# Patient Record
Sex: MALE | Race: WHITE | NOT HISPANIC OR LATINO | Employment: OTHER | URBAN - METROPOLITAN AREA
[De-identification: names, ages, dates, MRNs, and addresses within clinical notes are randomized per-mention and may not be internally consistent; named-entity substitution may affect disease eponyms.]

---

## 2023-07-18 ENCOUNTER — PATIENT OUTREACH (OUTPATIENT)
Dept: CARE COORDINATION | Facility: CLINIC | Age: 63
End: 2023-07-18
Payer: COMMERCIAL

## 2023-09-22 PROBLEM — E78.5 HYPERLIPIDEMIA: Status: ACTIVE | Noted: 2023-09-22

## 2023-09-22 PROBLEM — I25.2 HX OF MYOCARDIAL INFARCTION: Status: ACTIVE | Noted: 2023-07-04

## 2023-09-22 PROBLEM — N40.0 BPH (BENIGN PROSTATIC HYPERPLASIA): Status: ACTIVE | Noted: 2023-09-22

## 2023-09-22 PROBLEM — I25.10 CAD (CORONARY ARTERY DISEASE): Status: ACTIVE | Noted: 2023-09-22

## 2023-09-22 PROBLEM — M54.16 PAIN, RADICULAR, LUMBAR: Status: ACTIVE | Noted: 2023-09-22

## 2023-09-22 PROBLEM — I63.9 CEREBROVASCULAR ACCIDENT (CVA) (MULTI): Status: ACTIVE | Noted: 2023-09-22

## 2023-09-22 PROBLEM — I70.219: Status: ACTIVE | Noted: 2023-09-22

## 2023-09-22 PROBLEM — F17.200 NICOTINE DEPENDENCE: Status: ACTIVE | Noted: 2023-01-05

## 2023-09-22 PROBLEM — M54.16 LUMBAR RADICULITIS: Status: ACTIVE | Noted: 2023-09-22

## 2023-09-22 PROBLEM — K59.00 CONSTIPATION: Status: ACTIVE | Noted: 2023-09-22

## 2023-09-22 PROBLEM — M24.542 CONTRACTURE OF LEFT HAND: Status: ACTIVE | Noted: 2023-09-22

## 2023-09-22 PROBLEM — I67.2 CEREBRAL ATHEROSCLEROSIS: Status: ACTIVE | Noted: 2023-09-22

## 2023-09-22 PROBLEM — I21.9 MYOCARDIAL INFARCT (MULTI): Status: ACTIVE | Noted: 2023-09-22

## 2023-09-22 PROBLEM — J43.9 EMPHYSEMA/COPD (MULTI): Status: ACTIVE | Noted: 2023-09-22

## 2023-09-22 PROBLEM — I10 BENIGN ESSENTIAL HTN: Status: ACTIVE | Noted: 2023-07-04

## 2023-09-22 PROBLEM — C34.90 LUNG CANCER (MULTI): Status: ACTIVE | Noted: 2023-09-22

## 2023-09-22 RX ORDER — LOSARTAN POTASSIUM 50 MG/1
50 TABLET ORAL
COMMUNITY
Start: 2022-08-05

## 2023-09-22 RX ORDER — METHOCARBAMOL 750 MG/1
750 TABLET, FILM COATED ORAL 3 TIMES DAILY PRN
COMMUNITY
Start: 2023-01-17

## 2023-09-22 RX ORDER — AMLODIPINE BESYLATE 5 MG/1
2.5 TABLET ORAL
COMMUNITY
Start: 2023-01-09

## 2023-09-22 RX ORDER — ASPIRIN 81 MG/1
81 TABLET ORAL DAILY
COMMUNITY
Start: 2022-06-06 | End: 2023-10-17 | Stop reason: SDUPTHER

## 2023-09-22 RX ORDER — ALBUTEROL SULFATE 90 UG/1
2 AEROSOL, METERED RESPIRATORY (INHALATION) EVERY 4 HOURS PRN
COMMUNITY
Start: 2023-06-21

## 2023-09-22 RX ORDER — GABAPENTIN 300 MG/1
300 CAPSULE ORAL
COMMUNITY
Start: 2022-06-20 | End: 2023-11-07 | Stop reason: SDUPTHER

## 2023-09-22 RX ORDER — METOPROLOL TARTRATE 25 MG/1
25 TABLET, FILM COATED ORAL 2 TIMES DAILY
COMMUNITY
Start: 2023-01-17 | End: 2023-10-17 | Stop reason: SDUPTHER

## 2023-09-22 RX ORDER — IBUPROFEN 200 MG
1 TABLET ORAL
COMMUNITY
Start: 2022-04-21 | End: 2023-11-08 | Stop reason: SDUPTHER

## 2023-09-24 RX ORDER — NITROGLYCERIN 0.4 MG/1
TABLET SUBLINGUAL
COMMUNITY

## 2023-10-08 VITALS — HEIGHT: 68 IN | WEIGHT: 115.74 LBS | BODY MASS INDEX: 17.54 KG/M2

## 2023-10-08 DIAGNOSIS — C34.11 MALIGNANT NEOPLASM OF UPPER LOBE OF RIGHT LUNG (MULTI): Primary | ICD-10-CM

## 2023-10-08 RX ORDER — DIPHENHYDRAMINE HYDROCHLORIDE 50 MG/ML
50 INJECTION INTRAMUSCULAR; INTRAVENOUS AS NEEDED
Status: CANCELLED | OUTPATIENT
Start: 2023-10-12

## 2023-10-08 RX ORDER — PROCHLORPERAZINE EDISYLATE 5 MG/ML
10 INJECTION INTRAMUSCULAR; INTRAVENOUS EVERY 6 HOURS PRN
Status: CANCELLED | OUTPATIENT
Start: 2023-10-12

## 2023-10-08 RX ORDER — FAMOTIDINE 10 MG/ML
20 INJECTION INTRAVENOUS ONCE AS NEEDED
Status: CANCELLED | OUTPATIENT
Start: 2023-10-12

## 2023-10-08 RX ORDER — ALBUTEROL SULFATE 0.83 MG/ML
3 SOLUTION RESPIRATORY (INHALATION) AS NEEDED
Status: CANCELLED | OUTPATIENT
Start: 2023-10-12

## 2023-10-08 RX ORDER — PROCHLORPERAZINE MALEATE 10 MG
10 TABLET ORAL EVERY 6 HOURS PRN
Status: CANCELLED | OUTPATIENT
Start: 2023-10-12

## 2023-10-08 RX ORDER — EPINEPHRINE 0.3 MG/.3ML
0.3 INJECTION SUBCUTANEOUS EVERY 5 MIN PRN
Status: CANCELLED | OUTPATIENT
Start: 2023-10-12

## 2023-10-08 RX ORDER — HEPARIN SODIUM,PORCINE/PF 10 UNIT/ML
50 SYRINGE (ML) INTRAVENOUS AS NEEDED
Status: CANCELLED | OUTPATIENT
Start: 2023-10-11

## 2023-10-08 RX ORDER — HEPARIN 100 UNIT/ML
500 SYRINGE INTRAVENOUS AS NEEDED
Status: CANCELLED | OUTPATIENT
Start: 2023-10-11

## 2023-10-09 ENCOUNTER — LAB (OUTPATIENT)
Dept: LAB | Facility: LAB | Age: 63
End: 2023-10-09
Payer: COMMERCIAL

## 2023-10-09 DIAGNOSIS — C34.11 MALIGNANT NEOPLASM OF UPPER LOBE OF RIGHT LUNG (MULTI): ICD-10-CM

## 2023-10-09 DIAGNOSIS — C34.90 LUNG CANCER METASTATIC TO BRAIN (MULTI): Primary | ICD-10-CM

## 2023-10-09 DIAGNOSIS — C79.31 LUNG CANCER METASTATIC TO BRAIN (MULTI): Primary | ICD-10-CM

## 2023-10-09 LAB
ALBUMIN SERPL BCP-MCNC: 4.3 G/DL (ref 3.4–5)
ALP SERPL-CCNC: 58 U/L (ref 33–136)
ALT SERPL W P-5'-P-CCNC: 14 U/L (ref 10–52)
ANION GAP SERPL CALC-SCNC: 12 MMOL/L (ref 10–20)
AST SERPL W P-5'-P-CCNC: 15 U/L (ref 9–39)
BASOPHILS # BLD AUTO: 0.06 X10*3/UL (ref 0–0.1)
BASOPHILS NFR BLD AUTO: 0.8 %
BILIRUB SERPL-MCNC: 0.6 MG/DL (ref 0–1.2)
BUN SERPL-MCNC: 11 MG/DL (ref 6–23)
CALCIUM SERPL-MCNC: 9.3 MG/DL (ref 8.6–10.6)
CHLORIDE SERPL-SCNC: 99 MMOL/L (ref 98–107)
CO2 SERPL-SCNC: 26 MMOL/L (ref 21–32)
CORTIS AM PEAK SERPL-MSCNC: 13.2 UG/DL (ref 5–20)
CREAT SERPL-MCNC: 0.56 MG/DL (ref 0.5–1.3)
EOSINOPHIL # BLD AUTO: 0.76 X10*3/UL (ref 0–0.7)
EOSINOPHIL NFR BLD AUTO: 9.7 %
ERYTHROCYTE [DISTWIDTH] IN BLOOD BY AUTOMATED COUNT: 18.1 % (ref 11.5–14.5)
GFR SERPL CREATININE-BSD FRML MDRD: >90 ML/MIN/1.73M*2
GLUCOSE SERPL-MCNC: 83 MG/DL (ref 74–99)
HCT VFR BLD AUTO: 35.2 % (ref 41–52)
HGB BLD-MCNC: 11.2 G/DL (ref 13.5–17.5)
IMM GRANULOCYTES # BLD AUTO: 0.04 X10*3/UL (ref 0–0.7)
IMM GRANULOCYTES NFR BLD AUTO: 0.5 % (ref 0–0.9)
LYMPHOCYTES # BLD AUTO: 3.5 X10*3/UL (ref 1.2–4.8)
LYMPHOCYTES NFR BLD AUTO: 44.7 %
MCH RBC QN AUTO: 21.3 PG (ref 26–34)
MCHC RBC AUTO-ENTMCNC: 31.8 G/DL (ref 32–36)
MCV RBC AUTO: 67 FL (ref 80–100)
MONOCYTES # BLD AUTO: 0.76 X10*3/UL (ref 0.1–1)
MONOCYTES NFR BLD AUTO: 9.7 %
NEUTROPHILS # BLD AUTO: 2.71 X10*3/UL (ref 1.2–7.7)
NEUTROPHILS NFR BLD AUTO: 34.6 %
NRBC BLD-RTO: ABNORMAL /100{WBCS}
PLATELET # BLD AUTO: 288 X10*3/UL (ref 150–450)
PMV BLD AUTO: 10.2 FL (ref 7.5–11.5)
POTASSIUM SERPL-SCNC: 4.7 MMOL/L (ref 3.5–5.3)
PROT SERPL-MCNC: 6.9 G/DL (ref 6.4–8.2)
RBC # BLD AUTO: 5.26 X10*6/UL (ref 4.5–5.9)
SODIUM SERPL-SCNC: 132 MMOL/L (ref 136–145)
T4 FREE SERPL-MCNC: 0.98 NG/DL (ref 0.78–1.48)
TSH SERPL-ACNC: 4 MIU/L (ref 0.44–3.98)
WBC # BLD AUTO: 7.8 X10*3/UL (ref 4.4–11.3)

## 2023-10-09 PROCEDURE — 80053 COMPREHEN METABOLIC PANEL: CPT

## 2023-10-09 PROCEDURE — 84443 ASSAY THYROID STIM HORMONE: CPT

## 2023-10-09 PROCEDURE — 84439 ASSAY OF FREE THYROXINE: CPT

## 2023-10-09 PROCEDURE — 82024 ASSAY OF ACTH: CPT

## 2023-10-09 PROCEDURE — 82533 TOTAL CORTISOL: CPT

## 2023-10-09 PROCEDURE — 36415 COLL VENOUS BLD VENIPUNCTURE: CPT

## 2023-10-11 ENCOUNTER — APPOINTMENT (OUTPATIENT)
Dept: HEMATOLOGY/ONCOLOGY | Facility: CLINIC | Age: 63
End: 2023-10-11
Payer: COMMERCIAL

## 2023-10-11 ENCOUNTER — OFFICE VISIT (OUTPATIENT)
Dept: HEMATOLOGY/ONCOLOGY | Facility: CLINIC | Age: 63
End: 2023-10-11
Payer: COMMERCIAL

## 2023-10-11 ENCOUNTER — INFUSION (OUTPATIENT)
Dept: HEMATOLOGY/ONCOLOGY | Facility: CLINIC | Age: 63
End: 2023-10-11
Payer: COMMERCIAL

## 2023-10-11 VITALS
RESPIRATION RATE: 18 BRPM | BODY MASS INDEX: 19.86 KG/M2 | SYSTOLIC BLOOD PRESSURE: 121 MMHG | WEIGHT: 130.29 LBS | TEMPERATURE: 96.8 F | OXYGEN SATURATION: 96 % | DIASTOLIC BLOOD PRESSURE: 69 MMHG | HEART RATE: 80 BPM

## 2023-10-11 DIAGNOSIS — C34.11 MALIGNANT NEOPLASM OF UPPER LOBE OF RIGHT LUNG (MULTI): ICD-10-CM

## 2023-10-11 DIAGNOSIS — C34.11 MALIGNANT NEOPLASM OF UPPER LOBE OF RIGHT LUNG (MULTI): Primary | ICD-10-CM

## 2023-10-11 LAB — ACTH PLAS-MCNC: 62.1 PG/ML (ref 7.2–63.3)

## 2023-10-11 PROCEDURE — 1126F AMNT PAIN NOTED NONE PRSNT: CPT | Performed by: NURSE PRACTITIONER

## 2023-10-11 PROCEDURE — 1159F MED LIST DOCD IN RCRD: CPT | Performed by: NURSE PRACTITIONER

## 2023-10-11 PROCEDURE — 99214 OFFICE O/P EST MOD 30 MIN: CPT | Performed by: NURSE PRACTITIONER

## 2023-10-11 PROCEDURE — 96413 CHEMO IV INFUSION 1 HR: CPT

## 2023-10-11 PROCEDURE — 2500000004 HC RX 250 GENERAL PHARMACY W/ HCPCS (ALT 636 FOR OP/ED): Mod: SE | Performed by: NURSE PRACTITIONER

## 2023-10-11 PROCEDURE — 3074F SYST BP LT 130 MM HG: CPT | Performed by: NURSE PRACTITIONER

## 2023-10-11 PROCEDURE — 3078F DIAST BP <80 MM HG: CPT | Performed by: NURSE PRACTITIONER

## 2023-10-11 RX ORDER — HEPARIN SODIUM,PORCINE/PF 10 UNIT/ML
50 SYRINGE (ML) INTRAVENOUS AS NEEDED
Status: CANCELLED | OUTPATIENT
Start: 2023-10-11

## 2023-10-11 RX ORDER — ALBUTEROL SULFATE 0.83 MG/ML
3 SOLUTION RESPIRATORY (INHALATION) AS NEEDED
Status: DISCONTINUED | OUTPATIENT
Start: 2023-10-11 | End: 2023-10-11 | Stop reason: HOSPADM

## 2023-10-11 RX ORDER — FAMOTIDINE 10 MG/ML
20 INJECTION INTRAVENOUS ONCE AS NEEDED
Status: DISCONTINUED | OUTPATIENT
Start: 2023-10-11 | End: 2023-10-11 | Stop reason: HOSPADM

## 2023-10-11 RX ORDER — PROCHLORPERAZINE EDISYLATE 5 MG/ML
10 INJECTION INTRAMUSCULAR; INTRAVENOUS EVERY 6 HOURS PRN
Status: DISCONTINUED | OUTPATIENT
Start: 2023-10-11 | End: 2023-10-11 | Stop reason: HOSPADM

## 2023-10-11 RX ORDER — PROCHLORPERAZINE MALEATE 10 MG
10 TABLET ORAL EVERY 6 HOURS PRN
Status: DISCONTINUED | OUTPATIENT
Start: 2023-10-11 | End: 2023-10-11 | Stop reason: HOSPADM

## 2023-10-11 RX ORDER — DIPHENHYDRAMINE HYDROCHLORIDE 50 MG/ML
50 INJECTION INTRAMUSCULAR; INTRAVENOUS AS NEEDED
Status: DISCONTINUED | OUTPATIENT
Start: 2023-10-11 | End: 2023-10-11 | Stop reason: HOSPADM

## 2023-10-11 RX ORDER — EPINEPHRINE 0.3 MG/.3ML
0.3 INJECTION SUBCUTANEOUS EVERY 5 MIN PRN
Status: DISCONTINUED | OUTPATIENT
Start: 2023-10-11 | End: 2023-10-11 | Stop reason: HOSPADM

## 2023-10-11 RX ORDER — HEPARIN 100 UNIT/ML
500 SYRINGE INTRAVENOUS AS NEEDED
Status: CANCELLED | OUTPATIENT
Start: 2023-10-11

## 2023-10-11 RX ADMIN — SODIUM CHLORIDE 200 MG: 9 INJECTION, SOLUTION INTRAVENOUS at 12:45

## 2023-10-11 ASSESSMENT — PATIENT HEALTH QUESTIONNAIRE - PHQ9
2. FEELING DOWN, DEPRESSED OR HOPELESS: NOT AT ALL
SUM OF ALL RESPONSES TO PHQ9 QUESTIONS 1 AND 2: 0
1. LITTLE INTEREST OR PLEASURE IN DOING THINGS: NOT AT ALL

## 2023-10-11 ASSESSMENT — PAIN SCALES - GENERAL: PAINLEVEL: 0-NO PAIN

## 2023-10-11 ASSESSMENT — ENCOUNTER SYMPTOMS
DEPRESSION: 0
OCCASIONAL FEELINGS OF UNSTEADINESS: 0
LOSS OF SENSATION IN FEET: 0

## 2023-10-11 ASSESSMENT — COLUMBIA-SUICIDE SEVERITY RATING SCALE - C-SSRS
1. IN THE PAST MONTH, HAVE YOU WISHED YOU WERE DEAD OR WISHED YOU COULD GO TO SLEEP AND NOT WAKE UP?: NO
2. HAVE YOU ACTUALLY HAD ANY THOUGHTS OF KILLING YOURSELF?: NO
6. HAVE YOU EVER DONE ANYTHING, STARTED TO DO ANYTHING, OR PREPARED TO DO ANYTHING TO END YOUR LIFE?: NO

## 2023-10-11 NOTE — SIGNIFICANT EVENT
10/11/23 1100   Prechemo Checklist   Has the patient been in the hospital, ED, or urgent care since last date of service No   Chemo/Immuno Consent Signed Yes   Protocol/Indications Verified Yes   Confirmed to previous date/time of medication Yes   Compared to previous dose Yes   All medications are dated accurately Yes   Pregnancy Test Negative Not applicable   Parameters Met Yes   BSA/Weight-Height Verified Yes   Dose Calculations Verified Yes

## 2023-10-11 NOTE — PROGRESS NOTES
Cancer History:   Treatment Synopsis:    DIAGNOSIS   Stage IVB ([id number]) poorly differentiated adenocarcinoma of RUL     PATHOLOGY  C. FINE NEEDLE ASPIRATION LYMPH NODE, R 11, CYTOLOGY AND CELL BLOCK:   POORLY DIFFERENTIATED CARCINOMA, CONSISTENT WITH ADENOCARCINOMA, SEE NOTE.      PD-L1 22C3 by Immunohistochemistry with Interpretation, pembrolizumab   (KEYTRUDA)     Interpretation: HIGH EXPRESSION     Tumor Proportion Score (TPS): 80%     MICROSATELLITE STATUS: Microsatellite Stable (MAGDIEL)     DISEASE  ASSOCIATED GENOMIC FINDINGS:   KRAS p.G13D ([id number] c.38G>A)   PIK3CA p.E542K ([id number] c.1624G>A)   TP53 p.E285K ([id number] c.853G>A)     DISEASE RELEVANT ALTERATIONS NOT DETECTED:   Negative for ALK fusion.   Negative for  BRAF V600E.   Negative for EGFR sensitizing mutation.   Negative for KRAS G12C.   Negative for MET exon 14 skipping mutation.   Negative for NTRK fusion.   Negative for RET fusion.   Negative for ROS1 fusion.      Therapies:  Pembrolizumab to begin on 08/31/23        Treatment History:    Mr. Pillo Guerin is a 64 YO with PMH of EtOH use disorder, CVA, CAD c/b NSTEMI s/p 2 stents and stage IVB ([id number]) poorly differentiated adenocarcinoma of RUL  seen as initial consultation.     Patient was initially diagnosed 08/2022 after incidentally found RUL lesion was worked up during admission for hyponatremia presumed to be 2/2 beer potomania. He was then seen by his pulmonologist Dr. Callejas who ordered PET and MRI but this was not obtained  nor were multiple attempts to schedule for oncologic NPV.     I saw him in August 2023 and he endorsed haviing lost 15 lbs since stroke April 2022. His appetite is good, denies dysgeusia. He has slowed down a bit since he had his stroke. He can walk without becoming dyspneic but is limited by his stroke.  He is  worried about his cancer spreading.       Oncology History   Lung cancer (CMS/HCC)   8/31/2023 -  Chemotherapy    Pembrolizumab, 21 Day Cycles      9/22/2023 Initial Diagnosis    Lung cancer (CMS/HCC)         [facility] - Medical Oncology Follow-Up Visit    Patient ID Pillo Guerin is a 63 y.o. male        Chief Concern: Here in clinic for follow-up and treatment with pembrolizumab.    HPI Patient is here today for follow-up and treatment with pembrolizumab. He is feeling well. Breathing is good, no CP, SOB, or dizziness. No neurological symptoms. States he is scheduled for GK next Tuesday. Denies abdominal pain, N/V/D/C. No new aches/pains. No rash or skin issues. No other concerns or complaints.         Meds (Current):    Current Outpatient Medications:     albuterol 90 mcg/actuation inhaler, Inhale 2 puffs every 4 hours if needed., Disp: , Rfl:     amLODIPine (Norvasc) 5 mg tablet, Take 0.5 tablets (2.5 mg) by mouth once daily., Disp: , Rfl:     aspirin 81 mg chewable tablet, CHEW 1 TABLET BY MOUTH ONCE DAILY, Disp: 30 tablet, Rfl: 3    aspirin 81 mg EC tablet, Take 1 tablet (81 mg) by mouth once daily., Disp: , Rfl:     atorvastatin (Lipitor) 80 mg tablet, TAKE 1 TABLET BY MOUTH ONCE DAILY, Disp: 30 tablet, Rfl: 1    diclofenac sodium 1 % kit, Apply 2 g topically every 6 hours if needed., Disp: , Rfl:     gabapentin (Neurontin) 300 mg capsule, Take 1 capsule (300 mg) by mouth., Disp: , Rfl:     losartan (Cozaar) 50 mg tablet, Take 1 tablet (50 mg) by mouth., Disp: , Rfl:     methocarbamol (Robaxin) 750 mg tablet, Take 1 tablet (750 mg) by mouth 3 times a day as needed., Disp: , Rfl:     metoprolol tartrate (Lopressor) 25 mg tablet, Take 1 tablet (25 mg) by mouth twice a day., Disp: , Rfl:     metoprolol tartrate (Lopressor) 25 mg tablet, TAKE 1 TABLET BY MOUTH TWO TIMES A DAY, Disp: 60 tablet, Rfl: 1    nicotine (Nicoderm CQ) 21 mg/24 hr patch, Place 1 patch on the skin., Disp: , Rfl:     nitroglycerin (Nitrostat) 0.4 mg SL tablet, Place under the tongue., Disp: , Rfl:     nitroglycerin (Nitrostat) 0.4 mg SL tablet, DISSOLVE  1 TABLET UNDER TONGUE EVERY 5 MINUTES FOR 3 DOSES AS NEEDED FOR CHEST PAIN. IF PAIN PERSISTS DIAL 911., Disp: 25 tablet, Rfl: 0    ticagrelor (Brilinta) 90 mg tablet, TAKE 1 TABLET BY MOUTH TWO TIMES A DAY, Disp: 60 tablet, Rfl: 11    No Known Allergies    Review of Systems - Oncology 12 point ROS was obtained and negative unless otherwise mentioned in the above HPI.      Objective   BSA: 1.68 meters squared  /69   Pulse 80   Temp 36 °C (96.8 °F)   Resp 18   Wt 59.1 kg (130 lb 4.7 oz)   SpO2 96%   BMI 19.86 kg/m²          Physical Exam  Constitutional:       Appearance: Normal appearance.   Eyes:      Pupils: Pupils are equal, round, and reactive to light.   Cardiovascular:      Rate and Rhythm: Normal rate and regular rhythm.   Pulmonary:      Effort: Pulmonary effort is normal.      Breath sounds: Normal breath sounds.   Abdominal:      General: Bowel sounds are normal.      Palpations: Abdomen is soft.   Musculoskeletal:         General: No swelling.      Comments: Wears left leg brace   Skin:     General: Skin is warm and dry.   Neurological:      General: No focal deficit present.      Mental Status: He is alert and oriented to person, place, and time.   Psychiatric:         Mood and Affect: Mood normal.         Behavior: Behavior normal.          Results:  Labs:  Lab Results   Component Value Date    WBC 7.8 10/09/2023    HGB 11.2 (L) 10/09/2023    HCT 35.2 (L) 10/09/2023    MCV 67 (L) 10/09/2023     10/09/2023      Lab Results   Component Value Date    NEUTROABS 2.71 10/09/2023      Lab Results   Component Value Date    GLUCOSE 83 10/09/2023    CALCIUM 9.3 10/09/2023     (L) 10/09/2023    K 4.7 10/09/2023    CO2 26 10/09/2023    CL 99 10/09/2023    BUN 11 10/09/2023    CREATININE 0.56 10/09/2023    MG 2.21 09/19/2023     Lab Results   Component Value Date    ALT 14 10/09/2023    AST 15 10/09/2023    ALKPHOS 58 10/09/2023    BILITOT 0.6 10/09/2023    BILIDIR 0.3 07/31/2022      Lab  Results   Component Value Date    ACTH 62.1 10/09/2023    CORTISOL 13.2 10/09/2023    TSH 4.00 (H) 10/09/2023    FREET4 0.98 10/09/2023              === Results for orders placed in visit on 08/16/23 ===    MR brain w and wo IV contrast [[id number]] 08/16/2023    Status: Normal  Cystic 8 x 6 mm metastatic lesion in the medial right occipital lobe  with mild associated edema. No mass effect or hemorrhage.    Moderate microangiopathic disease. Chronic lacunar infarct in the  right corona radiata/centrum semiovale.    Assessment/Plan      Pillo Guerin is a 63 y.o. male here for follow up.    Assessment and Plan:   Assessment:    Pal is a 62 YO with PMH of EtOH use disorder, CVA, CAD c/b NSTEMI s/p 2 stents and stage IVB ([id number]) poorly differentiated adenocarcinoma of RUL with metastasis  to the lungs, left occipital lobe and left gluteus                        Plan:    #Stage IVB ([id number]) poorly differentiated adenocarcinoma of RUL, TPS 80%, lack of actionable genomic alterations noted.  -PET and MRI test results discussed   -Discussed with the patient next steps, including pembrolizumab Rx  He was able to review the potential benefits and side effects of the treatment and able to provide a consent over the phone  - Pembrolizumab started on 08/31/23   -RTC in 3 weeks for follow-up and treatment.    # Right occipital lobe 8 mm metastasis   Asymptomatic at the moment   No need for dexamethasone   -seen by rad onc (Dr. Mckeon) and plan for GK, scheduled 10/17/23     -has pulmonology appointment scheduled 10/13/23, pulmonary testing scheduled 10/16/23  Referral placed for Dr. Michael Esparza     # Pain on right gluteal region-improved  Due to large centrally necrotic and peripherally hypermetabolic mass in the right lower paramediastinal muscle extending into the adjacent subcutaneous tissue and superior right gluteal muscle with involvement of the superior aspect of the right iliac  bone, right florencio sacrum with  worsening associated cortical erosions and lytic  changes and extension into the right sacroiliac joint (L5-S1) consistent with osseous metastatic disease with associated soft tissue mass.  Rx for gabapentin provided - he was taking 600 mg BID 3 months ago        # Stroke in April 2022 and hx of CAD  -   Stable -   On ASA and ticagrelor      # HTN - on amlodipine      # HLD - atorvastatin

## 2023-10-16 ENCOUNTER — HOSPITAL ENCOUNTER (OUTPATIENT)
Dept: RESPIRATORY THERAPY | Facility: HOSPITAL | Age: 63
Discharge: HOME | End: 2023-10-16
Payer: COMMERCIAL

## 2023-10-16 DIAGNOSIS — C34.90 MALIGNANT NEOPLASM OF LUNG, UNSPECIFIED LATERALITY, UNSPECIFIED PART OF LUNG (MULTI): Primary | ICD-10-CM

## 2023-10-16 PROCEDURE — 94726 PLETHYSMOGRAPHY LUNG VOLUMES: CPT | Performed by: INTERNAL MEDICINE

## 2023-10-16 PROCEDURE — 94060 EVALUATION OF WHEEZING: CPT

## 2023-10-16 PROCEDURE — 94726 PLETHYSMOGRAPHY LUNG VOLUMES: CPT

## 2023-10-16 PROCEDURE — 94729 DIFFUSING CAPACITY: CPT | Performed by: INTERNAL MEDICINE

## 2023-10-16 PROCEDURE — 94060 EVALUATION OF WHEEZING: CPT | Performed by: INTERNAL MEDICINE

## 2023-10-17 ENCOUNTER — RADIATION ONCOLOGY OTV (OUTPATIENT)
Dept: RADIATION ONCOLOGY | Facility: HOSPITAL | Age: 63
End: 2023-10-17

## 2023-10-17 ENCOUNTER — TELEMEDICINE (OUTPATIENT)
Dept: PULMONOLOGY | Facility: CLINIC | Age: 63
End: 2023-10-17
Payer: COMMERCIAL

## 2023-10-17 ENCOUNTER — APPOINTMENT (OUTPATIENT)
Dept: RADIATION ONCOLOGY | Facility: HOSPITAL | Age: 63
End: 2023-10-17
Payer: COMMERCIAL

## 2023-10-17 ENCOUNTER — HOSPITAL ENCOUNTER (OUTPATIENT)
Dept: RADIATION ONCOLOGY | Facility: HOSPITAL | Age: 63
Setting detail: RADIATION/ONCOLOGY SERIES
Discharge: STILL ADMITTED OR ADMINISTRATIVE | End: 2023-10-17
Payer: COMMERCIAL

## 2023-10-17 ENCOUNTER — HOSPITAL ENCOUNTER (OUTPATIENT)
Dept: RADIOLOGY | Facility: HOSPITAL | Age: 63
Discharge: HOME | End: 2023-10-17
Payer: COMMERCIAL

## 2023-10-17 VITALS
DIASTOLIC BLOOD PRESSURE: 69 MMHG | RESPIRATION RATE: 16 BRPM | TEMPERATURE: 97.5 F | OXYGEN SATURATION: 100 % | HEART RATE: 57 BPM | SYSTOLIC BLOOD PRESSURE: 119 MMHG

## 2023-10-17 VITALS
SYSTOLIC BLOOD PRESSURE: 115 MMHG | HEART RATE: 65 BPM | OXYGEN SATURATION: 98 % | DIASTOLIC BLOOD PRESSURE: 68 MMHG | RESPIRATION RATE: 16 BRPM

## 2023-10-17 DIAGNOSIS — C34.90 LUNG CANCER METASTATIC TO BRAIN (MULTI): Primary | ICD-10-CM

## 2023-10-17 DIAGNOSIS — J43.9 PULMONARY EMPHYSEMA, UNSPECIFIED EMPHYSEMA TYPE (MULTI): Primary | ICD-10-CM

## 2023-10-17 DIAGNOSIS — C34.90 MALIGNANT NEOPLASM OF LUNG, UNSPECIFIED LATERALITY, UNSPECIFIED PART OF LUNG (MULTI): ICD-10-CM

## 2023-10-17 DIAGNOSIS — C79.31 SECONDARY MALIGNANT NEOPLASM OF BRAIN (MULTI): ICD-10-CM

## 2023-10-17 DIAGNOSIS — F17.210 CIGARETTE NICOTINE DEPENDENCE WITHOUT COMPLICATION: ICD-10-CM

## 2023-10-17 DIAGNOSIS — C79.31 LUNG CANCER METASTATIC TO BRAIN (MULTI): ICD-10-CM

## 2023-10-17 DIAGNOSIS — J43.9 PULMONARY EMPHYSEMA, UNSPECIFIED EMPHYSEMA TYPE (MULTI): ICD-10-CM

## 2023-10-17 DIAGNOSIS — C79.89 SECONDARY MALIGNANT NEOPLASM OF OTHER SPECIFIED SITES (MULTI): ICD-10-CM

## 2023-10-17 DIAGNOSIS — Z51.0 ENCOUNTER FOR ANTINEOPLASTIC RADIATION THERAPY: ICD-10-CM

## 2023-10-17 DIAGNOSIS — C79.31 LUNG CANCER METASTATIC TO BRAIN (MULTI): Primary | ICD-10-CM

## 2023-10-17 DIAGNOSIS — C34.90 LUNG CANCER METASTATIC TO BRAIN (MULTI): ICD-10-CM

## 2023-10-17 LAB
RAD ONC MSQ ACTUAL FRACTIONS DELIVERED: 1
RAD ONC MSQ ACTUAL SESSION DELIVERED DOSE: 2400 CGRAY
RAD ONC MSQ ACTUAL TOTAL DOSE: 2400 CGRAY
RAD ONC MSQ ELAPSED DAYS: 0
RAD ONC MSQ LAST DATE: NORMAL
RAD ONC MSQ PRESCRIBED FRACTIONAL DOSE: 2400 CGRAY
RAD ONC MSQ PRESCRIBED NUMBER OF FRACTIONS: 1
RAD ONC MSQ PRESCRIBED TECHNIQUE: NORMAL
RAD ONC MSQ PRESCRIBED TOTAL DOSE: 2400 CGRAY
RAD ONC MSQ START DATE: NORMAL
RAD ONC MSQ TREATMENT COURSE NUMBER: 1
RAD ONC MSQ TREATMENT SITE: NORMAL

## 2023-10-17 PROCEDURE — 77432 STEREOTACTIC RADIATION TRMT: CPT | Performed by: STUDENT IN AN ORGANIZED HEALTH CARE EDUCATION/TRAINING PROGRAM

## 2023-10-17 PROCEDURE — 77334 RADIATION TREATMENT AID(S): CPT | Performed by: STUDENT IN AN ORGANIZED HEALTH CARE EDUCATION/TRAINING PROGRAM

## 2023-10-17 PROCEDURE — 99442 PR PHYS/QHP TELEPHONE EVALUATION 11-20 MIN: CPT | Performed by: INTERNAL MEDICINE

## 2023-10-17 PROCEDURE — A9575 INJ GADOTERATE MEGLUMI 0.1ML: HCPCS | Performed by: STUDENT IN AN ORGANIZED HEALTH CARE EDUCATION/TRAINING PROGRAM

## 2023-10-17 PROCEDURE — 70553 MRI BRAIN STEM W/O & W/DYE: CPT | Performed by: STUDENT IN AN ORGANIZED HEALTH CARE EDUCATION/TRAINING PROGRAM

## 2023-10-17 PROCEDURE — 2550000001 HC RX 255 CONTRASTS: Performed by: STUDENT IN AN ORGANIZED HEALTH CARE EDUCATION/TRAINING PROGRAM

## 2023-10-17 PROCEDURE — 77370 RADIATION PHYSICS CONSULT: CPT | Mod: XE | Performed by: STUDENT IN AN ORGANIZED HEALTH CARE EDUCATION/TRAINING PROGRAM

## 2023-10-17 PROCEDURE — 61800 APPLY SRS HEADFRAME ADD-ON: CPT | Performed by: NEUROLOGICAL SURGERY

## 2023-10-17 PROCEDURE — 77300 RADIATION THERAPY DOSE PLAN: CPT | Performed by: STUDENT IN AN ORGANIZED HEALTH CARE EDUCATION/TRAINING PROGRAM

## 2023-10-17 PROCEDURE — 70553 MRI BRAIN STEM W/O & W/DYE: CPT | Performed by: RADIOLOGY

## 2023-10-17 PROCEDURE — 77371 SRS MULTISOURCE: CPT | Performed by: STUDENT IN AN ORGANIZED HEALTH CARE EDUCATION/TRAINING PROGRAM

## 2023-10-17 PROCEDURE — 77295 3-D RADIOTHERAPY PLAN: CPT | Performed by: STUDENT IN AN ORGANIZED HEALTH CARE EDUCATION/TRAINING PROGRAM

## 2023-10-17 PROCEDURE — 2500000004 HC RX 250 GENERAL PHARMACY W/ HCPCS (ALT 636 FOR OP/ED): Performed by: NEUROLOGICAL SURGERY

## 2023-10-17 PROCEDURE — 61796 SRS CRANIAL LESION SIMPLE: CPT | Performed by: NEUROLOGICAL SURGERY

## 2023-10-17 RX ORDER — TIOTROPIUM BROMIDE 18 UG/1
1 CAPSULE ORAL; RESPIRATORY (INHALATION)
Qty: 30 CAPSULE | Refills: 5 | Status: SHIPPED | OUTPATIENT
Start: 2023-10-17 | End: 2024-02-26 | Stop reason: SDUPTHER

## 2023-10-17 RX ORDER — LIDOCAINE HYDROCHLORIDE 10 MG/ML
INJECTION INFILTRATION; PERINEURAL
Status: DISCONTINUED
Start: 2023-10-17 | End: 2023-10-17 | Stop reason: HOSPADM

## 2023-10-17 RX ORDER — IBUPROFEN 200 MG
400 TABLET ORAL AS NEEDED
Status: SHIPPED | OUTPATIENT
Start: 2023-10-17

## 2023-10-17 RX ORDER — ACETAMINOPHEN 325 MG/1
650 TABLET ORAL EVERY 4 HOURS PRN
Status: SHIPPED | OUTPATIENT
Start: 2023-10-17

## 2023-10-17 RX ORDER — LIDOCAINE AND PRILOCAINE 25; 25 MG/G; MG/G
CREAM TOPICAL
Status: DISCONTINUED
Start: 2023-10-17 | End: 2023-10-17 | Stop reason: HOSPADM

## 2023-10-17 RX ORDER — HYDROMORPHONE HYDROCHLORIDE 1 MG/ML
0.4 INJECTION, SOLUTION INTRAMUSCULAR; INTRAVENOUS; SUBCUTANEOUS AS NEEDED
Status: SHIPPED | OUTPATIENT
Start: 2023-10-17

## 2023-10-17 RX ORDER — ONDANSETRON HYDROCHLORIDE 2 MG/ML
4 INJECTION, SOLUTION INTRAVENOUS EVERY 4 HOURS PRN
Status: SHIPPED | OUTPATIENT
Start: 2023-10-17

## 2023-10-17 RX ORDER — MIDAZOLAM HYDROCHLORIDE 1 MG/ML
0.5 INJECTION INTRAMUSCULAR; INTRAVENOUS AS NEEDED
Status: SHIPPED | OUTPATIENT
Start: 2023-10-17

## 2023-10-17 RX ORDER — HYDROMORPHONE HYDROCHLORIDE 1 MG/ML
INJECTION, SOLUTION INTRAMUSCULAR; INTRAVENOUS; SUBCUTANEOUS
Status: DISCONTINUED
Start: 2023-10-17 | End: 2023-10-17 | Stop reason: HOSPADM

## 2023-10-17 RX ORDER — LIDOCAINE AND PRILOCAINE 25; 25 MG/G; MG/G
1 CREAM TOPICAL ONCE
Status: COMPLETED | OUTPATIENT
Start: 2023-10-17 | End: 2023-10-17

## 2023-10-17 RX ORDER — MIDAZOLAM HYDROCHLORIDE 1 MG/ML
INJECTION INTRAMUSCULAR; INTRAVENOUS
Status: DISCONTINUED
Start: 2023-10-17 | End: 2023-10-17 | Stop reason: HOSPADM

## 2023-10-17 RX ORDER — BUPIVACAINE HYDROCHLORIDE 5 MG/ML
INJECTION, SOLUTION EPIDURAL; INTRACAUDAL
Status: DISCONTINUED
Start: 2023-10-17 | End: 2023-10-17 | Stop reason: HOSPADM

## 2023-10-17 RX ORDER — OXYCODONE HYDROCHLORIDE 5 MG/1
10 TABLET ORAL EVERY 4 HOURS PRN
Status: SHIPPED | OUTPATIENT
Start: 2023-10-17

## 2023-10-17 RX ORDER — GADOTERATE MEGLUMINE 376.9 MG/ML
15 INJECTION INTRAVENOUS
Status: COMPLETED | OUTPATIENT
Start: 2023-10-17 | End: 2023-10-17

## 2023-10-17 RX ADMIN — MIDAZOLAM HYDROCHLORIDE 0.5 MG: 1 INJECTION, SOLUTION INTRAMUSCULAR; INTRAVENOUS at 12:30

## 2023-10-17 RX ADMIN — MIDAZOLAM HYDROCHLORIDE 0.5 MG: 1 INJECTION INTRAMUSCULAR; INTRAVENOUS at 08:30

## 2023-10-17 RX ADMIN — HYDROMORPHONE HYDROCHLORIDE 0.4 MG: 1 INJECTION, SOLUTION INTRAMUSCULAR; INTRAVENOUS; SUBCUTANEOUS at 08:30

## 2023-10-17 RX ADMIN — GADOTERATE MEGLUMINE 24 ML: 376.9 INJECTION INTRAVENOUS at 09:16

## 2023-10-17 RX ADMIN — HYDROMORPHONE HYDROCHLORIDE 0.4 MG: 1 INJECTION, SOLUTION INTRAMUSCULAR; INTRAVENOUS; SUBCUTANEOUS at 12:30

## 2023-10-17 RX ADMIN — LIDOCAINE AND PRILOCAINE 10 APPLICATION: 25; 25 CREAM TOPICAL at 07:00

## 2023-10-17 ASSESSMENT — ENCOUNTER SYMPTOMS
SHORTNESS OF BREATH: 0
CHILLS: 0
DEPRESSION: 0
ROS GI COMMENTS: AS IN HPI
PALPITATIONS: 0
FEVER: 0
PSYCHIATRIC NEGATIVE: 1
GASTROINTESTINAL NEGATIVE: 1
UNEXPECTED WEIGHT CHANGE: 0
COUGH: 0
NEUROLOGICAL NEGATIVE: 1

## 2023-10-17 ASSESSMENT — PATIENT HEALTH QUESTIONNAIRE - PHQ9
2. FEELING DOWN, DEPRESSED OR HOPELESS: NOT AT ALL
SUM OF ALL RESPONSES TO PHQ9 QUESTIONS 1 & 2: 0
1. LITTLE INTEREST OR PLEASURE IN DOING THINGS: NOT AT ALL

## 2023-10-17 ASSESSMENT — COLUMBIA-SUICIDE SEVERITY RATING SCALE - C-SSRS
6. HAVE YOU EVER DONE ANYTHING, STARTED TO DO ANYTHING, OR PREPARED TO DO ANYTHING TO END YOUR LIFE?: NO
2. HAVE YOU ACTUALLY HAD ANY THOUGHTS OF KILLING YOURSELF?: NO
1. IN THE PAST MONTH, HAVE YOU WISHED YOU WERE DEAD OR WISHED YOU COULD GO TO SLEEP AND NOT WAKE UP?: NO

## 2023-10-17 ASSESSMENT — PAIN SCALES - GENERAL: PAINLEVEL: 0-NO PAIN

## 2023-10-17 NOTE — PROGRESS NOTES
This visit was completed via telephone. All issues as below were discussed and addressed but no physical exam was performed. If it was felt that the patient should be evaluated in clinic then they were directed there. The patient verbally consented to visit.      Subjective   Patient ID: Pillo Guerin is a 63 y.o. male who presents for Follow-up (From PFT 10-16-23).  63 year old man h/o CVA here for f/u emphysema. NL PFTs 10/2023.  Pt now following w/ oncology. Thinks breathing is good.  No fevers, chills or cough.   Currently on albuterol but rarely uses it.  ET is limited by pain. Current 1 pack/day smoker.       Review of Systems   Constitutional:  Negative for chills, fever and unexpected weight change.   Respiratory:  Negative for cough and shortness of breath.    Cardiovascular:  Negative for chest pain, palpitations and leg swelling.   Gastrointestinal: Negative.         As in HPI   Skin:  Negative for rash.   Neurological: Negative.    Psychiatric/Behavioral: Negative.         Objective   Physical Exam    Assessment/Plan   Problem List Items Addressed This Visit       Emphysema/COPD (CMS/HCC) - Primary     seen on CT. NL PFTs 10/2023.  Start Spiriva.  Cont albuterol.  [place] referral          Lung cancer (CMS/HCC)     Followed by oncology.  To start radiation therapy.  On pembrolizumab         Nicotine dependence     40 pack years. Insurance did not cover nicotine patch.              RTC in  4 months.

## 2023-10-17 NOTE — PROGRESS NOTES
Radiation Oncology On Treatment Visit    Patient Name:  Pillo Guerin  MRN:  [id number]  :  1960    Referring Provider: No ref. provider found  Primary Care Provider: Fer Chavez MD  Care Team: Patient Care Team:  Fer Chavez MD as PCP - General  Fer Chavez MD as PCP - [facility] Medicaid PCP  Zuleyma Vines MD as Consulting Physician (Hematology and Oncology)    Date of Service: 10/17/2023     Diagnosis:   Specialty Problems          Radiation Oncology Problems    Lung cancer (CMS/HCC)         Treatment Summary:    24 GY in 1 fraction - Right occipital lesion    SUBJECTIVE: Pt laura Gamma Knife Treatment well.      OBJECTIVE:   Vital Signs:  /68   Pulse 65   Resp 16   SpO2 98%    Pain Scale: The patient's current pain level was assessed.  They report currently having a pain of 0 out of 10.    Other Pertinent Findings:     Toxicity Assessment          10/17/2023    14:43   Toxicity Assessment   Adverse Events Reviewed (WDL) No (Exceptions to WDL)   Treatment Site Brain   Anorexia Grade 0   Anxiety Grade 0   Dehydration Grade 0   Depression Grade 0   Dermatitis Radiation Grade 0   Diarrhea Grade 0   Fatigue Grade 0   Fibrosis Deep Connective Tissue Grade 0   Fracture Grade 0   Nausea Grade 0   Pain Grade 0   Treatment Related Secondary Malignancy Grade 0   Tumor Pain Grade 0   Vomiting Grade 0   Hearing Impaired Grade 0   Middle Ear Inflammation Grade 0   Endocrine Disorders - Other, Specify Grade 0   Blurred Vision Grade 0   Dry Eye Grade 0   Eye Pain Grade 0   Optic Nerve Disorder Grade 0   Retinopathy Grade 0   Eye Disorders - Other, Specify Grade 0   Central Nervous System Necrosis Grade 0   Cognitive Disturbance Grade 0   Edema Cerebral Grade 0   Headache Grade 0   Ischemia Cerebrovascular Grade 0   Memory Impairment Grade 0   Seizure Grade 0          Assessment / Plan:  The patient is tolerating radiation therapy as anticipated.  Continue per current treatment plan.

## 2023-10-18 ENCOUNTER — DOCUMENTATION (OUTPATIENT)
Dept: ORTHOPEDICS | Facility: HOSPITAL | Age: 63
End: 2023-10-18
Payer: COMMERCIAL

## 2023-10-18 NOTE — PROGRESS NOTES
Patient ID: Pillo Guerin is a 63 y.o. male.    Date of Procedure:    10/17/2023     Procedure  Placement of stereotactic head frame  Stereotactic Radiosurgery of Intracranial lesion    Primary Surgeon:   Angelica Munoz MD     Radiation Oncologist:   Elpidio Mckeon MD     Preoperative Diagnosis:   Brain metastasis    Postoperative Diagnosis:   Brain metastasis    History Of Present Illness  Pillo Guerin is a 63 y.o. male with a history of  metastatic lung cancer. Recent imaging showed intracranial lesions. The patient is dispositioned for stereotactic radiosurgery. The risk and benefits of radiosurgery were explained to the patient in detail, including risk of bleeding, seizure, radiation necrosis and frame placement. The patient is amenable to proceed.     Anesthesia:   Local anesthesia injected both superficially and deep.     Procedure in Detail:   The patient was take to the preoperative suite where the gamma knife frame was to be placed. The [place] gamma knife frame was placed over the patient's head. Two frontal and two occipital sites were marked to approximately where the pins would be placed. EMLA cream was placed on the frontal sites, then wiped off. The sites were cleansed with alcohol. Lidocaine was injected superficially and deep. The Le[place]ell gamma knife was placed over the patient's head again. Two frontal and 2 occipital pins were placed. All pins were tightened with a hex wrench to appropriate torque of 0.45 nM. After placement of all the pins, we made sure the frame could not be moved and clear phantom was placed to ensure fit. The patient tolerated the procedure well.     Following placement of frame, the patient was taken to the MRI scanner where intravenous contrast dye was administered and 1 intracranial lesion was identified for treatment. The patient was taken back to the gamma knife suite where radiosurgery was to be carried out. The treatment and dose plans were created by the  radiation oncologist. The target volume of the lesion was outlined, dose prescribed and radiosurgical planning was carried out. The gamma knife plan software was used to set up radiation and  measures were performed without difficulty. The plan was signed and approved by me, the radiation oncologist, and the medical physicist. The patient was then taken into the gamma knife suite and the patient's head frame was secured to the table. The patient received radiosurgery without difficulty, after which the frame was removed and the patient was discharged the same day. Please see Radiation Oncology note by Dr. Mckeon for details of dosages.     Estimated Blood Loss:   Minimal     Complications:   None     Disposition:   Stable to recovery     Sites Treated:   Brain lesion treated:    target received 24.0 Gy at the 60 % isodose line in 5 shot(s). Total volume: 0.031 cm3. 100 % of the tumor received the prescription isodose of 24.0 Gy at the 60 % isodose line.

## 2023-10-19 NOTE — RADIATION COMPLETION NOTES
Radiation Oncology Completion Summary     ID: [id number]      Pillo Guerin   presented to the Gamma knife suite this morning and the stereotactic frame was placed by Dr. Munoz . The patient was transferred to diagnostic radiology for an radiation treatment planning MRI brain w/wo contrast scan.    This imaging information was transferred back to the Gamma Plan workstation.  The MRI images were carefully reviewed and the Right Occipital metastasis  was carefully contoured. A highly conformal plan was developed. Careful consideration was given to the dose fall-off around normal brain tissue.  The plan was reviewed and approved by Selwyn Ferguson(physicist) and by me. The patient was then brought to the Gamma knife suite and the treatment was delivered.      Technical Summary:  Region(s) Treated: Right Occipital  Radiation Dose/Technique: Gamma Knife Perfexion   24 Gy to 60 % isodose line  Total of 1 target irradiated with total number of shots: 5.   Total beam time 35 minutes.     Radiation Treatment Dates: 10/19/23   Total Elapsed Days: 1     Clinical Summary: The patient tolerated the procedure well with no acute complications, and was discharged home.     Followup/Disposition:  The patient will reachout to our office if he has any new acute side effects of Radiation Therapy. We will schedule a MRI of the brain with and without contrast to be done every 3 months with follow-up at that time for surveillance.      Thank you for allowing me to participate in the care of Pillo Guerin     Elpidio Mckeon MD MS  , Department of Radiation Oncology  [facility] [place], [facility]

## 2023-10-23 ENCOUNTER — TELEPHONE (OUTPATIENT)
Dept: ADMISSION | Facility: HOSPITAL | Age: 63
End: 2023-10-23
Payer: COMMERCIAL

## 2023-10-23 ENCOUNTER — APPOINTMENT (OUTPATIENT)
Dept: PULMONOLOGY | Facility: CLINIC | Age: 63
End: 2023-10-23
Payer: COMMERCIAL

## 2023-10-23 NOTE — TELEPHONE ENCOUNTER
Pt checking on next FUV with Dr. Vines.   Orders in Epic for 11/1, but has not been completed in Epic.   Attempted to call pt to update- VM not yet set up, so unable to leave a message/appt updates

## 2023-10-29 RX ORDER — FAMOTIDINE 10 MG/ML
20 INJECTION INTRAVENOUS ONCE AS NEEDED
Status: CANCELLED | OUTPATIENT
Start: 2023-11-01

## 2023-10-29 RX ORDER — PROCHLORPERAZINE MALEATE 10 MG
10 TABLET ORAL EVERY 6 HOURS PRN
Status: CANCELLED | OUTPATIENT
Start: 2023-11-01

## 2023-10-29 RX ORDER — EPINEPHRINE 0.3 MG/.3ML
0.3 INJECTION SUBCUTANEOUS EVERY 5 MIN PRN
Status: CANCELLED | OUTPATIENT
Start: 2023-11-01

## 2023-10-29 RX ORDER — DIPHENHYDRAMINE HYDROCHLORIDE 50 MG/ML
50 INJECTION INTRAMUSCULAR; INTRAVENOUS AS NEEDED
Status: CANCELLED | OUTPATIENT
Start: 2023-11-01

## 2023-10-29 RX ORDER — PROCHLORPERAZINE EDISYLATE 5 MG/ML
10 INJECTION INTRAMUSCULAR; INTRAVENOUS EVERY 6 HOURS PRN
Status: CANCELLED | OUTPATIENT
Start: 2023-11-01

## 2023-10-29 RX ORDER — ALBUTEROL SULFATE 0.83 MG/ML
3 SOLUTION RESPIRATORY (INHALATION) AS NEEDED
Status: CANCELLED | OUTPATIENT
Start: 2023-11-01

## 2023-10-31 ENCOUNTER — LAB (OUTPATIENT)
Dept: LAB | Facility: CLINIC | Age: 63
End: 2023-10-31
Payer: COMMERCIAL

## 2023-10-31 DIAGNOSIS — C34.11 MALIGNANT NEOPLASM OF UPPER LOBE OF RIGHT LUNG (MULTI): ICD-10-CM

## 2023-10-31 LAB
ALBUMIN SERPL BCP-MCNC: 4.3 G/DL (ref 3.4–5)
ALP SERPL-CCNC: 57 U/L (ref 33–136)
ALT SERPL W P-5'-P-CCNC: 17 U/L (ref 10–52)
ANION GAP SERPL CALC-SCNC: 10 MMOL/L (ref 10–20)
AST SERPL W P-5'-P-CCNC: 19 U/L (ref 9–39)
BASOPHILS # BLD AUTO: 0.07 X10*3/UL (ref 0–0.1)
BASOPHILS NFR BLD AUTO: 0.9 %
BILIRUB SERPL-MCNC: 0.6 MG/DL (ref 0–1.2)
BUN SERPL-MCNC: 15 MG/DL (ref 6–23)
CALCIUM SERPL-MCNC: 9.2 MG/DL (ref 8.6–10.6)
CHLORIDE SERPL-SCNC: 101 MMOL/L (ref 98–107)
CO2 SERPL-SCNC: 28 MMOL/L (ref 21–32)
CREAT SERPL-MCNC: 0.54 MG/DL (ref 0.5–1.3)
EOSINOPHIL # BLD AUTO: 0.56 X10*3/UL (ref 0–0.7)
EOSINOPHIL NFR BLD AUTO: 7.4 %
ERYTHROCYTE [DISTWIDTH] IN BLOOD BY AUTOMATED COUNT: 18.3 % (ref 11.5–14.5)
GFR SERPL CREATININE-BSD FRML MDRD: >90 ML/MIN/1.73M*2
GLUCOSE SERPL-MCNC: 98 MG/DL (ref 74–99)
HCT VFR BLD AUTO: 35.4 % (ref 41–52)
HGB BLD-MCNC: 11.1 G/DL (ref 13.5–17.5)
IMM GRANULOCYTES # BLD AUTO: 0.04 X10*3/UL (ref 0–0.7)
IMM GRANULOCYTES NFR BLD AUTO: 0.5 % (ref 0–0.9)
LYMPHOCYTES # BLD AUTO: 3.37 X10*3/UL (ref 1.2–4.8)
LYMPHOCYTES NFR BLD AUTO: 44.6 %
MCH RBC QN AUTO: 21.2 PG (ref 26–34)
MCHC RBC AUTO-ENTMCNC: 31.4 G/DL (ref 32–36)
MCV RBC AUTO: 68 FL (ref 80–100)
MONOCYTES # BLD AUTO: 0.81 X10*3/UL (ref 0.1–1)
MONOCYTES NFR BLD AUTO: 10.7 %
NEUTROPHILS # BLD AUTO: 2.71 X10*3/UL (ref 1.2–7.7)
NEUTROPHILS NFR BLD AUTO: 35.9 %
NRBC BLD-RTO: ABNORMAL /100{WBCS}
PLATELET # BLD AUTO: 296 X10*3/UL (ref 150–450)
PMV BLD AUTO: 9.6 FL (ref 7.5–11.5)
POTASSIUM SERPL-SCNC: 4.5 MMOL/L (ref 3.5–5.3)
PROT SERPL-MCNC: 6.8 G/DL (ref 6.4–8.2)
RBC # BLD AUTO: 5.24 X10*6/UL (ref 4.5–5.9)
SODIUM SERPL-SCNC: 134 MMOL/L (ref 136–145)
WBC # BLD AUTO: 7.6 X10*3/UL (ref 4.4–11.3)

## 2023-10-31 PROCEDURE — 80053 COMPREHEN METABOLIC PANEL: CPT

## 2023-10-31 PROCEDURE — 36415 COLL VENOUS BLD VENIPUNCTURE: CPT

## 2023-10-31 PROCEDURE — 85025 COMPLETE CBC W/AUTO DIFF WBC: CPT

## 2023-11-01 ENCOUNTER — OFFICE VISIT (OUTPATIENT)
Dept: HEMATOLOGY/ONCOLOGY | Facility: CLINIC | Age: 63
End: 2023-11-01
Payer: COMMERCIAL

## 2023-11-01 VITALS
SYSTOLIC BLOOD PRESSURE: 127 MMHG | WEIGHT: 130.29 LBS | RESPIRATION RATE: 18 BRPM | HEART RATE: 66 BPM | OXYGEN SATURATION: 99 % | BODY MASS INDEX: 19.3 KG/M2 | DIASTOLIC BLOOD PRESSURE: 69 MMHG | TEMPERATURE: 98.1 F | HEIGHT: 69 IN

## 2023-11-01 DIAGNOSIS — Z51.12 ENCOUNTER FOR ANTINEOPLASTIC IMMUNOTHERAPY: ICD-10-CM

## 2023-11-01 DIAGNOSIS — C34.11 MALIGNANT NEOPLASM OF UPPER LOBE OF RIGHT LUNG (MULTI): Primary | ICD-10-CM

## 2023-11-01 PROCEDURE — 3078F DIAST BP <80 MM HG: CPT | Performed by: NURSE PRACTITIONER

## 2023-11-01 PROCEDURE — 1126F AMNT PAIN NOTED NONE PRSNT: CPT | Performed by: NURSE PRACTITIONER

## 2023-11-01 PROCEDURE — 99214 OFFICE O/P EST MOD 30 MIN: CPT | Performed by: NURSE PRACTITIONER

## 2023-11-01 PROCEDURE — 3074F SYST BP LT 130 MM HG: CPT | Performed by: NURSE PRACTITIONER

## 2023-11-01 PROCEDURE — 1159F MED LIST DOCD IN RCRD: CPT | Performed by: NURSE PRACTITIONER

## 2023-11-01 PROCEDURE — 99214 OFFICE O/P EST MOD 30 MIN: CPT | Mod: 25 | Performed by: NURSE PRACTITIONER

## 2023-11-01 ASSESSMENT — PAIN SCALES - GENERAL: PAINLEVEL: 0-NO PAIN

## 2023-11-01 NOTE — PROGRESS NOTES
[facility] - Medical Oncology Follow-Up Visit    Patient ID: Pillo Guerin is a 63 y.o. male      Current therapy: pembrolizumab (Keytruda) 200 mg in sodium chloride 0.9% 100 mL IV, 200 mg, intravenous, Once, 3 of 17 cycles    Administration: 200 mg (10/11/2023)      Oncologic History:   Cancer History:   Treatment Synopsis:    DIAGNOSIS   Stage IVB ([id number]) poorly differentiated adenocarcinoma of RUL     PATHOLOGY  C. FINE NEEDLE ASPIRATION LYMPH NODE, R 11, CYTOLOGY AND CELL BLOCK:   POORLY DIFFERENTIATED CARCINOMA, CONSISTENT WITH ADENOCARCINOMA, SEE NOTE.      PD-L1 22C3 by Immunohistochemistry with Interpretation, pembrolizumab   (KEYTRUDA)     Interpretation: HIGH EXPRESSION     Tumor Proportion Score (TPS): 80%     MICROSATELLITE STATUS: Microsatellite Stable (MAGDIEL)     DISEASE  ASSOCIATED GENOMIC FINDINGS:   KRAS p.G13D ([id number] c.38G>A)   PIK3CA p.E542K ([id number] c.1624G>A)   TP53 p.E285K ([id number] c.853G>A)     DISEASE RELEVANT ALTERATIONS NOT DETECTED:   Negative for ALK fusion.   Negative for  BRAF V600E.   Negative for EGFR sensitizing mutation.   Negative for KRAS G12C.   Negative for MET exon 14 skipping mutation.   Negative for NTRK fusion.   Negative for RET fusion.   Negative for ROS1 fusion.      Therapies:  Pembrolizumab to begin on 08/31/23        Treatment History:    Mr. Pillo Guerin is a 64 YO with PMH of EtOH use disorder, CVA, CAD c/b NSTEMI s/p 2 stents and stage IVB (lF3vP5A5m) poorly differentiated adenocarcinoma of RUL  seen as initial consultation.     Patient was initially diagnosed 08/2022 after incidentally found RUL lesion was worked up during admission for hyponatremia presumed to be 2/2 beer potomania. He was then seen by his pulmonologist Dr. Callejas who ordered PET and MRI but this was not obtained  nor were multiple attempts to schedule for oncologic NPV.     I saw him in August 2023 and he endorsed haviing lost 15 lbs since stroke April 2022.  His appetite is good, denies dysgeusia. He has slowed down a bit since he had his stroke. He can walk without becoming dyspneic but is limited by his stroke.  He is  worried about his cancer spreading.                 Chief Concern: Here in clinic for follow-up and treatment    HPI Patient is here today in clinic for follow-up and treatment with pembrolizumab. He is feeling well for treatment. Breathing is good, saw pulmonology and said the appointment went well. S/p GK with residual intermittent headaches and numbness. No new neurological symptoms. Denies abdominal pain, N/V/D/C. Eating/drinking ok. Denies fever, chills, or cold symptoms. No other concerns or complaints.       Meds (Current):    Current Outpatient Medications:     albuterol 90 mcg/actuation inhaler, Inhale 2 puffs every 4 hours if needed., Disp: , Rfl:     amLODIPine (Norvasc) 5 mg tablet, Take 0.5 tablets (2.5 mg) by mouth once daily., Disp: , Rfl:     aspirin 81 mg chewable tablet, CHEW 1 TABLET BY MOUTH ONCE DAILY, Disp: 30 tablet, Rfl: 3    atorvastatin (Lipitor) 80 mg tablet, TAKE 1 TABLET BY MOUTH ONCE DAILY, Disp: 30 tablet, Rfl: 1    diclofenac sodium 1 % kit, Apply 2 g topically every 6 hours if needed., Disp: , Rfl:     gabapentin (Neurontin) 300 mg capsule, Take 1 capsule (300 mg) by mouth., Disp: , Rfl:     losartan (Cozaar) 50 mg tablet, Take 1 tablet (50 mg) by mouth., Disp: , Rfl:     metoprolol tartrate (Lopressor) 25 mg tablet, TAKE 1 TABLET BY MOUTH TWO TIMES A DAY, Disp: 60 tablet, Rfl: 1    nitroglycerin (Nitrostat) 0.4 mg SL tablet, Place under the tongue., Disp: , Rfl:     nitroglycerin (Nitrostat) 0.4 mg SL tablet, DISSOLVE 1 TABLET UNDER TONGUE EVERY 5 MINUTES FOR 3 DOSES AS NEEDED FOR CHEST PAIN. IF PAIN PERSISTS DIAL 911., Disp: 25 tablet, Rfl: 0    ticagrelor (Brilinta) 90 mg tablet, TAKE 1 TABLET BY MOUTH TWO TIMES A DAY, Disp: 60 tablet, Rfl: 11    methocarbamol (Robaxin) 750 mg tablet, Take 1 tablet (750 mg) by mouth 3  "times a day as needed., Disp: , Rfl:     nicotine (Nicoderm CQ) 21 mg/24 hr patch, Place 1 patch on the skin., Disp: , Rfl:     tiotropium (Spiriva) 18 mcg inhalation capsule, Place 1 capsule (18 mcg) into inhaler and inhale once daily. (Patient not taking: Reported on 11/1/2023), Disp: 30 capsule, Rfl: 5    Current Facility-Administered Medications:     acetaminophen (Tylenol) tablet 650 mg, 650 mg, oral, q4h PRN, Angelica Munoz MD    HYDROmorphone (Dilaudid) injection 0.4 mg, 0.4 mg, intravenous, PRN, Angelica Munoz MD, 0.4 mg at 10/17/23 1230    HYDROmorphone (Dilaudid) injection 0.4 mg, 0.4 mg, intravenous, PRN, Angelica Munoz MD, 0.4 mg at 10/17/23 0830    ibuprofen tablet 400 mg, 400 mg, oral, PRN, Angelica Munoz MD    midazolam (Versed) injection 0.5 mg, 0.5 mg, intravenous, PRN, Angelica Munoz MD, 0.5 mg at 10/17/23 1230    midazolam (Versed) injection 0.5 mg, 0.5 mg, intravenous, PRN, Angelica Munoz MD, 0.5 mg at 10/17/23 0830    ondansetron (Zofran) injection 4 mg, 4 mg, intravenous, q4h PRN, Angelica Munoz MD    oxyCODONE (Roxicodone) immediate release tablet 10 mg, 10 mg, oral, q4h PRN, Angelica Munoz MD    Review of Systems - Oncology 12 point ROS was obtained and negative unless otherwise mentioned in the above HPI.      Objective   BSA: 1.7 meters squared  Wt Readings from Last 5 Encounters:   11/01/23 59.1 kg (130 lb 4.7 oz)   10/11/23 59.1 kg (130 lb 4.7 oz)   10/17/23 59 kg (130 lb)   10/08/23 52.5 kg (115 lb 11.9 oz)   07/18/22 55.1 kg (121 lb 7 oz)     /69 (BP Location: Right arm, Patient Position: Sitting, BP Cuff Size: Adult)   Pulse 66   Temp 36.7 °C (98.1 °F) (Temporal)   Resp 18   Ht 1.757 m (5' 9.17\")   Wt 59.1 kg (130 lb 4.7 oz)   SpO2 99%   BMI 19.14 kg/m²     ECOG Score: 2- Ambulatory and  capable of all selfcare; unable to carry out work activities.  Up and about > 50% of waking hrs.      Physical Exam  Constitutional:       Appearance: Normal " appearance.   Eyes:      Pupils: Pupils are equal, round, and reactive to light.   Cardiovascular:      Rate and Rhythm: Normal rate and regular rhythm.   Pulmonary:      Effort: Pulmonary effort is normal.      Breath sounds: Normal breath sounds.   Abdominal:      General: Bowel sounds are normal.      Palpations: Abdomen is soft.   Musculoskeletal:      Comments: Left leg brace intact   Skin:     General: Skin is warm and dry.   Neurological:      General: No focal deficit present.      Mental Status: He is alert and oriented to person, place, and time.   Psychiatric:         Mood and Affect: Mood normal.         Behavior: Behavior normal.          Results:  Labs:  Lab Results   Component Value Date    WBC 7.6 10/31/2023    HGB 11.1 (L) 10/31/2023    HCT 35.4 (L) 10/31/2023    MCV 68 (L) 10/31/2023     10/31/2023      Lab Results   Component Value Date    NEUTROABS 2.71 10/31/2023      Lab Results   Component Value Date    GLUCOSE 98 10/31/2023    CALCIUM 9.2 10/31/2023     (L) 10/31/2023    K 4.5 10/31/2023    CO2 28 10/31/2023     10/31/2023    BUN 15 10/31/2023    CREATININE 0.54 10/31/2023    MG 2.21 09/19/2023     Lab Results   Component Value Date    ALT 17 10/31/2023    AST 19 10/31/2023    ALKPHOS 57 10/31/2023    BILITOT 0.6 10/31/2023    BILIDIR 0.3 07/31/2022      Lab Results   Component Value Date    ACTH 62.1 10/09/2023    CORTISOL 13.2 10/09/2023    TSH 4.00 (H) 10/09/2023    FREET4 0.98 10/09/2023       Imaging:           === Results for orders placed during the hospital encounter of 10/17/23 ===    MR brain gamma knife treatment planning w or wo IV contrast [[id number]] 10/17/2023    Status: Normal  1. Interval decrease in size of a lesion in the right occipital lobe  with resolution of internal cystic component and is now nodular in  appearance measuring 3 mm.    2. Stable 6 mm enhancing lesion located within the left lateral  frontal bone which may reflect a hemangioma.    3. No  new intracranial masses or lesions.    This study was interpreted at [facility].      MACRO:  None    Signed by: Teddy Mendiola 10/17/2023 10:17 AM  Dictation workstation:   [id number]    Assessment/Plan      Pillo Guerin is a 63 y.o. male here for follow up     Assessment and Plan:   Assessment:    Pal is a 62 YO with PMH of EtOH use disorder, CVA, CAD c/b NSTEMI s/p 2 stents and stage IVB ([id number]) poorly differentiated adenocarcinoma of RUL with metastasis  to the lungs, left occipital lobe and left gluteus                        Plan:    #Stage IVB ([id number]) poorly differentiated adenocarcinoma of RUL, TPS 80%, lack of actionable genomic alterations noted.  -PET and MRI test results discussed   -Discussed with the patient next steps, including pembrolizumab Rx  He was able to review the potential benefits and side effects of the treatment and able to provide a consent over the phone  -had pulmonology appointment scheduled 10/13/23, pulmonary testing 10/16/23, next follow-up in [facility]  - Pembrolizumab started on 08/31/23   -RTC in 3 weeks for follow-up and treatment, with repeat scan prior to visit with Dr. Vines     # Right occipital lobe 8 mm metastasis   Asymptomatic at the moment   No need for dexamethasone   -seen by rad onc (Dr. Mckeon) and plan for GK,10/17/23, next follow-up January.  -residual headaches/scalp numbness s/p GK      # Pain on right gluteal region-improved  Due to large centrally necrotic and peripherally hypermetabolic mass in the right lower paramediastinal muscle extending into the adjacent subcutaneous tissue and superior right gluteal muscle with involvement of the superior aspect of the right iliac  bone, right florencio sacrum with worsening associated cortical erosions and lytic  changes and extension into the right sacroiliac joint (L5-S1) consistent with osseous metastatic disease with associated soft tissue mass.  Rx for gabapentin provided -  he was taking 600 mg BID 3 months ago        # Stroke in April 2022 and hx of CAD  -   Stable -   On ASA and ticagrelor      # HTN - on amlodipine      # HLD - atorvastatin

## 2023-11-06 ENCOUNTER — SOCIAL WORK (OUTPATIENT)
Dept: CASE MANAGEMENT | Facility: HOSPITAL | Age: 63
End: 2023-11-06
Payer: COMMERCIAL

## 2023-11-06 NOTE — PROGRESS NOTES
SW was asked to research if patient's Medicaid coverage will cover a prescription for Nicotine patches. SW investigated. Nicotine patches and other smoking cessation programs are a covered benefit with [place] Medicaid. SW advised patient. Prior authorization may be needed but it is a covered benefit. Patient appreciative of the information.   ADDENDUM: November 22, 2023 SW met with patient in the infusion area of [facility]. Patient is on chemotherapy today for lung cancer. Patient shares that he was informed that the cancer is shrinking since starting chemotherapy. SW acknowledged the significance of this information. Patient continues to tolerate treatment well. Support offered. SW to follow.

## 2023-11-07 DIAGNOSIS — G89.3 CANCER ASSOCIATED PAIN: Primary | ICD-10-CM

## 2023-11-08 DIAGNOSIS — F17.210 CIGARETTE NICOTINE DEPENDENCE WITHOUT COMPLICATION: Primary | ICD-10-CM

## 2023-11-08 RX ORDER — GABAPENTIN 300 MG/1
600 CAPSULE ORAL 2 TIMES DAILY
Qty: 60 CAPSULE | Refills: 1 | Status: SHIPPED | OUTPATIENT
Start: 2023-11-08 | End: 2023-12-04 | Stop reason: SDUPTHER

## 2023-11-08 RX ORDER — IBUPROFEN 200 MG
1 TABLET ORAL DAILY
Qty: 30 PATCH | Refills: 3 | Status: SHIPPED | OUTPATIENT
Start: 2023-11-08

## 2023-11-20 ENCOUNTER — LAB (OUTPATIENT)
Dept: LAB | Facility: CLINIC | Age: 63
End: 2023-11-20
Payer: COMMERCIAL

## 2023-11-20 ENCOUNTER — ANCILLARY PROCEDURE (OUTPATIENT)
Dept: RADIOLOGY | Facility: CLINIC | Age: 63
End: 2023-11-20
Payer: COMMERCIAL

## 2023-11-20 DIAGNOSIS — C34.11 MALIGNANT NEOPLASM OF UPPER LOBE OF RIGHT LUNG (MULTI): ICD-10-CM

## 2023-11-20 DIAGNOSIS — C34.11 MALIGNANT NEOPLASM OF UPPER LOBE OF RIGHT LUNG (MULTI): Primary | ICD-10-CM

## 2023-11-20 LAB
ALBUMIN SERPL BCP-MCNC: 4.6 G/DL (ref 3.4–5)
ALP SERPL-CCNC: 56 U/L (ref 33–136)
ALT SERPL W P-5'-P-CCNC: 26 U/L (ref 10–52)
ANION GAP SERPL CALC-SCNC: 13 MMOL/L (ref 10–20)
AST SERPL W P-5'-P-CCNC: 21 U/L (ref 9–39)
BASOPHILS # BLD AUTO: 0.09 X10*3/UL (ref 0–0.1)
BASOPHILS NFR BLD AUTO: 1 %
BILIRUB SERPL-MCNC: 0.6 MG/DL (ref 0–1.2)
BUN SERPL-MCNC: 13 MG/DL (ref 6–23)
CALCIUM SERPL-MCNC: 9.5 MG/DL (ref 8.6–10.6)
CHLORIDE SERPL-SCNC: 97 MMOL/L (ref 98–107)
CO2 SERPL-SCNC: 26 MMOL/L (ref 21–32)
CORTIS AM PEAK SERPL-MSCNC: 9.3 UG/DL (ref 5–20)
CREAT SERPL-MCNC: 0.56 MG/DL (ref 0.5–1.3)
EOSINOPHIL # BLD AUTO: 0.56 X10*3/UL (ref 0–0.7)
EOSINOPHIL NFR BLD AUTO: 6.5 %
ERYTHROCYTE [DISTWIDTH] IN BLOOD BY AUTOMATED COUNT: 17.4 % (ref 11.5–14.5)
GFR SERPL CREATININE-BSD FRML MDRD: >90 ML/MIN/1.73M*2
GLUCOSE SERPL-MCNC: 81 MG/DL (ref 74–99)
HCT VFR BLD AUTO: 36.6 % (ref 41–52)
HGB BLD-MCNC: 11.7 G/DL (ref 13.5–17.5)
IMM GRANULOCYTES # BLD AUTO: 0.03 X10*3/UL (ref 0–0.7)
IMM GRANULOCYTES NFR BLD AUTO: 0.3 % (ref 0–0.9)
LYMPHOCYTES # BLD AUTO: 4.31 X10*3/UL (ref 1.2–4.8)
LYMPHOCYTES NFR BLD AUTO: 50.2 %
MCH RBC QN AUTO: 21.5 PG (ref 26–34)
MCHC RBC AUTO-ENTMCNC: 32 G/DL (ref 32–36)
MCV RBC AUTO: 67 FL (ref 80–100)
MONOCYTES # BLD AUTO: 0.93 X10*3/UL (ref 0.1–1)
MONOCYTES NFR BLD AUTO: 10.8 %
NEUTROPHILS # BLD AUTO: 2.67 X10*3/UL (ref 1.2–7.7)
NEUTROPHILS NFR BLD AUTO: 31.2 %
NRBC BLD-RTO: ABNORMAL /100{WBCS}
PLATELET # BLD AUTO: 310 X10*3/UL (ref 150–450)
POTASSIUM SERPL-SCNC: 4.2 MMOL/L (ref 3.5–5.3)
PROT SERPL-MCNC: 7.1 G/DL (ref 6.4–8.2)
RBC # BLD AUTO: 5.45 X10*6/UL (ref 4.5–5.9)
SODIUM SERPL-SCNC: 132 MMOL/L (ref 136–145)
TSH SERPL-ACNC: 3.69 MIU/L (ref 0.44–3.98)
WBC # BLD AUTO: 8.6 X10*3/UL (ref 4.4–11.3)

## 2023-11-20 PROCEDURE — 82024 ASSAY OF ACTH: CPT

## 2023-11-20 PROCEDURE — 85025 COMPLETE CBC W/AUTO DIFF WBC: CPT

## 2023-11-20 PROCEDURE — 74177 CT ABD & PELVIS W/CONTRAST: CPT

## 2023-11-20 PROCEDURE — 80053 COMPREHEN METABOLIC PANEL: CPT

## 2023-11-20 PROCEDURE — 84443 ASSAY THYROID STIM HORMONE: CPT

## 2023-11-20 PROCEDURE — 71260 CT THORAX DX C+: CPT | Performed by: RADIOLOGY

## 2023-11-20 PROCEDURE — 36415 COLL VENOUS BLD VENIPUNCTURE: CPT

## 2023-11-20 PROCEDURE — 82533 TOTAL CORTISOL: CPT

## 2023-11-20 PROCEDURE — 74177 CT ABD & PELVIS W/CONTRAST: CPT | Performed by: RADIOLOGY

## 2023-11-20 PROCEDURE — 2550000001 HC RX 255 CONTRASTS: Mod: SE | Performed by: NURSE PRACTITIONER

## 2023-11-20 RX ADMIN — IOHEXOL 75 ML: 350 INJECTION, SOLUTION INTRAVENOUS at 12:24

## 2023-11-21 NOTE — PROGRESS NOTES
Patient ID: Pillo Castaneda is a 63 y.o. male    Primary Care Provider: Fer Chavez MD    DIAGNOSIS AND STAGING  Stage IVB (cT3 cN3 cM1c) poorly differentiated adenocarcinoma of RUL dx on 08/12/23     SITES OF DISEASE  Right upper lobe  Hilar, mediastinal and supraclavicular nodes  Contralateral lung  Right occipital lobe  Right gluteus extending into the right iliac bone       MOLECULAR GENOMICS  MICROSATELLITE STATUS: Microsatellite Stable (MAGDIEL)     DISEASE ASSOCIATED GENOMIC FINDINGS:   KRAS p.G13D ([id number] c.38G>A)   PIK3CA p.E542K ([id number] c.1624G>A)   TP53 p.E285K ([id number] c.853G>A)     DISEASE RELEVANT ALTERATIONS NOT DETECTED:   Negative for ALK fusion.   Negative for BRAF V600E.   Negative for EGFR sensitizing mutation.   Negative for KRAS G12C.   Negative for MET exon 14 skipping mutation.   Negative for NTRK fusion.   Negative for RET fusion.   Negative for ROS1 fusion.     PD-L1 TPS 80%     PRIOR THERAPIES  GKRS 10/17/23 to left occipital lobe      CURRENT THERAPY  Single agent pembrolizumab since 08/31/23    CURRENT ONCOLOGICAL PROBLEMS  Weight loss  Fatigue      HISTORY OF PRESENT ILLNESS  This is a smoker, with history of EtOH use disorder, CVA, CAD c/b NSTEMI s/p 2 stents and stage IVB (gL3eT3P6g) poorly differentiated adenocarcinoma of RUL dx on 08/12/23     Patient was initially diagnosed 08/2022 after incidentally found RUL lesion was worked up during admission for hyponatremia presumed to be 2/2 beer potomania. He was then seen by his pulmonologist Dr. Callejas who ordered PET and MRI but this was not obtained  nor were multiple attempts to schedule for oncologic NPV.     I saw him in August 2023 and he endorsed haviing lost 15 lbs since stroke April 2022. He could walk without becoming dyspneic but was limited by his stroke.      Undergoes bronchoscopy on 08/03/2022:    ADDENDUM   MRN: [id number]   Patient Name PILLO CASTANEDA   Accession #: [id number]   Date of Procedure:8/3/2022   Date Reported:   8/9/2022   Date Received: 8/3/2022   Date of Birth / Sex 1960 (Age: 62) / M   Race: WHITE   Submitting Physician: DARYN YOUNG MD   Attending Physician: MD HEVER BROWNE MD   Procedures/Addenda  Present     Other External #     FINAL CYTOLOGICAL INTERPRETATION     A. FINE NEEDLE ASPIRATION LYMPH NODE, L 4, CYTOLOGY AND CELL BLOCK:   NO MALIGNANT CELLS IDENTIFIED.   LYMPHOID SAMPLE IS PRESENT.     Note: Cell  block shows presence of lymphoid cells along with bronchial cells   contamination.     B. FINE NEEDLE ASPIRATION LYMPH NODE, STATION 7, CYTOLOGY AND CELL BLOCK:   NO MALIGNANT CELLS IDENTIFIED.   LYMPHOID SAMPLE IS PRESENT.      C. FINE NEEDLE ASPIRATION LYMPH NODE, R 11, CYTOLOGY AND CELL BLOCK:   POORLY DIFFERENTIATED CARCINOMA, CONSISTENT WITH ADENOCARCINOMA, SEE NOTE.     Note: A limited panel of immunohistochemical stains performed showed neoplastic   cells  staining strongly and diffusely positive with TTF-1 and negative with   p40. Findings are consistent with above.       D. BRONCHO-ALVEOLAR LAVAGE OF RIGHT UPPER LOBE:   RARE ATYPICAL CELLS ARE PRESENT.     08/16/23: PET scan demonstrates multiple hypermetabolic pulmonary nodules consistent with metastatic disease.  Enlarging right hilar, mediastinal and supraclavicular lymph nodes consistent with alonzo mets.  Large right gluteal metastasis with involvement of superior aspect of right iliac bone, right hemisacrum with extension into the right sacroiliac joint.    08/16/2023: 8 mm right occipital lobe metastasis with associated edema.    08/30/23: C1D1 pembrolizumab     10/17/2023: Gamma knife radiosurgery to right occipital brain metastasis (Dr. Mckeon)     PAST MEDICAL HISTORY  CAD s/p PTCA  CVA with residual left hemiparesis   HLD  HTN    SURGICAL HISTORY       SOCIAL HISTORY  Born in [place], lives in [place] with 2 roommates. Brother Salas is NOK. Drinks 2 24 oz beers a day. 50 pack year smoking history,  active. Rare cannabis, acid use in 70s, no additional illicits.  Was working at [employer] in [place]  before stroke.      FAMILY HISTORY  Melanoma - mother,  in     CURRENT MEDS REVIEWED       ALLERGIES REVIEWED        SUBJECTIVE:  Here for pembrolizumab therapy and review os most recent scans to assess response to treatment   Has progressively gained weight - close to 7 Kg since 2023    A 13 point review of systems was performed, with significant findings documented above in subjective history.    OBJECTIVE:  There were no vitals filed for this visit.   There is no height or weight on file to calculate BSA.     Wt Readings from Last 5 Encounters:   23 59.1 kg (130 lb 4.7 oz)   10/11/23 59.1 kg (130 lb 4.7 oz)   10/17/23 59 kg (130 lb)   10/08/23 52.5 kg (115 lb 11.9 oz)   22 55.1 kg (121 lb 7 oz)       ECOGSCORE: N/A    Physical Exam     Diagnostic Results   Results:  Labs:  Lab Results   Component Value Date    WBC 8.6 2023    HGB 11.7 (L) 2023    HCT 36.6 (L) 2023    MCV 67 (L) 2023     2023      Lab Results   Component Value Date    NEUTROABS 2.67 2023        Lab Results   Component Value Date    GLUCOSE 81 2023    CALCIUM 9.5 2023     (L) 2023    K 4.2 2023    CO2 26 2023    CL 97 (L) 2023    BUN 13 2023    CREATININE 0.56 2023    MG 2.21 2023     Lab Results   Component Value Date    ALT 26 2023    AST 21 2023    ALKPHOS 56 2023    BILITOT 0.6 2023    BILIDIR 0.3 2022      Lab Results   Component Value Date    ACTH 62.1 10/09/2023    CORTISOL 9.3 2023    TSH 3.69 2023    FREET4 0.98 10/09/2023     CT CHEST ABDOMEN PELVIS W IV CONTRAST;  2023 12:29 pm      INDICATION:  Signs/Symptoms:NSCLC adenocarcinoma; Restaging Scan.      COMPARISON:  2022; PET scan dated 2023      ACCESSION NUMBER(S):  [id number]       ORDERING CLINICIAN:  PHOEBE PEARSON      TECHNIQUE:  CT of the chest, abdomen, and pelvis was performed following the  intravenous administration of 75 mL Omnipaque 350. Sagittal and  sagittal and reconstructions were generated.      FINDINGS:  CHEST:      LUNG/PLEURA/LARGE AIRWAYS:  There is moderate emphysema. There is apical pleural disease. There  is irregular bilateral pleural thickening.      10 mm ground-glass opacity in the right apex on image 69/363.  Unchanged.  10 mm spiculated area in the right upper lobe on image 85. It appears  smaller than on the prior exam.  23 x 21 mm spiculated right upper lobe mass on image 97. It appears  slightly larger currently There is an irregular right upper lobe  density along the major fissure measuring approximately 25 x 32 mm.  Comparison is difficult due to irregular shape but it appears much  smaller than the prior exam.  7 mm nodule right upper lobe on image 115. Unchanged.  9 mm right lower lobe nodule on image 226. Unchanged.      5 mm nodular area in the left upper lobe on image 117. Unchanged.  15 x 10 mm spiculated area in the left upper lobe. It is not  obviously changed. There is an 8 mm ground-glass area in superior  segment of the left lower lobe. Unchanged.  7 mm nodular area in the left lower lobe on image 149. Unchanged.      VESSELS:  There are atherosclerotic changes of the aorta. The main pulmonary  artery and cava are unremarkable.      HEART:  The heart is normal in size. There are coronary artery calcifications.      MEDIASTINUM AND SHANTA:  There are numerous enlarged mediastinal and hilar lymph nodes.      CHEST WALL AND LOWER NECK:  The thyroid is unremarkable.      There are sclerotic areas involving lower thoracic vertebral bodies.  These are unchanged.      ABDOMEN:      LIVER:  Unremarkable      BILE DUCTS:  Unremarkable      GALLBLADDER:  There are no obvious gallstones.      PANCREAS:  Unremarkable      SPLEEN:  There are calcified splenic  granulomas.      ADRENAL GLANDS:  Unremarkable      KIDNEYS AND URETERS:  There are no renal calculi. There is no hydronephrosis. There is  suggestion of a left renal cortical cysts.      The ureters are normal in caliber.      PELVIS:      BLADDER:  Unremarkable      REPRODUCTIVE ORGANS:  The prostate is visualized.      BOWEL:  There is no significant bowel distention.      There appears to be a normal caliber air-containing appendix.      There is sigmoid diverticulosis.      VESSELS:  There are aortic calcifications. The cava is unremarkable.      PERITONEUM/RETROPERITONEUM/LYMPH NODES:  There is no free air or free fluid.      There is no significant lymphadenopathy.      BONE AND SOFT TISSUE:  There are degenerative changes of the lumbar spine. There is a  sclerotic destructive lesion involving the right iliac bone.      There is a contiguous 83 mL low-density area with involvement of the  erector spinae muscles on the right.      COMPARISON OF FINDINGS:  The largest lung nodule appears to be decreased. A 2nd lung nodule  appears to have increased in size.      There has been a dramatic decrease in the size of the right  suprahilar lymph nodes.      The large low-density area back muscles on the right was not present  previously. There may be more sclerosis of the right iliac bone.      IMPRESSION:  CHEST:  Multiple synchronous cancers of the lung. Most are unchanged. A 23 mm  nodule right upper lobe appears larger. The largest right upper lobe  nodule appears smaller.      There has been a dramatic decrease in the size of the right  suprahilar lymph nodes.      Emphysema.      Extensive coronary artery disease.      ABDOMEN-PELVIS:  Large low-density area involving the muscles of the back on the  right. Not obvious previously.      The right iliac bone in this region is more sclerotic. Perhaps this  is due to treatment and healing.    Assessment/Plan   Stage IVB NSCLC (adenocarcinoma) to right upper lobe with  no actionable alterations and PD-L1 TPS 80%.  On single agent pembrolizumab since 08/31/2023  Here for review of restaging scans.  His disease is doing better from a radiographic standpoint, with the exception of the right gluteal mass, which may be slightly more pronounced.  Clinically doing to better, with weight gain and stamina.      This note was created with the assistance of a speech recognition program.  Although the intention is to generate a document that actually reflects the content of the visit, it is possible that some mistakes occur and may not be corrected by the time of completion of this note.        Zuleyma Vines MD, MS  Thoracic Medical Oncology   [address] [place], [place] [address]  Phone: [phone]

## 2023-11-22 ENCOUNTER — INFUSION (OUTPATIENT)
Dept: HEMATOLOGY/ONCOLOGY | Facility: CLINIC | Age: 63
End: 2023-11-22
Payer: COMMERCIAL

## 2023-11-22 ENCOUNTER — OFFICE VISIT (OUTPATIENT)
Dept: HEMATOLOGY/ONCOLOGY | Facility: CLINIC | Age: 63
End: 2023-11-22
Payer: COMMERCIAL

## 2023-11-22 VITALS
TEMPERATURE: 95.5 F | HEART RATE: 59 BPM | RESPIRATION RATE: 20 BRPM | WEIGHT: 140.87 LBS | BODY MASS INDEX: 20.7 KG/M2 | DIASTOLIC BLOOD PRESSURE: 75 MMHG | SYSTOLIC BLOOD PRESSURE: 129 MMHG | OXYGEN SATURATION: 100 %

## 2023-11-22 DIAGNOSIS — R60.0 EDEMA OF LEFT LOWER EXTREMITY: ICD-10-CM

## 2023-11-22 DIAGNOSIS — C34.11 MALIGNANT NEOPLASM OF UPPER LOBE OF RIGHT LUNG (MULTI): ICD-10-CM

## 2023-11-22 DIAGNOSIS — C34.11 MALIGNANT NEOPLASM OF UPPER LOBE OF RIGHT LUNG (MULTI): Primary | ICD-10-CM

## 2023-11-22 LAB — ACTH PLAS-MCNC: 62.6 PG/ML (ref 7.2–63.3)

## 2023-11-22 PROCEDURE — 3074F SYST BP LT 130 MM HG: CPT | Performed by: INTERNAL MEDICINE

## 2023-11-22 PROCEDURE — 1126F AMNT PAIN NOTED NONE PRSNT: CPT | Performed by: INTERNAL MEDICINE

## 2023-11-22 PROCEDURE — 3078F DIAST BP <80 MM HG: CPT | Performed by: INTERNAL MEDICINE

## 2023-11-22 PROCEDURE — 99215 OFFICE O/P EST HI 40 MIN: CPT | Performed by: INTERNAL MEDICINE

## 2023-11-22 PROCEDURE — 96413 CHEMO IV INFUSION 1 HR: CPT

## 2023-11-22 PROCEDURE — 2500000004 HC RX 250 GENERAL PHARMACY W/ HCPCS (ALT 636 FOR OP/ED): Mod: JZ,JG,SE | Performed by: INTERNAL MEDICINE

## 2023-11-22 PROCEDURE — 1159F MED LIST DOCD IN RCRD: CPT | Performed by: INTERNAL MEDICINE

## 2023-11-22 PROCEDURE — 99215 OFFICE O/P EST HI 40 MIN: CPT | Mod: 25 | Performed by: INTERNAL MEDICINE

## 2023-11-22 RX ORDER — EPINEPHRINE 0.3 MG/.3ML
0.3 INJECTION SUBCUTANEOUS EVERY 5 MIN PRN
Status: DISCONTINUED | OUTPATIENT
Start: 2023-11-22 | End: 2023-11-24 | Stop reason: HOSPADM

## 2023-11-22 RX ORDER — EPINEPHRINE 0.3 MG/.3ML
0.3 INJECTION SUBCUTANEOUS EVERY 5 MIN PRN
Status: CANCELLED | OUTPATIENT
Start: 2024-01-03

## 2023-11-22 RX ORDER — FAMOTIDINE 10 MG/ML
20 INJECTION INTRAVENOUS ONCE AS NEEDED
Status: CANCELLED | OUTPATIENT
Start: 2024-01-03

## 2023-11-22 RX ORDER — ALBUTEROL SULFATE 0.83 MG/ML
3 SOLUTION RESPIRATORY (INHALATION) AS NEEDED
Status: CANCELLED | OUTPATIENT
Start: 2024-01-03

## 2023-11-22 RX ORDER — FAMOTIDINE 10 MG/ML
20 INJECTION INTRAVENOUS ONCE AS NEEDED
Status: DISCONTINUED | OUTPATIENT
Start: 2023-11-22 | End: 2023-11-24 | Stop reason: HOSPADM

## 2023-11-22 RX ORDER — PROCHLORPERAZINE EDISYLATE 5 MG/ML
10 INJECTION INTRAMUSCULAR; INTRAVENOUS EVERY 6 HOURS PRN
Status: CANCELLED | OUTPATIENT
Start: 2024-01-24

## 2023-11-22 RX ORDER — PROCHLORPERAZINE EDISYLATE 5 MG/ML
10 INJECTION INTRAMUSCULAR; INTRAVENOUS EVERY 6 HOURS PRN
Status: CANCELLED | OUTPATIENT
Start: 2023-12-13

## 2023-11-22 RX ORDER — FAMOTIDINE 10 MG/ML
20 INJECTION INTRAVENOUS ONCE AS NEEDED
Status: CANCELLED | OUTPATIENT
Start: 2023-12-13

## 2023-11-22 RX ORDER — PROCHLORPERAZINE MALEATE 10 MG
10 TABLET ORAL EVERY 6 HOURS PRN
Status: CANCELLED | OUTPATIENT
Start: 2023-12-13

## 2023-11-22 RX ORDER — ALBUTEROL SULFATE 0.83 MG/ML
3 SOLUTION RESPIRATORY (INHALATION) AS NEEDED
Status: CANCELLED | OUTPATIENT
Start: 2024-01-24

## 2023-11-22 RX ORDER — PROCHLORPERAZINE MALEATE 10 MG
10 TABLET ORAL EVERY 6 HOURS PRN
Status: DISCONTINUED | OUTPATIENT
Start: 2023-11-22 | End: 2023-11-24 | Stop reason: HOSPADM

## 2023-11-22 RX ORDER — ALBUTEROL SULFATE 0.83 MG/ML
3 SOLUTION RESPIRATORY (INHALATION) AS NEEDED
Status: DISCONTINUED | OUTPATIENT
Start: 2023-11-22 | End: 2023-11-24 | Stop reason: HOSPADM

## 2023-11-22 RX ORDER — EPINEPHRINE 0.3 MG/.3ML
0.3 INJECTION SUBCUTANEOUS EVERY 5 MIN PRN
Status: CANCELLED | OUTPATIENT
Start: 2024-01-24

## 2023-11-22 RX ORDER — ALBUTEROL SULFATE 0.83 MG/ML
3 SOLUTION RESPIRATORY (INHALATION) AS NEEDED
Status: CANCELLED | OUTPATIENT
Start: 2023-12-13

## 2023-11-22 RX ORDER — DIPHENHYDRAMINE HYDROCHLORIDE 50 MG/ML
50 INJECTION INTRAMUSCULAR; INTRAVENOUS AS NEEDED
Status: DISCONTINUED | OUTPATIENT
Start: 2023-11-22 | End: 2023-11-24 | Stop reason: HOSPADM

## 2023-11-22 RX ORDER — PROCHLORPERAZINE EDISYLATE 5 MG/ML
10 INJECTION INTRAMUSCULAR; INTRAVENOUS EVERY 6 HOURS PRN
Status: DISCONTINUED | OUTPATIENT
Start: 2023-11-22 | End: 2023-11-24 | Stop reason: HOSPADM

## 2023-11-22 RX ORDER — FAMOTIDINE 10 MG/ML
20 INJECTION INTRAVENOUS ONCE AS NEEDED
Status: CANCELLED | OUTPATIENT
Start: 2024-01-24

## 2023-11-22 RX ORDER — PROCHLORPERAZINE MALEATE 10 MG
10 TABLET ORAL EVERY 6 HOURS PRN
Status: CANCELLED | OUTPATIENT
Start: 2024-01-24

## 2023-11-22 RX ORDER — PROCHLORPERAZINE EDISYLATE 5 MG/ML
10 INJECTION INTRAMUSCULAR; INTRAVENOUS EVERY 6 HOURS PRN
Status: CANCELLED | OUTPATIENT
Start: 2024-01-03

## 2023-11-22 RX ORDER — EPINEPHRINE 0.3 MG/.3ML
0.3 INJECTION SUBCUTANEOUS EVERY 5 MIN PRN
Status: CANCELLED | OUTPATIENT
Start: 2023-12-13

## 2023-11-22 RX ORDER — DIPHENHYDRAMINE HYDROCHLORIDE 50 MG/ML
50 INJECTION INTRAMUSCULAR; INTRAVENOUS AS NEEDED
Status: CANCELLED | OUTPATIENT
Start: 2024-01-24

## 2023-11-22 RX ORDER — PROCHLORPERAZINE MALEATE 10 MG
10 TABLET ORAL EVERY 6 HOURS PRN
Status: CANCELLED | OUTPATIENT
Start: 2024-01-03

## 2023-11-22 RX ORDER — DIPHENHYDRAMINE HYDROCHLORIDE 50 MG/ML
50 INJECTION INTRAMUSCULAR; INTRAVENOUS AS NEEDED
Status: CANCELLED | OUTPATIENT
Start: 2024-01-03

## 2023-11-22 RX ORDER — DIPHENHYDRAMINE HYDROCHLORIDE 50 MG/ML
50 INJECTION INTRAMUSCULAR; INTRAVENOUS AS NEEDED
Status: CANCELLED | OUTPATIENT
Start: 2023-12-13

## 2023-11-22 RX ADMIN — SODIUM CHLORIDE 200 MG: 9 INJECTION, SOLUTION INTRAVENOUS at 13:22

## 2023-11-22 ASSESSMENT — PATIENT HEALTH QUESTIONNAIRE - PHQ9
1. LITTLE INTEREST OR PLEASURE IN DOING THINGS: NOT AT ALL
2. FEELING DOWN, DEPRESSED OR HOPELESS: NOT AT ALL
SUM OF ALL RESPONSES TO PHQ9 QUESTIONS 1 AND 2: 0

## 2023-11-22 ASSESSMENT — ENCOUNTER SYMPTOMS
OCCASIONAL FEELINGS OF UNSTEADINESS: 0
DEPRESSION: 0
LOSS OF SENSATION IN FEET: 0

## 2023-11-22 ASSESSMENT — PAIN SCALES - GENERAL: PAINLEVEL: 0-NO PAIN

## 2023-11-22 NOTE — PROGRESS NOTES
Patient ID: Pillo Guerin is a 63 y.o. male    Primary Care Provider: Fer Chavez MD    DIAGNOSIS AND STAGING  Stage IVB (cT3 cN3 cM1c) poorly differentiated adenocarcinoma of RUL diagnosed on 08/03/23     SITES OF DISEASE  RUL  Supraclavicular nodes  Contralateral lung  Right gluteal muscle   Brain right occipital lobe      MOLECULAR GENOMICS  MICROSATELLITE STATUS: Microsatellite Stable (MAGDIEL)     DISEASE ASSOCIATED GENOMIC FINDINGS:   KRAS p.G13D ([id number] c.38G>A)   PIK3CA p.E542K ([id number] c.1624G>A)   TP53 p.E285K ([id number] c.853G>A)     DISEASE RELEVANT ALTERATIONS NOT DETECTED:   Negative for ALK fusion.   Negative for BRAF V600E.   Negative for EGFR sensitizing mutation.   Negative for KRAS G12C.   Negative for MET exon 14 skipping mutation.   Negative for NTRK fusion.   Negative for RET fusion.   Negative for ROS1 fusion.     PD-L1 TPS 80%     PRIOR THERAPIES  GKRS to right occipital lobe on  24 Gy in 1 fraction on 10/17/23      CURRENT THERAPY  Single agent pembrolizumab since 08/31/23    CURRENT ONCOLOGICAL PROBLEMS  None      HISTORY OF PRESENT ILLNESS  This is a pt with PMH of EtOH use disorder, CVA, CAD c/b NSTEMI s/p 2 stents and stage IVB ([id number]) poorly differentiated adenocarcinoma of RUL diagnosed on 08/03/23.      Patient was initially diagnosed 08/2022 after incidentally found RUL lesion was worked up during admission for hyponatremia presumed to be 2/2 beer potomania. He was then seen by his pulmonologist Dr. Callejas who ordered PET and MRI but this was not obtained  nor were multiple attempts to schedule for oncologic NPV.     I saw him in August 2023 and he endorsed haviing lost 15 lbs since stroke April 2022. His appetite is good, denies dysgeusia. He has slowed down a bit since he had his stroke. He can walk without becoming dyspneic but is limited by his stroke.    On 08/12/23 the patient underwent a bronchoscopy with EBUS:  Accession #: [id number]   Date of Procedure:8/3/2022   Date  Reported: 8/9/2022   Date Received: 8/3/2022   Date of Birth / Sex 1960 (Age: 62) / M   Race: WHITE   Submitting Physician: DARYN YOUNG MD   Attending Physician: MD HEVER BROWNE MD   Procedures/Addenda Present     Other External #     FINAL CYTOLOGICAL INTERPRETATION     A. FINE NEEDLE ASPIRATION LYMPH NODE, L 4, CYTOLOGY AND CELL BLOCK:   NO MALIGNANT CELLS IDENTIFIED.   LYMPHOID SAMPLE IS PRESENT.     Note: Cell block shows presence of lymphoid cells along with bronchial cells   contamination.       B. FINE NEEDLE ASPIRATION LYMPH NODE, STATION 7, CYTOLOGY AND CELL BLOCK:   NO MALIGNANT CELLS IDENTIFIED.   LYMPHOID SAMPLE IS PRESENT.       C. FINE NEEDLE ASPIRATION LYMPH NODE, R 11, CYTOLOGY AND CELL BLOCK:   POORLY DIFFERENTIATED CARCINOMA, CONSISTENT WITH ADENOCARCINOMA, SEE NOTE.     Note: A limited panel of immunohistochemical stains performed showed neoplastic   cells staining strongly and diffusely positive with TTF-1 and negative with   p40. Findings are consistent with above.     Staff pathologist: Reviewed by Dr. Ellyn Valles.     Molecular testing has been ordered and results will be issued in   an addendum.     The cell block; C1 contains moderate tumor cellularity representing   roughly 70 % of all nucleated cells.       D. BRONCHO-ALVEOLAR LAVAGE OF RIGHT UPPER LOBE:   RARE ATYPICAL CELLS ARE PRESENT.     08/16/23: PET scan demonstrates multiple RUL FDG avid nodules as well as contra-lateral lung nodules, right hilar, mediastinal and supraclavicular nodes consistent with neoplastic involvement. There is also a large FDG avid right gluteal mass extending into the right SIJ and iliac bone.     08/16/23: MRI brain shows a 8 mm right occipital lobe metastasis with mild associated edema.     08/31/23: Single agent pembrolizumab begins     10/17/23: GKRS to right occipital lobe on  24 Gy in 1 fraction      PAST MEDICAL HISTORY  CAD s/p 2 stents, CVA, Dupuytren's contracture    BPH  Lumbar radiculopathy     SURGICAL HISTORY  None      SOCIAL HISTORY  Born in [place], lives in [place] with 2 roommates.   Brother Salas is NOK. Drinks 2 24 oz beers a day.   + Tobacco - 50 pack year smoking history, active.   Rare cannabis, acid use in 70s, no additional illicits.    Was working at [employer] in [place]  before stroke.      FAMILY HISTORY  Mother  of melanoma in      CURRENT MEDS REVIEWED       ALLERGIES REVIEWED        SUBJECTIVE:  Feels great overall   Has gained more than 20 lbs since 2023  Great appetite and stamina  He wakes up sometimes at 3:00 am and cleans the house   No rashes   No diarrhea  Complaints of new onset left lower extremity edema with no pain  Denies new onset respiratory sx   No headaches   Has residual numbness after GKRS  Complaints of decreased libido      A 13 point review of systems was performed, with significant findings documented above in subjective history.    OBJECTIVE:  Vitals:    23 1126   BP: 129/75   Pulse: 59   Resp: 20   Temp: 35.3 °C (95.5 °F)   SpO2: 100%      Body surface area is 1.77 meters squared.     Wt Readings from Last 5 Encounters:   23 63.9 kg (140 lb 14 oz)   23 59.1 kg (130 lb 4.7 oz)   10/11/23 59.1 kg (130 lb 4.7 oz)   10/17/23 59 kg (130 lb)   10/08/23 52.5 kg (115 lb 11.9 oz)       ECOGSCORE: 1- Restricted in physically strenuous activity.  Carries out light duty.    Physical Exam  Constitutional:       Appearance: Normal appearance. He is normal weight.   HENT:      Head: Normocephalic and atraumatic.   Eyes:      General: No scleral icterus.     Extraocular Movements: Extraocular movements intact.      Conjunctiva/sclera: Conjunctivae normal.   Cardiovascular:      Rate and Rhythm: Normal rate and regular rhythm.      Heart sounds: Normal heart sounds.   Pulmonary:      Effort: Pulmonary effort is normal.      Breath sounds: Normal breath sounds.   Abdominal:      Palpations: Abdomen is  soft. There is no mass.      Tenderness: There is no abdominal tenderness.   Musculoskeletal:      Right lower leg: No edema.      Left lower leg: Edema present.   Skin:     General: Skin is warm and dry.      Coloration: Skin is not pale.      Findings: No erythema or rash.   Neurological:      Mental Status: He is alert and oriented to person, place, and time. Mental status is at baseline.      Motor: Weakness present.      Gait: Gait abnormal.   Psychiatric:         Mood and Affect: Mood normal.         Behavior: Behavior normal.         Thought Content: Thought content normal.            Diagnostic Results   Results:  Labs:  Lab Results   Component Value Date    WBC 8.6 11/20/2023    HGB 11.7 (L) 11/20/2023    HCT 36.6 (L) 11/20/2023    MCV 67 (L) 11/20/2023     11/20/2023      Lab Results   Component Value Date    NEUTROABS 2.67 11/20/2023        Lab Results   Component Value Date    GLUCOSE 81 11/20/2023    CALCIUM 9.5 11/20/2023     (L) 11/20/2023    K 4.2 11/20/2023    CO2 26 11/20/2023    CL 97 (L) 11/20/2023    BUN 13 11/20/2023    CREATININE 0.56 11/20/2023    MG 2.21 09/19/2023     Lab Results   Component Value Date    ALT 26 11/20/2023    AST 21 11/20/2023    ALKPHOS 56 11/20/2023    BILITOT 0.6 11/20/2023    BILIDIR 0.3 07/31/2022      Lab Results   Component Value Date    ACTH 62.1 10/09/2023    CORTISOL 9.3 11/20/2023    TSH 3.69 11/20/2023    FREET4 0.98 10/09/2023         No images are attached to the encounter or orders placed in the encounter.     Assessment/Plan     Lung cancer (CMS/HCC), Clinical: Stage IVB (cT3, cN3, cM1c)  Stage IVB ([id number]) poorly differentiated adenocarcinoma of RUL, TPS 80%, lack of actionable genomic alterations noted.  On single agent pembrolizumab since 08/31/23 - best to keep a Q3 weeks schedule for better monitoring of potential side effects   CT C/A/P from 11/20/23 reviewed with the patient -  Excellent response to treatment   Repeat scans ~  02/20/24    New onset LLE edema   Rule our DVT   Dopplers ordered STAT      Right occipital lobe 8 mm metastasis   S/p GKRS 24 Gy in 1 fraction on 10/17/23  Repeat MRI brain every 3 months - to be scheduled by Rad Onc and followed accordingly      Pain on right gluteal region-improved  Due to large centrally necrotic and peripherally hypermetabolic mass in the right lower paramediastinal muscle extending into the adjacent subcutaneous tissue and superior right gluteal muscle with involvement of the superior aspect of the right iliac  bone, right florencio sacrum -   He has been taking gabapentin for pain        Stroke in April 2022 and hx of CAD  -   Stable -   On ASA and ticagrelor      HTN - on amlodipine      HLD - atorvastatin        This note was created with the assistance of a speech recognition program.  Although the intention is to generate a document that actually reflects the content of the visit, it is possible that some mistakes occur and may not be corrected by the time of completion of this note.        Zuleyma Vines MD, MS  Thoracic Medical Oncology   [address] [place] [address]  Phone: [phone]

## 2023-11-23 RX ORDER — DIPHENHYDRAMINE HYDROCHLORIDE 50 MG/ML
50 INJECTION INTRAMUSCULAR; INTRAVENOUS AS NEEDED
Status: CANCELLED | OUTPATIENT
Start: 2024-02-14

## 2023-11-23 RX ORDER — FAMOTIDINE 10 MG/ML
20 INJECTION INTRAVENOUS ONCE AS NEEDED
Status: CANCELLED | OUTPATIENT
Start: 2024-02-14

## 2023-11-23 RX ORDER — PROCHLORPERAZINE MALEATE 10 MG
10 TABLET ORAL EVERY 6 HOURS PRN
Status: CANCELLED | OUTPATIENT
Start: 2024-02-14

## 2023-11-23 RX ORDER — EPINEPHRINE 0.3 MG/.3ML
0.3 INJECTION SUBCUTANEOUS EVERY 5 MIN PRN
Status: CANCELLED | OUTPATIENT
Start: 2024-02-14

## 2023-11-23 RX ORDER — PROCHLORPERAZINE EDISYLATE 5 MG/ML
10 INJECTION INTRAMUSCULAR; INTRAVENOUS EVERY 6 HOURS PRN
Status: CANCELLED | OUTPATIENT
Start: 2024-02-14

## 2023-11-23 RX ORDER — ALBUTEROL SULFATE 0.83 MG/ML
3 SOLUTION RESPIRATORY (INHALATION) AS NEEDED
Status: CANCELLED | OUTPATIENT
Start: 2024-02-14

## 2023-11-27 ENCOUNTER — HOSPITAL ENCOUNTER (OUTPATIENT)
Dept: VASCULAR MEDICINE | Facility: CLINIC | Age: 63
Discharge: HOME | End: 2023-11-27
Payer: COMMERCIAL

## 2023-11-27 DIAGNOSIS — R60.0 EDEMA OF LEFT LOWER EXTREMITY: ICD-10-CM

## 2023-11-27 DIAGNOSIS — C34.11 MALIGNANT NEOPLASM OF UPPER LOBE OF RIGHT LUNG (MULTI): ICD-10-CM

## 2023-11-27 PROCEDURE — 93971 EXTREMITY STUDY: CPT | Performed by: SURGERY

## 2023-11-27 PROCEDURE — 93971 EXTREMITY STUDY: CPT

## 2023-12-04 DIAGNOSIS — G89.3 CANCER ASSOCIATED PAIN: ICD-10-CM

## 2023-12-04 RX ORDER — GABAPENTIN 300 MG/1
600 CAPSULE ORAL 2 TIMES DAILY
Qty: 60 CAPSULE | Refills: 1 | Status: SHIPPED | OUTPATIENT
Start: 2023-12-04 | End: 2024-01-08 | Stop reason: SDUPTHER

## 2023-12-04 NOTE — TELEPHONE ENCOUNTER
Refill request received for Gabapentin 300mg.  Preferred pharmacy is [facility] in [place].  Medication pended to team.  
Develop coping skills.  For example, strategies and lifestyle changes that reduce negative moods, stress, and exposure to smoking cues.

## 2023-12-11 ENCOUNTER — LAB (OUTPATIENT)
Dept: LAB | Facility: CLINIC | Age: 63
End: 2023-12-11
Payer: COMMERCIAL

## 2023-12-11 DIAGNOSIS — C34.11 MALIGNANT NEOPLASM OF UPPER LOBE OF RIGHT LUNG (MULTI): ICD-10-CM

## 2023-12-11 LAB
ALBUMIN SERPL BCP-MCNC: 4.8 G/DL (ref 3.4–5)
ALP SERPL-CCNC: 60 U/L (ref 33–136)
ALT SERPL W P-5'-P-CCNC: 21 U/L (ref 10–52)
ANION GAP SERPL CALC-SCNC: 13 MMOL/L (ref 10–20)
AST SERPL W P-5'-P-CCNC: 20 U/L (ref 9–39)
BASOPHILS # BLD AUTO: 0.07 X10*3/UL (ref 0–0.1)
BASOPHILS NFR BLD AUTO: 1.1 %
BILIRUB SERPL-MCNC: 0.7 MG/DL (ref 0–1.2)
BUN SERPL-MCNC: 12 MG/DL (ref 6–23)
CALCIUM SERPL-MCNC: 9.3 MG/DL (ref 8.6–10.6)
CHLORIDE SERPL-SCNC: 100 MMOL/L (ref 98–107)
CO2 SERPL-SCNC: 26 MMOL/L (ref 21–32)
CREAT SERPL-MCNC: 0.68 MG/DL (ref 0.5–1.3)
EOSINOPHIL # BLD AUTO: 0.71 X10*3/UL (ref 0–0.7)
EOSINOPHIL NFR BLD AUTO: 10.8 %
ERYTHROCYTE [DISTWIDTH] IN BLOOD BY AUTOMATED COUNT: 17.1 % (ref 11.5–14.5)
GFR SERPL CREATININE-BSD FRML MDRD: >90 ML/MIN/1.73M*2
GLUCOSE SERPL-MCNC: 92 MG/DL (ref 74–99)
HCT VFR BLD AUTO: 37.3 % (ref 41–52)
HGB BLD-MCNC: 12 G/DL (ref 13.5–17.5)
IMM GRANULOCYTES # BLD AUTO: 0.02 X10*3/UL (ref 0–0.7)
IMM GRANULOCYTES NFR BLD AUTO: 0.3 % (ref 0–0.9)
LYMPHOCYTES # BLD AUTO: 3.02 X10*3/UL (ref 1.2–4.8)
LYMPHOCYTES NFR BLD AUTO: 46 %
MCH RBC QN AUTO: 21.3 PG (ref 26–34)
MCHC RBC AUTO-ENTMCNC: 32.2 G/DL (ref 32–36)
MCV RBC AUTO: 66 FL (ref 80–100)
MONOCYTES # BLD AUTO: 0.64 X10*3/UL (ref 0.1–1)
MONOCYTES NFR BLD AUTO: 9.7 %
NEUTROPHILS # BLD AUTO: 2.11 X10*3/UL (ref 1.2–7.7)
NEUTROPHILS NFR BLD AUTO: 32.1 %
NRBC BLD-RTO: ABNORMAL /100{WBCS}
PLATELET # BLD AUTO: 299 X10*3/UL (ref 150–450)
POTASSIUM SERPL-SCNC: 4.4 MMOL/L (ref 3.5–5.3)
PROT SERPL-MCNC: 7.2 G/DL (ref 6.4–8.2)
RBC # BLD AUTO: 5.63 X10*6/UL (ref 4.5–5.9)
SODIUM SERPL-SCNC: 135 MMOL/L (ref 136–145)
WBC # BLD AUTO: 6.6 X10*3/UL (ref 4.4–11.3)

## 2023-12-11 PROCEDURE — 36415 COLL VENOUS BLD VENIPUNCTURE: CPT

## 2023-12-11 PROCEDURE — 80053 COMPREHEN METABOLIC PANEL: CPT

## 2023-12-11 PROCEDURE — 85025 COMPLETE CBC W/AUTO DIFF WBC: CPT

## 2023-12-13 ENCOUNTER — OFFICE VISIT (OUTPATIENT)
Dept: HEMATOLOGY/ONCOLOGY | Facility: CLINIC | Age: 63
End: 2023-12-13
Payer: COMMERCIAL

## 2023-12-13 ENCOUNTER — INFUSION (OUTPATIENT)
Dept: HEMATOLOGY/ONCOLOGY | Facility: CLINIC | Age: 63
End: 2023-12-13
Payer: COMMERCIAL

## 2023-12-13 VITALS
DIASTOLIC BLOOD PRESSURE: 80 MMHG | OXYGEN SATURATION: 98 % | WEIGHT: 139.22 LBS | RESPIRATION RATE: 18 BRPM | HEART RATE: 66 BPM | BODY MASS INDEX: 20.46 KG/M2 | SYSTOLIC BLOOD PRESSURE: 151 MMHG | TEMPERATURE: 97.7 F

## 2023-12-13 DIAGNOSIS — K21.9 GASTROESOPHAGEAL REFLUX DISEASE, UNSPECIFIED WHETHER ESOPHAGITIS PRESENT: Primary | ICD-10-CM

## 2023-12-13 DIAGNOSIS — C34.11 MALIGNANT NEOPLASM OF UPPER LOBE OF RIGHT LUNG (MULTI): ICD-10-CM

## 2023-12-13 DIAGNOSIS — L29.9 PRURITUS OF SKIN: ICD-10-CM

## 2023-12-13 PROCEDURE — 1126F AMNT PAIN NOTED NONE PRSNT: CPT | Performed by: NURSE PRACTITIONER

## 2023-12-13 PROCEDURE — 96413 CHEMO IV INFUSION 1 HR: CPT

## 2023-12-13 PROCEDURE — 99214 OFFICE O/P EST MOD 30 MIN: CPT | Performed by: NURSE PRACTITIONER

## 2023-12-13 PROCEDURE — 1159F MED LIST DOCD IN RCRD: CPT | Performed by: NURSE PRACTITIONER

## 2023-12-13 PROCEDURE — 3077F SYST BP >= 140 MM HG: CPT | Performed by: NURSE PRACTITIONER

## 2023-12-13 PROCEDURE — 3079F DIAST BP 80-89 MM HG: CPT | Performed by: NURSE PRACTITIONER

## 2023-12-13 PROCEDURE — 2500000004 HC RX 250 GENERAL PHARMACY W/ HCPCS (ALT 636 FOR OP/ED): Mod: SE | Performed by: INTERNAL MEDICINE

## 2023-12-13 RX ORDER — HEPARIN 100 UNIT/ML
500 SYRINGE INTRAVENOUS AS NEEDED
Status: DISCONTINUED | OUTPATIENT
Start: 2023-12-13 | End: 2023-12-13 | Stop reason: HOSPADM

## 2023-12-13 RX ORDER — PANTOPRAZOLE SODIUM 40 MG/1
40 TABLET, DELAYED RELEASE ORAL DAILY
Qty: 30 TABLET | Refills: 2 | Status: SHIPPED | OUTPATIENT
Start: 2023-12-13 | End: 2024-03-13 | Stop reason: SDUPTHER

## 2023-12-13 RX ORDER — PROCHLORPERAZINE EDISYLATE 5 MG/ML
10 INJECTION INTRAMUSCULAR; INTRAVENOUS EVERY 6 HOURS PRN
Status: DISCONTINUED | OUTPATIENT
Start: 2023-12-13 | End: 2023-12-13 | Stop reason: HOSPADM

## 2023-12-13 RX ORDER — HEPARIN 100 UNIT/ML
500 SYRINGE INTRAVENOUS AS NEEDED
Status: CANCELLED | OUTPATIENT
Start: 2023-12-13

## 2023-12-13 RX ORDER — ALBUTEROL SULFATE 0.83 MG/ML
3 SOLUTION RESPIRATORY (INHALATION) AS NEEDED
Status: DISCONTINUED | OUTPATIENT
Start: 2023-12-13 | End: 2023-12-13 | Stop reason: HOSPADM

## 2023-12-13 RX ORDER — HEPARIN SODIUM,PORCINE/PF 10 UNIT/ML
50 SYRINGE (ML) INTRAVENOUS AS NEEDED
Status: DISCONTINUED | OUTPATIENT
Start: 2023-12-13 | End: 2023-12-13 | Stop reason: HOSPADM

## 2023-12-13 RX ORDER — FAMOTIDINE 10 MG/ML
20 INJECTION INTRAVENOUS ONCE AS NEEDED
Status: DISCONTINUED | OUTPATIENT
Start: 2023-12-13 | End: 2023-12-13 | Stop reason: HOSPADM

## 2023-12-13 RX ORDER — EPINEPHRINE 0.3 MG/.3ML
0.3 INJECTION SUBCUTANEOUS EVERY 5 MIN PRN
Status: DISCONTINUED | OUTPATIENT
Start: 2023-12-13 | End: 2023-12-13 | Stop reason: HOSPADM

## 2023-12-13 RX ORDER — PROCHLORPERAZINE MALEATE 10 MG
10 TABLET ORAL EVERY 6 HOURS PRN
Status: DISCONTINUED | OUTPATIENT
Start: 2023-12-13 | End: 2023-12-13 | Stop reason: HOSPADM

## 2023-12-13 RX ORDER — TRIAMCINOLONE ACETONIDE 1 MG/G
OINTMENT TOPICAL 2 TIMES DAILY
Qty: 80 G | Refills: 1 | Status: SHIPPED | OUTPATIENT
Start: 2023-12-13 | End: 2024-02-02 | Stop reason: SDUPTHER

## 2023-12-13 RX ORDER — DIPHENHYDRAMINE HYDROCHLORIDE 50 MG/ML
50 INJECTION INTRAMUSCULAR; INTRAVENOUS AS NEEDED
Status: DISCONTINUED | OUTPATIENT
Start: 2023-12-13 | End: 2023-12-13 | Stop reason: HOSPADM

## 2023-12-13 RX ORDER — HEPARIN SODIUM,PORCINE/PF 10 UNIT/ML
50 SYRINGE (ML) INTRAVENOUS AS NEEDED
Status: CANCELLED | OUTPATIENT
Start: 2023-12-13

## 2023-12-13 RX ADMIN — SODIUM CHLORIDE 200 MG: 9 INJECTION, SOLUTION INTRAVENOUS at 15:43

## 2023-12-13 ASSESSMENT — ENCOUNTER SYMPTOMS
LOSS OF SENSATION IN FEET: 0
DEPRESSION: 0
OCCASIONAL FEELINGS OF UNSTEADINESS: 0

## 2023-12-13 ASSESSMENT — PAIN SCALES - GENERAL: PAINLEVEL: 0-NO PAIN

## 2023-12-13 NOTE — PROGRESS NOTES
Patient ID: Pillo Guerin is a 63 y.o. male    Primary Care Provider: Rocio Harris, SIMONE-CNP    DIAGNOSIS AND STAGING  Stage IVB (cT3 cN3 cM1c) poorly differentiated adenocarcinoma of RUL diagnosed on 08/03/23     SITES OF DISEASE  RUL  Supraclavicular nodes  Contralateral lung  Right gluteal muscle   Brain right occipital lobe      MOLECULAR GENOMICS  MICROSATELLITE STATUS: Microsatellite Stable (MAGDIEL)     DISEASE ASSOCIATED GENOMIC FINDINGS:   KRAS p.G13D ([id number] c.38G>A)   PIK3CA p.E542K ([id number] c.1624G>A)   TP53 p.E285K ([id number] c.853G>A)     DISEASE RELEVANT ALTERATIONS NOT DETECTED:   Negative for ALK fusion.   Negative for BRAF V600E.   Negative for EGFR sensitizing mutation.   Negative for KRAS G12C.   Negative for MET exon 14 skipping mutation.   Negative for NTRK fusion.   Negative for RET fusion.   Negative for ROS1 fusion.     PD-L1 TPS 80%     PRIOR THERAPIES  GKRS to right occipital lobe on  24 Gy in 1 fraction on 10/17/23      CURRENT THERAPY  Single agent pembrolizumab since 08/31/23    CURRENT ONCOLOGICAL PROBLEMS  None      HISTORY OF PRESENT ILLNESS  This is a pt with PMH of EtOH use disorder, CVA, CAD c/b NSTEMI s/p 2 stents and stage IVB ([id number]) poorly differentiated adenocarcinoma of RUL diagnosed on 08/03/23.      Patient was initially diagnosed 08/2022 after incidentally found RUL lesion was worked up during admission for hyponatremia presumed to be 2/2 beer potomania. He was then seen by his pulmonologist Dr. Callejas who ordered PET and MRI but this was not obtained  nor were multiple attempts to schedule for oncologic NPV.     I saw him in August 2023 and he endorsed haviing lost 15 lbs since stroke April 2022. His appetite is good, denies dysgeusia. He has slowed down a bit since he had his stroke. He can walk without becoming dyspneic but is limited by his stroke.    On 08/12/23 the patient underwent a bronchoscopy with EBUS:  Accession #: [id number]   Date of  Procedure:8/3/2022   Date Reported: 8/9/2022   Date Received: 8/3/2022   Date of Birth / Sex 1960 (Age: 62) / M   Race: WHITE   Submitting Physician: DARYN YOUNG MD   Attending Physician: MD HEVER BROWNE MD   Procedures/Addenda Present     Other External #     FINAL CYTOLOGICAL INTERPRETATION     A. FINE NEEDLE ASPIRATION LYMPH NODE, L 4, CYTOLOGY AND CELL BLOCK:   NO MALIGNANT CELLS IDENTIFIED.   LYMPHOID SAMPLE IS PRESENT.     Note: Cell block shows presence of lymphoid cells along with bronchial cells   contamination.       B. FINE NEEDLE ASPIRATION LYMPH NODE, STATION 7, CYTOLOGY AND CELL BLOCK:   NO MALIGNANT CELLS IDENTIFIED.   LYMPHOID SAMPLE IS PRESENT.       C. FINE NEEDLE ASPIRATION LYMPH NODE, R 11, CYTOLOGY AND CELL BLOCK:   POORLY DIFFERENTIATED CARCINOMA, CONSISTENT WITH ADENOCARCINOMA, SEE NOTE.     Note: A limited panel of immunohistochemical stains performed showed neoplastic   cells staining strongly and diffusely positive with TTF-1 and negative with   p40. Findings are consistent with above.     Staff pathologist: Reviewed by Dr. Ellyn Valles.     Molecular testing has been ordered and results will be issued in   an addendum.     The cell block; C1 contains moderate tumor cellularity representing   roughly 70 % of all nucleated cells.       D. BRONCHO-ALVEOLAR LAVAGE OF RIGHT UPPER LOBE:   RARE ATYPICAL CELLS ARE PRESENT.     08/16/23: PET scan demonstrates multiple RUL FDG avid nodules as well as contra-lateral lung nodules, right hilar, mediastinal and supraclavicular nodes consistent with neoplastic involvement. There is also a large FDG avid right gluteal mass extending into the right SIJ and iliac bone.     08/16/23: MRI brain shows a 8 mm right occipital lobe metastasis with mild associated edema.     08/31/23: Single agent pembrolizumab begins     10/17/23: GKRS to right occipital lobe on  24 Gy in 1 fraction      PAST MEDICAL HISTORY  CAD s/p 2 stents, CVA,  Dupuytren's contracture   BPH  Lumbar radiculopathy     SURGICAL HISTORY  None      SOCIAL HISTORY  Born in [place], lives in [place] with 2 roommates.   Brother Salas is NOK. Drinks 2 24 oz beers a day.   + Tobacco - 50 pack year smoking history, active.   Rare cannabis, acid use in 70s, no additional illicits.    Was working at [employer] in [place]  before stroke.      FAMILY HISTORY  Mother  of melanoma in      CURRENT MEDS REVIEWED       ALLERGIES REVIEWED        SUBJECTIVE:  Feeling well for treatment  Breathing stable, no respiratory complaints  Reports intermittent reflux symptoms, discussed PPI and will send RX  Denies N/V/D/C  Reports intermittent itching to bilateral arms, discussed triamcinolone cream and will send RX  No fevers, chills, or cold symptoms  No other concerns or complaints    A 13 point review of systems was performed, with significant findings documented above in subjective history.    OBJECTIVE:  Vitals:    23 1424   BP: 151/80   Pulse: 66   Resp: 18   Temp: 36.5 °C (97.7 °F)   SpO2: 98%      Body surface area is 1.76 meters squared.     Wt Readings from Last 5 Encounters:   23 63.2 kg (139 lb 3.5 oz)   23 63.9 kg (140 lb 14 oz)   23 59.1 kg (130 lb 4.7 oz)   10/11/23 59.1 kg (130 lb 4.7 oz)   10/17/23 59 kg (130 lb)       ECOGSCORE: 1- Restricted in physically strenuous activity.  Carries out light duty.    Physical Exam  Constitutional:       Appearance: Normal appearance. He is normal weight.   HENT:      Head: Normocephalic and atraumatic.   Eyes:      General: No scleral icterus.     Extraocular Movements: Extraocular movements intact.      Conjunctiva/sclera: Conjunctivae normal.   Cardiovascular:      Rate and Rhythm: Normal rate and regular rhythm.      Heart sounds: Normal heart sounds.   Pulmonary:      Effort: Pulmonary effort is normal.      Breath sounds: Normal breath sounds.   Abdominal:      Palpations: Abdomen is soft. There is  no mass.      Tenderness: There is no abdominal tenderness.   Musculoskeletal:         General: No swelling.      Comments: Left leg brace intact   Skin:     General: Skin is warm and dry.      Coloration: Skin is not pale.      Findings: No erythema or rash.   Neurological:      Mental Status: He is alert and oriented to person, place, and time. Mental status is at baseline.      Motor: Weakness present.      Gait: Gait abnormal.   Psychiatric:         Mood and Affect: Mood normal.         Behavior: Behavior normal.         Thought Content: Thought content normal.            Diagnostic Results   Results:  Labs:  Lab Results   Component Value Date    WBC 6.6 12/11/2023    HGB 12.0 (L) 12/11/2023    HCT 37.3 (L) 12/11/2023    MCV 66 (L) 12/11/2023     12/11/2023      Lab Results   Component Value Date    NEUTROABS 2.11 12/11/2023        Lab Results   Component Value Date    GLUCOSE 92 12/11/2023    CALCIUM 9.3 12/11/2023     (L) 12/11/2023    K 4.4 12/11/2023    CO2 26 12/11/2023     12/11/2023    BUN 12 12/11/2023    CREATININE 0.68 12/11/2023    MG 2.21 09/19/2023     Lab Results   Component Value Date    ALT 21 12/11/2023    AST 20 12/11/2023    ALKPHOS 60 12/11/2023    BILITOT 0.7 12/11/2023    BILIDIR 0.3 07/31/2022      Lab Results   Component Value Date    ACTH 62.6 11/20/2023    CORTISOL 9.3 11/20/2023    TSH 3.69 11/20/2023    FREET4 0.98 10/09/2023         No images are attached to the encounter or orders placed in the encounter.     Assessment/Plan     Lung cancer (CMS/[facility]), Clinical: Stage IVB (cT3, cN3, cM1c)  Stage IVB ([id number]) poorly differentiated adenocarcinoma of RUL, TPS 80%, lack of actionable genomic alterations noted.  On single agent pembrolizumab since 08/31/23 - best to keep a Q3 weeks schedule for better monitoring of potential side effects   CT C/A/P from 11/20/23 reviewed with the patient -  Excellent response to treatment   Repeat scans ~ 02/20/24  Will send  pantoprazole for reflux symptoms and triamcinolone for pruritus (likely irAE) to bilateral arms    New onset LLE edema- duplex US negative for DVT bilaterally       Right occipital lobe 8 mm metastasis   S/p GKRS 24 Gy in 1 fraction on 10/17/23  Repeat MRI brain every 3 months - to be scheduled by Rad Onc and followed accordingly      Pain on right gluteal region-improved  Due to large centrally necrotic and peripherally hypermetabolic mass in the right lower paramediastinal muscle extending into the adjacent subcutaneous tissue and superior right gluteal muscle with involvement of the superior aspect of the right iliac  bone, right florencio sacrum -   He has been taking gabapentin for pain        Stroke in April 2022 and hx of CAD  -   Stable -   On ASA and ticagrelor      HTN - on amlodipine      HLD - atorvastatin

## 2023-12-13 NOTE — SIGNIFICANT EVENT
12/13/23 1508   Prechemo Checklist   Has the patient been in the hospital, ED, or urgent care since last date of service No   Chemo/Immuno Consent Signed Yes   Protocol/Indications Verified Yes   Confirmed to previous date/time of medication Yes   Compared to previous dose Yes   All medications are dated accurately Yes   Pregnancy Test Negative Not applicable   Parameters Met Yes   BSA/Weight-Height Verified Yes   Dose Calculations Verified Yes

## 2024-01-02 ENCOUNTER — LAB (OUTPATIENT)
Dept: LAB | Facility: CLINIC | Age: 64
End: 2024-01-02
Payer: COMMERCIAL

## 2024-01-02 DIAGNOSIS — C34.11 MALIGNANT NEOPLASM OF UPPER LOBE OF RIGHT LUNG (MULTI): ICD-10-CM

## 2024-01-02 LAB
ALBUMIN SERPL BCP-MCNC: 4.5 G/DL (ref 3.4–5)
ALP SERPL-CCNC: 60 U/L (ref 33–136)
ALT SERPL W P-5'-P-CCNC: 19 U/L (ref 10–52)
ANION GAP SERPL CALC-SCNC: 10 MMOL/L (ref 10–20)
AST SERPL W P-5'-P-CCNC: 17 U/L (ref 9–39)
BASOPHILS # BLD AUTO: 0.07 X10*3/UL (ref 0–0.1)
BASOPHILS NFR BLD AUTO: 1 %
BILIRUB SERPL-MCNC: 0.7 MG/DL (ref 0–1.2)
BUN SERPL-MCNC: 12 MG/DL (ref 6–23)
CALCIUM SERPL-MCNC: 9.3 MG/DL (ref 8.6–10.6)
CHLORIDE SERPL-SCNC: 99 MMOL/L (ref 98–107)
CO2 SERPL-SCNC: 27 MMOL/L (ref 21–32)
CORTIS AM PEAK SERPL-MSCNC: 13 UG/DL (ref 5–20)
CREAT SERPL-MCNC: 0.63 MG/DL (ref 0.5–1.3)
EOSINOPHIL # BLD AUTO: 0.58 X10*3/UL (ref 0–0.7)
EOSINOPHIL NFR BLD AUTO: 8.3 %
ERYTHROCYTE [DISTWIDTH] IN BLOOD BY AUTOMATED COUNT: 15.7 % (ref 11.5–14.5)
GFR SERPL CREATININE-BSD FRML MDRD: >90 ML/MIN/1.73M*2
GLUCOSE SERPL-MCNC: 98 MG/DL (ref 74–99)
HCT VFR BLD AUTO: 36.2 % (ref 41–52)
HGB BLD-MCNC: 11.8 G/DL (ref 13.5–17.5)
IMM GRANULOCYTES # BLD AUTO: 0.03 X10*3/UL (ref 0–0.7)
IMM GRANULOCYTES NFR BLD AUTO: 0.4 % (ref 0–0.9)
LYMPHOCYTES # BLD AUTO: 2.93 X10*3/UL (ref 1.2–4.8)
LYMPHOCYTES NFR BLD AUTO: 41.9 %
MCH RBC QN AUTO: 21.4 PG (ref 26–34)
MCHC RBC AUTO-ENTMCNC: 32.6 G/DL (ref 32–36)
MCV RBC AUTO: 66 FL (ref 80–100)
MONOCYTES # BLD AUTO: 0.71 X10*3/UL (ref 0.1–1)
MONOCYTES NFR BLD AUTO: 10.1 %
NEUTROPHILS # BLD AUTO: 2.68 X10*3/UL (ref 1.2–7.7)
NEUTROPHILS NFR BLD AUTO: 38.3 %
NRBC BLD-RTO: ABNORMAL /100{WBCS}
PLATELET # BLD AUTO: 248 X10*3/UL (ref 150–450)
POTASSIUM SERPL-SCNC: 4.4 MMOL/L (ref 3.5–5.3)
PROT SERPL-MCNC: 6.9 G/DL (ref 6.4–8.2)
RBC # BLD AUTO: 5.52 X10*6/UL (ref 4.5–5.9)
SODIUM SERPL-SCNC: 132 MMOL/L (ref 136–145)
TSH SERPL-ACNC: 2.41 MIU/L (ref 0.44–3.98)
WBC # BLD AUTO: 7 X10*3/UL (ref 4.4–11.3)

## 2024-01-02 PROCEDURE — 82024 ASSAY OF ACTH: CPT

## 2024-01-02 PROCEDURE — 36415 COLL VENOUS BLD VENIPUNCTURE: CPT

## 2024-01-02 PROCEDURE — 82533 TOTAL CORTISOL: CPT

## 2024-01-02 PROCEDURE — 85025 COMPLETE CBC W/AUTO DIFF WBC: CPT

## 2024-01-02 PROCEDURE — 84443 ASSAY THYROID STIM HORMONE: CPT

## 2024-01-02 PROCEDURE — 80053 COMPREHEN METABOLIC PANEL: CPT

## 2024-01-03 ENCOUNTER — OFFICE VISIT (OUTPATIENT)
Dept: HEMATOLOGY/ONCOLOGY | Facility: CLINIC | Age: 64
End: 2024-01-03
Payer: COMMERCIAL

## 2024-01-03 ENCOUNTER — INFUSION (OUTPATIENT)
Dept: HEMATOLOGY/ONCOLOGY | Facility: CLINIC | Age: 64
End: 2024-01-03
Payer: COMMERCIAL

## 2024-01-03 VITALS
TEMPERATURE: 98.1 F | RESPIRATION RATE: 18 BRPM | BODY MASS INDEX: 21.74 KG/M2 | OXYGEN SATURATION: 96 % | HEART RATE: 60 BPM | WEIGHT: 147.93 LBS | SYSTOLIC BLOOD PRESSURE: 138 MMHG | DIASTOLIC BLOOD PRESSURE: 78 MMHG

## 2024-01-03 DIAGNOSIS — C34.11 MALIGNANT NEOPLASM OF UPPER LOBE OF RIGHT LUNG (MULTI): ICD-10-CM

## 2024-01-03 PROCEDURE — 3075F SYST BP GE 130 - 139MM HG: CPT | Performed by: NURSE PRACTITIONER

## 2024-01-03 PROCEDURE — 99214 OFFICE O/P EST MOD 30 MIN: CPT | Performed by: NURSE PRACTITIONER

## 2024-01-03 PROCEDURE — 2500000004 HC RX 250 GENERAL PHARMACY W/ HCPCS (ALT 636 FOR OP/ED): Mod: JZ,JG,SE | Performed by: INTERNAL MEDICINE

## 2024-01-03 PROCEDURE — 3078F DIAST BP <80 MM HG: CPT | Performed by: NURSE PRACTITIONER

## 2024-01-03 PROCEDURE — 96413 CHEMO IV INFUSION 1 HR: CPT

## 2024-01-03 PROCEDURE — 1126F AMNT PAIN NOTED NONE PRSNT: CPT | Performed by: NURSE PRACTITIONER

## 2024-01-03 RX ORDER — PROCHLORPERAZINE MALEATE 10 MG
10 TABLET ORAL EVERY 6 HOURS PRN
Status: DISCONTINUED | OUTPATIENT
Start: 2024-01-03 | End: 2024-01-03 | Stop reason: HOSPADM

## 2024-01-03 RX ORDER — PROCHLORPERAZINE EDISYLATE 5 MG/ML
10 INJECTION INTRAMUSCULAR; INTRAVENOUS EVERY 6 HOURS PRN
Status: DISCONTINUED | OUTPATIENT
Start: 2024-01-03 | End: 2024-01-03 | Stop reason: HOSPADM

## 2024-01-03 RX ORDER — HEPARIN 100 UNIT/ML
500 SYRINGE INTRAVENOUS AS NEEDED
Status: DISCONTINUED | OUTPATIENT
Start: 2024-01-03 | End: 2024-01-03 | Stop reason: HOSPADM

## 2024-01-03 RX ORDER — EPINEPHRINE 0.3 MG/.3ML
0.3 INJECTION SUBCUTANEOUS EVERY 5 MIN PRN
Status: DISCONTINUED | OUTPATIENT
Start: 2024-01-03 | End: 2024-01-03 | Stop reason: HOSPADM

## 2024-01-03 RX ORDER — FAMOTIDINE 10 MG/ML
20 INJECTION INTRAVENOUS ONCE AS NEEDED
Status: DISCONTINUED | OUTPATIENT
Start: 2024-01-03 | End: 2024-01-03 | Stop reason: HOSPADM

## 2024-01-03 RX ORDER — HEPARIN SODIUM,PORCINE/PF 10 UNIT/ML
50 SYRINGE (ML) INTRAVENOUS AS NEEDED
Status: CANCELLED | OUTPATIENT
Start: 2024-01-03

## 2024-01-03 RX ORDER — HEPARIN SODIUM,PORCINE/PF 10 UNIT/ML
50 SYRINGE (ML) INTRAVENOUS AS NEEDED
Status: DISCONTINUED | OUTPATIENT
Start: 2024-01-03 | End: 2024-01-03 | Stop reason: HOSPADM

## 2024-01-03 RX ORDER — DIPHENHYDRAMINE HYDROCHLORIDE 50 MG/ML
50 INJECTION INTRAMUSCULAR; INTRAVENOUS AS NEEDED
Status: DISCONTINUED | OUTPATIENT
Start: 2024-01-03 | End: 2024-01-03 | Stop reason: HOSPADM

## 2024-01-03 RX ORDER — ALBUTEROL SULFATE 0.83 MG/ML
3 SOLUTION RESPIRATORY (INHALATION) AS NEEDED
Status: DISCONTINUED | OUTPATIENT
Start: 2024-01-03 | End: 2024-01-03 | Stop reason: HOSPADM

## 2024-01-03 RX ORDER — HEPARIN 100 UNIT/ML
500 SYRINGE INTRAVENOUS AS NEEDED
Status: CANCELLED | OUTPATIENT
Start: 2024-01-03

## 2024-01-03 RX ADMIN — SODIUM CHLORIDE 200 MG: 9 INJECTION, SOLUTION INTRAVENOUS at 13:56

## 2024-01-03 ASSESSMENT — ENCOUNTER SYMPTOMS
LOSS OF SENSATION IN FEET: 0
OCCASIONAL FEELINGS OF UNSTEADINESS: 0
DEPRESSION: 0

## 2024-01-03 ASSESSMENT — PAIN SCALES - GENERAL: PAINLEVEL: 0-NO PAIN

## 2024-01-03 NOTE — SIGNIFICANT EVENT
01/03/24 1324   Prechemo Checklist   Has the patient been in the hospital, ED, or urgent care since last date of service No   Chemo/Immuno Consent Signed Yes   Protocol/Indications Verified Yes   Confirmed to previous date/time of medication Yes   Compared to previous dose Yes   All medications are dated accurately Yes   Pregnancy Test Negative Not applicable   Parameters Met Yes   BSA/Weight-Height Verified Yes   Dose Calculations Verified Yes

## 2024-01-03 NOTE — PROGRESS NOTES
Patient ID: Pillo Guerin is a 63 y.o. male    Primary Care Provider: Rocio Harris, SIMONE-CNP    DIAGNOSIS AND STAGING  Stage IVB (cT3 cN3 cM1c) poorly differentiated adenocarcinoma of RUL diagnosed on 08/03/23     SITES OF DISEASE  RUL  Supraclavicular nodes  Contralateral lung  Right gluteal muscle   Brain right occipital lobe      MOLECULAR GENOMICS  MICROSATELLITE STATUS: Microsatellite Stable (MAGDIEL)     DISEASE ASSOCIATED GENOMIC FINDINGS:   KRAS p.G13D ([id number] c.38G>A)   PIK3CA p.E542K ([id number] c.1624G>A)   TP53 p.E285K ([id number] c.853G>A)     DISEASE RELEVANT ALTERATIONS NOT DETECTED:   Negative for ALK fusion.   Negative for BRAF V600E.   Negative for EGFR sensitizing mutation.   Negative for KRAS G12C.   Negative for MET exon 14 skipping mutation.   Negative for NTRK fusion.   Negative for RET fusion.   Negative for ROS1 fusion.     PD-L1 TPS 80%     PRIOR THERAPIES  GKRS to right occipital lobe on  24 Gy in 1 fraction on 10/17/23      CURRENT THERAPY  Single agent pembrolizumab since 08/31/23    CURRENT ONCOLOGICAL PROBLEMS  None      HISTORY OF PRESENT ILLNESS  This is a pt with PMH of EtOH use disorder, CVA, CAD c/b NSTEMI s/p 2 stents and stage IVB (fI2mH2H4b) poorly differentiated adenocarcinoma of RUL diagnosed on 08/03/23.      Patient was initially diagnosed 08/2022 after incidentally found RUL lesion was worked up during admission for hyponatremia presumed to be 2/2 beer potomania. He was then seen by his pulmonologist Dr. Callejas who ordered PET and MRI but this was not obtained  nor were multiple attempts to schedule for oncologic NPV.     I saw him in August 2023 and he endorsed haviing lost 15 lbs since stroke April 2022. His appetite is good, denies dysgeusia. He has slowed down a bit since he had his stroke. He can walk without becoming dyspneic but is limited by his stroke.    On 08/12/23 the patient underwent a bronchoscopy with EBUS:  Accession #: [id number]   Date of  Procedure:8/3/2022   Date Reported: 8/9/2022   Date Received: 8/3/2022   Date of Birth / Sex 1960 (Age: 62) / M   Race: WHITE   Submitting Physician: DARYN YOUNG MD   Attending Physician: MD HEVER BROWNE MD   Procedures/Addenda Present     Other External #     FINAL CYTOLOGICAL INTERPRETATION     A. FINE NEEDLE ASPIRATION LYMPH NODE, L 4, CYTOLOGY AND CELL BLOCK:   NO MALIGNANT CELLS IDENTIFIED.   LYMPHOID SAMPLE IS PRESENT.     Note: Cell block shows presence of lymphoid cells along with bronchial cells   contamination.       B. FINE NEEDLE ASPIRATION LYMPH NODE, STATION 7, CYTOLOGY AND CELL BLOCK:   NO MALIGNANT CELLS IDENTIFIED.   LYMPHOID SAMPLE IS PRESENT.       C. FINE NEEDLE ASPIRATION LYMPH NODE, R 11, CYTOLOGY AND CELL BLOCK:   POORLY DIFFERENTIATED CARCINOMA, CONSISTENT WITH ADENOCARCINOMA, SEE NOTE.     Note: A limited panel of immunohistochemical stains performed showed neoplastic   cells staining strongly and diffusely positive with TTF-1 and negative with   p40. Findings are consistent with above.     Staff pathologist: Reviewed by Dr. Ellyn Valles.     Molecular testing has been ordered and results will be issued in   an addendum.     The cell block; C1 contains moderate tumor cellularity representing   roughly 70 % of all nucleated cells.       D. BRONCHO-ALVEOLAR LAVAGE OF RIGHT UPPER LOBE:   RARE ATYPICAL CELLS ARE PRESENT.     08/16/23: PET scan demonstrates multiple RUL FDG avid nodules as well as contra-lateral lung nodules, right hilar, mediastinal and supraclavicular nodes consistent with neoplastic involvement. There is also a large FDG avid right gluteal mass extending into the right SIJ and iliac bone.     08/16/23: MRI brain shows a 8 mm right occipital lobe metastasis with mild associated edema.     08/31/23: Single agent pembrolizumab begins     10/17/23: GKRS to right occipital lobe on  24 Gy in 1 fraction      PAST MEDICAL HISTORY  CAD s/p 2 stents, CVA,  Dupuytren's contracture   BPH  Lumbar radiculopathy     SURGICAL HISTORY  None      SOCIAL HISTORY  Born in [place], lives in [place] with 2 roommates.   Brother Salas is NOK. Drinks 2 24 oz beers a day.   + Tobacco - 50 pack year smoking history, active.   Rare cannabis, acid use in 70s, no additional illicits.    Was working at [email or web address] in [place]  before stroke.      FAMILY HISTORY  Mother  of melanoma in      CURRENT MEDS REVIEWED       ALLERGIES REVIEWED        SUBJECTIVE:  Feeling well for treatment  Breathing stable, no respiratory complaints  Reflux symptoms improved with  PPI  Denies N/V/D/C  Reports intermittent itching to bilateral arms, triamcinolone cream is helping  No fevers, chills, or cold symptoms  No other concerns or complaints    A 13 point review of systems was performed, with significant findings documented above in subjective history.    OBJECTIVE:  Vitals:    24 1152   BP: 138/78   Pulse: 60   Resp: 18   Temp: 36.7 °C (98.1 °F)   SpO2: 96%      Body surface area is 1.81 meters squared.     Wt Readings from Last 5 Encounters:   24 67.1 kg (147 lb 14.9 oz)   23 63.2 kg (139 lb 3.5 oz)   23 63.9 kg (140 lb 14 oz)   23 59.1 kg (130 lb 4.7 oz)   10/11/23 59.1 kg (130 lb 4.7 oz)       ECOGSCORE: 1- Restricted in physically strenuous activity.  Carries out light duty.    Physical Exam  Constitutional:       Appearance: Normal appearance. He is normal weight.   HENT:      Head: Normocephalic and atraumatic.   Eyes:      General: No scleral icterus.     Extraocular Movements: Extraocular movements intact.      Conjunctiva/sclera: Conjunctivae normal.   Cardiovascular:      Rate and Rhythm: Normal rate and regular rhythm.      Heart sounds: Normal heart sounds.   Pulmonary:      Effort: Pulmonary effort is normal.      Breath sounds: Normal breath sounds.   Abdominal:      Palpations: Abdomen is soft. There is no mass.      Tenderness: There is no  abdominal tenderness.   Musculoskeletal:         General: No swelling.      Comments: Left leg brace intact   Skin:     General: Skin is warm and dry.      Coloration: Skin is not pale.      Findings: No erythema or rash.      Comments: Left thigh (medial) dime-sized circular scabbed ulcer    Neurological:      Mental Status: He is alert and oriented to person, place, and time. Mental status is at baseline.      Motor: Weakness present.      Gait: Gait abnormal.   Psychiatric:         Mood and Affect: Mood normal.         Behavior: Behavior normal.         Thought Content: Thought content normal.            Diagnostic Results   Results:  Labs:  Lab Results   Component Value Date    WBC 7.0 01/02/2024    HGB 11.8 (L) 01/02/2024    HCT 36.2 (L) 01/02/2024    MCV 66 (L) 01/02/2024     01/02/2024      Lab Results   Component Value Date    NEUTROABS 2.68 01/02/2024        Lab Results   Component Value Date    GLUCOSE 98 01/02/2024    CALCIUM 9.3 01/02/2024     (L) 01/02/2024    K 4.4 01/02/2024    CO2 27 01/02/2024    CL 99 01/02/2024    BUN 12 01/02/2024    CREATININE 0.63 01/02/2024    MG 2.21 09/19/2023     Lab Results   Component Value Date    ALT 19 01/02/2024    AST 17 01/02/2024    ALKPHOS 60 01/02/2024    BILITOT 0.7 01/02/2024    BILIDIR 0.3 07/31/2022      Lab Results   Component Value Date    ACTH 62.6 11/20/2023    CORTISOL 13.0 01/02/2024    TSH 2.41 01/02/2024    FREET4 0.98 10/09/2023         No images are attached to the encounter or orders placed in the encounter.     Assessment/Plan     Lung cancer (CMS/[facility]), Clinical: Stage IVB (cT3, cN3, cM1c)  Stage IVB ([id number]) poorly differentiated adenocarcinoma of RUL, TPS 80%, lack of actionable genomic alterations noted.  On single agent pembrolizumab since 08/31/23 - best to keep a Q3 weeks schedule for better monitoring of potential side effects   CT C/A/P from 11/20/23 reviewed with the patient -  Excellent response to treatment   Repeat  scans ~ 02/20/24  Continue moisturizing BUE with CeraVe and triamcinolone as needed for pruritus, declined dermatology referral for Left thigh scabbed ulcer, states is healing, he will follow-up with his PCP if this does not improve/worsens    New onset LLE edema- duplex US negative for DVT bilaterally       Right occipital lobe 8 mm metastasis   S/p GKRS 24 Gy in 1 fraction on 10/17/23  Repeat MRI brain every 3 months - to be scheduled by Rad Onc and followed accordingly      Pain on right gluteal region-improved  Due to large centrally necrotic and peripherally hypermetabolic mass in the right lower paramediastinal muscle extending into the adjacent subcutaneous tissue and superior right gluteal muscle with involvement of the superior aspect of the right iliac  bone, right florencio sacrum -   He has been taking gabapentin for pain        Stroke in April 2022 and hx of CAD  -   Stable -   On ASA and ticagrelor      HTN - on amlodipine      HLD - atorvastatin

## 2024-01-04 LAB — ACTH PLAS-MCNC: 60.3 PG/ML (ref 7.2–63.3)

## 2024-01-08 ENCOUNTER — TELEPHONE (OUTPATIENT)
Dept: ADMISSION | Facility: HOSPITAL | Age: 64
End: 2024-01-08
Payer: COMMERCIAL

## 2024-01-08 DIAGNOSIS — G89.3 CANCER ASSOCIATED PAIN: ICD-10-CM

## 2024-01-08 RX ORDER — GABAPENTIN 300 MG/1
600 CAPSULE ORAL 2 TIMES DAILY
Qty: 60 CAPSULE | Refills: 1 | Status: CANCELLED | OUTPATIENT
Start: 2024-01-08 | End: 2024-02-07

## 2024-01-08 RX ORDER — GABAPENTIN 300 MG/1
600 CAPSULE ORAL 2 TIMES DAILY
Qty: 60 CAPSULE | Refills: 1 | Status: SHIPPED | OUTPATIENT
Start: 2024-01-08 | End: 2024-01-10 | Stop reason: SDUPTHER

## 2024-01-10 RX ORDER — GABAPENTIN 300 MG/1
600 CAPSULE ORAL 2 TIMES DAILY
Qty: 60 CAPSULE | Refills: 0 | Status: SHIPPED | OUTPATIENT
Start: 2024-01-10 | End: 2024-03-13 | Stop reason: SDUPTHER

## 2024-01-17 ENCOUNTER — HOSPITAL ENCOUNTER (OUTPATIENT)
Dept: RADIOLOGY | Facility: HOSPITAL | Age: 64
Discharge: HOME | End: 2024-01-17
Payer: COMMERCIAL

## 2024-01-17 DIAGNOSIS — C79.31 LUNG CANCER METASTATIC TO BRAIN (MULTI): ICD-10-CM

## 2024-01-17 DIAGNOSIS — C34.90 LUNG CANCER METASTATIC TO BRAIN (MULTI): ICD-10-CM

## 2024-01-17 PROCEDURE — 70553 MRI BRAIN STEM W/O & W/DYE: CPT

## 2024-01-17 PROCEDURE — A9575 INJ GADOTERATE MEGLUMI 0.1ML: HCPCS | Performed by: STUDENT IN AN ORGANIZED HEALTH CARE EDUCATION/TRAINING PROGRAM

## 2024-01-17 PROCEDURE — 70553 MRI BRAIN STEM W/O & W/DYE: CPT | Performed by: RADIOLOGY

## 2024-01-17 PROCEDURE — 2550000001 HC RX 255 CONTRASTS: Performed by: STUDENT IN AN ORGANIZED HEALTH CARE EDUCATION/TRAINING PROGRAM

## 2024-01-17 RX ORDER — GADOTERATE MEGLUMINE 376.9 MG/ML
13 INJECTION INTRAVENOUS
Status: COMPLETED | OUTPATIENT
Start: 2024-01-17 | End: 2024-01-17

## 2024-01-17 RX ADMIN — GADOTERATE MEGLUMINE 13 ML: 376.9 INJECTION INTRAVENOUS at 10:48

## 2024-01-24 ENCOUNTER — APPOINTMENT (OUTPATIENT)
Dept: HEMATOLOGY/ONCOLOGY | Facility: CLINIC | Age: 64
End: 2024-01-24
Payer: COMMERCIAL

## 2024-01-24 ENCOUNTER — APPOINTMENT (OUTPATIENT)
Dept: RADIATION ONCOLOGY | Facility: HOSPITAL | Age: 64
End: 2024-01-24
Payer: MEDICAID

## 2024-01-30 ENCOUNTER — LAB (OUTPATIENT)
Dept: LAB | Facility: CLINIC | Age: 64
End: 2024-01-30
Payer: COMMERCIAL

## 2024-01-30 ENCOUNTER — TELEPHONE (OUTPATIENT)
Dept: RADIATION ONCOLOGY | Facility: HOSPITAL | Age: 64
End: 2024-01-30
Payer: COMMERCIAL

## 2024-01-30 DIAGNOSIS — C34.11 MALIGNANT NEOPLASM OF UPPER LOBE OF RIGHT LUNG (MULTI): ICD-10-CM

## 2024-01-30 LAB
ALBUMIN SERPL BCP-MCNC: 4.5 G/DL (ref 3.4–5)
ALP SERPL-CCNC: 55 U/L (ref 33–136)
ALT SERPL W P-5'-P-CCNC: 14 U/L (ref 10–52)
ANION GAP SERPL CALC-SCNC: 11 MMOL/L (ref 10–20)
AST SERPL W P-5'-P-CCNC: 15 U/L (ref 9–39)
BASOPHILS # BLD AUTO: 0.1 X10*3/UL (ref 0–0.1)
BASOPHILS NFR BLD AUTO: 1.2 %
BILIRUB SERPL-MCNC: 0.6 MG/DL (ref 0–1.2)
BUN SERPL-MCNC: 11 MG/DL (ref 6–23)
CALCIUM SERPL-MCNC: 9.6 MG/DL (ref 8.6–10.6)
CHLORIDE SERPL-SCNC: 99 MMOL/L (ref 98–107)
CO2 SERPL-SCNC: 29 MMOL/L (ref 21–32)
CREAT SERPL-MCNC: 0.73 MG/DL (ref 0.5–1.3)
EGFRCR SERPLBLD CKD-EPI 2021: >90 ML/MIN/1.73M*2
EOSINOPHIL # BLD AUTO: 0.6 X10*3/UL (ref 0–0.7)
EOSINOPHIL NFR BLD AUTO: 7.2 %
ERYTHROCYTE [DISTWIDTH] IN BLOOD BY AUTOMATED COUNT: 16 % (ref 11.5–14.5)
GLUCOSE SERPL-MCNC: 105 MG/DL (ref 74–99)
HCT VFR BLD AUTO: 38.6 % (ref 41–52)
HGB BLD-MCNC: 12.3 G/DL (ref 13.5–17.5)
IMM GRANULOCYTES # BLD AUTO: 0.02 X10*3/UL (ref 0–0.7)
IMM GRANULOCYTES NFR BLD AUTO: 0.2 % (ref 0–0.9)
LYMPHOCYTES # BLD AUTO: 3.48 X10*3/UL (ref 1.2–4.8)
LYMPHOCYTES NFR BLD AUTO: 42 %
MCH RBC QN AUTO: 21.1 PG (ref 26–34)
MCHC RBC AUTO-ENTMCNC: 31.9 G/DL (ref 32–36)
MCV RBC AUTO: 66 FL (ref 80–100)
MONOCYTES # BLD AUTO: 0.82 X10*3/UL (ref 0.1–1)
MONOCYTES NFR BLD AUTO: 9.9 %
NEUTROPHILS # BLD AUTO: 3.27 X10*3/UL (ref 1.2–7.7)
NEUTROPHILS NFR BLD AUTO: 39.5 %
NRBC BLD-RTO: ABNORMAL /100{WBCS}
PLATELET # BLD AUTO: 342 X10*3/UL (ref 150–450)
POTASSIUM SERPL-SCNC: 4.6 MMOL/L (ref 3.5–5.3)
PROT SERPL-MCNC: 6.8 G/DL (ref 6.4–8.2)
RBC # BLD AUTO: 5.84 X10*6/UL (ref 4.5–5.9)
SODIUM SERPL-SCNC: 134 MMOL/L (ref 136–145)
WBC # BLD AUTO: 8.3 X10*3/UL (ref 4.4–11.3)

## 2024-01-30 PROCEDURE — 85025 COMPLETE CBC W/AUTO DIFF WBC: CPT

## 2024-01-30 PROCEDURE — 84075 ASSAY ALKALINE PHOSPHATASE: CPT

## 2024-01-30 PROCEDURE — 36415 COLL VENOUS BLD VENIPUNCTURE: CPT

## 2024-01-30 NOTE — TELEPHONE ENCOUNTER
Called pt to remind of appointment on 1/31/24 at 1:30 Pt confirmed appointment.     Juanita Sher MA

## 2024-01-31 ENCOUNTER — HOSPITAL ENCOUNTER (OUTPATIENT)
Dept: RADIATION ONCOLOGY | Facility: HOSPITAL | Age: 64
Setting detail: RADIATION/ONCOLOGY SERIES
Discharge: HOME | End: 2024-01-31
Payer: COMMERCIAL

## 2024-01-31 DIAGNOSIS — C79.31 LUNG CANCER METASTATIC TO BRAIN (MULTI): ICD-10-CM

## 2024-01-31 DIAGNOSIS — C34.90 LUNG CANCER METASTATIC TO BRAIN (MULTI): ICD-10-CM

## 2024-01-31 PROCEDURE — 99213 OFFICE O/P EST LOW 20 MIN: CPT | Performed by: NURSE PRACTITIONER

## 2024-01-31 NOTE — PROGRESS NOTES
Radiation Oncology Follow-Up    Patient Name:  Pillo Guerin  MRN:  [id number]  :  1960    Referring Provider: Elpidio Mckeon MD  Primary Care Provider: AMAN Roland  Care Team: Patient Care Team:  AMAN Roland as PCP - General (Family Medicine)  Troy Callejas MD as PCP - [facility] Medicaid PCP  Zuleyma Vines MD as Consulting Physician (Hematology and Oncology)    I performed this visit using real-time telehealth tools, including an audio/video connection between Pillo Guerin [facility] and RICCI Carranza CNP at [facility].     Date of Service: 2024   64 yo male with stage IVB ([id number]) poorly differentiated adenocarcinoma of RUL with metastases to the brain s/p gamma knife SRS on 10/17/2023 to 2 lesions.  Systemic therapy with pembrolizumab initiated 2023    SUBJECTIVE  History of Present Illness:   Telehealth visit with Mr. Guerin today. Phone visit due to inability to access [employer] appt is to review recent MRI of brain.  He says he is feeling well except has some issues at times with insomnia.  He does take melatonin prn which helps.  He continues to smoke at least 1 ppd of cigarettes.  He denies SOB or SANCHEZ however. He has no new neurologic issues and has some persistent dragging of left leg after a previous stroke.  No headaches, seizures, visual changes or falls. No fever, chills, cough, hemoptysis, diarrhea, N/V or bony pain. He continues pembrolizumab infusions q 3 weeks.     Review of Systems:    Review of Systems   All other systems reviewed and are negative.    OBJECTIVE    Current Outpatient Medications:     albuterol 90 mcg/actuation inhaler, Inhale 2 puffs every 4 hours if needed., Disp: , Rfl:     amLODIPine (Norvasc) 5 mg tablet, Take 0.5 tablets (2.5 mg) by mouth once daily., Disp: , Rfl:     aspirin 81 mg chewable tablet, CHEW 1 TABLET BY MOUTH ONCE DAILY, Disp: 30 tablet, Rfl: 3     atorvastatin (Lipitor) 80 mg tablet, TAKE 1 TABLET BY MOUTH ONCE DAILY, Disp: 30 tablet, Rfl: 1    diclofenac sodium 1 % kit, Apply 2 g topically every 6 hours if needed., Disp: , Rfl:     gabapentin (Neurontin) 300 mg capsule, Take 2 capsules (600 mg) by mouth 2 times a day for 15 days., Disp: 60 capsule, Rfl: 0    losartan (Cozaar) 50 mg tablet, Take 1 tablet (50 mg) by mouth., Disp: , Rfl:     methocarbamol (Robaxin) 750 mg tablet, Take 1 tablet (750 mg) by mouth 3 times a day as needed., Disp: , Rfl:     metoprolol tartrate (Lopressor) 25 mg tablet, TAKE 1 TABLET BY MOUTH TWO TIMES A DAY, Disp: 60 tablet, Rfl: 1    nicotine (Nicoderm CQ) 21 mg/24 hr patch, Place 1 patch on the skin once daily., Disp: 30 patch, Rfl: 3    nitroglycerin (Nitrostat) 0.4 mg SL tablet, Place under the tongue., Disp: , Rfl:     nitroglycerin (Nitrostat) 0.4 mg SL tablet, DISSOLVE 1 TABLET UNDER TONGUE EVERY 5 MINUTES FOR 3 DOSES AS NEEDED FOR CHEST PAIN. IF PAIN PERSISTS DIAL 911., Disp: 25 tablet, Rfl: 0    pantoprazole (ProtoNix) 40 mg EC tablet, Take 1 tablet (40 mg) by mouth once daily. Do not crush, chew, or split., Disp: 30 tablet, Rfl: 2    ticagrelor (Brilinta) 90 mg tablet, TAKE 1 TABLET BY MOUTH TWO TIMES A DAY, Disp: 60 tablet, Rfl: 11    tiotropium (Spiriva) 18 mcg inhalation capsule, Place 1 capsule (18 mcg) into inhaler and inhale once daily., Disp: 30 capsule, Rfl: 5    triamcinolone (Kenalog) 0.1 % ointment, Apply topically 2 times a day., Disp: 80 g, Rfl: 1    Current Facility-Administered Medications:     acetaminophen (Tylenol) tablet 650 mg, 650 mg, oral, q4h PRN, Angelica Munoz MD    HYDROmorphone (Dilaudid) injection 0.4 mg, 0.4 mg, intravenous, PRN, Angelica Munoz MD, 0.4 mg at 10/17/23 1230    HYDROmorphone (Dilaudid) injection 0.4 mg, 0.4 mg, intravenous, PRN, Angelica Munoz MD, 0.4 mg at 10/17/23 0830    ibuprofen tablet 400 mg, 400 mg, oral, PRN, Angelica Munoz MD    midazolam (Versed) injection  0.5 mg, 0.5 mg, intravenous, PRN, Angelica Munoz MD, 0.5 mg at 10/17/23 1230    midazolam (Versed) injection 0.5 mg, 0.5 mg, intravenous, PRN, Angelica Munoz MD, 0.5 mg at 10/17/23 0830    ondansetron (Zofran) injection 4 mg, 4 mg, intravenous, q4h PRN, Angelica Munoz MD    oxyCODONE (Roxicodone) immediate release tablet 10 mg, 10 mg, oral, q4h PRN, Angelica Munoz MD     RESULTS:  MR brain w and wo IV contrast    Result Date: 1/17/2024  Interpreted By:  Yajaira Yuan, STUDY: MR BRAIN W AND WO IV CONTRAST;  1/17/2024 11:26 am   INDICATION: Signs/Symptoms:Brain Metastases - s/p gamma knife. Surveillance imaging..   COMPARISON: None.   ACCESSION NUMBER(S): [id number]   ORDERING CLINICIAN: LEANDER PRICE   TECHNIQUE: Axial T2, FLAIR, DWI, gradient echo T2 and sagittal and coronal T1 weighted images of brain were acquired. Post contrast T1 weighted images were acquired after administration of  gadolinium based intravenous contrast.   FINDINGS: CSF Spaces: The ventricles, sulci and basal cisterns are within normal limits. There is no extra-axial fluid collection.   Parenchyma:   The postcontrast images are markedly degraded by patient motion artifact. The previously identified 0.3 cm focus of enhancement within the right occipital lobe is not visualized on the current exam. No definite new enhancement is noted, however evaluation is limited by motion artifact.   There is no diffusion restriction abnormality to suggest acute infarct.  There is a mild-to-moderate degree of patchy and confluence T2 and FLAIR hyperintense signal which is nonspecific but compatible with microangiopathy. Old lacunar infarcts within the right periventricular white matter are unchanged. There is no mass effect or midline shift. Cerebellar tonsils are above the foramen magnum. Pituitary and sella are not enlarged. There is a punctate focus of susceptibility artifact along the posterior frontal lobe on the right and right  cerebellum which may represent mineralization or old blood products.   Paranasal Sinuses and Mastoids: Moderate patchy opacification within the ethmoid air cells and left maxillary sinus. There is an air-fluid level within the left maxillary sinus. Mild mucosal thickening within the right maxillary sinus. Trace nonspecific fluid within the left mastoid air cells.   Unchanged 0.6 cm enhancing lesion within the left frontal calvarium which may represent a hemangioma.       The postcontrast images are markedly degraded by patient motion artifact. The previously identified 0.3 cm focus of enhancement within the right occipital lobe is not visualized. No definite new enhancement is noted, however evaluation is limited by the motion artifact.   Unchanged 0.6 cm enhancing lesion within the left lateral frontal bone which may reflect a hemangioma.   MACRO: None   Signed by: Yajaira Yuan 1/17/2024 11:45 AM Dictation workstation:   [id number]       ASSESSMENT/PLAN:  63 y.o. male with metastatic lung cancer to the brain s/p gamma knife SRS.  No new lesions noted on recent MRI of brain.  He continues systemic IO.  Plan: MRI of brain in 3 mo with rad onc follow up after scan. Discussed smoking cessation however pt not ready to quit or cut back. He will call with any questions or concerns.     Laverne Carranza CNP  [phone]

## 2024-02-01 ENCOUNTER — INFUSION (OUTPATIENT)
Dept: HEMATOLOGY/ONCOLOGY | Facility: CLINIC | Age: 64
End: 2024-02-01
Payer: COMMERCIAL

## 2024-02-01 VITALS
BODY MASS INDEX: 21.06 KG/M2 | SYSTOLIC BLOOD PRESSURE: 131 MMHG | RESPIRATION RATE: 16 BRPM | WEIGHT: 143.3 LBS | DIASTOLIC BLOOD PRESSURE: 71 MMHG | TEMPERATURE: 97.3 F | HEART RATE: 58 BPM

## 2024-02-01 DIAGNOSIS — C34.11 MALIGNANT NEOPLASM OF UPPER LOBE OF RIGHT LUNG (MULTI): ICD-10-CM

## 2024-02-01 PROCEDURE — 96413 CHEMO IV INFUSION 1 HR: CPT

## 2024-02-01 PROCEDURE — 2500000004 HC RX 250 GENERAL PHARMACY W/ HCPCS (ALT 636 FOR OP/ED): Mod: SE | Performed by: INTERNAL MEDICINE

## 2024-02-01 RX ORDER — ALBUTEROL SULFATE 0.83 MG/ML
3 SOLUTION RESPIRATORY (INHALATION) AS NEEDED
Status: DISCONTINUED | OUTPATIENT
Start: 2024-02-01 | End: 2024-02-01 | Stop reason: HOSPADM

## 2024-02-01 RX ORDER — PROCHLORPERAZINE EDISYLATE 5 MG/ML
10 INJECTION INTRAMUSCULAR; INTRAVENOUS EVERY 6 HOURS PRN
Status: DISCONTINUED | OUTPATIENT
Start: 2024-02-01 | End: 2024-02-01 | Stop reason: HOSPADM

## 2024-02-01 RX ORDER — EPINEPHRINE 0.3 MG/.3ML
0.3 INJECTION SUBCUTANEOUS EVERY 5 MIN PRN
Status: DISCONTINUED | OUTPATIENT
Start: 2024-02-01 | End: 2024-02-01 | Stop reason: HOSPADM

## 2024-02-01 RX ORDER — PROCHLORPERAZINE MALEATE 10 MG
10 TABLET ORAL EVERY 6 HOURS PRN
Status: DISCONTINUED | OUTPATIENT
Start: 2024-02-01 | End: 2024-02-01 | Stop reason: HOSPADM

## 2024-02-01 RX ORDER — DIPHENHYDRAMINE HYDROCHLORIDE 50 MG/ML
50 INJECTION INTRAMUSCULAR; INTRAVENOUS AS NEEDED
Status: DISCONTINUED | OUTPATIENT
Start: 2024-02-01 | End: 2024-02-01 | Stop reason: HOSPADM

## 2024-02-01 RX ORDER — FAMOTIDINE 10 MG/ML
20 INJECTION INTRAVENOUS ONCE AS NEEDED
Status: DISCONTINUED | OUTPATIENT
Start: 2024-02-01 | End: 2024-02-01 | Stop reason: HOSPADM

## 2024-02-01 RX ADMIN — SODIUM CHLORIDE 200 MG: 9 INJECTION, SOLUTION INTRAVENOUS at 11:20

## 2024-02-01 ASSESSMENT — PAIN SCALES - GENERAL: PAINLEVEL: 0-NO PAIN

## 2024-02-02 DIAGNOSIS — C34.11 MALIGNANT NEOPLASM OF UPPER LOBE OF RIGHT LUNG (MULTI): ICD-10-CM

## 2024-02-02 DIAGNOSIS — L29.9 PRURITUS OF SKIN: ICD-10-CM

## 2024-02-02 RX ORDER — TRIAMCINOLONE ACETONIDE 1 MG/G
OINTMENT TOPICAL 2 TIMES DAILY
Qty: 80 G | Refills: 1 | Status: SHIPPED | OUTPATIENT
Start: 2024-02-02

## 2024-02-09 ENCOUNTER — HOSPITAL ENCOUNTER (OUTPATIENT)
Dept: RADIOLOGY | Facility: CLINIC | Age: 64
Discharge: HOME | End: 2024-02-09
Payer: COMMERCIAL

## 2024-02-09 DIAGNOSIS — C34.11 MALIGNANT NEOPLASM OF UPPER LOBE OF RIGHT LUNG (MULTI): ICD-10-CM

## 2024-02-09 PROCEDURE — 71260 CT THORAX DX C+: CPT | Performed by: RADIOLOGY

## 2024-02-09 PROCEDURE — 74177 CT ABD & PELVIS W/CONTRAST: CPT | Performed by: RADIOLOGY

## 2024-02-09 PROCEDURE — 74177 CT ABD & PELVIS W/CONTRAST: CPT

## 2024-02-09 PROCEDURE — 2550000001 HC RX 255 CONTRASTS: Mod: SE | Performed by: INTERNAL MEDICINE

## 2024-02-09 RX ADMIN — IOHEXOL 75 ML: 350 INJECTION, SOLUTION INTRAVENOUS at 12:09

## 2024-02-14 ENCOUNTER — APPOINTMENT (OUTPATIENT)
Dept: HEMATOLOGY/ONCOLOGY | Facility: CLINIC | Age: 64
End: 2024-02-14
Payer: COMMERCIAL

## 2024-02-22 ENCOUNTER — TELEPHONE (OUTPATIENT)
Dept: HEMATOLOGY/ONCOLOGY | Facility: HOSPITAL | Age: 64
End: 2024-02-22
Payer: COMMERCIAL

## 2024-02-26 ENCOUNTER — APPOINTMENT (OUTPATIENT)
Dept: HEMATOLOGY/ONCOLOGY | Facility: CLINIC | Age: 64
End: 2024-02-26
Payer: COMMERCIAL

## 2024-02-26 ENCOUNTER — OFFICE VISIT (OUTPATIENT)
Dept: PULMONOLOGY | Facility: CLINIC | Age: 64
End: 2024-02-26
Payer: COMMERCIAL

## 2024-02-26 VITALS
HEART RATE: 64 BPM | SYSTOLIC BLOOD PRESSURE: 143 MMHG | OXYGEN SATURATION: 98 % | RESPIRATION RATE: 16 BRPM | BODY MASS INDEX: 20.42 KG/M2 | DIASTOLIC BLOOD PRESSURE: 73 MMHG | WEIGHT: 139 LBS

## 2024-02-26 DIAGNOSIS — C34.90 MALIGNANT NEOPLASM OF LUNG, UNSPECIFIED LATERALITY, UNSPECIFIED PART OF LUNG (MULTI): Primary | ICD-10-CM

## 2024-02-26 DIAGNOSIS — J43.9 PULMONARY EMPHYSEMA, UNSPECIFIED EMPHYSEMA TYPE (MULTI): ICD-10-CM

## 2024-02-26 DIAGNOSIS — F17.210 CIGARETTE NICOTINE DEPENDENCE WITHOUT COMPLICATION: ICD-10-CM

## 2024-02-26 PROCEDURE — 1126F AMNT PAIN NOTED NONE PRSNT: CPT | Performed by: INTERNAL MEDICINE

## 2024-02-26 PROCEDURE — 99213 OFFICE O/P EST LOW 20 MIN: CPT | Performed by: INTERNAL MEDICINE

## 2024-02-26 PROCEDURE — 3077F SYST BP >= 140 MM HG: CPT | Performed by: INTERNAL MEDICINE

## 2024-02-26 PROCEDURE — 1159F MED LIST DOCD IN RCRD: CPT | Performed by: INTERNAL MEDICINE

## 2024-02-26 PROCEDURE — 1160F RVW MEDS BY RX/DR IN RCRD: CPT | Performed by: INTERNAL MEDICINE

## 2024-02-26 PROCEDURE — 3078F DIAST BP <80 MM HG: CPT | Performed by: INTERNAL MEDICINE

## 2024-02-26 RX ORDER — TIOTROPIUM BROMIDE 18 UG/1
1 CAPSULE ORAL; RESPIRATORY (INHALATION)
Qty: 30 CAPSULE | Refills: 6 | Status: SHIPPED | OUTPATIENT
Start: 2024-02-26 | End: 2024-08-24

## 2024-02-26 ASSESSMENT — ENCOUNTER SYMPTOMS
NEUROLOGICAL NEGATIVE: 1
ROS GI COMMENTS: AS IN HPI
PSYCHIATRIC NEGATIVE: 1
UNEXPECTED WEIGHT CHANGE: 0
CHILLS: 0
GASTROINTESTINAL NEGATIVE: 1
COUGH: 0
DEPRESSION: 0
FEVER: 0
SHORTNESS OF BREATH: 0

## 2024-02-26 ASSESSMENT — PAIN SCALES - GENERAL: PAINLEVEL: 0-NO PAIN

## 2024-02-26 ASSESSMENT — COLUMBIA-SUICIDE SEVERITY RATING SCALE - C-SSRS
2. HAVE YOU ACTUALLY HAD ANY THOUGHTS OF KILLING YOURSELF?: NO
6. HAVE YOU EVER DONE ANYTHING, STARTED TO DO ANYTHING, OR PREPARED TO DO ANYTHING TO END YOUR LIFE?: NO

## 2024-02-26 NOTE — ASSESSMENT & PLAN NOTE
seen on CT. NL PFTs 10/2023.  Start Spiriva as pt did not start last time.  Cont albuterol.  Pt not interested in MA at this time

## 2024-02-26 NOTE — PROGRESS NOTES
Subjective   Patient ID: Pillo Guerin is a 63 y.o. male who presents for Lung Eval.   h/o CVA, lung ca here for f/u emphysema. NL PFTs 10/2023.  Pt now following w/ oncology.  Has not been using his Spiriva.  Uses rescue 1x/month.  No fevers, chills or cough.     ET is a couple miles.   Current 1 pack/day smoker. Not ready to quit yet.        Review of Systems   Constitutional:  Negative for chills, fever and unexpected weight change.   Respiratory:  Negative for cough and shortness of breath.    Cardiovascular:  Negative for chest pain.   Gastrointestinal: Negative.         As in HPI   Skin:  Negative for rash.   Neurological: Negative.    Psychiatric/Behavioral: Negative.         Objective   Physical Exam  Vitals reviewed.   Constitutional:       Appearance: Normal appearance.   HENT:      Head: Normocephalic and atraumatic.   Eyes:      Extraocular Movements: Extraocular movements intact.   Cardiovascular:      Rate and Rhythm: Normal rate and regular rhythm.      Heart sounds: Normal heart sounds.   Pulmonary:      Effort: Pulmonary effort is normal.      Breath sounds: Normal breath sounds.   Abdominal:      Palpations: Abdomen is soft.      Tenderness: There is no abdominal tenderness.   Musculoskeletal:      Cervical back: Normal range of motion.   Skin:     General: Skin is warm.   Neurological:      General: No focal deficit present.      Mental Status: He is alert and oriented to person, place, and time. Mental status is at baseline.   Psychiatric:         Mood and Affect: Mood normal.         Behavior: Behavior normal.         Assessment/Plan   Problem List Items Addressed This Visit       Emphysema/COPD (CMS/HCC)     seen on CT. NL PFTs 10/2023.  Start Spiriva as pt did not start last time.  Cont albuterol.  Pt not interested in GA at this time          Lung cancer (CMS/HCC) - Primary     Followed by oncology.           Nicotine dependence     40 pack years. Pt not interested in quitting at this time           RTC in  6 months.\    Time Spent  Prep time on day of patient encounter: 5 minutes  Time spent directly with patient, family or caregiver: 10 minutes  Additional Time Spent on Patient Care Activities: 0 minutes  Documentation Time: 5 minutes  Other Time Spent: 0 minutes  Total: 20 minutes        Troy Callejas MD 02/26/24 3:11 PM

## 2024-02-28 ENCOUNTER — APPOINTMENT (OUTPATIENT)
Dept: HEMATOLOGY/ONCOLOGY | Facility: CLINIC | Age: 64
End: 2024-02-28
Payer: COMMERCIAL

## 2024-03-11 ENCOUNTER — LAB (OUTPATIENT)
Dept: LAB | Facility: CLINIC | Age: 64
End: 2024-03-11
Payer: COMMERCIAL

## 2024-03-11 DIAGNOSIS — C34.11 MALIGNANT NEOPLASM OF UPPER LOBE OF RIGHT LUNG (MULTI): ICD-10-CM

## 2024-03-11 LAB
ALBUMIN SERPL BCP-MCNC: 4.2 G/DL (ref 3.4–5)
ALP SERPL-CCNC: 67 U/L (ref 33–136)
ALT SERPL W P-5'-P-CCNC: 12 U/L (ref 10–52)
ANION GAP SERPL CALC-SCNC: 12 MMOL/L (ref 10–20)
AST SERPL W P-5'-P-CCNC: 11 U/L (ref 9–39)
BASOPHILS # BLD AUTO: 0.07 X10*3/UL (ref 0–0.1)
BASOPHILS NFR BLD AUTO: 0.9 %
BILIRUB SERPL-MCNC: 0.7 MG/DL (ref 0–1.2)
BUN SERPL-MCNC: 11 MG/DL (ref 6–23)
CALCIUM SERPL-MCNC: 9.2 MG/DL (ref 8.6–10.6)
CHLORIDE SERPL-SCNC: 99 MMOL/L (ref 98–107)
CO2 SERPL-SCNC: 27 MMOL/L (ref 21–32)
CORTIS AM PEAK SERPL-MSCNC: 18.9 UG/DL (ref 5–20)
CREAT SERPL-MCNC: 0.64 MG/DL (ref 0.5–1.3)
EGFRCR SERPLBLD CKD-EPI 2021: >90 ML/MIN/1.73M*2
EOSINOPHIL # BLD AUTO: 0.85 X10*3/UL (ref 0–0.7)
EOSINOPHIL NFR BLD AUTO: 10.5 %
ERYTHROCYTE [DISTWIDTH] IN BLOOD BY AUTOMATED COUNT: 16.4 % (ref 11.5–14.5)
GLUCOSE SERPL-MCNC: 115 MG/DL (ref 74–99)
HCT VFR BLD AUTO: 38.1 % (ref 41–52)
HGB BLD-MCNC: 12.1 G/DL (ref 13.5–17.5)
IMM GRANULOCYTES # BLD AUTO: 0.02 X10*3/UL (ref 0–0.7)
IMM GRANULOCYTES NFR BLD AUTO: 0.2 % (ref 0–0.9)
LYMPHOCYTES # BLD AUTO: 2.84 X10*3/UL (ref 1.2–4.8)
LYMPHOCYTES NFR BLD AUTO: 35.2 %
MCH RBC QN AUTO: 20.9 PG (ref 26–34)
MCHC RBC AUTO-ENTMCNC: 31.8 G/DL (ref 32–36)
MCV RBC AUTO: 66 FL (ref 80–100)
MONOCYTES # BLD AUTO: 0.69 X10*3/UL (ref 0.1–1)
MONOCYTES NFR BLD AUTO: 8.6 %
NEUTROPHILS # BLD AUTO: 3.6 X10*3/UL (ref 1.2–7.7)
NEUTROPHILS NFR BLD AUTO: 44.6 %
NRBC BLD-RTO: ABNORMAL /100{WBCS}
PLATELET # BLD AUTO: 330 X10*3/UL (ref 150–450)
POTASSIUM SERPL-SCNC: 4.2 MMOL/L (ref 3.5–5.3)
PROT SERPL-MCNC: 6.6 G/DL (ref 6.4–8.2)
RBC # BLD AUTO: 5.8 X10*6/UL (ref 4.5–5.9)
SODIUM SERPL-SCNC: 134 MMOL/L (ref 136–145)
TSH SERPL-ACNC: 1.91 MIU/L (ref 0.44–3.98)
WBC # BLD AUTO: 8.1 X10*3/UL (ref 4.4–11.3)

## 2024-03-11 PROCEDURE — 84443 ASSAY THYROID STIM HORMONE: CPT

## 2024-03-11 PROCEDURE — 84075 ASSAY ALKALINE PHOSPHATASE: CPT

## 2024-03-11 PROCEDURE — 82024 ASSAY OF ACTH: CPT

## 2024-03-11 PROCEDURE — 85025 COMPLETE CBC W/AUTO DIFF WBC: CPT

## 2024-03-11 PROCEDURE — 82533 TOTAL CORTISOL: CPT

## 2024-03-11 PROCEDURE — 36415 COLL VENOUS BLD VENIPUNCTURE: CPT

## 2024-03-12 LAB — ACTH PLAS-MCNC: 67.5 PG/ML (ref 7.2–63.3)

## 2024-03-13 ENCOUNTER — INFUSION (OUTPATIENT)
Dept: HEMATOLOGY/ONCOLOGY | Facility: CLINIC | Age: 64
End: 2024-03-13
Payer: MEDICAID

## 2024-03-13 ENCOUNTER — SOCIAL WORK (OUTPATIENT)
Dept: CASE MANAGEMENT | Facility: HOSPITAL | Age: 64
End: 2024-03-13

## 2024-03-13 ENCOUNTER — OFFICE VISIT (OUTPATIENT)
Dept: HEMATOLOGY/ONCOLOGY | Facility: CLINIC | Age: 64
End: 2024-03-13
Payer: MEDICAID

## 2024-03-13 VITALS
SYSTOLIC BLOOD PRESSURE: 143 MMHG | DIASTOLIC BLOOD PRESSURE: 72 MMHG | WEIGHT: 140.1 LBS | BODY MASS INDEX: 20.59 KG/M2 | HEART RATE: 55 BPM | TEMPERATURE: 97.5 F | RESPIRATION RATE: 18 BRPM | OXYGEN SATURATION: 97 %

## 2024-03-13 DIAGNOSIS — C34.11 MALIGNANT NEOPLASM OF UPPER LOBE OF RIGHT LUNG (MULTI): Primary | ICD-10-CM

## 2024-03-13 DIAGNOSIS — G89.3 CANCER ASSOCIATED PAIN: ICD-10-CM

## 2024-03-13 DIAGNOSIS — C34.11 MALIGNANT NEOPLASM OF UPPER LOBE OF RIGHT LUNG (MULTI): ICD-10-CM

## 2024-03-13 DIAGNOSIS — K21.9 GASTROESOPHAGEAL REFLUX DISEASE, UNSPECIFIED WHETHER ESOPHAGITIS PRESENT: ICD-10-CM

## 2024-03-13 PROCEDURE — 99214 OFFICE O/P EST MOD 30 MIN: CPT | Performed by: NURSE PRACTITIONER

## 2024-03-13 PROCEDURE — 1125F AMNT PAIN NOTED PAIN PRSNT: CPT | Performed by: NURSE PRACTITIONER

## 2024-03-13 PROCEDURE — 3077F SYST BP >= 140 MM HG: CPT | Performed by: NURSE PRACTITIONER

## 2024-03-13 PROCEDURE — 1159F MED LIST DOCD IN RCRD: CPT | Performed by: NURSE PRACTITIONER

## 2024-03-13 PROCEDURE — 2500000004 HC RX 250 GENERAL PHARMACY W/ HCPCS (ALT 636 FOR OP/ED): Mod: JZ,JG,SE | Performed by: INTERNAL MEDICINE

## 2024-03-13 PROCEDURE — 4004F PT TOBACCO SCREEN RCVD TLK: CPT | Performed by: NURSE PRACTITIONER

## 2024-03-13 PROCEDURE — 1160F RVW MEDS BY RX/DR IN RCRD: CPT | Performed by: NURSE PRACTITIONER

## 2024-03-13 PROCEDURE — 3078F DIAST BP <80 MM HG: CPT | Performed by: NURSE PRACTITIONER

## 2024-03-13 PROCEDURE — 96413 CHEMO IV INFUSION 1 HR: CPT

## 2024-03-13 RX ORDER — EPINEPHRINE 0.3 MG/.3ML
0.3 INJECTION SUBCUTANEOUS EVERY 5 MIN PRN
Status: DISCONTINUED | OUTPATIENT
Start: 2024-03-13 | End: 2024-03-13 | Stop reason: HOSPADM

## 2024-03-13 RX ORDER — HEPARIN SODIUM,PORCINE/PF 10 UNIT/ML
50 SYRINGE (ML) INTRAVENOUS AS NEEDED
Status: DISCONTINUED | OUTPATIENT
Start: 2024-03-13 | End: 2024-03-13 | Stop reason: HOSPADM

## 2024-03-13 RX ORDER — FAMOTIDINE 10 MG/ML
20 INJECTION INTRAVENOUS ONCE AS NEEDED
Status: DISCONTINUED | OUTPATIENT
Start: 2024-03-13 | End: 2024-03-13 | Stop reason: HOSPADM

## 2024-03-13 RX ORDER — PANTOPRAZOLE SODIUM 40 MG/1
40 TABLET, DELAYED RELEASE ORAL DAILY
Qty: 30 TABLET | Refills: 2 | Status: SHIPPED | OUTPATIENT
Start: 2024-03-13 | End: 2024-06-11

## 2024-03-13 RX ORDER — HEPARIN SODIUM,PORCINE/PF 10 UNIT/ML
50 SYRINGE (ML) INTRAVENOUS AS NEEDED
Status: CANCELLED | OUTPATIENT
Start: 2024-03-13

## 2024-03-13 RX ORDER — PROCHLORPERAZINE EDISYLATE 5 MG/ML
10 INJECTION INTRAMUSCULAR; INTRAVENOUS EVERY 6 HOURS PRN
Status: DISCONTINUED | OUTPATIENT
Start: 2024-03-13 | End: 2024-03-13 | Stop reason: HOSPADM

## 2024-03-13 RX ORDER — HEPARIN 100 UNIT/ML
500 SYRINGE INTRAVENOUS AS NEEDED
Status: CANCELLED | OUTPATIENT
Start: 2024-03-13

## 2024-03-13 RX ORDER — PROCHLORPERAZINE MALEATE 10 MG
10 TABLET ORAL EVERY 6 HOURS PRN
Status: DISCONTINUED | OUTPATIENT
Start: 2024-03-13 | End: 2024-03-13 | Stop reason: HOSPADM

## 2024-03-13 RX ORDER — ALBUTEROL SULFATE 0.83 MG/ML
3 SOLUTION RESPIRATORY (INHALATION) AS NEEDED
Status: DISCONTINUED | OUTPATIENT
Start: 2024-03-13 | End: 2024-03-13 | Stop reason: HOSPADM

## 2024-03-13 RX ORDER — GABAPENTIN 300 MG/1
600 CAPSULE ORAL 2 TIMES DAILY
Qty: 120 CAPSULE | Refills: 0 | Status: SHIPPED | OUTPATIENT
Start: 2024-03-13 | End: 2024-04-03 | Stop reason: SDUPTHER

## 2024-03-13 RX ORDER — HEPARIN 100 UNIT/ML
500 SYRINGE INTRAVENOUS AS NEEDED
Status: DISCONTINUED | OUTPATIENT
Start: 2024-03-13 | End: 2024-03-13 | Stop reason: HOSPADM

## 2024-03-13 RX ORDER — DIPHENHYDRAMINE HYDROCHLORIDE 50 MG/ML
50 INJECTION INTRAMUSCULAR; INTRAVENOUS AS NEEDED
Status: DISCONTINUED | OUTPATIENT
Start: 2024-03-13 | End: 2024-03-13 | Stop reason: HOSPADM

## 2024-03-13 RX ADMIN — SODIUM CHLORIDE 200 MG: 9 INJECTION, SOLUTION INTRAVENOUS at 13:19

## 2024-03-13 ASSESSMENT — ENCOUNTER SYMPTOMS
OCCASIONAL FEELINGS OF UNSTEADINESS: 0
LOSS OF SENSATION IN FEET: 0
DEPRESSION: 0

## 2024-03-13 ASSESSMENT — PAIN SCALES - GENERAL: PAINLEVEL: 7

## 2024-03-13 ASSESSMENT — COLUMBIA-SUICIDE SEVERITY RATING SCALE - C-SSRS
6. HAVE YOU EVER DONE ANYTHING, STARTED TO DO ANYTHING, OR PREPARED TO DO ANYTHING TO END YOUR LIFE?: NO
1. IN THE PAST MONTH, HAVE YOU WISHED YOU WERE DEAD OR WISHED YOU COULD GO TO SLEEP AND NOT WAKE UP?: NO
2. HAVE YOU ACTUALLY HAD ANY THOUGHTS OF KILLING YOURSELF?: NO

## 2024-03-13 ASSESSMENT — PATIENT HEALTH QUESTIONNAIRE - PHQ9
1. LITTLE INTEREST OR PLEASURE IN DOING THINGS: NOT AT ALL
SUM OF ALL RESPONSES TO PHQ9 QUESTIONS 1 AND 2: 0
2. FEELING DOWN, DEPRESSED OR HOPELESS: NOT AT ALL

## 2024-03-13 NOTE — SIGNIFICANT EVENT
03/13/24 1231   Prechemo Checklist   Has the patient been in the hospital, ED, or urgent care since last date of service No   Chemo/Immuno Consent Signed Yes   Protocol/Indications Verified Yes   Confirmed to previous date/time of medication Yes   Compared to previous dose Yes   All medications are dated accurately Yes   Pregnancy Test Negative Not applicable   Parameters Met Yes   BSA/Weight-Height Verified Yes   Dose Calculations Verified Yes

## 2024-03-13 NOTE — PROGRESS NOTES
Patient ID: Pillo Guerin is a 63 y.o. male    Primary Care Provider: Rocio Harris, SIMONE-CNP    DIAGNOSIS AND STAGING  Stage IVB (cT3 cN3 cM1c) poorly differentiated adenocarcinoma of RUL diagnosed on 08/03/23     SITES OF DISEASE  RUL  Supraclavicular nodes  Contralateral lung  Right gluteal muscle   Brain right occipital lobe      MOLECULAR GENOMICS  MICROSATELLITE STATUS: Microsatellite Stable (MAGDIEL)     DISEASE ASSOCIATED GENOMIC FINDINGS:   KRAS p.G13D ([id number] c.38G>A)   PIK3CA p.E542K ([id number] c.1624G>A)   TP53 p.E285K ([id number] c.853G>A)     DISEASE RELEVANT ALTERATIONS NOT DETECTED:   Negative for ALK fusion.   Negative for BRAF V600E.   Negative for EGFR sensitizing mutation.   Negative for KRAS G12C.   Negative for MET exon 14 skipping mutation.   Negative for NTRK fusion.   Negative for RET fusion.   Negative for ROS1 fusion.     PD-L1 TPS 80%     PRIOR THERAPIES  GKRS to right occipital lobe on  24 Gy in 1 fraction on 10/17/23      CURRENT THERAPY  Single agent pembrolizumab since 08/31/23    CURRENT ONCOLOGICAL PROBLEMS  None      HISTORY OF PRESENT ILLNESS  This is a pt with PMH of EtOH use disorder, CVA, CAD c/b NSTEMI s/p 2 stents and stage IVB ([id number]) poorly differentiated adenocarcinoma of RUL diagnosed on 08/03/23.      Patient was initially diagnosed 08/2022 after incidentally found RUL lesion was worked up during admission for hyponatremia presumed to be 2/2 beer potomania. He was then seen by his pulmonologist Dr. Callejas who ordered PET and MRI but this was not obtained  nor were multiple attempts to schedule for oncologic NPV.     I saw him in August 2023 and he endorsed haviing lost 15 lbs since stroke April 2022. His appetite is good, denies dysgeusia. He has slowed down a bit since he had his stroke. He can walk without becoming dyspneic but is limited by his stroke.    On 08/12/23 the patient underwent a bronchoscopy with EBUS:  Accession #: [id number]   Date of  Procedure:8/3/2022   Date Reported: 8/9/2022   Date Received: 8/3/2022   Date of Birth / Sex 1960 (Age: 62) / M   Race: WHITE   Submitting Physician: DARYN YOUNG MD   Attending Physician: MD HEVER BROWNE MD   Procedures/Addenda Present     Other External #     FINAL CYTOLOGICAL INTERPRETATION     A. FINE NEEDLE ASPIRATION LYMPH NODE, L 4, CYTOLOGY AND CELL BLOCK:   NO MALIGNANT CELLS IDENTIFIED.   LYMPHOID SAMPLE IS PRESENT.     Note: Cell block shows presence of lymphoid cells along with bronchial cells   contamination.       B. FINE NEEDLE ASPIRATION LYMPH NODE, STATION 7, CYTOLOGY AND CELL BLOCK:   NO MALIGNANT CELLS IDENTIFIED.   LYMPHOID SAMPLE IS PRESENT.       C. FINE NEEDLE ASPIRATION LYMPH NODE, R 11, CYTOLOGY AND CELL BLOCK:   POORLY DIFFERENTIATED CARCINOMA, CONSISTENT WITH ADENOCARCINOMA, SEE NOTE.     Note: A limited panel of immunohistochemical stains performed showed neoplastic   cells staining strongly and diffusely positive with TTF-1 and negative with   p40. Findings are consistent with above.     Staff pathologist: Reviewed by Dr. Ellyn Valles.     Molecular testing has been ordered and results will be issued in   an addendum.     The cell block; C1 contains moderate tumor cellularity representing   roughly 70 % of all nucleated cells.       D. BRONCHO-ALVEOLAR LAVAGE OF RIGHT UPPER LOBE:   RARE ATYPICAL CELLS ARE PRESENT.     08/16/23: PET scan demonstrates multiple RUL FDG avid nodules as well as contra-lateral lung nodules, right hilar, mediastinal and supraclavicular nodes consistent with neoplastic involvement. There is also a large FDG avid right gluteal mass extending into the right SIJ and iliac bone.     08/16/23: MRI brain shows a 8 mm right occipital lobe metastasis with mild associated edema.     08/31/23: Single agent pembrolizumab begins     10/17/23: GKRS to right occipital lobe on  24 Gy in 1 fraction      PAST MEDICAL HISTORY  CAD s/p 2 stents, CVA,  Dupuytren's contracture   BPH  Lumbar radiculopathy     SURGICAL HISTORY  None      SOCIAL HISTORY  Born in [place], lives in [place] with 2 roommates.   Brother Salas is NOK. Drinks 2 24 oz beers a day.   + Tobacco - 50 pack year smoking history, active.   Rare cannabis, acid use in 70s, no additional illicits.    Was working at [employer] in [place]  before stroke.      FAMILY HISTORY  Mother  of melanoma in      CURRENT MEDS REVIEWED       ALLERGIES REVIEWED        SUBJECTIVE:  Patient here in clinic for follow-up and treatment. Discussed most recent scan results. He was unable to make his last appointment/treatment because his truck broke down. He has been walking more and without difficulty. Feeling well for treatment today. He was seen by pulmonology and started on Spiriva, feels breathing is better. Requesting refill of PPI and gabapentin. States he has cut way back on drinking, has 24oz. Every other day. No GI symptoms. No new aches/pains. No other concerns or complaints.      LAST VISIT:  Feeling well for treatment  Breathing stable, no respiratory complaints  Reflux symptoms improved with  PPI  Denies N/V/D/C  Reports intermittent itching to bilateral arms, triamcinolone cream is helping  No fevers, chills, or cold symptoms  No other concerns or complaints    A 13 point review of systems was performed, with significant findings documented above in subjective history.    OBJECTIVE:  Vitals:    24 1158   BP: 143/72   Pulse: 55   Resp: 18   Temp: 36.4 °C (97.5 °F)   SpO2: 97%      Body surface area is 1.76 meters squared.     Wt Readings from Last 5 Encounters:   24 63.6 kg (140 lb 1.6 oz)   24 63 kg (139 lb)   24 65 kg (143 lb 4.8 oz)   24 67.1 kg (147 lb 14.9 oz)   23 63.2 kg (139 lb 3.5 oz)       ECOGSCORE: 1- Restricted in physically strenuous activity.  Carries out light duty.    Physical Exam  Constitutional:       Appearance: Normal appearance.  He is normal weight.   HENT:      Head: Normocephalic and atraumatic.   Eyes:      General: No scleral icterus.     Extraocular Movements: Extraocular movements intact.      Conjunctiva/sclera: Conjunctivae normal.   Cardiovascular:      Rate and Rhythm: Normal rate and regular rhythm.      Heart sounds: Normal heart sounds.   Pulmonary:      Effort: Pulmonary effort is normal. No respiratory distress.      Breath sounds: Normal breath sounds. No stridor. No wheezing, rhonchi or rales.   Abdominal:      General: Bowel sounds are normal.      Palpations: Abdomen is soft. There is no mass.      Tenderness: There is no abdominal tenderness.   Musculoskeletal:         General: No swelling.      Comments: Left leg brace intact   Skin:     General: Skin is warm and dry.      Coloration: Skin is not pale.      Findings: No erythema or rash.   Neurological:      Mental Status: He is alert and oriented to person, place, and time. Mental status is at baseline.      Motor: Weakness present.      Gait: Gait abnormal.   Psychiatric:         Mood and Affect: Mood normal.         Behavior: Behavior normal.         Thought Content: Thought content normal.            Diagnostic Results   Results:  Labs:  Lab Results   Component Value Date    WBC 8.1 03/11/2024    HGB 12.1 (L) 03/11/2024    HCT 38.1 (L) 03/11/2024    MCV 66 (L) 03/11/2024     03/11/2024      Lab Results   Component Value Date    NEUTROABS 3.60 03/11/2024        Lab Results   Component Value Date    GLUCOSE 115 (H) 03/11/2024    CALCIUM 9.2 03/11/2024     (L) 03/11/2024    K 4.2 03/11/2024    CO2 27 03/11/2024    CL 99 03/11/2024    BUN 11 03/11/2024    CREATININE 0.64 03/11/2024    MG 2.21 09/19/2023     Lab Results   Component Value Date    ALT 12 03/11/2024    AST 11 03/11/2024    ALKPHOS 67 03/11/2024    BILITOT 0.7 03/11/2024    BILIDIR 0.3 07/31/2022      Lab Results   Component Value Date    ACTH 67.5 (H) 03/11/2024    CORTISOL 18.9 03/11/2024     TSH 1.91 03/11/2024    FREET4 0.98 10/09/2023         No images are attached to the encounter or orders placed in the encounter.     Assessment/Plan     Lung cancer (CMS/HCC), Clinical: Stage IVB (cT3, cN3, cM1c)  Stage IVB ([id number]) poorly differentiated adenocarcinoma of RUL, TPS 80%, lack of actionable genomic alterations noted.  On single agent pembrolizumab since 08/31/23 - best to keep a Q3 weeks schedule for better monitoring of potential side effects   CT C/A/P from 02/09/24 reviewed with the patient - stable scan  Repeat scans ~ 05/2024, not ordered yet  Continue moisturizing BUE with CeraVe and triamcinolone as needed for pruritus,      New onset LLE edema- duplex US negative for DVT bilaterally       Right occipital lobe 8 mm metastasis   S/p GKRS 24 Gy in 1 fraction on 10/17/23  Repeat MRI brain every 3 months - to be scheduled by Rad Onc and followed accordingly      Pain on right gluteal region-improved  Due to large centrally necrotic and peripherally hypermetabolic mass in the right lower paramediastinal muscle extending into the adjacent subcutaneous tissue and superior right gluteal muscle with involvement of the superior aspect of the right iliac  bone, right florencio sacrum -   He has been taking gabapentin for pain (controlled)        Stroke in April 2022 and hx of CAD  -   Stable -   On ASA and ticagrelor      HTN - on amlodipine      HLD - atorvastatin

## 2024-04-01 ENCOUNTER — LAB (OUTPATIENT)
Dept: LAB | Facility: CLINIC | Age: 64
End: 2024-04-01
Payer: COMMERCIAL

## 2024-04-01 DIAGNOSIS — C34.11 MALIGNANT NEOPLASM OF UPPER LOBE OF RIGHT LUNG (MULTI): ICD-10-CM

## 2024-04-01 LAB
ALBUMIN SERPL BCP-MCNC: 4.4 G/DL (ref 3.4–5)
ALP SERPL-CCNC: 62 U/L (ref 33–136)
ALT SERPL W P-5'-P-CCNC: 16 U/L (ref 10–52)
ANION GAP SERPL CALC-SCNC: 12 MMOL/L (ref 10–20)
AST SERPL W P-5'-P-CCNC: 16 U/L (ref 9–39)
BASOPHILS # BLD AUTO: 0.07 X10*3/UL (ref 0–0.1)
BASOPHILS NFR BLD AUTO: 0.9 %
BILIRUB SERPL-MCNC: 0.6 MG/DL (ref 0–1.2)
BUN SERPL-MCNC: 9 MG/DL (ref 6–23)
CALCIUM SERPL-MCNC: 9.6 MG/DL (ref 8.6–10.6)
CHLORIDE SERPL-SCNC: 98 MMOL/L (ref 98–107)
CO2 SERPL-SCNC: 28 MMOL/L (ref 21–32)
CREAT SERPL-MCNC: 0.77 MG/DL (ref 0.5–1.3)
EGFRCR SERPLBLD CKD-EPI 2021: >90 ML/MIN/1.73M*2
EOSINOPHIL # BLD AUTO: 0.96 X10*3/UL (ref 0–0.7)
EOSINOPHIL NFR BLD AUTO: 11.9 %
ERYTHROCYTE [DISTWIDTH] IN BLOOD BY AUTOMATED COUNT: 17.5 % (ref 11.5–14.5)
GLUCOSE SERPL-MCNC: 105 MG/DL (ref 74–99)
HCT VFR BLD AUTO: 39.4 % (ref 41–52)
HGB BLD-MCNC: 12.8 G/DL (ref 13.5–17.5)
IMM GRANULOCYTES # BLD AUTO: 0.03 X10*3/UL (ref 0–0.7)
IMM GRANULOCYTES NFR BLD AUTO: 0.4 % (ref 0–0.9)
LYMPHOCYTES # BLD AUTO: 2.98 X10*3/UL (ref 1.2–4.8)
LYMPHOCYTES NFR BLD AUTO: 37 %
MCH RBC QN AUTO: 20.8 PG (ref 26–34)
MCHC RBC AUTO-ENTMCNC: 32.5 G/DL (ref 32–36)
MCV RBC AUTO: 64 FL (ref 80–100)
MONOCYTES # BLD AUTO: 0.72 X10*3/UL (ref 0.1–1)
MONOCYTES NFR BLD AUTO: 8.9 %
NEUTROPHILS # BLD AUTO: 3.29 X10*3/UL (ref 1.2–7.7)
NEUTROPHILS NFR BLD AUTO: 40.9 %
NRBC BLD-RTO: ABNORMAL /100{WBCS}
PLATELET # BLD AUTO: 340 X10*3/UL (ref 150–450)
POTASSIUM SERPL-SCNC: 4.7 MMOL/L (ref 3.5–5.3)
PROT SERPL-MCNC: 6.9 G/DL (ref 6.4–8.2)
RBC # BLD AUTO: 6.16 X10*6/UL (ref 4.5–5.9)
SODIUM SERPL-SCNC: 133 MMOL/L (ref 136–145)
WBC # BLD AUTO: 8.1 X10*3/UL (ref 4.4–11.3)

## 2024-04-01 PROCEDURE — 85025 COMPLETE CBC W/AUTO DIFF WBC: CPT

## 2024-04-01 PROCEDURE — 36415 COLL VENOUS BLD VENIPUNCTURE: CPT

## 2024-04-01 PROCEDURE — 84075 ASSAY ALKALINE PHOSPHATASE: CPT

## 2024-04-02 RX ORDER — ALBUTEROL SULFATE 0.83 MG/ML
3 SOLUTION RESPIRATORY (INHALATION) AS NEEDED
Status: CANCELLED | OUTPATIENT
Start: 2024-04-03

## 2024-04-02 RX ORDER — PROCHLORPERAZINE MALEATE 10 MG
10 TABLET ORAL EVERY 6 HOURS PRN
Status: CANCELLED | OUTPATIENT
Start: 2024-04-03

## 2024-04-02 RX ORDER — PROCHLORPERAZINE EDISYLATE 5 MG/ML
10 INJECTION INTRAMUSCULAR; INTRAVENOUS EVERY 6 HOURS PRN
Status: CANCELLED | OUTPATIENT
Start: 2024-04-03

## 2024-04-02 RX ORDER — DIPHENHYDRAMINE HYDROCHLORIDE 50 MG/ML
50 INJECTION INTRAMUSCULAR; INTRAVENOUS AS NEEDED
Status: CANCELLED | OUTPATIENT
Start: 2024-04-03

## 2024-04-02 RX ORDER — FAMOTIDINE 10 MG/ML
20 INJECTION INTRAVENOUS ONCE AS NEEDED
Status: CANCELLED | OUTPATIENT
Start: 2024-04-03

## 2024-04-02 RX ORDER — EPINEPHRINE 0.3 MG/.3ML
0.3 INJECTION SUBCUTANEOUS EVERY 5 MIN PRN
Status: CANCELLED | OUTPATIENT
Start: 2024-04-03

## 2024-04-03 ENCOUNTER — OFFICE VISIT (OUTPATIENT)
Dept: HEMATOLOGY/ONCOLOGY | Facility: CLINIC | Age: 64
End: 2024-04-03
Payer: COMMERCIAL

## 2024-04-03 VITALS
DIASTOLIC BLOOD PRESSURE: 65 MMHG | OXYGEN SATURATION: 95 % | TEMPERATURE: 98.1 F | RESPIRATION RATE: 18 BRPM | HEART RATE: 55 BPM | SYSTOLIC BLOOD PRESSURE: 128 MMHG | WEIGHT: 135.36 LBS | BODY MASS INDEX: 19.89 KG/M2

## 2024-04-03 DIAGNOSIS — G89.3 CANCER ASSOCIATED PAIN: ICD-10-CM

## 2024-04-03 DIAGNOSIS — C34.11 MALIGNANT NEOPLASM OF UPPER LOBE OF RIGHT LUNG (MULTI): Primary | ICD-10-CM

## 2024-04-03 PROCEDURE — 1159F MED LIST DOCD IN RCRD: CPT | Performed by: NURSE PRACTITIONER

## 2024-04-03 PROCEDURE — 1160F RVW MEDS BY RX/DR IN RCRD: CPT | Performed by: NURSE PRACTITIONER

## 2024-04-03 PROCEDURE — 99214 OFFICE O/P EST MOD 30 MIN: CPT | Performed by: NURSE PRACTITIONER

## 2024-04-03 PROCEDURE — 3078F DIAST BP <80 MM HG: CPT | Performed by: NURSE PRACTITIONER

## 2024-04-03 PROCEDURE — 3074F SYST BP LT 130 MM HG: CPT | Performed by: NURSE PRACTITIONER

## 2024-04-03 PROCEDURE — 1125F AMNT PAIN NOTED PAIN PRSNT: CPT | Performed by: NURSE PRACTITIONER

## 2024-04-03 RX ORDER — GABAPENTIN 300 MG/1
600 CAPSULE ORAL 2 TIMES DAILY
Qty: 120 CAPSULE | Refills: 0 | Status: SHIPPED | OUTPATIENT
Start: 2024-04-03 | End: 2024-05-03

## 2024-04-03 ASSESSMENT — PAIN SCALES - GENERAL: PAINLEVEL: 8

## 2024-04-03 NOTE — PROGRESS NOTES
Patient ID: Pillo Guerin is a 63 y.o. male    Primary Care Provider: Rocio Harris, SIMONE-CNP    DIAGNOSIS AND STAGING  Stage IVB (cT3 cN3 cM1c) poorly differentiated adenocarcinoma of RUL diagnosed on 08/03/23     SITES OF DISEASE  RUL  Supraclavicular nodes  Contralateral lung  Right gluteal muscle   Brain right occipital lobe      MOLECULAR GENOMICS  MICROSATELLITE STATUS: Microsatellite Stable (MAGDIEL)     DISEASE ASSOCIATED GENOMIC FINDINGS:   KRAS p.G13D ([id number] c.38G>A)   PIK3CA p.E542K ([id number] c.1624G>A)   TP53 p.E285K ([id number] c.853G>A)     DISEASE RELEVANT ALTERATIONS NOT DETECTED:   Negative for ALK fusion.   Negative for BRAF V600E.   Negative for EGFR sensitizing mutation.   Negative for KRAS G12C.   Negative for MET exon 14 skipping mutation.   Negative for NTRK fusion.   Negative for RET fusion.   Negative for ROS1 fusion.     PD-L1 TPS 80%     PRIOR THERAPIES  GKRS to right occipital lobe on  24 Gy in 1 fraction on 10/17/23      CURRENT THERAPY  Single agent pembrolizumab since 08/31/23    CURRENT ONCOLOGICAL PROBLEMS  None      HISTORY OF PRESENT ILLNESS  This is a pt with PMH of EtOH use disorder, CVA, CAD c/b NSTEMI s/p 2 stents and stage IVB ([id number]) poorly differentiated adenocarcinoma of RUL diagnosed on 08/03/23.      Patient was initially diagnosed 08/2022 after incidentally found RUL lesion was worked up during admission for hyponatremia presumed to be 2/2 beer potomania. He was then seen by his pulmonologist Dr. Callejas who ordered PET and MRI but this was not obtained  nor were multiple attempts to schedule for oncologic NPV.     I saw him in August 2023 and he endorsed haviing lost 15 lbs since stroke April 2022. His appetite is good, denies dysgeusia. He has slowed down a bit since he had his stroke. He can walk without becoming dyspneic but is limited by his stroke.    On 08/12/23 the patient underwent a bronchoscopy with EBUS:  Accession #: [id number]   Date of  Procedure:8/3/2022   Date Reported: 8/9/2022   Date Received: 8/3/2022   Date of Birth / Sex 1960 (Age: 62) / M   Race: WHITE   Submitting Physician: DARYN YOUNG MD   Attending Physician: MD HEVER BRWONE MD   Procedures/Addenda Present     Other External #     FINAL CYTOLOGICAL INTERPRETATION     A. FINE NEEDLE ASPIRATION LYMPH NODE, L 4, CYTOLOGY AND CELL BLOCK:   NO MALIGNANT CELLS IDENTIFIED.   LYMPHOID SAMPLE IS PRESENT.     Note: Cell block shows presence of lymphoid cells along with bronchial cells   contamination.       B. FINE NEEDLE ASPIRATION LYMPH NODE, STATION 7, CYTOLOGY AND CELL BLOCK:   NO MALIGNANT CELLS IDENTIFIED.   LYMPHOID SAMPLE IS PRESENT.       C. FINE NEEDLE ASPIRATION LYMPH NODE, R 11, CYTOLOGY AND CELL BLOCK:   POORLY DIFFERENTIATED CARCINOMA, CONSISTENT WITH ADENOCARCINOMA, SEE NOTE.     Note: A limited panel of immunohistochemical stains performed showed neoplastic   cells staining strongly and diffusely positive with TTF-1 and negative with   p40. Findings are consistent with above.     Staff pathologist: Reviewed by Dr. Ellyn Valles.     Molecular testing has been ordered and results will be issued in   an addendum.     The cell block; C1 contains moderate tumor cellularity representing   roughly 70 % of all nucleated cells.       D. BRONCHO-ALVEOLAR LAVAGE OF RIGHT UPPER LOBE:   RARE ATYPICAL CELLS ARE PRESENT.     08/16/23: PET scan demonstrates multiple RUL FDG avid nodules as well as contra-lateral lung nodules, right hilar, mediastinal and supraclavicular nodes consistent with neoplastic involvement. There is also a large FDG avid right gluteal mass extending into the right SIJ and iliac bone.     08/16/23: MRI brain shows a 8 mm right occipital lobe metastasis with mild associated edema.     08/31/23: Single agent pembrolizumab begins     10/17/23: GKRS to right occipital lobe on  24 Gy in 1 fraction      PAST MEDICAL HISTORY  CAD s/p 2 stents, CVA,  Dupuytren's contracture   BPH  Lumbar radiculopathy     SURGICAL HISTORY  None      SOCIAL HISTORY  Born in [place], lives in [place] with 2 roommates.   Brother Salas is NOK. Drinks 2 24 oz beers a day.   + Tobacco - 50 pack year smoking history, active.   Rare cannabis, acid use in 70s, no additional illicits.    Was working at [employer] in [place]  before stroke.      FAMILY HISTORY  Mother  of melanoma in      CURRENT MEDS REVIEWED       ALLERGIES REVIEWED        SUBJECTIVE:  Patient is here today in clinic for follow-up prior to treatment tomorrow  Feeling well for treatment  Breathing stable, no new respiratory symptoms  He stopped taking his Spiriva due to SE, advised him to discuss this with his pulmonologist  Reports 1 episode of diarrhea, resolved without intervention  No fevers or chills  No new aches/pains  No other concerns      LAST VISIT:  Patient here in clinic for follow-up and treatment. Discussed most recent scan results. He was unable to make his last appointment/treatment because his truck broke down. He has been walking more and without difficulty. Feeling well for treatment today. He was seen by pulmonology and started on Spiriva, feels breathing is better. Requesting refill of PPI and gabapentin. States he has cut way back on drinking, has 24oz. Every other day. No GI symptoms. No new aches/pains. No other concerns or complaints.    A 13 point review of systems was performed, with significant findings documented above in subjective history.    OBJECTIVE:  There were no vitals filed for this visit.     There is no height or weight on file to calculate BSA.     Wt Readings from Last 5 Encounters:   24 63.6 kg (140 lb 1.6 oz)   24 63 kg (139 lb)   24 65 kg (143 lb 4.8 oz)   24 67.1 kg (147 lb 14.9 oz)   23 63.2 kg (139 lb 3.5 oz)       ECOGSCORE: 1- Restricted in physically strenuous activity.  Carries out light duty.    Physical  Exam  Constitutional:       Appearance: Normal appearance. He is normal weight.   HENT:      Head: Normocephalic and atraumatic.   Eyes:      General: No scleral icterus.     Extraocular Movements: Extraocular movements intact.      Conjunctiva/sclera: Conjunctivae normal.   Cardiovascular:      Rate and Rhythm: Normal rate and regular rhythm.      Heart sounds: Normal heart sounds.   Pulmonary:      Effort: Pulmonary effort is normal. No respiratory distress.      Breath sounds: Normal breath sounds. No stridor. No wheezing, rhonchi or rales.   Abdominal:      General: Bowel sounds are normal.      Palpations: Abdomen is soft. There is no mass.      Tenderness: There is no abdominal tenderness.   Musculoskeletal:         General: No swelling.      Comments: Left leg brace intact   Skin:     General: Skin is warm and dry.      Coloration: Skin is not pale.      Findings: No erythema or rash.   Neurological:      Mental Status: He is alert and oriented to person, place, and time. Mental status is at baseline.      Motor: Weakness present.      Gait: Gait abnormal.   Psychiatric:         Mood and Affect: Mood normal.         Behavior: Behavior normal.         Thought Content: Thought content normal.            Diagnostic Results   Results:  Labs:  Lab Results   Component Value Date    WBC 8.1 04/01/2024    HGB 12.8 (L) 04/01/2024    HCT 39.4 (L) 04/01/2024    MCV 64 (L) 04/01/2024     04/01/2024      Lab Results   Component Value Date    NEUTROABS 3.29 04/01/2024        Lab Results   Component Value Date    GLUCOSE 105 (H) 04/01/2024    CALCIUM 9.6 04/01/2024     (L) 04/01/2024    K 4.7 04/01/2024    CO2 28 04/01/2024    CL 98 04/01/2024    BUN 9 04/01/2024    CREATININE 0.77 04/01/2024    MG 2.21 09/19/2023     Lab Results   Component Value Date    ALT 16 04/01/2024    AST 16 04/01/2024    ALKPHOS 62 04/01/2024    BILITOT 0.6 04/01/2024    BILIDIR 0.3 07/31/2022      Lab Results   Component Value Date     ACTH 67.5 (H) 03/11/2024    CORTISOL 18.9 03/11/2024    TSH 1.91 03/11/2024    FREET4 0.98 10/09/2023         No images are attached to the encounter or orders placed in the encounter.     Assessment/Plan     Lung cancer (CMS/HCC), Clinical: Stage IVB (cT3, cN3, cM1c)  Stage IVB ([id number]) poorly differentiated adenocarcinoma of RUL, TPS 80%, lack of actionable genomic alterations noted.  On single agent pembrolizumab since 08/31/23 - best to keep a Q3 weeks schedule for better monitoring of potential side effects   CT C/A/P from 02/09/24 reviewed with the patient - stable scan  Repeat scans ~ 05/2024, not ordered yet   Continue moisturizing BUE with CeraVe and triamcinolone as needed for pruritus,    New onset LLE edema- duplex US negative for DVT bilaterally    Right occipital lobe 8 mm metastasis   S/p GKRS 24 Gy in 1 fraction on 10/17/23  Repeat MRI brain every 3 months - to be scheduled by Rad Onc and followed accordingly (scheduled 5/15/24 with subsequent follow-up)     Pain on right gluteal region-improved  Due to large centrally necrotic and peripherally hypermetabolic mass in the right lower paramediastinal muscle extending into the adjacent subcutaneous tissue and superior right gluteal muscle with involvement of the superior aspect of the right iliac  bone, right florencio sacrum -   He has been taking gabapentin for pain (controlled)      Stroke in April 2022 and hx of CAD  -   Stable -   On ASA and ticagrelor      HTN - on amlodipine      HLD - atorvastatin

## 2024-04-04 ENCOUNTER — INFUSION (OUTPATIENT)
Dept: HEMATOLOGY/ONCOLOGY | Facility: CLINIC | Age: 64
End: 2024-04-04
Payer: COMMERCIAL

## 2024-04-04 VITALS
RESPIRATION RATE: 18 BRPM | WEIGHT: 140 LBS | TEMPERATURE: 96.1 F | SYSTOLIC BLOOD PRESSURE: 131 MMHG | DIASTOLIC BLOOD PRESSURE: 65 MMHG | HEART RATE: 58 BPM | BODY MASS INDEX: 20.57 KG/M2 | OXYGEN SATURATION: 98 %

## 2024-04-04 DIAGNOSIS — C34.11 MALIGNANT NEOPLASM OF UPPER LOBE OF RIGHT LUNG (MULTI): ICD-10-CM

## 2024-04-04 PROCEDURE — 2500000004 HC RX 250 GENERAL PHARMACY W/ HCPCS (ALT 636 FOR OP/ED): Mod: JZ,JG,SE | Performed by: NURSE PRACTITIONER

## 2024-04-04 PROCEDURE — 96413 CHEMO IV INFUSION 1 HR: CPT

## 2024-04-04 RX ORDER — PROCHLORPERAZINE EDISYLATE 5 MG/ML
10 INJECTION INTRAMUSCULAR; INTRAVENOUS EVERY 6 HOURS PRN
Status: DISCONTINUED | OUTPATIENT
Start: 2024-04-04 | End: 2024-04-04 | Stop reason: HOSPADM

## 2024-04-04 RX ORDER — HEPARIN SODIUM,PORCINE/PF 10 UNIT/ML
50 SYRINGE (ML) INTRAVENOUS AS NEEDED
Status: CANCELLED | OUTPATIENT
Start: 2024-04-04

## 2024-04-04 RX ORDER — HEPARIN 100 UNIT/ML
500 SYRINGE INTRAVENOUS AS NEEDED
Status: DISCONTINUED | OUTPATIENT
Start: 2024-04-04 | End: 2024-04-04 | Stop reason: HOSPADM

## 2024-04-04 RX ORDER — HEPARIN SODIUM,PORCINE/PF 10 UNIT/ML
50 SYRINGE (ML) INTRAVENOUS AS NEEDED
Status: DISCONTINUED | OUTPATIENT
Start: 2024-04-04 | End: 2024-04-04 | Stop reason: HOSPADM

## 2024-04-04 RX ORDER — PROCHLORPERAZINE MALEATE 10 MG
10 TABLET ORAL EVERY 6 HOURS PRN
Status: DISCONTINUED | OUTPATIENT
Start: 2024-04-04 | End: 2024-04-04 | Stop reason: HOSPADM

## 2024-04-04 RX ORDER — ALBUTEROL SULFATE 0.83 MG/ML
3 SOLUTION RESPIRATORY (INHALATION) AS NEEDED
Status: DISCONTINUED | OUTPATIENT
Start: 2024-04-04 | End: 2024-04-04 | Stop reason: HOSPADM

## 2024-04-04 RX ORDER — DIPHENHYDRAMINE HYDROCHLORIDE 50 MG/ML
50 INJECTION INTRAMUSCULAR; INTRAVENOUS AS NEEDED
Status: DISCONTINUED | OUTPATIENT
Start: 2024-04-04 | End: 2024-04-04 | Stop reason: HOSPADM

## 2024-04-04 RX ORDER — HEPARIN 100 UNIT/ML
500 SYRINGE INTRAVENOUS AS NEEDED
Status: CANCELLED | OUTPATIENT
Start: 2024-04-04

## 2024-04-04 RX ORDER — FAMOTIDINE 10 MG/ML
20 INJECTION INTRAVENOUS ONCE AS NEEDED
Status: DISCONTINUED | OUTPATIENT
Start: 2024-04-04 | End: 2024-04-04 | Stop reason: HOSPADM

## 2024-04-04 RX ORDER — EPINEPHRINE 0.3 MG/.3ML
0.3 INJECTION SUBCUTANEOUS EVERY 5 MIN PRN
Status: DISCONTINUED | OUTPATIENT
Start: 2024-04-04 | End: 2024-04-04 | Stop reason: HOSPADM

## 2024-04-04 RX ADMIN — SODIUM CHLORIDE 200 MG: 9 INJECTION, SOLUTION INTRAVENOUS at 08:56

## 2024-04-04 ASSESSMENT — PAIN SCALES - GENERAL: PAINLEVEL: 0-NO PAIN

## 2024-04-04 NOTE — SIGNIFICANT EVENT
04/04/24 0818   Prechemo Checklist   Has the patient been in the hospital, ED, or urgent care since last date of service No   Chemo/Immuno Consent Signed Yes   Protocol/Indications Verified Yes   Confirmed to previous date/time of medication Yes   Compared to previous dose Yes   All medications are dated accurately Yes   Pregnancy Test Negative Not applicable   Parameters Met Yes   BSA/Weight-Height Verified Yes   Dose Calculations Verified (current, total, cumulative) Yes

## 2024-04-19 ENCOUNTER — APPOINTMENT (OUTPATIENT)
Dept: RADIOLOGY | Facility: HOSPITAL | Age: 64
End: 2024-04-19
Payer: COMMERCIAL

## 2024-04-19 ENCOUNTER — HOSPITAL ENCOUNTER (EMERGENCY)
Facility: HOSPITAL | Age: 64
Discharge: HOME | End: 2024-04-19
Attending: INTERNAL MEDICINE
Payer: COMMERCIAL

## 2024-04-19 ENCOUNTER — NURSE TRIAGE (OUTPATIENT)
Dept: HEMATOLOGY/ONCOLOGY | Facility: HOSPITAL | Age: 64
End: 2024-04-19
Payer: COMMERCIAL

## 2024-04-19 ENCOUNTER — APPOINTMENT (OUTPATIENT)
Dept: CARDIOLOGY | Facility: HOSPITAL | Age: 64
End: 2024-04-19
Payer: COMMERCIAL

## 2024-04-19 VITALS
OXYGEN SATURATION: 98 % | HEART RATE: 76 BPM | SYSTOLIC BLOOD PRESSURE: 145 MMHG | DIASTOLIC BLOOD PRESSURE: 72 MMHG | RESPIRATION RATE: 18 BRPM

## 2024-04-19 DIAGNOSIS — K52.9 COLITIS: Primary | ICD-10-CM

## 2024-04-19 LAB
ALBUMIN SERPL BCP-MCNC: 3.9 G/DL (ref 3.4–5)
ALP SERPL-CCNC: 60 U/L (ref 33–136)
ALT SERPL W P-5'-P-CCNC: 10 U/L (ref 10–52)
ANION GAP SERPL CALC-SCNC: 11 MMOL/L (ref 10–20)
APTT PPP: 28 SECONDS (ref 27–38)
AST SERPL W P-5'-P-CCNC: 18 U/L (ref 9–39)
BASOPHILS # BLD AUTO: 0.09 X10*3/UL (ref 0–0.1)
BASOPHILS NFR BLD AUTO: 1.1 %
BILIRUB SERPL-MCNC: 0.6 MG/DL (ref 0–1.2)
BUN SERPL-MCNC: 11 MG/DL (ref 6–23)
CA-I BLD-SCNC: 1.16 MMOL/L (ref 1.1–1.33)
CALCIUM SERPL-MCNC: 7.9 MG/DL (ref 8.6–10.3)
CHLORIDE SERPL-SCNC: 105 MMOL/L (ref 98–107)
CO2 SERPL-SCNC: 23 MMOL/L (ref 21–32)
CREAT SERPL-MCNC: 0.68 MG/DL (ref 0.5–1.3)
EGFRCR SERPLBLD CKD-EPI 2021: >90 ML/MIN/1.73M*2
EOSINOPHIL # BLD AUTO: 0.72 X10*3/UL (ref 0–0.7)
EOSINOPHIL NFR BLD AUTO: 8.5 %
ERYTHROCYTE [DISTWIDTH] IN BLOOD BY AUTOMATED COUNT: 17.4 % (ref 11.5–14.5)
FLUAV RNA RESP QL NAA+PROBE: NOT DETECTED
FLUBV RNA RESP QL NAA+PROBE: NOT DETECTED
GLUCOSE SERPL-MCNC: 139 MG/DL (ref 74–99)
HCT VFR BLD AUTO: 38.3 % (ref 41–52)
HEMOCCULT SP1 STL QL: NEGATIVE
HGB BLD-MCNC: 12.5 G/DL (ref 13.5–17.5)
IMM GRANULOCYTES # BLD AUTO: 0.02 X10*3/UL (ref 0–0.7)
IMM GRANULOCYTES NFR BLD AUTO: 0.2 % (ref 0–0.9)
INR PPP: 1.1 (ref 0.9–1.1)
LACTATE SERPL-SCNC: 0.9 MMOL/L (ref 0.4–2)
LIPASE SERPL-CCNC: 22 U/L (ref 9–82)
LYMPHOCYTES # BLD AUTO: 2.88 X10*3/UL (ref 1.2–4.8)
LYMPHOCYTES NFR BLD AUTO: 34.1 %
MAGNESIUM SERPL-MCNC: 2.2 MG/DL (ref 1.6–2.4)
MCH RBC QN AUTO: 21.1 PG (ref 26–34)
MCHC RBC AUTO-ENTMCNC: 32.6 G/DL (ref 32–36)
MCV RBC AUTO: 65 FL (ref 80–100)
MONOCYTES # BLD AUTO: 1.01 X10*3/UL (ref 0.1–1)
MONOCYTES NFR BLD AUTO: 12 %
NEUTROPHILS # BLD AUTO: 3.72 X10*3/UL (ref 1.2–7.7)
NEUTROPHILS NFR BLD AUTO: 44.1 %
NRBC BLD-RTO: 0 /100 WBCS (ref 0–0)
PLATELET # BLD AUTO: 339 X10*3/UL (ref 150–450)
POTASSIUM SERPL-SCNC: 4.1 MMOL/L (ref 3.5–5.3)
PROT SERPL-MCNC: 6.3 G/DL (ref 6.4–8.2)
PROTHROMBIN TIME: 12.6 SECONDS (ref 9.8–12.8)
RBC # BLD AUTO: 5.92 X10*6/UL (ref 4.5–5.9)
SARS-COV-2 RNA RESP QL NAA+PROBE: NOT DETECTED
SODIUM SERPL-SCNC: 135 MMOL/L (ref 136–145)
WBC # BLD AUTO: 8.4 X10*3/UL (ref 4.4–11.3)

## 2024-04-19 PROCEDURE — 36415 COLL VENOUS BLD VENIPUNCTURE: CPT | Performed by: INTERNAL MEDICINE

## 2024-04-19 PROCEDURE — 99285 EMERGENCY DEPT VISIT HI MDM: CPT | Mod: 25

## 2024-04-19 PROCEDURE — 74177 CT ABD & PELVIS W/CONTRAST: CPT | Performed by: RADIOLOGY

## 2024-04-19 PROCEDURE — 2500000004 HC RX 250 GENERAL PHARMACY W/ HCPCS (ALT 636 FOR OP/ED): Performed by: INTERNAL MEDICINE

## 2024-04-19 PROCEDURE — 74177 CT ABD & PELVIS W/CONTRAST: CPT

## 2024-04-19 PROCEDURE — 82330 ASSAY OF CALCIUM: CPT | Performed by: INTERNAL MEDICINE

## 2024-04-19 PROCEDURE — 87636 SARSCOV2 & INF A&B AMP PRB: CPT | Performed by: INTERNAL MEDICINE

## 2024-04-19 PROCEDURE — 83735 ASSAY OF MAGNESIUM: CPT | Performed by: INTERNAL MEDICINE

## 2024-04-19 PROCEDURE — 96360 HYDRATION IV INFUSION INIT: CPT

## 2024-04-19 PROCEDURE — 85730 THROMBOPLASTIN TIME PARTIAL: CPT | Performed by: INTERNAL MEDICINE

## 2024-04-19 PROCEDURE — 83605 ASSAY OF LACTIC ACID: CPT | Performed by: INTERNAL MEDICINE

## 2024-04-19 PROCEDURE — 93005 ELECTROCARDIOGRAM TRACING: CPT

## 2024-04-19 PROCEDURE — 96361 HYDRATE IV INFUSION ADD-ON: CPT

## 2024-04-19 PROCEDURE — 85025 COMPLETE CBC W/AUTO DIFF WBC: CPT | Performed by: INTERNAL MEDICINE

## 2024-04-19 PROCEDURE — 2550000001 HC RX 255 CONTRASTS: Performed by: INTERNAL MEDICINE

## 2024-04-19 PROCEDURE — 82270 OCCULT BLOOD FECES: CPT | Performed by: INTERNAL MEDICINE

## 2024-04-19 PROCEDURE — 85610 PROTHROMBIN TIME: CPT | Performed by: INTERNAL MEDICINE

## 2024-04-19 PROCEDURE — 80053 COMPREHEN METABOLIC PANEL: CPT | Performed by: INTERNAL MEDICINE

## 2024-04-19 PROCEDURE — 83690 ASSAY OF LIPASE: CPT | Performed by: INTERNAL MEDICINE

## 2024-04-19 RX ORDER — LOPERAMIDE HYDROCHLORIDE 2 MG/1
2 CAPSULE ORAL 4 TIMES DAILY PRN
Qty: 20 CAPSULE | Refills: 0 | Status: SHIPPED | OUTPATIENT
Start: 2024-04-19 | End: 2024-04-22

## 2024-04-19 RX ORDER — PANTOPRAZOLE SODIUM 40 MG/10ML
80 INJECTION, POWDER, LYOPHILIZED, FOR SOLUTION INTRAVENOUS ONCE
Status: DISCONTINUED | OUTPATIENT
Start: 2024-04-19 | End: 2024-04-19

## 2024-04-19 RX ADMIN — IOHEXOL 75 ML: 350 INJECTION, SOLUTION INTRAVENOUS at 20:52

## 2024-04-19 RX ADMIN — SODIUM CHLORIDE, SODIUM LACTATE, POTASSIUM CHLORIDE, AND CALCIUM CHLORIDE 1000 ML: 600; 310; 30; 20 INJECTION, SOLUTION INTRAVENOUS at 19:00

## 2024-04-19 ASSESSMENT — PAIN SCALES - GENERAL: PAINLEVEL_OUTOF10: 3

## 2024-04-19 ASSESSMENT — PAIN - FUNCTIONAL ASSESSMENT: PAIN_FUNCTIONAL_ASSESSMENT: 0-10

## 2024-04-19 NOTE — TELEPHONE ENCOUNTER
Genna Rocha CNP after reviewing chart it is recommended that he go to the emergency room for further evaluation.    Call to patient who verbalized understanding.  He states he will call an ambulance and go to [facility].

## 2024-04-19 NOTE — TELEPHONE ENCOUNTER
"Additional Information   Commented on: Review EMR for h/o immunotherapy. If positive, ask if patient is having these symptoms (they may be signs of immune-related hepatitis or colitis):     Very dark   Commented on: How many bowel movements have you had in the last 24 hours?     8.  Both normal and watery   Commented on: Are your bowel movements liquid or formed?     Soft and liquid   Commented on: What color are your bowel movements?     Very dark   Commented on: When did these problems start?     2 weeks ago - was less at that time and without  cramping   Commented on: What helps these problems?     Has tried immodium and peptobismol without relief.   Commented on: What have you eaten in the last 2 days?     Have cut down on eating \"big time\"   Commented on: What have you been drinking in the last 24 hours?     1 - 2 bottles of water per day   Commented on: Are there daily activities that you're having trouble with such as getting dressed or making meals?     Up until yesterday able to be up but not today    Protocols used: Diarrhea   No chest pain, no dizziness,  no fevers, some shortness of breath that he is on meds for but this is not a change.    "

## 2024-04-19 NOTE — ED PROVIDER NOTES
HPI     CC: No chief complaint on file.     HPI: Pillo Guerin is a 63 y.o. 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  He has had worsening diarrhea over the past 2 weeks, now having 8-10 episodes a day.  His p.o. intake has been poor because everything goes right through him.  He denies concurrent abdominal pain, lightheadedness/dizziness, chest pain, shortness of breath, dysuria, hematuria, nausea, vomiting, or other symptoms.  He does take iron intermittently but not in the past week.  Today he noticed that his stool became black.  He has no history of prior GI bleed.  He denies NSAID usage.    ROS: 10-point review of systems was performed and is otherwise negative except as noted in HPI.    Limitations to history: N/A    Independent Historians: N/A    External Records Reviewed: Outpatient notes in EMR    Past Medical History: Noncontributory except per HPI     Past Surgical History: Noncontributory except per HPI     Family History: Reviewed and noncontributory     Social History: Smokes tobacco.  Uses ETOH. Denies illicit drugs.    Social Determinants Affecting Care: N/A disheveled    No Known Allergies    Home Meds:   Current Outpatient Medications   Medication Instructions    albuterol 90 mcg/actuation inhaler 2 puffs, inhalation, Every 4 hours PRN    amLODIPine (NORVASC) 2.5 mg, oral, Daily RT    aspirin 81 mg chewable tablet CHEW 1 TABLET BY MOUTH ONCE DAILY    atorvastatin (Lipitor) 80 mg tablet TAKE 1 TABLET BY MOUTH ONCE DAILY    diclofenac sodium 2 g, Topical, Every 6 hours PRN    gabapentin (NEURONTIN) 600 mg, oral, 2 times daily    loperamide (IMODIUM) 2 mg, oral, 4 times daily PRN    losartan (COZAAR) 50 mg, oral    methocarbamol (ROBAXIN) 750 mg, oral, 3 times daily PRN     metoprolol tartrate (Lopressor) 25 mg tablet TAKE 1 TABLET BY MOUTH TWO TIMES A DAY    nicotine (Nicoderm CQ) 21 mg/24 hr patch 1 patch, transdermal, Daily    nitroglycerin (Nitrostat) 0.4 mg SL tablet sublingual    nitroglycerin (Nitrostat) 0.4 mg SL tablet DISSOLVE 1 TABLET UNDER TONGUE EVERY 5 MINUTES FOR 3 DOSES AS NEEDED FOR CHEST PAIN. IF PAIN PERSISTS DIAL 911.    pantoprazole (PROTONIX) 40 mg, oral, Daily, Do not crush, chew, or split.    ticagrelor (Brilinta) 90 mg tablet TAKE 1 TABLET BY MOUTH TWO TIMES A DAY    tiotropium (SPIRIVA) 18 mcg, inhalation, Daily RT    triamcinolone (Kenalog) 0.1 % ointment Topical, 2 times daily        Physical Exam     ED Triage Vitals   Temp Pulse Resp BP   -- -- -- --      SpO2 Temp src Heart Rate Source Patient Position   -- -- -- --      BP Location FiO2 (%)     -- --               Physical Exam  Vitals and nursing note reviewed.     CONSTITUTIONAL: Disheveled appearing, thin, in no acute distress.   HENMT: Head atraumatic. Airway patent. Nasal mucosa clear. Mouth with normal mucosa, clear oropharynx. Uvula midline. Neck supple.    EYES: Clear bilaterally, pupils equally round and reactive to light.   CARDIOVASCULAR: Normal rate, regular rhythm.  Heart sounds S1, S2.  No murmurs, rubs or gallops. Normal pulses. Capillary refill < 2 sec.   RESPIRATORY: No increased work of breathing. Breath sounds clear and equal bilaterally.  GASTROINTESTINAL: Abdomen soft, non-distended, non-tender. No rebound, no guarding. Normal bowel sounds. No palpable masses. VINEET with grayish/dark brown stool.  GENITOURINARY:  No CVA tenderness.  MUSCULOSKELETAL: Spine appears normal, range of motion is not limited, no muscle or joint tenderness. No edema.   NEUROLOGICAL: Alert and oriented, no asymmetry, moving all extremities equally.  SKIN: Warm, dry and intact. No rash or notable lesions.  PSYCHIATRIC: Normal mood and affect.  HEME/LYMPH: No adenopathy or splenomegaly.    Diagnostic Results       ECG: ECGs read and interpreted by me. See ED Course, below, for interpretation.    Labs Reviewed   CBC WITH AUTO DIFFERENTIAL - Abnormal       Result Value    WBC 8.4      nRBC 0.0      RBC 5.92 (*)     Hemoglobin 12.5 (*)     Hematocrit 38.3 (*)     MCV 65 (*)     MCH 21.1 (*)     MCHC 32.6      RDW 17.4 (*)     Platelets 339      Neutrophils % 44.1      Immature Granulocytes %, Automated 0.2      Lymphocytes % 34.1      Monocytes % 12.0      Eosinophils % 8.5      Basophils % 1.1      Neutrophils Absolute 3.72      Immature Granulocytes Absolute, Automated 0.02      Lymphocytes Absolute 2.88      Monocytes Absolute 1.01 (*)     Eosinophils Absolute 0.72 (*)     Basophils Absolute 0.09     COMPREHENSIVE METABOLIC PANEL - Abnormal    Glucose 139 (*)     Sodium 135 (*)     Potassium 4.1      Chloride 105      Bicarbonate 23      Anion Gap 11      Urea Nitrogen 11      Creatinine 0.68      eGFR >90      Calcium 7.9 (*)     Albumin 3.9      Alkaline Phosphatase 60      Total Protein 6.3 (*)     AST 18      Bilirubin, Total 0.6      ALT 10     OCCULT BLOOD X1, STOOL - Normal    Occult Blood, Stool X1 Negative     MAGNESIUM - Normal    Magnesium 2.20     PROTIME-INR - Normal    Protime 12.6      INR 1.1     APTT - Normal    aPTT 28      Narrative:     The APTT is no longer used for monitoring Unfractionated Heparin Therapy. For monitoring Heparin Therapy, use the Heparin Assay.   LACTATE - Normal    Lactate 0.9      Narrative:     Venipuncture immediately after or during the administration of Metamizole may lead to falsely low results. Testing should be performed immediately  prior to Metamizole dosing.   LIPASE - Normal    Lipase 22      Narrative:     Venipuncture immediately after or during the administration of Metamizole may lead to falsely low results. Testing should be performed immediately prior to Metamizole dosing.   SARS-COV-2 AND INFLUENZA A/B PCR - Normal    Flu A Result Not Detected      Flu B Result  Not Detected      Coronavirus 2019, PCR Not Detected      Narrative:     This assay has received FDA Emergency Use Authorization (EUA) and  is only authorized for the duration of time that circumstances exist to justify the authorization of the emergency use of in vitro diagnostic tests for the detection of SARS-CoV-2 virus and/or diagnosis of COVID-19 infection under section 564(b)(1) of the Act, 21 U.S.C. 360bbb-3(b)(1). Testing for SARS-CoV-2 is only recommended for patients who meet current clinical and/or epidemiological criteria as defined by federal, state, or local public health directives. This assay is an in vitro diagnostic nucleic acid amplification test for the qualitative detection of SARS-CoV-2, Influenza A, and Influenza B from nasopharyngeal specimens and has been validated for use at [facility]. Negative results do not preclude COVID-19 infections or Influenza A/B infections, and should not be used as the sole basis for diagnosis, treatment, or other management decisions. If Influenza A/B and RSV PCR results are negative, testing for Parainfluenza virus, Adenovirus and Metapneumovirus is routinely performed for [facility] inpatients, and is available on other patients by placing an add-on request.    CALCIUM, IONIZED - Normal    POCT Calcium, Ionized 1.16     STOOL PATHOGEN PANEL, PCR   C. DIFFICILE, PCR   URINALYSIS WITH REFLEX CULTURE AND MICROSCOPIC    Narrative:     The following orders were created for panel order Urinalysis with Reflex Culture and Microscopic.  Procedure                               Abnormality         Status                     ---------                               -----------         ------                     Urinalysis with Reflex C...[[id number]]                                                 Extra Urine Gray Tube[[id number]]                                                         Please view results for these  tests on the individual orders.   URINALYSIS WITH REFLEX CULTURE AND MICROSCOPIC   EXTRA URINE GRAY TUBE         CT abdomen pelvis w IV contrast   Final Result   Stable 10 mm pulmonary nodule in the right lower lobe and adjacent   smaller nodular opacities.        Diffuse mild wall thickening of the ascending and transverse colon   and also wall thickening of the sigmoid colon which may be infectious   or inflammatory in etiology; colonoscopy is however recommended for   evaluation to exclude other underlying pathology including mass given   the clinical history. Colonic diverticulosis without evidence of   acute diverticulitis. Underdistention versus wall thickening of the   stomach.        The previously described fluid collection involving the right gluteal   musculature is decreased in size comparison to prior examination,   measuring 5.5 cm x 2 cm on the current examination and clinical   correlation is recommended for etiology of the collection and exclude   abscess. Deformity and erosive changes and sclerosis of the right   iliac bone is again noted.        MACRO:   None        Signed by: Tyree Reyes 4/19/2024 9:15 PM   Dictation workstation:   [id number]                    No data recorded                Procedure  Procedures    ED Course & MDM   Assessment/Plan:   Pillo Guerin is a 63 y.o. 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  His stool is brownish grey on exam, not overtly melanotic.  Abdominal exam is benign.  He has no known history of cirrhosis.  Given he is on Keytruda he is somewhat immunocompromised putting him at risk for infectious diarrhea like C. difficile or other pathogen.  Other possibilities include medication side effect, viral gastroenteritis,  malabsorption, occult intra-abdominal process.  Workup was initiated with ECG, labs, CTAP.  Treatment was initiated with a dose of IV pantoprazole.  See below for details of ED course and ultimate disposition.    Medications   lactated Ringer's bolus 1,000 mL (0 mL intravenous Stopped 4/19/24 2145)   iohexol (OMNIPaque) 350 mg iodine/mL solution 75 mL (75 mL intravenous Given 4/19/24 2052)        ED Course as of 04/19/24 2241 Fri Apr 19, 2024   1743 ECG read interpreted by me.  Normal sinus rhythm, rate 63.  Normal axis.  Normal intervals.  Prominent T waves.  Isolated T wave inversion III.  Largely unchanged from prior. [CG]   1753 FOBT negative. Protonix canceled. [CG]   2132 CTAP shows diffuse mild wall thickening of the ascending and transverse colon and also wall thickening of the sigmoid colon which may be infectious or inflammatory in etiology. [CG]   2132 Labs are notable for CBC without leukocytosis, mild anemia 12.5, normal platelets, CMP with mild hyponatremia 135, normal LFTs, normal coags, normal lactate, normal lipase, negative viral swabs. [CG]   2231 iCa normal 1.16.  [CG]   2231 Patient was updated on his exam findings.  He remains asymptomatic other than diarrhea.  Suspect viral gastroenteritis.  He was given a prescription for Imodium on discharge.  He was advised to follow-up with his primary care physician on Monday.  Patient was discharged home with anticipatory guidance and strict return precautions. [CG]      ED Course User Index  [CG] Alanna Pearson MD         Diagnoses as of 04/19/24 2241   Colitis       Disposition:   DISCHARGE.  The patient was discharged with both verbal and written anticipatory guidance and strict return precautions. Discharge diagnosis, instructions and plan were discussed and understood. I emphasized the importance of following up with their primary care provider within 24-48 hours and with any referrals in the timeframe recommended. At the time of discharge  the patient was comfortable and was in no apparent distress. Patient is aware of diagnostic uncertainty and was notified though testing is negative here, there is a very small chance that pathology may be missed.  The patient understands these risks and the patient/family understood to call 911 or return immediately to the emergency department if the symptoms worsen or if they have any additional concerns.      FOLLOW UP  Primary care provider in 1-2 days.      ED Prescriptions       Medication Sig Dispense Start Date End Date Auth. Provider    loperamide (Imodium) 2 mg capsule Take 1 capsule (2 mg) by mouth 4 times a day as needed for diarrhea for up to 3 days. 20 capsule 4/19/2024 4/22/2024 MD Alanna Waterman MD  EM/IM/Peds    This note was dictated by speech recognition. Minor errors in transcription may be present.     Alanna Pearson MD  04/19/24 9698

## 2024-04-20 NOTE — DISCHARGE INSTRUCTIONS
You were seen today for diarrhea.  You have signs of colitis on your CT scan.  We gave you a prescription for Imodium to help with your diarrhea.  Please follow-up with your primary care physician for further evaluation.  Return to the ED if you have trouble staying hydrated or any other new or worsening symptoms..  Your evaluation was not concerning for an emergency at this time. Please see the attached information sheet for information about your condition, how to care for your condition at home, and reasons to return to the emergency department. Take any prescriptions written today as prescribed. You should call your primary care provider within 24 hours to tell them about today's visit, including any new medications or medication changes, as he or she may want to see you in the office for further evaluation. If you do not have a primary care provider, call  [phone] for an appointment. We offer in-person office visits as well as virtual options. Please do not hesitate to call  [phone] or return to the emergency department with any new or unresolved concerns or symptoms. Thank you for choosing [facility] for your care.

## 2024-04-22 ENCOUNTER — LAB (OUTPATIENT)
Dept: LAB | Facility: CLINIC | Age: 64
End: 2024-04-22
Payer: COMMERCIAL

## 2024-04-22 DIAGNOSIS — C34.11 MALIGNANT NEOPLASM OF UPPER LOBE OF RIGHT LUNG (MULTI): ICD-10-CM

## 2024-04-22 LAB
ALBUMIN SERPL BCP-MCNC: 4 G/DL (ref 3.4–5)
ALP SERPL-CCNC: 65 U/L (ref 33–136)
ALT SERPL W P-5'-P-CCNC: 14 U/L (ref 10–52)
ANION GAP SERPL CALC-SCNC: 12 MMOL/L (ref 10–20)
AST SERPL W P-5'-P-CCNC: 14 U/L (ref 9–39)
ATRIAL RATE: 63 BPM
BASOPHILS # BLD AUTO: 0.09 X10*3/UL (ref 0–0.1)
BASOPHILS NFR BLD AUTO: 1 %
BILIRUB SERPL-MCNC: 0.8 MG/DL (ref 0–1.2)
BUN SERPL-MCNC: 7 MG/DL (ref 6–23)
CALCIUM SERPL-MCNC: 8.9 MG/DL (ref 8.6–10.6)
CHLORIDE SERPL-SCNC: 98 MMOL/L (ref 98–107)
CO2 SERPL-SCNC: 28 MMOL/L (ref 21–32)
CORTIS AM PEAK SERPL-MSCNC: 8 UG/DL (ref 5–20)
CREAT SERPL-MCNC: 0.71 MG/DL (ref 0.5–1.3)
EGFRCR SERPLBLD CKD-EPI 2021: >90 ML/MIN/1.73M*2
EOSINOPHIL # BLD AUTO: 0.97 X10*3/UL (ref 0–0.7)
EOSINOPHIL NFR BLD AUTO: 10.7 %
ERYTHROCYTE [DISTWIDTH] IN BLOOD BY AUTOMATED COUNT: 17.4 % (ref 11.5–14.5)
GLUCOSE SERPL-MCNC: 100 MG/DL (ref 74–99)
HCT VFR BLD AUTO: 38 % (ref 41–52)
HGB BLD-MCNC: 12.2 G/DL (ref 13.5–17.5)
IMM GRANULOCYTES # BLD AUTO: 0.02 X10*3/UL (ref 0–0.7)
IMM GRANULOCYTES NFR BLD AUTO: 0.2 % (ref 0–0.9)
LYMPHOCYTES # BLD AUTO: 3.26 X10*3/UL (ref 1.2–4.8)
LYMPHOCYTES NFR BLD AUTO: 36.1 %
MCH RBC QN AUTO: 20.8 PG (ref 26–34)
MCHC RBC AUTO-ENTMCNC: 32.1 G/DL (ref 32–36)
MCV RBC AUTO: 65 FL (ref 80–100)
MONOCYTES # BLD AUTO: 0.9 X10*3/UL (ref 0.1–1)
MONOCYTES NFR BLD AUTO: 10 %
NEUTROPHILS # BLD AUTO: 3.79 X10*3/UL (ref 1.2–7.7)
NEUTROPHILS NFR BLD AUTO: 42 %
NRBC BLD-RTO: ABNORMAL /100{WBCS}
P AXIS: 50 DEGREES
P OFFSET: 192 MS
P ONSET: 130 MS
PLATELET # BLD AUTO: 365 X10*3/UL (ref 150–450)
POTASSIUM SERPL-SCNC: 4.5 MMOL/L (ref 3.5–5.3)
PR INTERVAL: 174 MS
PROT SERPL-MCNC: 6.2 G/DL (ref 6.4–8.2)
Q ONSET: 217 MS
QRS COUNT: 11 BEATS
QRS DURATION: 92 MS
QT INTERVAL: 406 MS
QTC CALCULATION(BAZETT): 415 MS
QTC FREDERICIA: 412 MS
R AXIS: 49 DEGREES
RBC # BLD AUTO: 5.86 X10*6/UL (ref 4.5–5.9)
SODIUM SERPL-SCNC: 133 MMOL/L (ref 136–145)
T AXIS: 10 DEGREES
T OFFSET: 420 MS
T4 FREE SERPL-MCNC: 1.14 NG/DL (ref 0.78–1.48)
TSH SERPL-ACNC: 5.57 MIU/L (ref 0.44–3.98)
VENTRICULAR RATE: 63 BPM
WBC # BLD AUTO: 9 X10*3/UL (ref 4.4–11.3)

## 2024-04-22 PROCEDURE — 80053 COMPREHEN METABOLIC PANEL: CPT

## 2024-04-22 PROCEDURE — 82533 TOTAL CORTISOL: CPT

## 2024-04-22 PROCEDURE — 36415 COLL VENOUS BLD VENIPUNCTURE: CPT

## 2024-04-22 PROCEDURE — 85025 COMPLETE CBC W/AUTO DIFF WBC: CPT

## 2024-04-22 PROCEDURE — 84443 ASSAY THYROID STIM HORMONE: CPT

## 2024-04-22 PROCEDURE — 84439 ASSAY OF FREE THYROXINE: CPT

## 2024-04-22 PROCEDURE — 82024 ASSAY OF ACTH: CPT

## 2024-04-24 ENCOUNTER — OFFICE VISIT (OUTPATIENT)
Dept: HEMATOLOGY/ONCOLOGY | Facility: CLINIC | Age: 64
End: 2024-04-24
Payer: COMMERCIAL

## 2024-04-24 ENCOUNTER — INFUSION (OUTPATIENT)
Dept: HEMATOLOGY/ONCOLOGY | Facility: CLINIC | Age: 64
End: 2024-04-24
Payer: COMMERCIAL

## 2024-04-24 VITALS
WEIGHT: 139.99 LBS | OXYGEN SATURATION: 96 % | TEMPERATURE: 97.9 F | HEART RATE: 56 BPM | RESPIRATION RATE: 18 BRPM | BODY MASS INDEX: 20.57 KG/M2 | SYSTOLIC BLOOD PRESSURE: 125 MMHG | DIASTOLIC BLOOD PRESSURE: 75 MMHG

## 2024-04-24 DIAGNOSIS — C34.11 MALIGNANT NEOPLASM OF UPPER LOBE OF RIGHT LUNG (MULTI): ICD-10-CM

## 2024-04-24 DIAGNOSIS — C34.11 MALIGNANT NEOPLASM OF UPPER LOBE OF RIGHT LUNG (MULTI): Primary | ICD-10-CM

## 2024-04-24 LAB — ACTH PLAS-MCNC: 31.4 PG/ML (ref 7.2–63.3)

## 2024-04-24 PROCEDURE — 99214 OFFICE O/P EST MOD 30 MIN: CPT | Performed by: STUDENT IN AN ORGANIZED HEALTH CARE EDUCATION/TRAINING PROGRAM

## 2024-04-24 PROCEDURE — 1125F AMNT PAIN NOTED PAIN PRSNT: CPT | Performed by: STUDENT IN AN ORGANIZED HEALTH CARE EDUCATION/TRAINING PROGRAM

## 2024-04-24 PROCEDURE — 1159F MED LIST DOCD IN RCRD: CPT | Performed by: STUDENT IN AN ORGANIZED HEALTH CARE EDUCATION/TRAINING PROGRAM

## 2024-04-24 PROCEDURE — 3074F SYST BP LT 130 MM HG: CPT | Performed by: STUDENT IN AN ORGANIZED HEALTH CARE EDUCATION/TRAINING PROGRAM

## 2024-04-24 PROCEDURE — 2500000004 HC RX 250 GENERAL PHARMACY W/ HCPCS (ALT 636 FOR OP/ED): Mod: SE | Performed by: STUDENT IN AN ORGANIZED HEALTH CARE EDUCATION/TRAINING PROGRAM

## 2024-04-24 PROCEDURE — 1160F RVW MEDS BY RX/DR IN RCRD: CPT | Performed by: STUDENT IN AN ORGANIZED HEALTH CARE EDUCATION/TRAINING PROGRAM

## 2024-04-24 PROCEDURE — 3078F DIAST BP <80 MM HG: CPT | Performed by: STUDENT IN AN ORGANIZED HEALTH CARE EDUCATION/TRAINING PROGRAM

## 2024-04-24 PROCEDURE — 96413 CHEMO IV INFUSION 1 HR: CPT

## 2024-04-24 RX ORDER — ALBUTEROL SULFATE 0.83 MG/ML
3 SOLUTION RESPIRATORY (INHALATION) AS NEEDED
Status: DISCONTINUED | OUTPATIENT
Start: 2024-04-24 | End: 2024-04-24 | Stop reason: HOSPADM

## 2024-04-24 RX ORDER — PROCHLORPERAZINE MALEATE 10 MG
10 TABLET ORAL EVERY 6 HOURS PRN
Status: DISCONTINUED | OUTPATIENT
Start: 2024-04-24 | End: 2024-04-24 | Stop reason: HOSPADM

## 2024-04-24 RX ORDER — FAMOTIDINE 10 MG/ML
20 INJECTION INTRAVENOUS ONCE AS NEEDED
Status: CANCELLED | OUTPATIENT
Start: 2024-04-24

## 2024-04-24 RX ORDER — HEPARIN 100 UNIT/ML
500 SYRINGE INTRAVENOUS AS NEEDED
Status: CANCELLED | OUTPATIENT
Start: 2024-04-24

## 2024-04-24 RX ORDER — HEPARIN SODIUM,PORCINE/PF 10 UNIT/ML
50 SYRINGE (ML) INTRAVENOUS AS NEEDED
Status: DISCONTINUED | OUTPATIENT
Start: 2024-04-24 | End: 2024-04-24 | Stop reason: HOSPADM

## 2024-04-24 RX ORDER — FAMOTIDINE 10 MG/ML
20 INJECTION INTRAVENOUS ONCE AS NEEDED
Status: DISCONTINUED | OUTPATIENT
Start: 2024-04-24 | End: 2024-04-24 | Stop reason: HOSPADM

## 2024-04-24 RX ORDER — PROCHLORPERAZINE EDISYLATE 5 MG/ML
10 INJECTION INTRAMUSCULAR; INTRAVENOUS EVERY 6 HOURS PRN
Status: DISCONTINUED | OUTPATIENT
Start: 2024-04-24 | End: 2024-04-24 | Stop reason: HOSPADM

## 2024-04-24 RX ORDER — DIPHENHYDRAMINE HYDROCHLORIDE 50 MG/ML
50 INJECTION INTRAMUSCULAR; INTRAVENOUS AS NEEDED
Status: CANCELLED | OUTPATIENT
Start: 2024-04-24

## 2024-04-24 RX ORDER — DIPHENHYDRAMINE HYDROCHLORIDE 50 MG/ML
50 INJECTION INTRAMUSCULAR; INTRAVENOUS AS NEEDED
Status: DISCONTINUED | OUTPATIENT
Start: 2024-04-24 | End: 2024-04-24 | Stop reason: HOSPADM

## 2024-04-24 RX ORDER — HEPARIN 100 UNIT/ML
500 SYRINGE INTRAVENOUS AS NEEDED
Status: DISCONTINUED | OUTPATIENT
Start: 2024-04-24 | End: 2024-04-24 | Stop reason: HOSPADM

## 2024-04-24 RX ORDER — HEPARIN SODIUM,PORCINE/PF 10 UNIT/ML
50 SYRINGE (ML) INTRAVENOUS AS NEEDED
Status: CANCELLED | OUTPATIENT
Start: 2024-04-24

## 2024-04-24 RX ORDER — EPINEPHRINE 0.3 MG/.3ML
0.3 INJECTION SUBCUTANEOUS EVERY 5 MIN PRN
Status: DISCONTINUED | OUTPATIENT
Start: 2024-04-24 | End: 2024-04-24 | Stop reason: HOSPADM

## 2024-04-24 RX ORDER — PROCHLORPERAZINE EDISYLATE 5 MG/ML
10 INJECTION INTRAMUSCULAR; INTRAVENOUS EVERY 6 HOURS PRN
Status: CANCELLED | OUTPATIENT
Start: 2024-04-24

## 2024-04-24 RX ORDER — PROCHLORPERAZINE MALEATE 10 MG
10 TABLET ORAL EVERY 6 HOURS PRN
Status: CANCELLED | OUTPATIENT
Start: 2024-04-24

## 2024-04-24 RX ORDER — EPINEPHRINE 0.3 MG/.3ML
0.3 INJECTION SUBCUTANEOUS EVERY 5 MIN PRN
Status: CANCELLED | OUTPATIENT
Start: 2024-04-24

## 2024-04-24 RX ORDER — ALBUTEROL SULFATE 0.83 MG/ML
3 SOLUTION RESPIRATORY (INHALATION) AS NEEDED
Status: CANCELLED | OUTPATIENT
Start: 2024-04-24

## 2024-04-24 RX ADMIN — SODIUM CHLORIDE 200 MG: 9 INJECTION, SOLUTION INTRAVENOUS at 12:35

## 2024-04-24 ASSESSMENT — PAIN SCALES - GENERAL: PAINLEVEL: 8

## 2024-04-24 NOTE — PROGRESS NOTES
Patient ID: Pillo Guerin is a 63 y.o. male    Primary Care Provider: Rocio Harris, SIMONE-CNP    DIAGNOSIS AND STAGING  Stage IVB (cT3 cN3 cM1c) poorly differentiated adenocarcinoma of RUL diagnosed on 08/03/23     SITES OF DISEASE  RUL  Supraclavicular nodes  Contralateral lung  Right gluteal muscle   Brain right occipital lobe      MOLECULAR GENOMICS  MICROSATELLITE STATUS: Microsatellite Stable (MAGDIEL)     DISEASE ASSOCIATED GENOMIC FINDINGS:   KRAS p.G13D ([id number] c.38G>A)   PIK3CA p.E542K ([id number] c.1624G>A)   TP53 p.E285K ([id number] c.853G>A)     DISEASE RELEVANT ALTERATIONS NOT DETECTED:   Negative for ALK fusion.   Negative for BRAF V600E.   Negative for EGFR sensitizing mutation.   Negative for KRAS G12C.   Negative for MET exon 14 skipping mutation.   Negative for NTRK fusion.   Negative for RET fusion.   Negative for ROS1 fusion.     PD-L1 TPS 80%     PRIOR THERAPIES  GKRS to right occipital lobe on  24 Gy in 1 fraction on 10/17/23      CURRENT THERAPY  Single agent pembrolizumab since 08/31/23    CURRENT ONCOLOGICAL PROBLEMS  None      HISTORY OF PRESENT ILLNESS  This is a pt with PMH of EtOH use disorder, CVA, CAD c/b NSTEMI s/p 2 stents and stage IVB ([id number]) poorly differentiated adenocarcinoma of RUL diagnosed on 08/03/23.      Patient was initially diagnosed 08/2022 after incidentally found RUL lesion was worked up during admission for hyponatremia presumed to be 2/2 beer potomania. He was then seen by his pulmonologist Dr. Callejas who ordered PET and MRI but this was not obtained  nor were multiple attempts to schedule for oncologic NPV.     I saw him in August 2023 and he endorsed haviing lost 15 lbs since stroke April 2022. His appetite is good, denies dysgeusia. He has slowed down a bit since he had his stroke. He can walk without becoming dyspneic but is limited by his stroke.    On 08/12/23 the patient underwent a bronchoscopy with EBUS:  Accession #: [id number]   Date of  Procedure:8/3/2022   Date Reported: 8/9/2022   Date Received: 8/3/2022   Date of Birth / Sex 1960 (Age: 62) / M   Race: WHITE   Submitting Physician: DARYN YOUNG MD   Attending Physician: MD HEVER BROWNE MD   Procedures/Addenda Present     Other External #     FINAL CYTOLOGICAL INTERPRETATION     A. FINE NEEDLE ASPIRATION LYMPH NODE, L 4, CYTOLOGY AND CELL BLOCK:   NO MALIGNANT CELLS IDENTIFIED.   LYMPHOID SAMPLE IS PRESENT.     Note: Cell block shows presence of lymphoid cells along with bronchial cells   contamination.       B. FINE NEEDLE ASPIRATION LYMPH NODE, STATION 7, CYTOLOGY AND CELL BLOCK:   NO MALIGNANT CELLS IDENTIFIED.   LYMPHOID SAMPLE IS PRESENT.       C. FINE NEEDLE ASPIRATION LYMPH NODE, R 11, CYTOLOGY AND CELL BLOCK:   POORLY DIFFERENTIATED CARCINOMA, CONSISTENT WITH ADENOCARCINOMA, SEE NOTE.     Note: A limited panel of immunohistochemical stains performed showed neoplastic   cells staining strongly and diffusely positive with TTF-1 and negative with   p40. Findings are consistent with above.     Staff pathologist: Reviewed by Dr. Ellyn Valles.     Molecular testing has been ordered and results will be issued in   an addendum.     The cell block; C1 contains moderate tumor cellularity representing   roughly 70 % of all nucleated cells.       D. BRONCHO-ALVEOLAR LAVAGE OF RIGHT UPPER LOBE:   RARE ATYPICAL CELLS ARE PRESENT.     08/16/23: PET scan demonstrates multiple RUL FDG avid nodules as well as contra-lateral lung nodules, right hilar, mediastinal and supraclavicular nodes consistent with neoplastic involvement. There is also a large FDG avid right gluteal mass extending into the right SIJ and iliac bone.     08/16/23: MRI brain shows a 8 mm right occipital lobe metastasis with mild associated edema.     08/31/23: Single agent pembrolizumab begins     10/17/23: GKRS to right occipital lobe on  24 Gy in 1 fraction      PAST MEDICAL HISTORY  CAD s/p 2 stents, CVA,  Dupuytren's contracture   BPH  Lumbar radiculopathy     SURGICAL HISTORY  None      SOCIAL HISTORY  Born in [place], lives in [place] with 2 roommates.   Brother Salas is NOK. Drinks 2 24 oz beers a day.   + Tobacco - 50 pack year smoking history, active.   Rare cannabis, acid use in 70s, no additional illicits.    Was working at [employer] in [place]  before stroke.      FAMILY HISTORY  Mother  of melanoma in      CURRENT MEDS REVIEWED       ALLERGIES REVIEWED        SUBJECTIVE:  He is here today alone. He was seen in the ER for diarrhea on 24, CT with smaller gluteal mass, bowel wall thickening diffusely of the ascending and transverse colon, and sigmoid colon. His bowel movements are better- mushy, has had 4 today. Usually goes 1-2 times per day. He hasn't had any imodium since Monday. He hasn't had diarrhea since Friday. No abdominal pain. No more gas. He was already having slight diarrhea the last time had got the infusion. It went on for 2 weeks, and had a lot of gas. That's why he went to the ER. He thinks it was a virus - his brother was also having diarrhea at the same time and he's better now too.       LAST VISIT:  Patient is here today in clinic for follow-up prior to treatment tomorrow  Feeling well for treatment  Breathing stable, no new respiratory symptoms  He stopped taking his Spiriva due to SE, advised him to discuss this with his pulmonologist  Reports 1 episode of diarrhea, resolved without intervention  No fevers or chills    A 13 point review of systems was performed, with significant findings documented above in subjective history.    OBJECTIVE:  Vitals:    24 1120   BP: 125/75   Pulse: 56   Resp: 18   Temp: 36.6 °C (97.9 °F)   SpO2: 96%      Body surface area is 1.76 meters squared.     Wt Readings from Last 5 Encounters:   24 63.5 kg (139 lb 15.9 oz)   24 63.5 kg (140 lb)   24 61.4 kg (135 lb 5.8 oz)   24 63.6 kg (140 lb 1.6 oz)    02/26/24 63 kg (139 lb)       ECOGSCORE: 1- Restricted in physically strenuous activity.  Carries out light duty.    Physical Exam  Constitutional:       Appearance: Normal appearance. He is normal weight.   HENT:      Head: Normocephalic and atraumatic.   Eyes:      General: No scleral icterus.     Extraocular Movements: Extraocular movements intact.      Conjunctiva/sclera: Conjunctivae normal.   Cardiovascular:      Rate and Rhythm: Normal rate and regular rhythm.      Heart sounds: Normal heart sounds.   Pulmonary:      Effort: Pulmonary effort is normal. No respiratory distress.      Breath sounds: Normal breath sounds. No stridor. No wheezing, rhonchi or rales.   Abdominal:      General: Bowel sounds are normal.      Palpations: Abdomen is soft. There is no mass.      Tenderness: There is no abdominal tenderness.   Musculoskeletal:         General: No swelling.      Comments: Left leg brace intact   Skin:     General: Skin is warm and dry.      Coloration: Skin is not pale.      Findings: No erythema or rash.   Neurological:      Mental Status: He is alert and oriented to person, place, and time. Mental status is at baseline.      Motor: Weakness present.      Gait: Gait abnormal.   Psychiatric:         Mood and Affect: Mood normal.         Behavior: Behavior normal.         Thought Content: Thought content normal.          Diagnostic Results   Results:  Labs:  Lab Results   Component Value Date    WBC 9.0 04/22/2024    HGB 12.2 (L) 04/22/2024    HCT 38.0 (L) 04/22/2024    MCV 65 (L) 04/22/2024     04/22/2024      Lab Results   Component Value Date    NEUTROABS 3.79 04/22/2024        Lab Results   Component Value Date    GLUCOSE 100 (H) 04/22/2024    CALCIUM 8.9 04/22/2024     (L) 04/22/2024    K 4.5 04/22/2024    CO2 28 04/22/2024    CL 98 04/22/2024    BUN 7 04/22/2024    CREATININE 0.71 04/22/2024    MG 2.20 04/19/2024     Lab Results   Component Value Date    ALT 14 04/22/2024    AST 14  04/22/2024    ALKPHOS 65 04/22/2024    BILITOT 0.8 04/22/2024    BILIDIR 0.3 07/31/2022      Lab Results   Component Value Date    ACTH 67.5 (H) 03/11/2024    CORTISOL 8.0 04/22/2024    TSH 5.57 (H) 04/22/2024    FREET4 1.14 04/22/2024         No images are attached to the encounter or orders placed in the encounter.     Assessment/Plan     Lung cancer (Multi), Clinical: Stage IVB (cT3, cN3, cM1c)  Stage IVB ([id number]) poorly differentiated adenocarcinoma of RUL, TPS 80%, lack of actionable genomic alterations noted.  On single agent pembrolizumab since 08/31/23 - best to keep a Q3 weeks schedule for better monitoring of potential side effects   CT C/A/P from 02/09/24 reviewed with the patient - stable scan  CT A/P from 4/19/24 stable  Repeat scans ~ 05/2024, not ordered yet   Continue moisturizing BUE with CeraVe and triamcinolone as needed for pruritus,    Diarrhea - resolving  Unclear if this is irAE diarrhea - resolved with imodium, possibly with viral component  Reviewed reasons to call back or return to the ED if diarrhea resumes after this infusion    Right occipital lobe 8 mm metastasis   S/p GKRS 24 Gy in 1 fraction on 10/17/23  Repeat MRI brain every 3 months - to be scheduled by Rad Onc and followed accordingly (scheduled 5/15/24 with subsequent follow-up)     Pain on right gluteal region-improved  Due to large centrally necrotic and peripherally hypermetabolic mass in the right lower paramediastinal muscle extending into the adjacent subcutaneous tissue and superior right gluteal muscle with involvement of the superior aspect of the right iliac  bone, right florencio sacrum -   He has been taking gabapentin for pain (controlled)      Stroke in April 2022 and hx of CAD  -   Stable -   On ASA and ticagrelor      HTN - on amlodipine      HLD - atorvastatin

## 2024-04-25 DIAGNOSIS — J43.9 PULMONARY EMPHYSEMA, UNSPECIFIED EMPHYSEMA TYPE (MULTI): ICD-10-CM

## 2024-04-25 RX ORDER — UMECLIDINIUM 62.5 UG/1
1 AEROSOL, POWDER ORAL DAILY
Qty: 1 EACH | Refills: 11 | Status: SHIPPED | OUTPATIENT
Start: 2024-04-25

## 2024-05-13 ENCOUNTER — LAB (OUTPATIENT)
Dept: LAB | Facility: CLINIC | Age: 64
End: 2024-05-13
Payer: COMMERCIAL

## 2024-05-13 DIAGNOSIS — C34.11 MALIGNANT NEOPLASM OF UPPER LOBE OF RIGHT LUNG (MULTI): ICD-10-CM

## 2024-05-13 LAB
ALBUMIN SERPL BCP-MCNC: 3.6 G/DL (ref 3.4–5)
ALP SERPL-CCNC: 58 U/L (ref 33–136)
ALT SERPL W P-5'-P-CCNC: 13 U/L (ref 10–52)
ANION GAP SERPL CALC-SCNC: 12 MMOL/L (ref 10–20)
AST SERPL W P-5'-P-CCNC: 11 U/L (ref 9–39)
BASOPHILS # BLD AUTO: 0.08 X10*3/UL (ref 0–0.1)
BASOPHILS NFR BLD AUTO: 1 %
BILIRUB SERPL-MCNC: 0.5 MG/DL (ref 0–1.2)
BUN SERPL-MCNC: 11 MG/DL (ref 6–23)
CALCIUM SERPL-MCNC: 8.5 MG/DL (ref 8.6–10.6)
CHLORIDE SERPL-SCNC: 102 MMOL/L (ref 98–107)
CO2 SERPL-SCNC: 27 MMOL/L (ref 21–32)
CREAT SERPL-MCNC: 0.81 MG/DL (ref 0.5–1.3)
EGFRCR SERPLBLD CKD-EPI 2021: >90 ML/MIN/1.73M*2
EOSINOPHIL # BLD AUTO: 0.94 X10*3/UL (ref 0–0.7)
EOSINOPHIL NFR BLD AUTO: 11.9 %
ERYTHROCYTE [DISTWIDTH] IN BLOOD BY AUTOMATED COUNT: 17.1 % (ref 11.5–14.5)
GLUCOSE SERPL-MCNC: 86 MG/DL (ref 74–99)
HCT VFR BLD AUTO: 35.7 % (ref 41–52)
HGB BLD-MCNC: 11.5 G/DL (ref 13.5–17.5)
IMM GRANULOCYTES # BLD AUTO: 0.01 X10*3/UL (ref 0–0.7)
IMM GRANULOCYTES NFR BLD AUTO: 0.1 % (ref 0–0.9)
LYMPHOCYTES # BLD AUTO: 2.93 X10*3/UL (ref 1.2–4.8)
LYMPHOCYTES NFR BLD AUTO: 37.2 %
MCH RBC QN AUTO: 21.1 PG (ref 26–34)
MCHC RBC AUTO-ENTMCNC: 32.2 G/DL (ref 32–36)
MCV RBC AUTO: 65 FL (ref 80–100)
MONOCYTES # BLD AUTO: 0.68 X10*3/UL (ref 0.1–1)
MONOCYTES NFR BLD AUTO: 8.6 %
NEUTROPHILS # BLD AUTO: 3.23 X10*3/UL (ref 1.2–7.7)
NEUTROPHILS NFR BLD AUTO: 41.2 %
NRBC BLD-RTO: ABNORMAL /100{WBCS}
PLATELET # BLD AUTO: 317 X10*3/UL (ref 150–450)
POTASSIUM SERPL-SCNC: 4.4 MMOL/L (ref 3.5–5.3)
PROT SERPL-MCNC: 5.9 G/DL (ref 6.4–8.2)
RBC # BLD AUTO: 5.46 X10*6/UL (ref 4.5–5.9)
SODIUM SERPL-SCNC: 137 MMOL/L (ref 136–145)
WBC # BLD AUTO: 7.9 X10*3/UL (ref 4.4–11.3)

## 2024-05-13 PROCEDURE — 36415 COLL VENOUS BLD VENIPUNCTURE: CPT

## 2024-05-13 PROCEDURE — 85025 COMPLETE CBC W/AUTO DIFF WBC: CPT

## 2024-05-13 PROCEDURE — 80053 COMPREHEN METABOLIC PANEL: CPT

## 2024-05-14 ENCOUNTER — TELEPHONE (OUTPATIENT)
Dept: RADIATION ONCOLOGY | Facility: HOSPITAL | Age: 64
End: 2024-05-14
Payer: COMMERCIAL

## 2024-05-14 NOTE — TELEPHONE ENCOUNTER
Called pt to remind of appointment on 5/15/24  at 4:00 Pt's phone went to voicemail left number if needs to reschedule.      Juanita Sher MA

## 2024-05-15 ENCOUNTER — TELEMEDICINE (OUTPATIENT)
Dept: HEMATOLOGY/ONCOLOGY | Facility: CLINIC | Age: 64
End: 2024-05-15
Payer: COMMERCIAL

## 2024-05-15 ENCOUNTER — HOSPITAL ENCOUNTER (OUTPATIENT)
Dept: RADIATION ONCOLOGY | Facility: HOSPITAL | Age: 64
Setting detail: RADIATION/ONCOLOGY SERIES
Discharge: HOME | End: 2024-05-15
Payer: COMMERCIAL

## 2024-05-15 ENCOUNTER — HOSPITAL ENCOUNTER (OUTPATIENT)
Dept: RADIOLOGY | Facility: HOSPITAL | Age: 64
Discharge: HOME | End: 2024-05-15
Payer: COMMERCIAL

## 2024-05-15 VITALS
WEIGHT: 138.3 LBS | BODY MASS INDEX: 20.32 KG/M2 | SYSTOLIC BLOOD PRESSURE: 123 MMHG | DIASTOLIC BLOOD PRESSURE: 70 MMHG | OXYGEN SATURATION: 98 % | HEART RATE: 63 BPM | TEMPERATURE: 96.8 F | RESPIRATION RATE: 18 BRPM

## 2024-05-15 DIAGNOSIS — C34.90 MALIGNANT NEOPLASM OF LUNG, UNSPECIFIED LATERALITY, UNSPECIFIED PART OF LUNG (MULTI): Primary | ICD-10-CM

## 2024-05-15 DIAGNOSIS — C79.31 LUNG CANCER METASTATIC TO BRAIN (MULTI): ICD-10-CM

## 2024-05-15 DIAGNOSIS — C34.90 LUNG CANCER METASTATIC TO BRAIN (MULTI): ICD-10-CM

## 2024-05-15 DIAGNOSIS — C34.11 MALIGNANT NEOPLASM OF UPPER LOBE OF RIGHT LUNG (MULTI): Primary | ICD-10-CM

## 2024-05-15 PROCEDURE — 70553 MRI BRAIN STEM W/O & W/DYE: CPT | Performed by: RADIOLOGY

## 2024-05-15 PROCEDURE — 99214 OFFICE O/P EST MOD 30 MIN: CPT | Performed by: NURSE PRACTITIONER

## 2024-05-15 PROCEDURE — 1159F MED LIST DOCD IN RCRD: CPT | Performed by: INTERNAL MEDICINE

## 2024-05-15 PROCEDURE — A9575 INJ GADOTERATE MEGLUMI 0.1ML: HCPCS | Mod: SE | Performed by: NURSE PRACTITIONER

## 2024-05-15 PROCEDURE — 2550000001 HC RX 255 CONTRASTS: Mod: SE | Performed by: NURSE PRACTITIONER

## 2024-05-15 PROCEDURE — 99213 OFFICE O/P EST LOW 20 MIN: CPT | Performed by: INTERNAL MEDICINE

## 2024-05-15 PROCEDURE — 1160F RVW MEDS BY RX/DR IN RCRD: CPT | Performed by: INTERNAL MEDICINE

## 2024-05-15 PROCEDURE — 4004F PT TOBACCO SCREEN RCVD TLK: CPT | Performed by: INTERNAL MEDICINE

## 2024-05-15 PROCEDURE — 70553 MRI BRAIN STEM W/O & W/DYE: CPT

## 2024-05-15 RX ORDER — DIPHENHYDRAMINE HYDROCHLORIDE 50 MG/ML
50 INJECTION INTRAMUSCULAR; INTRAVENOUS AS NEEDED
OUTPATIENT
Start: 2024-08-28

## 2024-05-15 RX ORDER — FAMOTIDINE 10 MG/ML
20 INJECTION INTRAVENOUS ONCE AS NEEDED
OUTPATIENT
Start: 2024-08-28

## 2024-05-15 RX ORDER — PROCHLORPERAZINE EDISYLATE 5 MG/ML
10 INJECTION INTRAMUSCULAR; INTRAVENOUS EVERY 6 HOURS PRN
OUTPATIENT
Start: 2024-07-17

## 2024-05-15 RX ORDER — PROCHLORPERAZINE EDISYLATE 5 MG/ML
10 INJECTION INTRAMUSCULAR; INTRAVENOUS EVERY 6 HOURS PRN
OUTPATIENT
Start: 2024-06-26

## 2024-05-15 RX ORDER — PROCHLORPERAZINE EDISYLATE 5 MG/ML
10 INJECTION INTRAMUSCULAR; INTRAVENOUS EVERY 6 HOURS PRN
OUTPATIENT
Start: 2024-08-28

## 2024-05-15 RX ORDER — PROCHLORPERAZINE MALEATE 10 MG
10 TABLET ORAL EVERY 6 HOURS PRN
Status: CANCELLED | OUTPATIENT
Start: 2024-05-15

## 2024-05-15 RX ORDER — ALBUTEROL SULFATE 0.83 MG/ML
3 SOLUTION RESPIRATORY (INHALATION) AS NEEDED
OUTPATIENT
Start: 2024-07-17

## 2024-05-15 RX ORDER — FAMOTIDINE 10 MG/ML
20 INJECTION INTRAVENOUS ONCE AS NEEDED
Status: CANCELLED | OUTPATIENT
Start: 2024-05-15

## 2024-05-15 RX ORDER — DIPHENHYDRAMINE HYDROCHLORIDE 50 MG/ML
50 INJECTION INTRAMUSCULAR; INTRAVENOUS AS NEEDED
OUTPATIENT
Start: 2024-07-17

## 2024-05-15 RX ORDER — DIPHENHYDRAMINE HYDROCHLORIDE 50 MG/ML
50 INJECTION INTRAMUSCULAR; INTRAVENOUS AS NEEDED
OUTPATIENT
Start: 2024-06-05

## 2024-05-15 RX ORDER — PROCHLORPERAZINE EDISYLATE 5 MG/ML
10 INJECTION INTRAMUSCULAR; INTRAVENOUS EVERY 6 HOURS PRN
OUTPATIENT
Start: 2024-08-07

## 2024-05-15 RX ORDER — PROCHLORPERAZINE MALEATE 10 MG
10 TABLET ORAL EVERY 6 HOURS PRN
OUTPATIENT
Start: 2024-08-28

## 2024-05-15 RX ORDER — FAMOTIDINE 10 MG/ML
20 INJECTION INTRAVENOUS ONCE AS NEEDED
OUTPATIENT
Start: 2024-08-07

## 2024-05-15 RX ORDER — EPINEPHRINE 0.3 MG/.3ML
0.3 INJECTION SUBCUTANEOUS EVERY 5 MIN PRN
OUTPATIENT
Start: 2024-07-17

## 2024-05-15 RX ORDER — PROCHLORPERAZINE MALEATE 10 MG
10 TABLET ORAL EVERY 6 HOURS PRN
OUTPATIENT
Start: 2024-08-07

## 2024-05-15 RX ORDER — GADOTERATE MEGLUMINE 376.9 MG/ML
12 INJECTION INTRAVENOUS
Status: COMPLETED | OUTPATIENT
Start: 2024-05-15 | End: 2024-05-15

## 2024-05-15 RX ORDER — ALBUTEROL SULFATE 0.83 MG/ML
3 SOLUTION RESPIRATORY (INHALATION) AS NEEDED
OUTPATIENT
Start: 2024-06-26

## 2024-05-15 RX ORDER — PROCHLORPERAZINE EDISYLATE 5 MG/ML
10 INJECTION INTRAMUSCULAR; INTRAVENOUS EVERY 6 HOURS PRN
OUTPATIENT
Start: 2024-06-05

## 2024-05-15 RX ORDER — PROCHLORPERAZINE MALEATE 10 MG
10 TABLET ORAL EVERY 6 HOURS PRN
OUTPATIENT
Start: 2024-07-17

## 2024-05-15 RX ORDER — FAMOTIDINE 10 MG/ML
20 INJECTION INTRAVENOUS ONCE AS NEEDED
OUTPATIENT
Start: 2024-06-26

## 2024-05-15 RX ORDER — EPINEPHRINE 0.3 MG/.3ML
0.3 INJECTION SUBCUTANEOUS EVERY 5 MIN PRN
OUTPATIENT
Start: 2024-06-05

## 2024-05-15 RX ORDER — FAMOTIDINE 10 MG/ML
20 INJECTION INTRAVENOUS ONCE AS NEEDED
OUTPATIENT
Start: 2024-07-17

## 2024-05-15 RX ORDER — PROCHLORPERAZINE MALEATE 10 MG
10 TABLET ORAL EVERY 6 HOURS PRN
OUTPATIENT
Start: 2024-06-05

## 2024-05-15 RX ORDER — ALBUTEROL SULFATE 0.83 MG/ML
3 SOLUTION RESPIRATORY (INHALATION) AS NEEDED
OUTPATIENT
Start: 2024-08-07

## 2024-05-15 RX ORDER — PROCHLORPERAZINE EDISYLATE 5 MG/ML
10 INJECTION INTRAMUSCULAR; INTRAVENOUS EVERY 6 HOURS PRN
Status: CANCELLED | OUTPATIENT
Start: 2024-05-15

## 2024-05-15 RX ORDER — EPINEPHRINE 0.3 MG/.3ML
0.3 INJECTION SUBCUTANEOUS EVERY 5 MIN PRN
Status: CANCELLED | OUTPATIENT
Start: 2024-05-15

## 2024-05-15 RX ORDER — EPINEPHRINE 0.3 MG/.3ML
0.3 INJECTION SUBCUTANEOUS EVERY 5 MIN PRN
OUTPATIENT
Start: 2024-06-26

## 2024-05-15 RX ORDER — ALBUTEROL SULFATE 0.83 MG/ML
3 SOLUTION RESPIRATORY (INHALATION) AS NEEDED
Status: CANCELLED | OUTPATIENT
Start: 2024-05-15

## 2024-05-15 RX ORDER — EPINEPHRINE 0.3 MG/.3ML
0.3 INJECTION SUBCUTANEOUS EVERY 5 MIN PRN
OUTPATIENT
Start: 2024-08-28

## 2024-05-15 RX ORDER — DIPHENHYDRAMINE HYDROCHLORIDE 50 MG/ML
50 INJECTION INTRAMUSCULAR; INTRAVENOUS AS NEEDED
Status: CANCELLED | OUTPATIENT
Start: 2024-05-15

## 2024-05-15 RX ORDER — DIPHENHYDRAMINE HYDROCHLORIDE 50 MG/ML
50 INJECTION INTRAMUSCULAR; INTRAVENOUS AS NEEDED
OUTPATIENT
Start: 2024-06-26

## 2024-05-15 RX ORDER — ALBUTEROL SULFATE 0.83 MG/ML
3 SOLUTION RESPIRATORY (INHALATION) AS NEEDED
OUTPATIENT
Start: 2024-08-28

## 2024-05-15 RX ORDER — EPINEPHRINE 0.3 MG/.3ML
0.3 INJECTION SUBCUTANEOUS EVERY 5 MIN PRN
OUTPATIENT
Start: 2024-08-07

## 2024-05-15 RX ORDER — FAMOTIDINE 10 MG/ML
20 INJECTION INTRAVENOUS ONCE AS NEEDED
OUTPATIENT
Start: 2024-06-05

## 2024-05-15 RX ORDER — ALBUTEROL SULFATE 0.83 MG/ML
3 SOLUTION RESPIRATORY (INHALATION) AS NEEDED
OUTPATIENT
Start: 2024-06-05

## 2024-05-15 RX ORDER — DIPHENHYDRAMINE HYDROCHLORIDE 50 MG/ML
50 INJECTION INTRAMUSCULAR; INTRAVENOUS AS NEEDED
OUTPATIENT
Start: 2024-08-07

## 2024-05-15 RX ORDER — PROCHLORPERAZINE MALEATE 10 MG
10 TABLET ORAL EVERY 6 HOURS PRN
OUTPATIENT
Start: 2024-06-26

## 2024-05-15 RX ADMIN — GADOTERATE MEGLUMINE 12 ML: 376.9 INJECTION INTRAVENOUS at 15:11

## 2024-05-15 ASSESSMENT — PAIN SCALES - GENERAL: PAINLEVEL: 0-NO PAIN

## 2024-05-15 ASSESSMENT — COLUMBIA-SUICIDE SEVERITY RATING SCALE - C-SSRS
2. HAVE YOU ACTUALLY HAD ANY THOUGHTS OF KILLING YOURSELF?: NO
6. HAVE YOU EVER DONE ANYTHING, STARTED TO DO ANYTHING, OR PREPARED TO DO ANYTHING TO END YOUR LIFE?: NO
1. IN THE PAST MONTH, HAVE YOU WISHED YOU WERE DEAD OR WISHED YOU COULD GO TO SLEEP AND NOT WAKE UP?: NO

## 2024-05-15 ASSESSMENT — ENCOUNTER SYMPTOMS
OCCASIONAL FEELINGS OF UNSTEADINESS: 0
DEPRESSION: 0
LOSS OF SENSATION IN FEET: 0

## 2024-05-15 NOTE — PROGRESS NOTES
Patient ID: Pillo Guerin is a 64 y.o. male    Primary Care Provider: Rocio Harris, APRN-CNP    DIAGNOSIS AND STAGING  Stage IVB (cT3 cN3 cM1c) poorly differentiated adenocarcinoma of RUL diagnosed on 08/03/23     SITES OF DISEASE  RUL  Supraclavicular nodes  Contralateral lung  Right gluteal muscle   Brain right occipital lobe      MOLECULAR GENOMICS  MICROSATELLITE STATUS: Microsatellite Stable (MAGDIEL)     DISEASE ASSOCIATED GENOMIC FINDINGS:   KRAS p.G13D ([id number] c.38G>A)   PIK3CA p.E542K ([id number] c.1624G>A)   TP53 p.E285K ([id number] c.853G>A)     DISEASE RELEVANT ALTERATIONS NOT DETECTED:   Negative for ALK fusion.   Negative for BRAF V600E.   Negative for EGFR sensitizing mutation.   Negative for KRAS G12C.   Negative for MET exon 14 skipping mutation.   Negative for NTRK fusion.   Negative for RET fusion.   Negative for ROS1 fusion.     PD-L1 TPS 80%     PRIOR THERAPIES  GKRS to right occipital lobe on  24 Gy in 1 fraction on 10/17/23      CURRENT THERAPY  Single agent pembrolizumab since 08/31/23    CURRENT ONCOLOGICAL PROBLEMS  None      HISTORY OF PRESENT ILLNESS  This is a pt with PMH of EtOH use disorder, CVA, CAD c/b NSTEMI s/p 2 stents and stage IVB ([id number]) poorly differentiated adenocarcinoma of RUL diagnosed on 08/03/23.      Patient was initially diagnosed 08/2022 after incidentally found RUL lesion was worked up during admission for hyponatremia presumed to be 2/2 beer potomania. He was then seen by his pulmonologist Dr. Callejas who ordered PET and MRI but this was not obtained  nor were multiple attempts to schedule for oncologic NPV.     I saw him in August 2023 and he endorsed haviing lost 15 lbs since stroke April 2022. His appetite is good, denies dysgeusia. He has slowed down a bit since he had his stroke. He can walk without becoming dyspneic but is limited by his stroke.    On 08/12/23 the patient underwent a bronchoscopy with EBUS:  Accession #: [id number]   Date of  Procedure:8/3/2022   Date Reported: 8/9/2022   Date Received: 8/3/2022   Date of Birth / Sex 1960 (Age: 62) / M   Race: WHITE   Submitting Physician: DARYN YOUNG MD   Attending Physician: MD HEVER BROWNE MD   Procedures/Addenda Present     Other External #     FINAL CYTOLOGICAL INTERPRETATION     A. FINE NEEDLE ASPIRATION LYMPH NODE, L 4, CYTOLOGY AND CELL BLOCK:   NO MALIGNANT CELLS IDENTIFIED.   LYMPHOID SAMPLE IS PRESENT.     Note: Cell block shows presence of lymphoid cells along with bronchial cells   contamination.       B. FINE NEEDLE ASPIRATION LYMPH NODE, STATION 7, CYTOLOGY AND CELL BLOCK:   NO MALIGNANT CELLS IDENTIFIED.   LYMPHOID SAMPLE IS PRESENT.       C. FINE NEEDLE ASPIRATION LYMPH NODE, R 11, CYTOLOGY AND CELL BLOCK:   POORLY DIFFERENTIATED CARCINOMA, CONSISTENT WITH ADENOCARCINOMA, SEE NOTE.     Note: A limited panel of immunohistochemical stains performed showed neoplastic   cells staining strongly and diffusely positive with TTF-1 and negative with   p40. Findings are consistent with above.     Staff pathologist: Reviewed by Dr. Ellyn Valles.     Molecular testing has been ordered and results will be issued in   an addendum.     The cell block; C1 contains moderate tumor cellularity representing   roughly 70 % of all nucleated cells.       D. BRONCHO-ALVEOLAR LAVAGE OF RIGHT UPPER LOBE:   RARE ATYPICAL CELLS ARE PRESENT.     08/16/23: PET scan demonstrates multiple RUL FDG avid nodules as well as contra-lateral lung nodules, right hilar, mediastinal and supraclavicular nodes consistent with neoplastic involvement. There is also a large FDG avid right gluteal mass extending into the right SIJ and iliac bone.     08/16/23: MRI brain shows a 8 mm right occipital lobe metastasis with mild associated edema.     08/31/23: Single agent pembrolizumab begins     10/17/23: GKRS to right occipital lobe on  24 Gy in 1 fraction      PAST MEDICAL HISTORY  CAD s/p 2 stents, CVA,  Dupuytren's contracture   BPH  Lumbar radiculopathy     SURGICAL HISTORY  None      SOCIAL HISTORY  Born in [place], lives in [place] with 2 roommates.   Brother Salas is NOK. Drinks 2 24 oz beers a day.   + Tobacco - 50 pack year smoking history, active.   Rare cannabis, acid use in 70s, no additional illicits.    Was working at [employer] in [place]  before stroke.      FAMILY HISTORY  Mother  of melanoma in      CURRENT MEDS REVIEWED       ALLERGIES REVIEWED        SUBJECTIVE:  Verbal consent obtained for this Telehealth/Phone visit   Doing OK   Diarrhea has been persistent   Has 3-4 BM a day - loose, some water, no blood in the stools   It was better last week - stools were harder   Watching what he has been eating - eating more bread, bananas   Taking Imodium (2 tabs AM and 1-2 tabs before sleep)  No new respiratory sx     A 13 point review of systems was performed, with significant findings documented above in subjective history.    OBJECTIVE:  There were no vitals filed for this visit.     There is no height or weight on file to calculate BSA.     Wt Readings from Last 5 Encounters:   05/15/24 62.7 kg (138 lb 4.8 oz)   24 63.5 kg (139 lb 15.9 oz)   24 63.5 kg (140 lb)   24 61.4 kg (135 lb 5.8 oz)   24 63.6 kg (140 lb 1.6 oz)       ECOGSCORE: 1- Restricted in physically strenuous activity.  Carries out light duty.       Diagnostic Results   Results:  Labs:  Lab Results   Component Value Date    WBC 7.9 2024    HGB 11.5 (L) 2024    HCT 35.7 (L) 2024    MCV 65 (L) 2024     2024      Lab Results   Component Value Date    NEUTROABS 3.23 2024        Lab Results   Component Value Date    GLUCOSE 86 2024    CALCIUM 8.5 (L) 2024     2024    K 4.4 2024    CO2 27 2024     2024    BUN 11 2024    CREATININE 0.81 2024    MG 2.20 2024     Lab Results   Component Value Date     ALT 13 05/13/2024    AST 11 05/13/2024    ALKPHOS 58 05/13/2024    BILITOT 0.5 05/13/2024    BILIDIR 0.3 07/31/2022      Lab Results   Component Value Date    ACTH 31.4 04/22/2024    CORTISOL 8.0 04/22/2024    TSH 5.57 (H) 04/22/2024    FREET4 1.14 04/22/2024         No images are attached to the encounter or orders placed in the encounter.     Assessment/Plan     Lung cancer (Multi), Clinical: Stage IVB (cT3, cN3, cM1c)  Stage IVB ([id number]) poorly differentiated adenocarcinoma of RUL, TPS 80%, lack of actionable genomic alterations noted.  On single agent pembrolizumab since 08/31/23 - best to keep a Q3 weeks schedule for better monitoring of potential side effects   CT C/A/P from 02/09/24 reviewed with the patient - stable scan  CT A/P from 4/19/24 stable  Repeat scans 05/31/24  Continue moisturizing BUE with CeraVe and triamcinolone as needed for pruritus,    Diarrhea - grade 1   Discussed with the patient taking Imodium 2 tablets TID and the BRAT diet, avoiding dairy   We will continue  to reassess and determine the safety of continuing with single agent pembrolizumab in view of this ir-diarrhea     Right occipital lobe 8 mm metastasis   S/p GKRS 24 Gy in 1 fraction on 10/17/23  Repeat MRI brain every 3 months -   Last on 05/15/24 showing no signs of PD      Pain on right gluteal region-improved  Due to large centrally necrotic and peripherally hypermetabolic mass in the right lower paramediastinal muscle extending into the adjacent subcutaneous tissue and superior right gluteal muscle with involvement of the superior aspect of the right iliac  bone, right florencio sacrum -   He has been taking gabapentin for pain (controlled)      Stroke in April 2022 and hx of CAD  -   Stable -   On ASA and ticagrelor      HTN - on amlodipine      HLD - atorvastatin     Zuleyma Vines MD MS

## 2024-05-15 NOTE — PROGRESS NOTES
"Radiation Oncology Follow-Up    Patient Name:  Pillo Guerin  MRN:  [id number]  :  1960    Referring Provider: Fadumo Carranza, APR*  Primary Care Provider: AMAN Roland  Care Team: Patient Care Team:  AMAN Roland as PCP - General (Family Medicine)  Zuleyam Vines MD as Consulting Physician (Hematology and Oncology)  Haritha Zuniga MD as On Call Attending Physician (Hematology and Oncology)    Date of Service: 5/15/2024   65 yo male with stage IVB ([id number]) poorly differentiated adenocarcinoma of RUL with metastases to the brain s/p gamma knife SRS on 10/17/2023 to 2 lesions.  Systemic therapy with pembrolizumab initiated 2023    SUBJECTIVE  History of Present Illness:   Mr. Guerin is here today for routine radiation follow up and review of MRI of  brain done today.   He says he is feeling well except has some issues at times with insomnia.  He does take melatonin prn which helps.  He continues to smoke at least 1 ppd of cigarettes.  He denies SOB or SANCHEZ however. He has no new neurologic issues and has some persistent dragging of left leg after a previous stroke.  No headaches, seizures, visual changes or falls. He has diarrhea 2-3 times most days and takes 2-3 immodium per day.  Mild pruritic skin rash from immunotherapy.  Uses a cream which helps. No fever, chills, cough, hemoptysis, N/V or bony pain. He endorses \"electric shock\" type pain sometimes down his legs. He continues pembrolizumab infusions q 3 weeks.     Review of Systems:    Review of Systems   All other systems reviewed and are negative.    OBJECTIVE    Current Outpatient Medications:     albuterol 90 mcg/actuation inhaler, Inhale 2 puffs every 4 hours if needed., Disp: , Rfl:     amLODIPine (Norvasc) 5 mg tablet, Take 0.5 tablets (2.5 mg) by mouth once daily., Disp: , Rfl:     aspirin 81 mg chewable tablet, CHEW 1 TABLET BY MOUTH ONCE DAILY, Disp: 30 tablet, Rfl: 3    atorvastatin " (Lipitor) 80 mg tablet, TAKE 1 TABLET BY MOUTH ONCE DAILY, Disp: 30 tablet, Rfl: 1    diclofenac sodium 1 % kit, Apply 2 g topically every 6 hours if needed., Disp: , Rfl:     gabapentin (Neurontin) 300 mg capsule, Take 2 capsules (600 mg) by mouth 2 times a day., Disp: 120 capsule, Rfl: 0    losartan (Cozaar) 50 mg tablet, Take 1 tablet (50 mg) by mouth., Disp: , Rfl:     methocarbamol (Robaxin) 750 mg tablet, Take 1 tablet (750 mg) by mouth 3 times a day as needed., Disp: , Rfl:     metoprolol tartrate (Lopressor) 25 mg tablet, TAKE 1 TABLET BY MOUTH TWO TIMES A DAY, Disp: 60 tablet, Rfl: 1    nicotine (Nicoderm CQ) 21 mg/24 hr patch, Place 1 patch on the skin once daily. (Patient not taking: Reported on 3/13/2024), Disp: 30 patch, Rfl: 3    nitroglycerin (Nitrostat) 0.4 mg SL tablet, Place under the tongue., Disp: , Rfl:     nitroglycerin (Nitrostat) 0.4 mg SL tablet, DISSOLVE 1 TABLET UNDER TONGUE EVERY 5 MINUTES FOR 3 DOSES AS NEEDED FOR CHEST PAIN. IF PAIN PERSISTS DIAL 911., Disp: 25 tablet, Rfl: 0    pantoprazole (ProtoNix) 40 mg EC tablet, Take 1 tablet (40 mg) by mouth once daily. Do not crush, chew, or split., Disp: 30 tablet, Rfl: 2    ticagrelor (Brilinta) 90 mg tablet, TAKE 1 TABLET BY MOUTH TWO TIMES A DAY, Disp: 60 tablet, Rfl: 11    tiotropium (Spiriva) 18 mcg inhalation capsule, Place 1 capsule (18 mcg) into inhaler and inhale once daily., Disp: 30 capsule, Rfl: 6    triamcinolone (Kenalog) 0.1 % ointment, Apply topically 2 times a day., Disp: 80 g, Rfl: 1    umeclidinium (Incruse Ellipta) 62.5 mcg/actuation inhalation, Inhale 1 puff (62.5 mcg) once daily., Disp: 1 each, Rfl: 11    Current Facility-Administered Medications:     acetaminophen (Tylenol) tablet 650 mg, 650 mg, oral, q4h PRN, Angelica Munoz MD    HYDROmorphone (Dilaudid) injection 0.4 mg, 0.4 mg, intravenous, PRN, Angelica Munoz MD, 0.4 mg at 10/17/23 1230    HYDROmorphone (Dilaudid) injection 0.4 mg, 0.4 mg, intravenous, PRN,  Angelica Munoz MD, 0.4 mg at 10/17/23 0830    ibuprofen tablet 400 mg, 400 mg, oral, PRN, Angelica Munoz MD    midazolam (Versed) injection 0.5 mg, 0.5 mg, intravenous, PRN, Angelica Munoz MD, 0.5 mg at 10/17/23 1230    midazolam (Versed) injection 0.5 mg, 0.5 mg, intravenous, PRN, Angelica Munoz MD, 0.5 mg at 10/17/23 0830    ondansetron (Zofran) injection 4 mg, 4 mg, intravenous, q4h PRN, Angelica Munoz MD    oxyCODONE (Roxicodone) immediate release tablet 10 mg, 10 mg, oral, q4h PRN, Angelica Munoz MD     RESULTS:  MR brain w and wo IV contrast    Result Date: 5/15/2024  Interpreted By:  Tanja aVsquez, STUDY: MR BRAIN W AND WO IV CONTRAST;  5/15/2024 3:21 pm   INDICATION: Signs/Symptoms:Metastatic lung cancer to the brain s/p gamma knife. Evaluate for progression.   COMPARISON: January 17, 2024   ACCESSION NUMBER(S): [id number]   ORDERING CLINICIAN: MARY ANN STANFORD   TECHNIQUE: Axial T2, FLAIR, DWI, gradient echo T2 and  T1 weighted images of brain were acquired. Post contrast T1 weighted images were acquired after administration of 12 mL Dotarem gadolinium based intravenous contrast.   FINDINGS: Direct comparison with the prior examination is suboptimal due to motion degradation.   CSF Spaces: There is again prominence of ventricles and sulci compatible with diffuse parenchymal volume loss.   Parenchyma: There is no diffusion restriction abnormality to suggest acute infarct.  There are patchy and confluent hyperintensities on FLAIR and T2 weighted imaging in the subcortical and periventricular white matter. There is minimal involvement of the jagruti. Prominent perivascular spaces and/or remote lacunar infarcts are noted in the basal ganglia, thalami, and external capsules. There is a somewhat irregularly shaped focus of encephalomalacia abutting the posterior body of the right lateral ventricle, similar from the previous examination. There is a punctate focus of susceptibility artifact on  gradient echo T2 weighted imaging in the right precentral gyrus, similar from the prior study and potentially related to mineralization or blood products. No nodular or masslike enhancement suggestive of brain metastasis is identified. There is no mass effect or midline shift.   Paranasal Sinuses and Mastoids: Mucosal thickening and fluid are noted in the left maxillary sinus which is near completely opacified. The fluid is intermediate to slightly hyperintense on T1 and slightly hypointense on T2 weighted imaging which may be seen with inspissated secretions, blood products, or fungal elements. Mucosal thickening and retention cysts or polyps are noted in the right maxillary sinus. There is fluid in the sphenoid sinus and at least partial opacification of several ethmoid air cells. Additionally there is partial opacification of scattered mastoid air cells, left greater than right.   There is again an enhancing lesion in the left frontal bone.       1. No definitive evidence of brain metastasis. 2. No measurable change in the nonspecific enhancing lesion in the left frontal bone. 3. Nonspecific white matter changes most likely represent small-vessel ischemic disease in a patient of this age. 4. Multifocal inflammatory changes of the paranasal sinuses most significantly involving the left maxillary sinus with interval progression from the previous examination.       MACRO: None   Signed by: Tanja Vasquez 5/15/2024 3:30 PM Dictation workstation:   [id number]      Physical Exam  Vitals reviewed.   Constitutional:       Appearance: Normal appearance.      Comments: Very thin   HENT:      Head: Normocephalic and atraumatic.      Nose: Nose normal.      Mouth/Throat:      Mouth: Mucous membranes are dry.   Eyes:      Extraocular Movements: Extraocular movements intact.      Conjunctiva/sclera: Conjunctivae normal.      Pupils: Pupils are equal, round, and reactive to light.   Cardiovascular:      Rate and Rhythm:  Normal rate and regular rhythm.      Heart sounds: Normal heart sounds.   Pulmonary:      Effort: Pulmonary effort is normal.      Comments: Coarse breath sounds throughout.   Abdominal:      General: Abdomen is flat.      Palpations: Abdomen is soft.   Musculoskeletal:         General: No swelling. Normal range of motion.      Cervical back: Normal range of motion and neck supple.   Lymphadenopathy:      Cervical: No cervical adenopathy.   Skin:     General: Skin is warm and dry.   Neurological:      Mental Status: He is alert and oriented to person, place, and time.      Cranial Nerves: No cranial nerve deficit.      Sensory: No sensory deficit.      Motor: Weakness (Left leg drag) present.      Coordination: Coordination normal.      Gait: Gait abnormal.      Comments: Left leg drag. Wearing lower leg band/brace. Walks with limp.   Psychiatric:         Mood and Affect: Mood normal.         Behavior: Behavior normal.     ASSESSMENT/PLAN:  64 y.o. male with metastatic lung cancer to the brain s/p gamma knife SRS.  No new lesions noted on recent MRI of brain.  He continues systemic immunotherapy.  Plan: MRI of brain in 3 mo with rad onc follow up after scan. Discussed smoking cessation however pt not ready to quit or cut back. He will call with any questions or concerns.     Laverne Carranza CNP  [phone]

## 2024-05-16 ENCOUNTER — INFUSION (OUTPATIENT)
Dept: HEMATOLOGY/ONCOLOGY | Facility: CLINIC | Age: 64
End: 2024-05-16
Payer: COMMERCIAL

## 2024-05-16 VITALS
SYSTOLIC BLOOD PRESSURE: 132 MMHG | BODY MASS INDEX: 20.28 KG/M2 | OXYGEN SATURATION: 96 % | RESPIRATION RATE: 18 BRPM | DIASTOLIC BLOOD PRESSURE: 67 MMHG | WEIGHT: 138 LBS | TEMPERATURE: 95.9 F | HEART RATE: 61 BPM

## 2024-05-16 DIAGNOSIS — C34.11 MALIGNANT NEOPLASM OF UPPER LOBE OF RIGHT LUNG (MULTI): ICD-10-CM

## 2024-05-16 PROCEDURE — 2500000004 HC RX 250 GENERAL PHARMACY W/ HCPCS (ALT 636 FOR OP/ED): Mod: JZ,JG,SE | Performed by: INTERNAL MEDICINE

## 2024-05-16 PROCEDURE — 96413 CHEMO IV INFUSION 1 HR: CPT

## 2024-05-16 RX ORDER — PROCHLORPERAZINE MALEATE 10 MG
10 TABLET ORAL EVERY 6 HOURS PRN
Status: DISCONTINUED | OUTPATIENT
Start: 2024-05-16 | End: 2024-05-16 | Stop reason: HOSPADM

## 2024-05-16 RX ORDER — ALBUTEROL SULFATE 0.83 MG/ML
3 SOLUTION RESPIRATORY (INHALATION) AS NEEDED
Status: DISCONTINUED | OUTPATIENT
Start: 2024-05-16 | End: 2024-05-16 | Stop reason: HOSPADM

## 2024-05-16 RX ORDER — PROCHLORPERAZINE EDISYLATE 5 MG/ML
10 INJECTION INTRAMUSCULAR; INTRAVENOUS EVERY 6 HOURS PRN
Status: DISCONTINUED | OUTPATIENT
Start: 2024-05-16 | End: 2024-05-16 | Stop reason: HOSPADM

## 2024-05-16 RX ORDER — HEPARIN SODIUM,PORCINE/PF 10 UNIT/ML
50 SYRINGE (ML) INTRAVENOUS AS NEEDED
OUTPATIENT
Start: 2024-05-16

## 2024-05-16 RX ORDER — HEPARIN 100 UNIT/ML
500 SYRINGE INTRAVENOUS AS NEEDED
OUTPATIENT
Start: 2024-05-16

## 2024-05-16 RX ORDER — EPINEPHRINE 0.3 MG/.3ML
0.3 INJECTION SUBCUTANEOUS EVERY 5 MIN PRN
Status: DISCONTINUED | OUTPATIENT
Start: 2024-05-16 | End: 2024-05-16 | Stop reason: HOSPADM

## 2024-05-16 RX ORDER — DIPHENHYDRAMINE HYDROCHLORIDE 50 MG/ML
50 INJECTION INTRAMUSCULAR; INTRAVENOUS AS NEEDED
Status: DISCONTINUED | OUTPATIENT
Start: 2024-05-16 | End: 2024-05-16 | Stop reason: HOSPADM

## 2024-05-16 RX ORDER — HEPARIN 100 UNIT/ML
500 SYRINGE INTRAVENOUS AS NEEDED
Status: DISCONTINUED | OUTPATIENT
Start: 2024-05-16 | End: 2024-05-16 | Stop reason: HOSPADM

## 2024-05-16 RX ORDER — HEPARIN SODIUM,PORCINE/PF 10 UNIT/ML
50 SYRINGE (ML) INTRAVENOUS AS NEEDED
Status: DISCONTINUED | OUTPATIENT
Start: 2024-05-16 | End: 2024-05-16 | Stop reason: HOSPADM

## 2024-05-16 RX ORDER — FAMOTIDINE 10 MG/ML
20 INJECTION INTRAVENOUS ONCE AS NEEDED
Status: DISCONTINUED | OUTPATIENT
Start: 2024-05-16 | End: 2024-05-16 | Stop reason: HOSPADM

## 2024-05-16 RX ADMIN — SODIUM CHLORIDE 200 MG: 9 INJECTION, SOLUTION INTRAVENOUS at 11:15

## 2024-05-16 ASSESSMENT — PAIN SCALES - GENERAL: PAINLEVEL: 4

## 2024-05-16 NOTE — SIGNIFICANT EVENT

## 2024-05-28 ENCOUNTER — NURSE TRIAGE (OUTPATIENT)
Dept: HEMATOLOGY/ONCOLOGY | Facility: HOSPITAL | Age: 64
End: 2024-05-28
Payer: COMMERCIAL

## 2024-05-28 NOTE — TELEPHONE ENCOUNTER
I received a secure chat from Arslan Min and she said for Pillo to keep a stool diary starting today. Since his B/M's are semi-soft currently and not liquid no need to take Imodium as of now. I let him know what team said and he will keep a stool diary. He will bring to appointment as instructed on 6/5/24 with Dr. Vines. He also was told any new or worsening symptoms to call nurse line back. He verbalized back and agreed to plan.

## 2024-05-28 NOTE — TELEPHONE ENCOUNTER
Pt is requesting a prescription for imodium.  He takes 8 tabs/day.  He has run out of funds to pay for the medication OTC.  Preferred pharmacy is [facility] in [place].

## 2024-05-28 NOTE — TELEPHONE ENCOUNTER
Pillo called nurse line back and said he ran out of Imodium on Saturday. He took 8 pills a day Wed-Saturday of Imodium. He has taken no pills starting Sunday and has had 3 semi-soft B/M's in last 24 hours. He said without taking the pills it is still semi-soft and not liquid as of now. Brown color, no different odor and sees some undigested food in stools. He said he follows the BRAT diet mostly and is avoiding dairy at this time. He is drinking his usual amount and urinating normally. He denies any other symptoms besides the semi soft B/M's as of now. Messaged team and secured chat team.

## 2024-05-28 NOTE — TELEPHONE ENCOUNTER
Additional Information  • Do you see any undigested food or fat in your bowel movements?     Some undigested food in B/M's  • How often are you passing urine and what color is it?     Every few hours. Normal urination  • Have you taken any anti-diarrhea medicines such as Imodium?     8 tablets a day as of Saturday until he ran out of medication.  • Commented on: Review EMR for h/o immunotherapy. If positive, ask if patient is having these symptoms (they may be signs of immune-related hepatitis or colitis):     8 tablets Wed- Saturday. Ran out of Imodium Sunday and hasn't taken anything else since Sunday. 3 B/M in last 24 hours. Semi soft B/M's not all liquid. Stools look brownish now. Sometimes he said there is undigested food.  • Commented on: How many bowel movements have you had in the last 24 hours?     Soft not liquid B/M's  • Commented on: Do you see any mucous or blood in your bowel movements?     Denies both of these  • Commented on: Are you having any of these problems?     Denies all these symptoms. Still semi soft without any Imodium since Saturday. They were liquid but more softer.  • Commented on: When did these problems start?     Continued problem 5-6 weeks.  • Commented on: What helps these problems?     Imodium helps some.  • Commented on: What makes these problems worse?     BRAT diet maybe helped a bit. He is avoiding dairy. He is eating eggs, potatoes.  • Commented on: What have you been drinking in the last 24 hours?     He is drinking usual amount mostly water. He drinks around 24 ounces he thinks in this time.    Protocols used: Diarrhea

## 2024-05-31 ENCOUNTER — HOSPITAL ENCOUNTER (OUTPATIENT)
Dept: RADIOLOGY | Facility: CLINIC | Age: 64
Discharge: HOME | End: 2024-05-31
Payer: COMMERCIAL

## 2024-05-31 DIAGNOSIS — C34.11 MALIGNANT NEOPLASM OF UPPER LOBE OF RIGHT LUNG (MULTI): ICD-10-CM

## 2024-05-31 PROCEDURE — 2550000001 HC RX 255 CONTRASTS: Mod: SE | Performed by: INTERNAL MEDICINE

## 2024-05-31 PROCEDURE — 71260 CT THORAX DX C+: CPT | Performed by: STUDENT IN AN ORGANIZED HEALTH CARE EDUCATION/TRAINING PROGRAM

## 2024-05-31 PROCEDURE — 74177 CT ABD & PELVIS W/CONTRAST: CPT

## 2024-05-31 PROCEDURE — 74177 CT ABD & PELVIS W/CONTRAST: CPT | Performed by: STUDENT IN AN ORGANIZED HEALTH CARE EDUCATION/TRAINING PROGRAM

## 2024-05-31 RX ADMIN — IOHEXOL 75 ML: 350 INJECTION, SOLUTION INTRAVENOUS at 10:18

## 2024-06-05 ENCOUNTER — APPOINTMENT (OUTPATIENT)
Dept: HEMATOLOGY/ONCOLOGY | Facility: CLINIC | Age: 64
End: 2024-06-05
Payer: COMMERCIAL

## 2024-06-05 ENCOUNTER — DOCUMENTATION (OUTPATIENT)
Dept: HEMATOLOGY/ONCOLOGY | Facility: CLINIC | Age: 64
End: 2024-06-05
Payer: COMMERCIAL

## 2024-06-05 NOTE — PROGRESS NOTES
Noted that patient cancelled both MD and infusion for 6.5.2024.  Patient did not re-schedule. Call out to patient to get him re-scheduled/assess reason for cancellation. Voicemail left for patient to return call to office.

## 2024-06-06 ENCOUNTER — TELEMEDICINE (OUTPATIENT)
Dept: HEMATOLOGY/ONCOLOGY | Facility: HOSPITAL | Age: 64
End: 2024-06-06
Payer: COMMERCIAL

## 2024-06-06 ENCOUNTER — APPOINTMENT (OUTPATIENT)
Dept: HEMATOLOGY/ONCOLOGY | Facility: CLINIC | Age: 64
End: 2024-06-06
Payer: COMMERCIAL

## 2024-06-06 DIAGNOSIS — C34.11 PRIMARY CANCER OF RIGHT UPPER LOBE OF LUNG (MULTI): Primary | ICD-10-CM

## 2024-06-06 DIAGNOSIS — K52.1 DIARRHEA DUE TO DRUG: ICD-10-CM

## 2024-06-06 PROCEDURE — 99213 OFFICE O/P EST LOW 20 MIN: CPT | Performed by: INTERNAL MEDICINE

## 2024-06-06 RX ORDER — PREDNISONE 10 MG/1
TABLET ORAL
Qty: 60 TABLET | Refills: 1 | Status: SHIPPED | OUTPATIENT
Start: 2024-06-06

## 2024-06-06 RX ORDER — OMEPRAZOLE 20 MG/1
CAPSULE, DELAYED RELEASE ORAL
Qty: 30 CAPSULE | Refills: 11 | Status: SHIPPED | OUTPATIENT
Start: 2024-06-06

## 2024-06-06 NOTE — PROGRESS NOTES
Patient ID: Pillo Guerin is a 64 y.o. male    Primary Care Provider: Rocio Harris, APRN-CNP    DIAGNOSIS AND STAGING  Stage IVB (cT3 cN3 cM1c) poorly differentiated adenocarcinoma of RUL diagnosed on 08/03/23     SITES OF DISEASE  RUL  Supraclavicular nodes  Contralateral lung  Right gluteal muscle   Brain right occipital lobe      MOLECULAR GENOMICS  MICROSATELLITE STATUS: Microsatellite Stable (MAGDIEL)     DISEASE ASSOCIATED GENOMIC FINDINGS:   KRAS p.G13D ([id number] c.38G>A)   PIK3CA p.E542K ([id number] c.1624G>A)   TP53 p.E285K ([id number] c.853G>A)     DISEASE RELEVANT ALTERATIONS NOT DETECTED:   Negative for ALK fusion.   Negative for BRAF V600E.   Negative for EGFR sensitizing mutation.   Negative for KRAS G12C.   Negative for MET exon 14 skipping mutation.   Negative for NTRK fusion.   Negative for RET fusion.   Negative for ROS1 fusion.     PD-L1 TPS 80%     PRIOR THERAPIES  GKRS to right occipital lobe on  24 Gy in 1 fraction on 10/17/23      CURRENT THERAPY  Single agent pembrolizumab since 08/31/23    CURRENT ONCOLOGICAL PROBLEMS  None      HISTORY OF PRESENT ILLNESS  This is a pt with PMH of EtOH use disorder, CVA, CAD c/b NSTEMI s/p 2 stents and stage IVB ([id number]) poorly differentiated adenocarcinoma of RUL diagnosed on 08/03/23.      Patient was initially diagnosed 08/2022 after incidentally found RUL lesion was worked up during admission for hyponatremia presumed to be 2/2 beer potomania. He was then seen by his pulmonologist Dr. Callejas who ordered PET and MRI but this was not obtained  nor were multiple attempts to schedule for oncologic NPV.     I saw him in August 2023 and he endorsed haviing lost 15 lbs since stroke April 2022. His appetite is good, denies dysgeusia. He has slowed down a bit since he had his stroke. He can walk without becoming dyspneic but is limited by his stroke.    On 08/12/23 the patient underwent a bronchoscopy with EBUS:  Accession #: [id number]   Date of  Procedure:8/3/2022   Date Reported: 8/9/2022   Date Received: 8/3/2022   Date of Birth / Sex 1960 (Age: 62) / M   Race: WHITE   Submitting Physician: DARYN YOUNG MD   Attending Physician: MD HEVER BROWNE MD   Procedures/Addenda Present     Other External #     FINAL CYTOLOGICAL INTERPRETATION     A. FINE NEEDLE ASPIRATION LYMPH NODE, L 4, CYTOLOGY AND CELL BLOCK:   NO MALIGNANT CELLS IDENTIFIED.   LYMPHOID SAMPLE IS PRESENT.     Note: Cell block shows presence of lymphoid cells along with bronchial cells   contamination.       B. FINE NEEDLE ASPIRATION LYMPH NODE, STATION 7, CYTOLOGY AND CELL BLOCK:   NO MALIGNANT CELLS IDENTIFIED.   LYMPHOID SAMPLE IS PRESENT.       C. FINE NEEDLE ASPIRATION LYMPH NODE, R 11, CYTOLOGY AND CELL BLOCK:   POORLY DIFFERENTIATED CARCINOMA, CONSISTENT WITH ADENOCARCINOMA, SEE NOTE.     Note: A limited panel of immunohistochemical stains performed showed neoplastic   cells staining strongly and diffusely positive with TTF-1 and negative with   p40. Findings are consistent with above.     Staff pathologist: Reviewed by Dr. Ellyn Valles.     Molecular testing has been ordered and results will be issued in   an addendum.     The cell block; C1 contains moderate tumor cellularity representing   roughly 70 % of all nucleated cells.       D. BRONCHO-ALVEOLAR LAVAGE OF RIGHT UPPER LOBE:   RARE ATYPICAL CELLS ARE PRESENT.     08/16/23: PET scan demonstrates multiple RUL FDG avid nodules as well as contra-lateral lung nodules, right hilar, mediastinal and supraclavicular nodes consistent with neoplastic involvement. There is also a large FDG avid right gluteal mass extending into the right SIJ and iliac bone.     08/16/23: MRI brain shows a 8 mm right occipital lobe metastasis with mild associated edema.     08/31/23: Single agent pembrolizumab begins     10/17/23: GKRS to right occipital lobe on  24 Gy in 1 fraction     06/06/24: grade 3 immune-related diarrhea with  episodes of incontinence despite Imodium   Rx prednisone 40 mg/day provided today      PAST MEDICAL HISTORY  CAD s/p 2 stents, CVA, Dupuytren's contracture   BPH  Lumbar radiculopathy     SURGICAL HISTORY  None      SOCIAL HISTORY  Born in [place], lives in [place] with 2 roommates.   Brother Salas is NOK. Drinks 2 24 oz beers a day.   + Tobacco - 50 pack year smoking history, active.   Rare cannabis, acid use in 70s, no additional illicits.    Was working at [employer] in [place]  before stroke.      FAMILY HISTORY  Mother  of melanoma in      CURRENT MEDS REVIEWED       ALLERGIES REVIEWED        SUBJECTIVE:  Verbal consent obtained for this Telehealth/Phone visit   Diarrhea is worse despite Imodium use   Has had episodes of fecal incontinence   Denies nausea   Not eating much afraid of having bowel movements after meals   No blood in the stools  Drinking enough with good urinary output   No rashes   No abdominal pain   Feels bloated     A 13 point review of systems was performed, with significant findings documented above in subjective history.    OBJECTIVE:  There were no vitals filed for this visit.     There is no height or weight on file to calculate BSA.     Wt Readings from Last 5 Encounters:   24 62.6 kg (138 lb)   05/15/24 62.7 kg (138 lb 4.8 oz)   24 63.5 kg (139 lb 15.9 oz)   24 63.5 kg (140 lb)   24 61.4 kg (135 lb 5.8 oz)       ECOGSCORE: 1- Restricted in physically strenuous activity.  Carries out light duty.       Diagnostic Results   Results:  Labs:  Lab Results   Component Value Date    WBC 7.9 2024    HGB 11.5 (L) 2024    HCT 35.7 (L) 2024    MCV 65 (L) 2024     2024      Lab Results   Component Value Date    NEUTROABS 3.23 2024        Lab Results   Component Value Date    GLUCOSE 86 2024    CALCIUM 8.5 (L) 2024     2024    K 4.4 2024    CO2 27 2024     2024     BUN 11 05/13/2024    CREATININE 0.81 05/13/2024    MG 2.20 04/19/2024     Lab Results   Component Value Date    ALT 13 05/13/2024    AST 11 05/13/2024    ALKPHOS 58 05/13/2024    BILITOT 0.5 05/13/2024    BILIDIR 0.3 07/31/2022      Lab Results   Component Value Date    ACTH 31.4 04/22/2024    CORTISOL 8.0 04/22/2024    TSH 5.57 (H) 04/22/2024    FREET4 1.14 04/22/2024         Assessment/Plan     Lung cancer (Multi), Clinical: Stage IVB (cT3, cN3, cM1c)  Stage IVB ([id number]) poorly differentiated adenocarcinoma of RUL, TPS 80%, lack of actionable genomic alterations noted.  On single agent pembrolizumab since 08/31/23 - best to keep a Q3 weeks schedule for better monitoring of potential side effects   CT C/A/P from 02/09/24 reviewed with the patient - stable scan  CT A/P from 4/19/24 stable  Repeat scans 05/31/24 show no signs of PD     Diarrhea - grade 3 and related to pembrolizumab   Continue Imodium but added prednisone 40 mg/day with PPI   I will see him next week and reassess to initiate taper of steroids     Pruritus, immune related  Continue moisturizing BUE with CeraVe and triamcinolone as needed     Right occipital lobe 8 mm metastasis   S/p GKRS 24 Gy in 1 fraction on 10/17/23  Repeat MRI brain every 3 months -   Last on 05/15/24 showing no signs of PD      Pain on right gluteal region- resolved   Due to large centrally necrotic and peripherally hypermetabolic mass in the right lower paramediastinal muscle extending into the adjacent subcutaneous tissue and superior right gluteal muscle with involvement of the superior aspect of the right iliac  bone, right florencio sacrum -   He has been taking gabapentin for pain (controlled)     Stroke in April 2022 and hx of CAD  -   Stable -   On ASA and ticagrelor      HTN - on amlodipine      HLD - atorvastatin     Zuleyma Vines MD MS

## 2024-06-12 ENCOUNTER — LAB (OUTPATIENT)
Dept: LAB | Facility: CLINIC | Age: 64
End: 2024-06-12
Payer: COMMERCIAL

## 2024-06-12 ENCOUNTER — OFFICE VISIT (OUTPATIENT)
Dept: HEMATOLOGY/ONCOLOGY | Facility: CLINIC | Age: 64
End: 2024-06-12
Payer: COMMERCIAL

## 2024-06-12 ENCOUNTER — TELEPHONE (OUTPATIENT)
Dept: HEMATOLOGY/ONCOLOGY | Facility: HOSPITAL | Age: 64
End: 2024-06-12

## 2024-06-12 VITALS
OXYGEN SATURATION: 98 % | SYSTOLIC BLOOD PRESSURE: 129 MMHG | HEART RATE: 55 BPM | BODY MASS INDEX: 19.73 KG/M2 | DIASTOLIC BLOOD PRESSURE: 69 MMHG | TEMPERATURE: 97.3 F | WEIGHT: 134.26 LBS | RESPIRATION RATE: 18 BRPM

## 2024-06-12 DIAGNOSIS — R53.82 CHRONIC FATIGUE: ICD-10-CM

## 2024-06-12 DIAGNOSIS — G89.3 CANCER ASSOCIATED PAIN: ICD-10-CM

## 2024-06-12 DIAGNOSIS — C34.11 PRIMARY CANCER OF RIGHT UPPER LOBE OF LUNG (MULTI): Primary | ICD-10-CM

## 2024-06-12 DIAGNOSIS — C34.11 PRIMARY CANCER OF RIGHT UPPER LOBE OF LUNG (MULTI): ICD-10-CM

## 2024-06-12 DIAGNOSIS — K52.1 DIARRHEA DUE TO DRUG: ICD-10-CM

## 2024-06-12 DIAGNOSIS — C34.11 MALIGNANT NEOPLASM OF UPPER LOBE OF RIGHT LUNG (MULTI): ICD-10-CM

## 2024-06-12 LAB
ALBUMIN SERPL BCP-MCNC: 4.1 G/DL (ref 3.4–5)
ALP SERPL-CCNC: 48 U/L (ref 33–136)
ALT SERPL W P-5'-P-CCNC: 34 U/L (ref 10–52)
ANION GAP SERPL CALC-SCNC: 13 MMOL/L (ref 10–20)
AST SERPL W P-5'-P-CCNC: 21 U/L (ref 9–39)
BASOPHILS # BLD AUTO: 0.01 X10*3/UL (ref 0–0.1)
BASOPHILS NFR BLD AUTO: 0.2 %
BILIRUB SERPL-MCNC: 0.9 MG/DL (ref 0–1.2)
BUN SERPL-MCNC: 13 MG/DL (ref 6–23)
CALCIUM SERPL-MCNC: 8.7 MG/DL (ref 8.6–10.6)
CHLORIDE SERPL-SCNC: 99 MMOL/L (ref 98–107)
CO2 SERPL-SCNC: 27 MMOL/L (ref 21–32)
CORTIS SERPL-MCNC: 11.1 UG/DL (ref 2.5–20)
CREAT SERPL-MCNC: 0.7 MG/DL (ref 0.5–1.3)
EGFRCR SERPLBLD CKD-EPI 2021: >90 ML/MIN/1.73M*2
EOSINOPHIL # BLD AUTO: 0 X10*3/UL (ref 0–0.7)
EOSINOPHIL NFR BLD AUTO: 0 %
ERYTHROCYTE [DISTWIDTH] IN BLOOD BY AUTOMATED COUNT: 17.7 % (ref 11.5–14.5)
GLUCOSE SERPL-MCNC: 119 MG/DL (ref 74–99)
HCT VFR BLD AUTO: 36.9 % (ref 41–52)
HGB BLD-MCNC: 11.9 G/DL (ref 13.5–17.5)
IMM GRANULOCYTES # BLD AUTO: 0.06 X10*3/UL (ref 0–0.7)
IMM GRANULOCYTES NFR BLD AUTO: 0.9 % (ref 0–0.9)
LYMPHOCYTES # BLD AUTO: 1.53 X10*3/UL (ref 1.2–4.8)
LYMPHOCYTES NFR BLD AUTO: 23.8 %
MAGNESIUM SERPL-MCNC: 1.86 MG/DL (ref 1.6–2.4)
MCH RBC QN AUTO: 21 PG (ref 26–34)
MCHC RBC AUTO-ENTMCNC: 32.2 G/DL (ref 32–36)
MCV RBC AUTO: 65 FL (ref 80–100)
MONOCYTES # BLD AUTO: 0.36 X10*3/UL (ref 0.1–1)
MONOCYTES NFR BLD AUTO: 5.6 %
NEUTROPHILS # BLD AUTO: 4.47 X10*3/UL (ref 1.2–7.7)
NEUTROPHILS NFR BLD AUTO: 69.5 %
NRBC BLD-RTO: ABNORMAL /100{WBCS}
PLATELET # BLD AUTO: 349 X10*3/UL (ref 150–450)
POTASSIUM SERPL-SCNC: 4.2 MMOL/L (ref 3.5–5.3)
PROT SERPL-MCNC: 6.3 G/DL (ref 6.4–8.2)
RBC # BLD AUTO: 5.68 X10*6/UL (ref 4.5–5.9)
SODIUM SERPL-SCNC: 135 MMOL/L (ref 136–145)
TSH SERPL-ACNC: 1.85 MIU/L (ref 0.44–3.98)
WBC # BLD AUTO: 6.4 X10*3/UL (ref 4.4–11.3)

## 2024-06-12 PROCEDURE — 85025 COMPLETE CBC W/AUTO DIFF WBC: CPT

## 2024-06-12 PROCEDURE — 1159F MED LIST DOCD IN RCRD: CPT | Performed by: INTERNAL MEDICINE

## 2024-06-12 PROCEDURE — 82533 TOTAL CORTISOL: CPT

## 2024-06-12 PROCEDURE — 36415 COLL VENOUS BLD VENIPUNCTURE: CPT

## 2024-06-12 PROCEDURE — 82024 ASSAY OF ACTH: CPT

## 2024-06-12 PROCEDURE — 84443 ASSAY THYROID STIM HORMONE: CPT

## 2024-06-12 PROCEDURE — 3074F SYST BP LT 130 MM HG: CPT | Performed by: INTERNAL MEDICINE

## 2024-06-12 PROCEDURE — 1125F AMNT PAIN NOTED PAIN PRSNT: CPT | Performed by: INTERNAL MEDICINE

## 2024-06-12 PROCEDURE — 99215 OFFICE O/P EST HI 40 MIN: CPT | Performed by: INTERNAL MEDICINE

## 2024-06-12 PROCEDURE — 83735 ASSAY OF MAGNESIUM: CPT

## 2024-06-12 PROCEDURE — 4004F PT TOBACCO SCREEN RCVD TLK: CPT | Performed by: INTERNAL MEDICINE

## 2024-06-12 PROCEDURE — 3078F DIAST BP <80 MM HG: CPT | Performed by: INTERNAL MEDICINE

## 2024-06-12 PROCEDURE — 80053 COMPREHEN METABOLIC PANEL: CPT

## 2024-06-12 RX ORDER — LOPERAMIDE HCL 2 MG
TABLET ORAL
Qty: 60 TABLET | Refills: 3 | Status: SHIPPED | OUTPATIENT
Start: 2024-06-12

## 2024-06-12 RX ORDER — PREDNISONE 10 MG/1
TABLET ORAL
Qty: 120 TABLET | Refills: 1 | Status: SHIPPED | OUTPATIENT
Start: 2024-06-12

## 2024-06-12 RX ORDER — GABAPENTIN 300 MG/1
600 CAPSULE ORAL 2 TIMES DAILY
Qty: 120 CAPSULE | Refills: 3 | Status: SHIPPED | OUTPATIENT
Start: 2024-06-12 | End: 2024-07-12

## 2024-06-12 ASSESSMENT — PAIN SCALES - GENERAL: PAINLEVEL: 8

## 2024-06-12 ASSESSMENT — ENCOUNTER SYMPTOMS
LOSS OF SENSATION IN FEET: 0
OCCASIONAL FEELINGS OF UNSTEADINESS: 0
DEPRESSION: 0

## 2024-06-12 NOTE — PATIENT INSTRUCTIONS
Take prednisone as instructed:  4 tablets daily with breakfast until 06/14 Saturday, 6/15, start taking 3 tablets daily with food   Saturday 6/22, start taking 2 tablets daily with food  I will talk to you over the phone on 06/27 to make sure the bowel movements are controlled  If you notice the diarrhea returning as you taper the prednisone, call us immediatly

## 2024-06-12 NOTE — PROGRESS NOTES
Patient ID: Pillo Guerin is a 64 y.o. male    Primary Care Provider: Rocio Harris, APRN-CNP    DIAGNOSIS AND STAGING  Stage IVB (cT3 cN3 cM1c) poorly differentiated adenocarcinoma of RUL diagnosed on 08/03/23     SITES OF DISEASE  RUL  Supraclavicular nodes  Contralateral lung  Right gluteal muscle   Brain right occipital lobe      MOLECULAR GENOMICS  MICROSATELLITE STATUS: Microsatellite Stable (MAGDIEL)     DISEASE ASSOCIATED GENOMIC FINDINGS:   KRAS p.G13D ([id number] c.38G>A)   PIK3CA p.E542K ([id number] c.1624G>A)   TP53 p.E285K ([id number] c.853G>A)     DISEASE RELEVANT ALTERATIONS NOT DETECTED:   Negative for ALK fusion.   Negative for BRAF V600E.   Negative for EGFR sensitizing mutation.   Negative for KRAS G12C.   Negative for MET exon 14 skipping mutation.   Negative for NTRK fusion.   Negative for RET fusion.   Negative for ROS1 fusion.     PD-L1 TPS 80%     PRIOR THERAPIES  GKRS to right occipital lobe on  24 Gy in 1 fraction on 10/17/23      CURRENT THERAPY  Single agent pembrolizumab since 08/31/23    CURRENT ONCOLOGICAL PROBLEMS  None      HISTORY OF PRESENT ILLNESS  This is a pt with PMH of EtOH use disorder, CVA, CAD c/b NSTEMI s/p 2 stents and stage IVB ([id number]) poorly differentiated adenocarcinoma of RUL diagnosed on 08/03/23.      Patient was initially diagnosed 08/2022 after incidentally found RUL lesion was worked up during admission for hyponatremia presumed to be 2/2 beer potomania. He was then seen by his pulmonologist Dr. Callejas who ordered PET and MRI but this was not obtained  nor were multiple attempts to schedule for oncologic NPV.     I saw him in August 2023 and he endorsed haviing lost 15 lbs since stroke April 2022. His appetite is good, denies dysgeusia. He has slowed down a bit since he had his stroke. He can walk without becoming dyspneic but is limited by his stroke.    On 08/12/23 the patient underwent a bronchoscopy with EBUS:  Accession #: [id number]   Date of  Procedure:8/3/2022   Date Reported: 8/9/2022   Date Received: 8/3/2022   Date of Birth / Sex 1960 (Age: 62) / M   Race: WHITE   Submitting Physician: DARYN YOUNG MD   Attending Physician: MD HEVER BROWNE MD   Procedures/Addenda Present     Other External #     FINAL CYTOLOGICAL INTERPRETATION     A. FINE NEEDLE ASPIRATION LYMPH NODE, L 4, CYTOLOGY AND CELL BLOCK:   NO MALIGNANT CELLS IDENTIFIED.   LYMPHOID SAMPLE IS PRESENT.     Note: Cell block shows presence of lymphoid cells along with bronchial cells   contamination.       B. FINE NEEDLE ASPIRATION LYMPH NODE, STATION 7, CYTOLOGY AND CELL BLOCK:   NO MALIGNANT CELLS IDENTIFIED.   LYMPHOID SAMPLE IS PRESENT.       C. FINE NEEDLE ASPIRATION LYMPH NODE, R 11, CYTOLOGY AND CELL BLOCK:   POORLY DIFFERENTIATED CARCINOMA, CONSISTENT WITH ADENOCARCINOMA, SEE NOTE.     Note: A limited panel of immunohistochemical stains performed showed neoplastic   cells staining strongly and diffusely positive with TTF-1 and negative with   p40. Findings are consistent with above.     Staff pathologist: Reviewed by Dr. Ellyn Valles.     Molecular testing has been ordered and results will be issued in   an addendum.     The cell block; C1 contains moderate tumor cellularity representing   roughly 70 % of all nucleated cells.       D. BRONCHO-ALVEOLAR LAVAGE OF RIGHT UPPER LOBE:   RARE ATYPICAL CELLS ARE PRESENT.     08/16/23: PET scan demonstrates multiple RUL FDG avid nodules as well as contra-lateral lung nodules, right hilar, mediastinal and supraclavicular nodes consistent with neoplastic involvement. There is also a large FDG avid right gluteal mass extending into the right SIJ and iliac bone.     08/16/23: MRI brain shows a 8 mm right occipital lobe metastasis with mild associated edema.     08/31/23: Single agent pembrolizumab begins     10/17/23: GKRS to right occipital lobe on  24 Gy in 1 fraction     06/06/24: grade 3 immune-related diarrhea with  episodes of incontinence despite Imodium   Rx prednisone 40 mg/day provided today      PAST MEDICAL HISTORY  CAD s/p 2 stents, CVA, Dupuytren's contracture   BPH  Lumbar radiculopathy     SURGICAL HISTORY  None      SOCIAL HISTORY  Born in [place], lives in [place] with 2 roommates.   Brother Salas is NOK. Drinks 2 24 oz beers a day.   + Tobacco - 50 pack year smoking history, active.   Rare cannabis, acid use in 70s, no additional illicits.    Was working at [employer] in [place]  before stroke.      FAMILY HISTORY  Mother  of melanoma in      CURRENT MEDS REVIEWED       ALLERGIES REVIEWED        SUBJECTIVE:  Taking prednisone for the past week - 40 mg/day   Diarrhea resolved last weekend   He has been eating more and tolerating it well  Would like refills on gabapentin for the pain in LLE   States he has been refraining from excessive alcohol intake       A 13 point review of systems was performed, with significant findings documented above in subjective history.    OBJECTIVE:  Vitals:    24 1417   BP: 129/69   Pulse: 55   Resp: 18   Temp: 36.3 °C (97.3 °F)   SpO2: 98%        Body surface area is 1.72 meters squared.     Wt Readings from Last 5 Encounters:   24 60.9 kg (134 lb 4.2 oz)   24 62.6 kg (138 lb)   05/15/24 62.7 kg (138 lb 4.8 oz)   24 63.5 kg (139 lb 15.9 oz)   24 63.5 kg (140 lb)       ECOGSCORE: 1- Restricted in physically strenuous activity.  Carries out light duty.     Physical Exam  Constitutional:       Appearance: Normal appearance.   HENT:      Head: Atraumatic.   Eyes:      General: No scleral icterus.     Conjunctiva/sclera: Conjunctivae normal.   Cardiovascular:      Rate and Rhythm: Normal rate and regular rhythm.      Heart sounds: Normal heart sounds.   Pulmonary:      Effort: Pulmonary effort is normal.      Breath sounds: Normal breath sounds.   Abdominal:      Palpations: Abdomen is soft.      Tenderness: There is no abdominal  tenderness. There is no guarding.   Musculoskeletal:      Right lower leg: No edema.      Left lower leg: No edema.   Skin:     General: Skin is warm.      Findings: No rash.   Neurological:      General: No focal deficit present.      Mental Status: He is alert and oriented to person, place, and time. Mental status is at baseline.      Motor: No weakness.      Gait: Gait normal.   Psychiatric:         Mood and Affect: Mood normal.         Behavior: Behavior normal.         Thought Content: Thought content normal.         Judgment: Judgment normal.            Diagnostic Results   Results:  Labs:  Lab Results   Component Value Date    WBC 7.9 05/13/2024    HGB 11.5 (L) 05/13/2024    HCT 35.7 (L) 05/13/2024    MCV 65 (L) 05/13/2024     05/13/2024      Lab Results   Component Value Date    NEUTROABS 3.23 05/13/2024        Lab Results   Component Value Date    GLUCOSE 86 05/13/2024    CALCIUM 8.5 (L) 05/13/2024     05/13/2024    K 4.4 05/13/2024    CO2 27 05/13/2024     05/13/2024    BUN 11 05/13/2024    CREATININE 0.81 05/13/2024    MG 2.20 04/19/2024     Lab Results   Component Value Date    ALT 13 05/13/2024    AST 11 05/13/2024    ALKPHOS 58 05/13/2024    BILITOT 0.5 05/13/2024    BILIDIR 0.3 07/31/2022      Lab Results   Component Value Date    ACTH 31.4 04/22/2024    CORTISOL 8.0 04/22/2024    TSH 5.57 (H) 04/22/2024    FREET4 1.14 04/22/2024     CT C/A/P 5/31/24:  IMPRESSION:  CHEST:  1.  No significant interval change multifocal spiculated nodules the  largest in the right upper lobe measuring 2.4 x 2.2 cm.  2. Stable prominent-mildly enlarged lymph nodes.  3. Other stable findings as described above.      ABDOMEN-PELVIS:  1.  No evidence of metastatic disease in the abdomen and pelvis.  2. Severe atherosclerotic calcification of the abdominal aorta and  iliac arteries as well as up to moderate stenosis involving the  celiac axis and SMA secondary to noncalcified plaque/thrombus.  3. Other  stable findings as described above.    Assessment/Plan     Lung cancer (Multi), Clinical: Stage IVB (cT3, cN3, cM1c)  Stage IVB ([id number]) poorly differentiated adenocarcinoma of RUL, TPS 80%, lack of actionable genomic alterations noted.  On single agent pembrolizumab since 08/31/23 - best to keep a Q3 weeks schedule for better monitoring of potential side effects   CT C/A/P from 02/09/24 reviewed with the patient - stable scan  CT A/P from 4/19/24 stable  Repeat scans 05/31/24 show no signs of PD - repeat in August 2024  Repeat labs today     Diarrhea - grade 3 and related to pembrolizumab - last treatment on 05/15/24 - held due to grade 2 diarrhea   Continue prednisone but taper as instructed:  Take prednisone as instructed:  4 tablets daily with breakfast until 06/14 Saturday, 6/15, start taking 3 tablets daily with food   Saturday 6/22, start taking 2 tablets daily with food  I will talk to you over the phone on 06/27 to make sure the bowel movements are controlled  If you notice the diarrhea returning as you taper the prednisone, call us immediatly     Pruritus, immune related  Continue moisturizing BUE with CeraVe and triamcinolone as needed     Right occipital lobe 8 mm metastasis   S/p GKRS 24 Gy in 1 fraction on 10/17/23  Repeat MRI brain every 3 months -   Last on 05/15/24 showing no signs of PD  - repeat in 08/2024     Pain on right gluteal region- resolved   Due to large centrally necrotic and peripherally hypermetabolic mass in the right lower paramediastinal muscle extending into the adjacent subcutaneous tissue and superior right gluteal muscle with involvement of the superior aspect of the right iliac  bone, right florencio sacrum -   He has been taking gabapentin for pain (controlled) in LLE that is chronic      Stroke in April 2022 and hx of CAD  -   Stable -   On ASA and ticagrelor      HTN - on amlodipine      HLD - atorvastatin     Zuleyma Vines MD MS

## 2024-06-12 NOTE — TELEPHONE ENCOUNTER
Pharmacy requesting clarification on prednisone and loperamide prescriptions.  More specific instructions are needed in order to fill these prescriptions.

## 2024-06-14 LAB — ACTH PLAS-MCNC: 2.8 PG/ML (ref 7.2–63.3)

## 2024-06-26 ENCOUNTER — APPOINTMENT (OUTPATIENT)
Dept: HEMATOLOGY/ONCOLOGY | Facility: CLINIC | Age: 64
End: 2024-06-26
Payer: COMMERCIAL

## 2024-06-27 ENCOUNTER — TELEMEDICINE (OUTPATIENT)
Dept: HEMATOLOGY/ONCOLOGY | Facility: HOSPITAL | Age: 64
End: 2024-06-27
Payer: COMMERCIAL

## 2024-06-27 DIAGNOSIS — C34.11 PRIMARY CANCER OF RIGHT UPPER LOBE OF LUNG (MULTI): Primary | ICD-10-CM

## 2024-06-27 PROCEDURE — 99213 OFFICE O/P EST LOW 20 MIN: CPT | Performed by: INTERNAL MEDICINE

## 2024-06-27 NOTE — PROGRESS NOTES
Patient ID: Pillo Guerin is a 64 y.o. male    Primary Care Provider: Rocio Harris, APRN-CNP    DIAGNOSIS AND STAGING  Stage IVB (cT3 cN3 cM1c) poorly differentiated adenocarcinoma of RUL diagnosed on 08/03/23     SITES OF DISEASE  RUL  Supraclavicular nodes  Contralateral lung  Right gluteal muscle   Brain right occipital lobe      MOLECULAR GENOMICS  MICROSATELLITE STATUS: Microsatellite Stable (MAGDIEL)     DISEASE ASSOCIATED GENOMIC FINDINGS:   KRAS p.G13D ([id number] c.38G>A)   PIK3CA p.E542K ([id number] c.1624G>A)   TP53 p.E285K ([id number] c.853G>A)     DISEASE RELEVANT ALTERATIONS NOT DETECTED:   Negative for ALK fusion.   Negative for BRAF V600E.   Negative for EGFR sensitizing mutation.   Negative for KRAS G12C.   Negative for MET exon 14 skipping mutation.   Negative for NTRK fusion.   Negative for RET fusion.   Negative for ROS1 fusion.     PD-L1 TPS 80%     PRIOR THERAPIES  GKRS to right occipital lobe on  24 Gy in 1 fraction on 10/17/23      CURRENT THERAPY  Single agent pembrolizumab since 08/31/23    CURRENT ONCOLOGICAL PROBLEMS  None      HISTORY OF PRESENT ILLNESS  This is a pt with PMH of EtOH use disorder, CVA, CAD c/b NSTEMI s/p 2 stents and stage IVB ([id number]) poorly differentiated adenocarcinoma of RUL diagnosed on 08/03/23.      Patient was initially diagnosed 08/2022 after incidentally found RUL lesion was worked up during admission for hyponatremia presumed to be 2/2 beer potomania. He was then seen by his pulmonologist Dr. Callejas who ordered PET and MRI but this was not obtained  nor were multiple attempts to schedule for oncologic NPV.     I saw him in August 2023 and he endorsed haviing lost 15 lbs since stroke April 2022. His appetite is good, denies dysgeusia. He has slowed down a bit since he had his stroke. He can walk without becoming dyspneic but is limited by his stroke.    On 08/12/23 the patient underwent a bronchoscopy with EBUS:  Accession #: [id number]   Date of  Procedure:8/3/2022   Date Reported: 8/9/2022   Date Received: 8/3/2022   Date of Birth / Sex 1960 (Age: 62) / M   Race: WHITE   Submitting Physician: DARYN YOUNG MD   Attending Physician: MD HEVER BROWNE MD   Procedures/Addenda Present     Other External #     FINAL CYTOLOGICAL INTERPRETATION     A. FINE NEEDLE ASPIRATION LYMPH NODE, L 4, CYTOLOGY AND CELL BLOCK:   NO MALIGNANT CELLS IDENTIFIED.   LYMPHOID SAMPLE IS PRESENT.     Note: Cell block shows presence of lymphoid cells along with bronchial cells   contamination.       B. FINE NEEDLE ASPIRATION LYMPH NODE, STATION 7, CYTOLOGY AND CELL BLOCK:   NO MALIGNANT CELLS IDENTIFIED.   LYMPHOID SAMPLE IS PRESENT.       C. FINE NEEDLE ASPIRATION LYMPH NODE, R 11, CYTOLOGY AND CELL BLOCK:   POORLY DIFFERENTIATED CARCINOMA, CONSISTENT WITH ADENOCARCINOMA, SEE NOTE.     Note: A limited panel of immunohistochemical stains performed showed neoplastic   cells staining strongly and diffusely positive with TTF-1 and negative with   p40. Findings are consistent with above.     Staff pathologist: Reviewed by Dr. Ellyn aVlles.     Molecular testing has been ordered and results will be issued in   an addendum.     The cell block; C1 contains moderate tumor cellularity representing   roughly 70 % of all nucleated cells.       D. BRONCHO-ALVEOLAR LAVAGE OF RIGHT UPPER LOBE:   RARE ATYPICAL CELLS ARE PRESENT.     08/16/23: PET scan demonstrates multiple RUL FDG avid nodules as well as contra-lateral lung nodules, right hilar, mediastinal and supraclavicular nodes consistent with neoplastic involvement. There is also a large FDG avid right gluteal mass extending into the right SIJ and iliac bone.     08/16/23: MRI brain shows a 8 mm right occipital lobe metastasis with mild associated edema.     08/31/23: Single agent pembrolizumab begins     10/17/23: GKRS to right occipital lobe on  24 Gy in 1 fraction     06/06/24: grade 3 immune-related diarrhea with  episodes of incontinence despite Imodium   Rx prednisone 40 mg/day provided today      PAST MEDICAL HISTORY  CAD s/p 2 stents, CVA, Dupuytren's contracture   BPH  Lumbar radiculopathy   Ir-diarrhea (pembrolizumab) treated with prednisone months of May and      SURGICAL HISTORY  None      SOCIAL HISTORY  Born in [place], lives in [place] with 2 roommates.   Brother Salas is NOK. Drinks 2 24 oz beers a day.   + Tobacco - 50 pack year smoking history, active.   Rare cannabis, acid use in 70s, no additional illicits.    Was working at [employer] in [place]  before stroke.      FAMILY HISTORY  Mother  of melanoma in      CURRENT MEDS REVIEWED       ALLERGIES REVIEWED        SUBJECTIVE:  Verbal consent obtained prior to this telehealth/phone appointment today   Doing well on prednisone 20 mg/day   No further episodes of diarrhea  Eating well  Good appetite and stamina   Denies new respiratory sx     A 13 point review of systems was performed, with significant findings documented above in subjective history.    OBJECTIVE:  There were no vitals filed for this visit.       There is no height or weight on file to calculate BSA.     Wt Readings from Last 5 Encounters:   24 60.9 kg (134 lb 4.2 oz)   24 62.6 kg (138 lb)   05/15/24 62.7 kg (138 lb 4.8 oz)   24 63.5 kg (139 lb 15.9 oz)   24 63.5 kg (140 lb)       ECOGSCORE: 1- Restricted in physically strenuous activity.  Carries out light duty.       Diagnostic Results   Results:  Labs:  Lab Results   Component Value Date    WBC 6.4 2024    HGB 11.9 (L) 2024    HCT 36.9 (L) 2024    MCV 65 (L) 2024     2024      Lab Results   Component Value Date    NEUTROABS 4.47 2024        Lab Results   Component Value Date    GLUCOSE 119 (H) 2024    CALCIUM 8.7 2024     (L) 2024    K 4.2 2024    CO2 27 2024    CL 99 2024    BUN 13 2024    CREATININE 0.70  06/12/2024    MG 1.86 06/12/2024     Lab Results   Component Value Date    ALT 34 06/12/2024    AST 21 06/12/2024    ALKPHOS 48 06/12/2024    BILITOT 0.9 06/12/2024    BILIDIR 0.3 07/31/2022      Lab Results   Component Value Date    ACTH 2.8 (L) 06/12/2024    CORTISOL 11.1 06/12/2024    TSH 1.85 06/12/2024    FREET4 1.14 04/22/2024     CT C/A/P 5/31/24:  IMPRESSION:  CHEST:  1.  No significant interval change multifocal spiculated nodules the  largest in the right upper lobe measuring 2.4 x 2.2 cm.  2. Stable prominent-mildly enlarged lymph nodes.  3. Other stable findings as described above.      ABDOMEN-PELVIS:  1.  No evidence of metastatic disease in the abdomen and pelvis.  2. Severe atherosclerotic calcification of the abdominal aorta and  iliac arteries as well as up to moderate stenosis involving the  celiac axis and SMA secondary to noncalcified plaque/thrombus.  3. Other stable findings as described above.    Assessment/Plan     Lung cancer (Multi), Clinical: Stage IVB (cT3, cN3, cM1c)  Stage IVB ([id number]) poorly differentiated adenocarcinoma of RUL, TPS 80%, lack of actionable genomic alterations noted.  On single agent pembrolizumab since 08/31/23 - best to keep a Q3 weeks schedule for better monitoring of potential side effects   CT C/A/P from 02/09/24 reviewed with the patient - stable scan  CT A/P from 4/19/24 stable  Repeat scans 05/31/24 show no signs of PD - repeat in August 2024      Diarrhea - grade 3 and related to pembrolizumab - last treatment on 05/15/24 - held due to grade 2 diarrhea   Continue prednisone but taper as instructed:  Take prednisone as instructed:  4 tablets daily with breakfast until 06/14 Saturday, 6/15, start taking 3 tablets daily with food   Saturday 6/22, start taking 2 tablets daily with food  Saturday 6/29 start taking 1 tablet daily with food and stay on this dose until appointment on 7/17 for possible pembrolizumab     Pruritus, immune related  Continue  moisturizing BUE with CeraVe and triamcinolone as needed     Right occipital lobe 8 mm metastasis   S/p GKRS 24 Gy in 1 fraction on 10/17/23  Repeat MRI brain every 3 months -   Last on 05/15/24 showing no signs of PD  - repeat in 08/2024     Pain on right gluteal region- resolved   Due to large centrally necrotic and peripherally hypermetabolic mass in the right lower paramediastinal muscle extending into the adjacent subcutaneous tissue and superior right gluteal muscle with involvement of the superior aspect of the right iliac  bone, right florencio sacrum -   He has been taking gabapentin for pain (controlled) in LLE that is chronic      Stroke in April 2022 and hx of CAD  -   Stable -   On ASA and ticagrelor      HTN - on amlodipine      HLD - atorvastatin     Zuleyma Vines MD MS

## 2024-07-08 ENCOUNTER — APPOINTMENT (OUTPATIENT)
Dept: PHYSICAL THERAPY | Facility: CLINIC | Age: 64
End: 2024-07-08
Payer: COMMERCIAL

## 2024-07-08 NOTE — PROGRESS NOTES
Physical Therapy    Physical Therapy Evaluation and Treatment      Patient Name: Pillo Guerin  MRN: [id number]  Today's Date: 7/8/2024       Assessment:        Plan:       Current Problem:   No diagnosis found.    Subjective    General:     Precautions:     Vital Signs:     Pain:            Objective   Cognition:       Outcome Measures:  {PT Outcome Measures:09588}     Treatments:  {PT Treatments:59934}    EDUCATION:       Goals:

## 2024-07-15 ENCOUNTER — LAB (OUTPATIENT)
Dept: LAB | Facility: CLINIC | Age: 64
End: 2024-07-15
Payer: COMMERCIAL

## 2024-07-15 DIAGNOSIS — C34.11 MALIGNANT NEOPLASM OF UPPER LOBE OF RIGHT LUNG (MULTI): ICD-10-CM

## 2024-07-15 LAB
ALBUMIN SERPL BCP-MCNC: 4 G/DL (ref 3.4–5)
ALP SERPL-CCNC: 49 U/L (ref 33–136)
ALT SERPL W P-5'-P-CCNC: 15 U/L (ref 10–52)
ANION GAP SERPL CALC-SCNC: 10 MMOL/L (ref 10–20)
AST SERPL W P-5'-P-CCNC: 15 U/L (ref 9–39)
BASOPHILS # BLD AUTO: 0.06 X10*3/UL (ref 0–0.1)
BASOPHILS NFR BLD AUTO: 0.7 %
BILIRUB SERPL-MCNC: 0.6 MG/DL (ref 0–1.2)
BUN SERPL-MCNC: 10 MG/DL (ref 6–23)
CALCIUM SERPL-MCNC: 9.1 MG/DL (ref 8.6–10.6)
CHLORIDE SERPL-SCNC: 98 MMOL/L (ref 98–107)
CO2 SERPL-SCNC: 30 MMOL/L (ref 21–32)
CORTIS AM PEAK SERPL-MSCNC: 24.6 UG/DL (ref 5–20)
CREAT SERPL-MCNC: 0.65 MG/DL (ref 0.5–1.3)
EGFRCR SERPLBLD CKD-EPI 2021: >90 ML/MIN/1.73M*2
EOSINOPHIL # BLD AUTO: 0.59 X10*3/UL (ref 0–0.7)
EOSINOPHIL NFR BLD AUTO: 6.9 %
ERYTHROCYTE [DISTWIDTH] IN BLOOD BY AUTOMATED COUNT: 17.6 % (ref 11.5–14.5)
GLUCOSE SERPL-MCNC: 95 MG/DL (ref 74–99)
HCT VFR BLD AUTO: 38.4 % (ref 41–52)
HGB BLD-MCNC: 12.6 G/DL (ref 13.5–17.5)
IMM GRANULOCYTES # BLD AUTO: 0.06 X10*3/UL (ref 0–0.7)
IMM GRANULOCYTES NFR BLD AUTO: 0.7 % (ref 0–0.9)
LYMPHOCYTES # BLD AUTO: 2.5 X10*3/UL (ref 1.2–4.8)
LYMPHOCYTES NFR BLD AUTO: 29.1 %
MCH RBC QN AUTO: 21.7 PG (ref 26–34)
MCHC RBC AUTO-ENTMCNC: 32.8 G/DL (ref 32–36)
MCV RBC AUTO: 66 FL (ref 80–100)
MONOCYTES # BLD AUTO: 0.74 X10*3/UL (ref 0.1–1)
MONOCYTES NFR BLD AUTO: 8.6 %
NEUTROPHILS # BLD AUTO: 4.63 X10*3/UL (ref 1.2–7.7)
NEUTROPHILS NFR BLD AUTO: 54 %
NRBC BLD-RTO: ABNORMAL /100{WBCS}
PLATELET # BLD AUTO: 327 X10*3/UL (ref 150–450)
POTASSIUM SERPL-SCNC: 4.5 MMOL/L (ref 3.5–5.3)
PROT SERPL-MCNC: 6.1 G/DL (ref 6.4–8.2)
RBC # BLD AUTO: 5.8 X10*6/UL (ref 4.5–5.9)
SODIUM SERPL-SCNC: 133 MMOL/L (ref 136–145)
TSH SERPL-ACNC: 2.02 MIU/L (ref 0.44–3.98)
WBC # BLD AUTO: 8.6 X10*3/UL (ref 4.4–11.3)

## 2024-07-15 PROCEDURE — 82533 TOTAL CORTISOL: CPT

## 2024-07-15 PROCEDURE — 84443 ASSAY THYROID STIM HORMONE: CPT

## 2024-07-15 PROCEDURE — 36415 COLL VENOUS BLD VENIPUNCTURE: CPT

## 2024-07-15 PROCEDURE — 82024 ASSAY OF ACTH: CPT

## 2024-07-15 PROCEDURE — 80053 COMPREHEN METABOLIC PANEL: CPT

## 2024-07-15 PROCEDURE — 85025 COMPLETE CBC W/AUTO DIFF WBC: CPT

## 2024-07-17 ENCOUNTER — INFUSION (OUTPATIENT)
Dept: HEMATOLOGY/ONCOLOGY | Facility: CLINIC | Age: 64
End: 2024-07-17
Payer: COMMERCIAL

## 2024-07-17 ENCOUNTER — OFFICE VISIT (OUTPATIENT)
Dept: HEMATOLOGY/ONCOLOGY | Facility: CLINIC | Age: 64
End: 2024-07-17
Payer: COMMERCIAL

## 2024-07-17 ENCOUNTER — SOCIAL WORK (OUTPATIENT)
Dept: CASE MANAGEMENT | Facility: HOSPITAL | Age: 64
End: 2024-07-17
Payer: COMMERCIAL

## 2024-07-17 VITALS
HEART RATE: 56 BPM | WEIGHT: 132.9 LBS | SYSTOLIC BLOOD PRESSURE: 126 MMHG | DIASTOLIC BLOOD PRESSURE: 73 MMHG | RESPIRATION RATE: 18 BRPM | BODY MASS INDEX: 19.53 KG/M2 | OXYGEN SATURATION: 96 % | TEMPERATURE: 99.1 F

## 2024-07-17 DIAGNOSIS — C34.11 MALIGNANT NEOPLASM OF UPPER LOBE OF RIGHT LUNG (MULTI): ICD-10-CM

## 2024-07-17 DIAGNOSIS — C34.11 MALIGNANT NEOPLASM OF UPPER LOBE OF RIGHT LUNG (MULTI): Primary | ICD-10-CM

## 2024-07-17 DIAGNOSIS — Z51.12 ENCOUNTER FOR ANTINEOPLASTIC IMMUNOTHERAPY: ICD-10-CM

## 2024-07-17 PROCEDURE — 3074F SYST BP LT 130 MM HG: CPT | Performed by: NURSE PRACTITIONER

## 2024-07-17 PROCEDURE — 3078F DIAST BP <80 MM HG: CPT | Performed by: NURSE PRACTITIONER

## 2024-07-17 PROCEDURE — 99215 OFFICE O/P EST HI 40 MIN: CPT | Performed by: NURSE PRACTITIONER

## 2024-07-17 PROCEDURE — 4004F PT TOBACCO SCREEN RCVD TLK: CPT | Performed by: NURSE PRACTITIONER

## 2024-07-17 PROCEDURE — 1126F AMNT PAIN NOTED NONE PRSNT: CPT | Performed by: NURSE PRACTITIONER

## 2024-07-17 PROCEDURE — 96413 CHEMO IV INFUSION 1 HR: CPT

## 2024-07-17 PROCEDURE — 2500000004 HC RX 250 GENERAL PHARMACY W/ HCPCS (ALT 636 FOR OP/ED): Mod: SE | Performed by: NURSE PRACTITIONER

## 2024-07-17 RX ORDER — FAMOTIDINE 10 MG/ML
20 INJECTION INTRAVENOUS ONCE AS NEEDED
OUTPATIENT
Start: 2024-08-07

## 2024-07-17 RX ORDER — PROCHLORPERAZINE MALEATE 10 MG
10 TABLET ORAL EVERY 6 HOURS PRN
Status: DISCONTINUED | OUTPATIENT
Start: 2024-07-17 | End: 2024-07-17 | Stop reason: HOSPADM

## 2024-07-17 RX ORDER — PROCHLORPERAZINE MALEATE 10 MG
10 TABLET ORAL EVERY 6 HOURS PRN
Status: CANCELLED | OUTPATIENT
Start: 2024-07-17

## 2024-07-17 RX ORDER — FAMOTIDINE 10 MG/ML
20 INJECTION INTRAVENOUS ONCE AS NEEDED
Status: DISCONTINUED | OUTPATIENT
Start: 2024-07-17 | End: 2024-07-17 | Stop reason: HOSPADM

## 2024-07-17 RX ORDER — DIPHENHYDRAMINE HYDROCHLORIDE 50 MG/ML
50 INJECTION INTRAMUSCULAR; INTRAVENOUS AS NEEDED
OUTPATIENT
Start: 2024-08-07

## 2024-07-17 RX ORDER — ALBUTEROL SULFATE 0.83 MG/ML
3 SOLUTION RESPIRATORY (INHALATION) AS NEEDED
Status: DISCONTINUED | OUTPATIENT
Start: 2024-07-17 | End: 2024-07-17 | Stop reason: HOSPADM

## 2024-07-17 RX ORDER — PROCHLORPERAZINE EDISYLATE 5 MG/ML
10 INJECTION INTRAMUSCULAR; INTRAVENOUS EVERY 6 HOURS PRN
Status: CANCELLED | OUTPATIENT
Start: 2024-07-17

## 2024-07-17 RX ORDER — PROCHLORPERAZINE EDISYLATE 5 MG/ML
10 INJECTION INTRAMUSCULAR; INTRAVENOUS EVERY 6 HOURS PRN
Status: DISCONTINUED | OUTPATIENT
Start: 2024-07-17 | End: 2024-07-17 | Stop reason: HOSPADM

## 2024-07-17 RX ORDER — DIPHENHYDRAMINE HYDROCHLORIDE 50 MG/ML
50 INJECTION INTRAMUSCULAR; INTRAVENOUS AS NEEDED
Status: CANCELLED | OUTPATIENT
Start: 2024-07-17

## 2024-07-17 RX ORDER — FAMOTIDINE 10 MG/ML
20 INJECTION INTRAVENOUS ONCE AS NEEDED
Status: CANCELLED | OUTPATIENT
Start: 2024-07-17

## 2024-07-17 RX ORDER — EPINEPHRINE 0.3 MG/.3ML
0.3 INJECTION SUBCUTANEOUS EVERY 5 MIN PRN
Status: CANCELLED | OUTPATIENT
Start: 2024-07-17

## 2024-07-17 RX ORDER — PROCHLORPERAZINE MALEATE 10 MG
10 TABLET ORAL EVERY 6 HOURS PRN
OUTPATIENT
Start: 2024-08-07

## 2024-07-17 RX ORDER — PROCHLORPERAZINE EDISYLATE 5 MG/ML
10 INJECTION INTRAMUSCULAR; INTRAVENOUS EVERY 6 HOURS PRN
OUTPATIENT
Start: 2024-08-07

## 2024-07-17 RX ORDER — ALBUTEROL SULFATE 0.83 MG/ML
3 SOLUTION RESPIRATORY (INHALATION) AS NEEDED
Status: CANCELLED | OUTPATIENT
Start: 2024-07-17

## 2024-07-17 RX ORDER — EPINEPHRINE 0.3 MG/.3ML
0.3 INJECTION SUBCUTANEOUS EVERY 5 MIN PRN
Status: DISCONTINUED | OUTPATIENT
Start: 2024-07-17 | End: 2024-07-17 | Stop reason: HOSPADM

## 2024-07-17 RX ORDER — ALBUTEROL SULFATE 0.83 MG/ML
3 SOLUTION RESPIRATORY (INHALATION) AS NEEDED
OUTPATIENT
Start: 2024-08-07

## 2024-07-17 RX ORDER — EPINEPHRINE 0.3 MG/.3ML
0.3 INJECTION SUBCUTANEOUS EVERY 5 MIN PRN
OUTPATIENT
Start: 2024-08-07

## 2024-07-17 RX ORDER — DIPHENHYDRAMINE HYDROCHLORIDE 50 MG/ML
50 INJECTION INTRAMUSCULAR; INTRAVENOUS AS NEEDED
Status: DISCONTINUED | OUTPATIENT
Start: 2024-07-17 | End: 2024-07-17 | Stop reason: HOSPADM

## 2024-07-17 ASSESSMENT — PAIN SCALES - GENERAL: PAINLEVEL: 0-NO PAIN

## 2024-07-17 NOTE — PATIENT INSTRUCTIONS
Your treatment may need to be rescheduled if you do not have your lab work completed prior to your next scheduled infusion. Please be sure to get your blood work completed 7 days before your next treatment. You can go to any  lab to have these done.

## 2024-07-17 NOTE — PROGRESS NOTES
Patient ID: Pillo Guerin is a 64 y.o. male    Primary Care Provider: Rocio Harris, APRN-CNP    DIAGNOSIS AND STAGING  Stage IVB (cT3 cN3 cM1c) poorly differentiated adenocarcinoma of RUL diagnosed on 08/03/23     SITES OF DISEASE  RUL  Supraclavicular nodes  Contralateral lung  Right gluteal muscle   Brain right occipital lobe      MOLECULAR GENOMICS  MICROSATELLITE STATUS: Microsatellite Stable (MAGDIEL)     DISEASE ASSOCIATED GENOMIC FINDINGS:   KRAS p.G13D ([id number] c.38G>A)   PIK3CA p.E542K ([id number] c.1624G>A)   TP53 p.E285K (NM_000546 c.853G>A)     DISEASE RELEVANT ALTERATIONS NOT DETECTED:   Negative for ALK fusion.   Negative for BRAF V600E.   Negative for EGFR sensitizing mutation.   Negative for KRAS G12C.   Negative for MET exon 14 skipping mutation.   Negative for NTRK fusion.   Negative for RET fusion.   Negative for ROS1 fusion.     PD-L1 TPS 80%     PRIOR THERAPIES  GKRS to right occipital lobe on  24 Gy in 1 fraction on 10/17/23      CURRENT THERAPY  Single agent pembrolizumab since 08/31/23    CURRENT ONCOLOGICAL PROBLEMS  None      HISTORY OF PRESENT ILLNESS  This is a pt with PMH of EtOH use disorder, CVA, CAD c/b NSTEMI s/p 2 stents and stage IVB ([id number]) poorly differentiated adenocarcinoma of RUL diagnosed on 08/03/23.      Patient was initially diagnosed 08/2022 after incidentally found RUL lesion was worked up during admission for hyponatremia presumed to be 2/2 beer potomania. He was then seen by his pulmonologist Dr. Callejas who ordered PET and MRI but this was not obtained  nor were multiple attempts to schedule for oncologic NPV.     I saw him in August 2023 and he endorsed haviing lost 15 lbs since stroke April 2022. His appetite is good, denies dysgeusia. He has slowed down a bit since he had his stroke. He can walk without becoming dyspneic but is limited by his stroke.    On 08/12/23 the patient underwent a bronchoscopy with EBUS:  Accession #: [id number]   Date of  Procedure:8/3/2022   Date Reported: 8/9/2022   Date Received: 8/3/2022   Date of Birth / Sex 1960 (Age: 62) / M   Race: WHITE   Submitting Physician: DARYN YOUNG MD   Attending Physician: MD HEVER BROWNE MD   Procedures/Addenda Present     Other External #     FINAL CYTOLOGICAL INTERPRETATION     A. FINE NEEDLE ASPIRATION LYMPH NODE, L 4, CYTOLOGY AND CELL BLOCK:   NO MALIGNANT CELLS IDENTIFIED.   LYMPHOID SAMPLE IS PRESENT.     Note: Cell block shows presence of lymphoid cells along with bronchial cells   contamination.       B. FINE NEEDLE ASPIRATION LYMPH NODE, STATION 7, CYTOLOGY AND CELL BLOCK:   NO MALIGNANT CELLS IDENTIFIED.   LYMPHOID SAMPLE IS PRESENT.       C. FINE NEEDLE ASPIRATION LYMPH NODE, R 11, CYTOLOGY AND CELL BLOCK:   POORLY DIFFERENTIATED CARCINOMA, CONSISTENT WITH ADENOCARCINOMA, SEE NOTE.     Note: A limited panel of immunohistochemical stains performed showed neoplastic   cells staining strongly and diffusely positive with TTF-1 and negative with   p40. Findings are consistent with above.     Staff pathologist: Reviewed by Dr. Ellyn Valles.     Molecular testing has been ordered and results will be issued in   an addendum.     The cell block; C1 contains moderate tumor cellularity representing   roughly 70 % of all nucleated cells.       D. BRONCHO-ALVEOLAR LAVAGE OF RIGHT UPPER LOBE:   RARE ATYPICAL CELLS ARE PRESENT.     08/16/23: PET scan demonstrates multiple RUL FDG avid nodules as well as contra-lateral lung nodules, right hilar, mediastinal and supraclavicular nodes consistent with neoplastic involvement. There is also a large FDG avid right gluteal mass extending into the right SIJ and iliac bone.     08/16/23: MRI brain shows a 8 mm right occipital lobe metastasis with mild associated edema.     08/31/23: Single agent pembrolizumab begins     10/17/23: GKRS to right occipital lobe on  24 Gy in 1 fraction     06/06/24: grade 3 immune-related diarrhea with  episodes of incontinence despite Imodium   Rx prednisone 40 mg/day provided today      PAST MEDICAL HISTORY  CAD s/p 2 stents, CVA, Dupuytren's contracture   BPH  Lumbar radiculopathy   Ir-diarrhea (pembrolizumab) treated with prednisone months of May and      SURGICAL HISTORY  None      SOCIAL HISTORY  Born in [place], lives in [place] with 2 roommates.   Brother Salas is NOK. Drinks 2 24 oz beers a day.   + Tobacco - 50 pack year smoking history, active.   Rare cannabis, acid use in 70s, no additional illicits.    Was working at [employer] in [place]  before stroke.      FAMILY HISTORY  Mother  of melanoma in      CURRENT MEDS REVIEWED       ALLERGIES REVIEWED        SUBJECTIVE:  Feeling good today.  Energy level is good.  Breathing - Denies SOB or cough.  Appetite - good.  Back to his norm.  2# wt loss from 24.  Pt attributes to the weather, lack of a/c within the home.  Denies n/v/d/c.    Reports no recent diarrhea.  Last episode a couple of weeks ago.  Denies fevers, chills, or CP.  Denies s/sx's bleeding.  No rashes or skin concerns.    No other concerns today.    Discussed he is to continue daily prednisone 10 mg maintenance dose.  Partial fill with initial Rx.  Recommended pt contact pharmacy for remaining doses.  Pt verbalized understanding.      Last visit:   Verbal consent obtained prior to this telehealth/phone appointment today   Doing well on prednisone 20 mg/day   No further episodes of diarrhea  Eating well  Good appetite and stamina   Denies new respiratory sx     A 13-point review of systems was performed, with significant findings documented above in subjective history.    OBJECTIVE:  Vitals:    24 1442   BP: 126/73   Pulse: 56   Resp: 18   Temp: 37.3 °C (99.1 °F)   SpO2: 96%          Body surface area is 1.72 meters squared.     Wt Readings from Last 5 Encounters:   24 60.3 kg (132 lb 14.4 oz)   24 60.9 kg (134 lb 4.2 oz)   24 62.6 kg (138 lb)    05/15/24 62.7 kg (138 lb 4.8 oz)   04/24/24 63.5 kg (139 lb 15.9 oz)       ECOGSCORE: 1- Restricted in physically strenuous activity.  Carries out light duty.     Physical Exam  Vitals reviewed.   Constitutional:       Appearance: Normal appearance.      Comments: Thin framed   HENT:      Head: Normocephalic and atraumatic.      Mouth/Throat:      Mouth: Mucous membranes are moist.      Pharynx: Oropharynx is clear.   Eyes:      Extraocular Movements: Extraocular movements intact.      Conjunctiva/sclera: Conjunctivae normal.   Cardiovascular:      Rate and Rhythm: Regular rhythm. Bradycardia present.      Pulses: Normal pulses.      Heart sounds: Normal heart sounds.   Pulmonary:      Breath sounds: Normal breath sounds.   Abdominal:      General: Bowel sounds are normal.      Palpations: Abdomen is soft.   Musculoskeletal:         General: Normal range of motion.      Cervical back: Normal range of motion and neck supple.      Comments: Leg brace to LLE   Skin:     General: Skin is warm and dry.   Neurological:      General: No focal deficit present.      Mental Status: He is alert and oriented to person, place, and time.   Psychiatric:         Mood and Affect: Mood normal.         Behavior: Behavior normal.         Thought Content: Thought content normal.         Judgment: Judgment normal.          Diagnostic Results   Results:  Labs:  Lab Results   Component Value Date    WBC 8.6 07/15/2024    HGB 12.6 (L) 07/15/2024    HCT 38.4 (L) 07/15/2024    MCV 66 (L) 07/15/2024     07/15/2024      Lab Results   Component Value Date    NEUTROABS 4.63 07/15/2024        Lab Results   Component Value Date    GLUCOSE 95 07/15/2024    CALCIUM 9.1 07/15/2024     (L) 07/15/2024    K 4.5 07/15/2024    CO2 30 07/15/2024    CL 98 07/15/2024    BUN 10 07/15/2024    CREATININE 0.65 07/15/2024    MG 1.86 06/12/2024     Lab Results   Component Value Date    ALT 15 07/15/2024    AST 15 07/15/2024    ALKPHOS 49 07/15/2024     BILITOT 0.6 07/15/2024    BILIDIR 0.3 07/31/2022      Lab Results   Component Value Date    ACTH 2.8 (L) 06/12/2024    CORTISOL 24.6 (H) 07/15/2024    TSH 2.02 07/15/2024    FREET4 1.14 04/22/2024     No new imaging.      Assessment/Plan     Lung cancer (Multi), Clinical: Stage IVB (cT3, cN3, cM1c)  Stage IVB ([id number]) poorly differentiated adenocarcinoma of RUL, TPS 80%, lack of actionable genomic alterations noted.  On single agent pembrolizumab since 08/31/23 - best to keep a Q3 weeks schedule for better monitoring of potential side effects   CT C/A/P from 02/09/24 reviewed with the patient - stable scan  CT A/P from 4/19/24 stable  Repeat scans 05/31/24 show no signs of PD - repeat in August 2024 (order at 8/7/24 visit)      Diarrhea - grade 3 and related to pembrolizumab - last treatment on 05/15/24 - held due to grade 2 diarrhea   Continue prednisone but taper as instructed:  Take prednisone as instructed:  4 tablets daily with breakfast until 06/14 Saturday, 6/15, start taking 3 tablets daily with food   Saturday 6/22, start taking 2 tablets daily with food  Saturday 6/29 start taking 1 tablet daily with food and stay on this dose until appointment on 7/17 for possible pembrolizumab   - Diarrhea has resolved.  Reports no episodes within the last two weeks. Pt to continue daily 10 mg maintenance dose.  Partial fill with initial Rx.  Recommended pt contact pharmacy for remaining doses.      Pruritus, immune related  Continue moisturizing BUE with CeraVe and triamcinolone as needed     Right occipital lobe 8 mm metastasis   S/p GKRS 24 Gy in 1 fraction on 10/17/23  Repeat MRI brain every 3 months -   Last on 05/15/24 showing no signs of PD  - repeat in 08/2024     Pain on right gluteal region- resolved   Due to large centrally necrotic and peripherally hypermetabolic mass in the right lower paramediastinal muscle extending into the adjacent subcutaneous tissue and superior right gluteal muscle with  involvement of the superior aspect of the right iliac  bone, right florencio sacrum -   He has been taking gabapentin for pain (controlled) in LLE that is chronic      Stroke in April 2022 and hx of CAD  -   Stable -   On ASA and ticagrelor      HTN - on amlodipine      HLD - atorvastatin

## 2024-07-18 NOTE — PROGRESS NOTES
Primary Care Physician: Rocio Harris, APRN-CNP  Date of Visit: 2024 11:20 AM EDT      Chief Complaint:   Re-establish cardiac care     HPI / Summary:   Pillo Guerin is a 64 y.o. male with metastatic adenocarcinoma of the lung (supraclavicular node, mets to gluteus and right occipital brain), COPD, smoker, CAD with inferior STEMI  with ANGELITA to RCA, in-stent thrombosis  with PTCA in the setting of medication noncompliance, inferior STEMI 2023 with 100% RCA ISR status post ANGELITA x 2 in overlapping fashion, CVA  with residual L leg weakness.    I first met him on the day of his admission 2023 when he presented with inferior STEMI.  He was subsequently seen over the weekend and discharged the following day.  Has neglected cardiology follow-up to this point as he wanted to get treatment for his lung cancer. He is getting q3 week pembrolizumab. Says he feels good. NO chest pain or dyspnea        Last Cardiology Tests:  EC24    Echo: 7/15/23  1. Left ventricular systolic function is normal with a 55-60% estimated ejection fraction.  2. There are multiple wall motion abnormalities.  3. Basal septum and inferal walls are hypokinetic in views seen; study limited by poor acoustic windows.  4. Spectral Doppler shows an impaired relaxation pattern of left ventricular diastolic filling.       Cath: 23  Nonobstructive LAD and LCx.  40-50% and mid LAD with collaterals to the right PDA  70% stenosis in pre-existing mid RCA stents with occluded distal RCA.  3.0 x 38 mm ANGELITA deployed to the distal and mid RCA overlapped with a 4.0 x 18 mm ANGELITA      Stress Test:    Cardiac Imaging:      Past Medical History:  Past Medical History:   Diagnosis Date    Hyperlipidemia, unspecified 2022    Hyperlipidemia    Old myocardial infarction     History of myocardial infarction        Past Surgical History:  Past Surgical History:   Procedure Laterality Date    MR HEAD ANGIO WO IV CONTRAST  2022  "   MR HEAD ANGIO WO IV CONTRAST 4/19/2022 [facility]    MR NECK ANGIO WO IV CONTRAST  4/19/2022    MR NECK ANGIO WO IV CONTRAST 4/19/2022 [facility]    OTHER SURGICAL HISTORY  09/01/2015    Previous Stent Placement          Social History:  He reports that he has been smoking cigarettes. He has been exposed to tobacco smoke. He uses smokeless tobacco. He reports current alcohol use of about 4.0 standard drinks of alcohol per week. He reports current drug use. Drug: \"Crack\" cocaine.    Family History:  family history includes Cancer in his mother; Heart attack in his brother and father.      Allergies:  No Known Allergies    Outpatient Medications:  Current Outpatient Medications   Medication Instructions    albuterol 90 mcg/actuation inhaler 2 puffs, inhalation, Every 4 hours PRN    amLODIPine (NORVASC) 2.5 mg, oral, Daily RT    atorvastatin (Lipitor) 80 mg tablet TAKE 1 TABLET BY MOUTH ONCE DAILY    diclofenac sodium 2 g, Topical, Every 6 hours PRN    gabapentin (NEURONTIN) 600 mg, oral, 2 times daily    Incruse Ellipta 62.5 mcg, inhalation, Daily    loperamide (Imodium A-D) 2 mg tablet Take it as instructed for diarrhea    losartan (COZAAR) 50 mg, oral    methocarbamol (ROBAXIN) 750 mg, oral, 3 times daily PRN    metoprolol tartrate (Lopressor) 25 mg tablet TAKE 1 TABLET BY MOUTH TWO TIMES A DAY    nitroglycerin (Nitrostat) 0.4 mg SL tablet sublingual    nitroglycerin (Nitrostat) 0.4 mg SL tablet DISSOLVE 1 TABLET UNDER TONGUE EVERY 5 MINUTES FOR 3 DOSES AS NEEDED FOR CHEST PAIN. IF PAIN PERSISTS DIAL 911.    omeprazole (PriLOSEC) 20 mg DR capsule Do not crush or chew. Take it 30 minutes before breakfast.    pantoprazole (PROTONIX) 40 mg, oral, Daily, Do not crush, chew, or split.    predniSONE (Deltasone) 10 mg tablet Take it as instructed    triamcinolone (Kenalog) 0.1 % ointment Topical, 2 times daily       Review of Systems:  A complete 10 point ROS was performed and is negative except for " HPI    Physical Exam:  GENERAL: alert, cooperative, pleasant, in no acute distress  SKIN: warm, dry, no rash.  NECK: no JVD, no AMANDA  CARDIAC: Regular rate and rhythm with no rubs, murmurs, or gallops  CHEST: Normal respiratory efforts, lungs clear to auscultation bilaterally.  ABDOMEN: soft, nontender, nondistended  EXTREMITIES: no edema  NEURO: Alert and oriented x 3.  Grossly normal.  Moves all 4 extremities.    Vitals:    07/19/24 1118   BP: 110/67   BP Location: Left arm   Patient Position: Sitting   Pulse: 59   SpO2: 95%   Weight: 60.3 kg (133 lb)     Wt Readings from Last 5 Encounters:   07/17/24 60.3 kg (132 lb 14.4 oz)   06/12/24 60.9 kg (134 lb 4.2 oz)   05/16/24 62.6 kg (138 lb)   05/15/24 62.7 kg (138 lb 4.8 oz)   04/24/24 63.5 kg (139 lb 15.9 oz)     Body mass index is 19.54 kg/m².        Last Labs:  CMP:  Recent Labs     07/15/24  1051 06/12/24  1515 05/13/24  1119   * 135* 137   K 4.5 4.2 4.4   CL 98 99 102   CO2 30 27 27   ANIONGAP 10 13 12   BUN 10 13 11   CREATININE 0.65 0.70 0.81   EGFR >90 >90 >90   GLUCOSE 95 119* 86     Recent Labs     07/15/24  1051 06/12/24  1515 05/13/24  1119 04/22/24  1026 04/19/24  1730 07/30/22  0708 07/29/22  0940   ALBUMIN 4.0 4.1 3.6   < > 3.9   < > 4.9   ALKPHOS 49 48 58   < > 60   < > 60   ALT 15 34 13   < > 10   < > 11   AST 15 21 11   < > 18   < > 21   BILITOT 0.6 0.9 0.5   < > 0.6   < > 1.7*   LIPASE  --   --   --   --  22  --  7*    < > = values in this interval not displayed.     CBC:  Recent Labs     07/15/24  1051 06/12/24  1515 05/13/24  1119   WBC 8.6 6.4 7.9   HGB 12.6* 11.9* 11.5*   HCT 38.4* 36.9* 35.7*    349 317   MCV 66* 65* 65*     COAG:   Recent Labs     04/19/24  1730 07/14/23  0153 07/29/22  0941   INR 1.1 1.0 1.0     ENDO:  Recent Labs     07/15/24  1051 06/12/24  1515 04/22/24  1026 03/11/24  1047 07/30/22  0708 04/21/22  0622   TSH 2.02 1.85 5.57* 1.91   < >  --    HGBA1C  --   --   --   --   --  5.4    < > = values in this interval  not displayed.      CARDIAC:   Recent Labs     07/14/23  1740 07/14/23  1620 07/14/23  1059 07/14/23  0153 07/30/22  0708 07/29/22  0940 04/19/22  0903   TROPHS 22,341* 33,648* AUTODIL* 78*  --    < >  --    BNP  --   --   --  96 74  --  33    < > = values in this interval not displayed.     Recent Labs     07/14/23  0153 04/21/22  0622   CHOL 137 125   LDLF 61 55   HDL 48.6 47.6   TRIG 136 110           Assessment/Plan   64 y.o. male with metastatic adenocarcinoma of the lung (supraclavicular node, mets to gluteus and right occipital brain), COPD, smoker, CAD with inferior STEMI 2014 with ANGELITA to RCA, in-stent thrombosis 2015 with PTCA in the setting of medication noncompliance, inferior STEMI 7/2023 with 100% RCA ISR status post ANGELITA x 2 in overlapping fashion, CVA 2022 with residual L leg weakness.    #coronary artery disease  -aspirin, statin, metoprolol  -change brillinta to 60 mg bid from 90mg as it's been a year. Favor long term DAPT due to extensive nature of stenting in RCA  -refer to cardiac rehab    #chronic HTN  -controlled on current meds    #tobacco use  -still smoking    Follow up 3 months    Followup Appts:  Future Appointments   Date Time Provider Department Center   8/7/2024 12:40 PM Zuleyma Vines MD [id number] [facility]   8/7/2024  1:00 PM INF 07 MINOFF [id number] [facility]   8/26/2024  2:20 PM Troy Callejas MD [id number] [facility]   8/28/2024  9:00 AM [facility] [id number] MRI 1 [id number] [facility]   8/28/2024 10:00 AM Zuleyma Vines MD [id number] [facility]   8/28/2024 10:30 AM INF 04 MINOFF [id number] [facility]   8/28/2024  2:00 PM Fadumo Carranza, APRN-CNP [id number] [facility]           ____________________________________________________________  DO [facility]  [facility]

## 2024-07-19 ENCOUNTER — OFFICE VISIT (OUTPATIENT)
Dept: CARDIOLOGY | Facility: CLINIC | Age: 64
End: 2024-07-19
Payer: COMMERCIAL

## 2024-07-19 VITALS
SYSTOLIC BLOOD PRESSURE: 110 MMHG | BODY MASS INDEX: 19.54 KG/M2 | HEART RATE: 59 BPM | DIASTOLIC BLOOD PRESSURE: 67 MMHG | OXYGEN SATURATION: 95 % | WEIGHT: 133 LBS

## 2024-07-19 DIAGNOSIS — Z72.0 TOBACCO USE: ICD-10-CM

## 2024-07-19 DIAGNOSIS — C34.11 PRIMARY CANCER OF RIGHT UPPER LOBE OF LUNG (MULTI): ICD-10-CM

## 2024-07-19 DIAGNOSIS — I21.11 ST ELEVATION MYOCARDIAL INFARCTION INVOLVING RIGHT CORONARY ARTERY (MULTI): Primary | ICD-10-CM

## 2024-07-19 DIAGNOSIS — Z95.5 S/P CORONARY ARTERY STENT PLACEMENT: ICD-10-CM

## 2024-07-19 LAB — ACTH PLAS-MCNC: 8.2 PG/ML (ref 7.2–63.3)

## 2024-07-19 PROCEDURE — 99215 OFFICE O/P EST HI 40 MIN: CPT | Performed by: INTERNAL MEDICINE

## 2024-07-19 PROCEDURE — 4004F PT TOBACCO SCREEN RCVD TLK: CPT | Performed by: INTERNAL MEDICINE

## 2024-07-19 PROCEDURE — 3078F DIAST BP <80 MM HG: CPT | Performed by: INTERNAL MEDICINE

## 2024-07-19 PROCEDURE — 1159F MED LIST DOCD IN RCRD: CPT | Performed by: INTERNAL MEDICINE

## 2024-07-19 PROCEDURE — 3074F SYST BP LT 130 MM HG: CPT | Performed by: INTERNAL MEDICINE

## 2024-07-25 ENCOUNTER — TELEPHONE (OUTPATIENT)
Dept: CARDIAC REHAB | Facility: CLINIC | Age: 64
End: 2024-07-25
Payer: COMMERCIAL

## 2024-07-25 NOTE — TELEPHONE ENCOUNTER
Spoke with [patient about starting rehab, he will be stopping up to check out the room to see if he is interested and is able due to his health

## 2024-07-30 DIAGNOSIS — I21.11 ST ELEVATION MYOCARDIAL INFARCTION INVOLVING RIGHT CORONARY ARTERY (MULTI): Primary | ICD-10-CM

## 2024-07-30 NOTE — TELEPHONE ENCOUNTER
Patient called to say nitroglycerin bottle had  and requests new prescription. LOV note reviewed. Script cued and forwarded for signature

## 2024-07-31 RX ORDER — NITROGLYCERIN 0.4 MG/1
TABLET SUBLINGUAL
Qty: 25 TABLET | Refills: 1 | Status: SHIPPED | OUTPATIENT
Start: 2024-07-31 | End: 2025-07-31

## 2024-08-05 ENCOUNTER — APPOINTMENT (OUTPATIENT)
Dept: PHYSICAL THERAPY | Facility: CLINIC | Age: 64
End: 2024-08-05
Payer: COMMERCIAL

## 2024-08-06 ENCOUNTER — LAB (OUTPATIENT)
Dept: LAB | Facility: CLINIC | Age: 64
End: 2024-08-06
Payer: COMMERCIAL

## 2024-08-06 DIAGNOSIS — C34.11 PRIMARY CANCER OF RIGHT UPPER LOBE OF LUNG (MULTI): Primary | ICD-10-CM

## 2024-08-06 DIAGNOSIS — R53.83 FATIGUE, UNSPECIFIED TYPE: ICD-10-CM

## 2024-08-06 DIAGNOSIS — C34.11 PRIMARY CANCER OF RIGHT UPPER LOBE OF LUNG (MULTI): ICD-10-CM

## 2024-08-06 LAB
ALBUMIN SERPL BCP-MCNC: 4.6 G/DL (ref 3.4–5)
ALP SERPL-CCNC: 54 U/L (ref 33–136)
ALT SERPL W P-5'-P-CCNC: 15 U/L (ref 10–52)
ANION GAP SERPL CALC-SCNC: 10 MMOL/L (ref 10–20)
AST SERPL W P-5'-P-CCNC: 15 U/L (ref 9–39)
BASOPHILS # BLD AUTO: 0.06 X10*3/UL (ref 0–0.1)
BASOPHILS NFR BLD AUTO: 0.7 %
BILIRUB SERPL-MCNC: 0.9 MG/DL (ref 0–1.2)
BUN SERPL-MCNC: 11 MG/DL (ref 6–23)
CALCIUM SERPL-MCNC: 9.6 MG/DL (ref 8.6–10.6)
CHLORIDE SERPL-SCNC: 97 MMOL/L (ref 98–107)
CO2 SERPL-SCNC: 28 MMOL/L (ref 21–32)
CORTIS SERPL-MCNC: 10.8 UG/DL (ref 2.5–20)
CREAT SERPL-MCNC: 0.75 MG/DL (ref 0.5–1.3)
EGFRCR SERPLBLD CKD-EPI 2021: >90 ML/MIN/1.73M*2
EOSINOPHIL # BLD AUTO: 0.45 X10*3/UL (ref 0–0.7)
EOSINOPHIL NFR BLD AUTO: 5.4 %
ERYTHROCYTE [DISTWIDTH] IN BLOOD BY AUTOMATED COUNT: 17.4 % (ref 11.5–14.5)
GLUCOSE SERPL-MCNC: 113 MG/DL (ref 74–99)
HCT VFR BLD AUTO: 39.9 % (ref 41–52)
HGB BLD-MCNC: 13.2 G/DL (ref 13.5–17.5)
IMM GRANULOCYTES # BLD AUTO: 0.06 X10*3/UL (ref 0–0.7)
IMM GRANULOCYTES NFR BLD AUTO: 0.7 % (ref 0–0.9)
LYMPHOCYTES # BLD AUTO: 2.01 X10*3/UL (ref 1.2–4.8)
LYMPHOCYTES NFR BLD AUTO: 24.1 %
MAGNESIUM SERPL-MCNC: 2.28 MG/DL (ref 1.6–2.4)
MCH RBC QN AUTO: 21.5 PG (ref 26–34)
MCHC RBC AUTO-ENTMCNC: 33.1 G/DL (ref 32–36)
MCV RBC AUTO: 65 FL (ref 80–100)
MONOCYTES # BLD AUTO: 0.7 X10*3/UL (ref 0.1–1)
MONOCYTES NFR BLD AUTO: 8.4 %
NEUTROPHILS # BLD AUTO: 5.06 X10*3/UL (ref 1.2–7.7)
NEUTROPHILS NFR BLD AUTO: 60.7 %
NRBC BLD-RTO: ABNORMAL /100{WBCS}
PLATELET # BLD AUTO: 304 X10*3/UL (ref 150–450)
POTASSIUM SERPL-SCNC: 4.7 MMOL/L (ref 3.5–5.3)
PROT SERPL-MCNC: 6.6 G/DL (ref 6.4–8.2)
RBC # BLD AUTO: 6.15 X10*6/UL (ref 4.5–5.9)
SODIUM SERPL-SCNC: 130 MMOL/L (ref 136–145)
TSH SERPL-ACNC: 1.68 MIU/L (ref 0.44–3.98)
WBC # BLD AUTO: 8.3 X10*3/UL (ref 4.4–11.3)

## 2024-08-06 PROCEDURE — 36415 COLL VENOUS BLD VENIPUNCTURE: CPT

## 2024-08-06 PROCEDURE — 82533 TOTAL CORTISOL: CPT

## 2024-08-06 PROCEDURE — 80053 COMPREHEN METABOLIC PANEL: CPT

## 2024-08-06 PROCEDURE — 84443 ASSAY THYROID STIM HORMONE: CPT

## 2024-08-06 PROCEDURE — 82024 ASSAY OF ACTH: CPT

## 2024-08-06 PROCEDURE — 85025 COMPLETE CBC W/AUTO DIFF WBC: CPT

## 2024-08-06 PROCEDURE — 83735 ASSAY OF MAGNESIUM: CPT

## 2024-08-06 NOTE — PROGRESS NOTES
Patient ID: Pillo Guerin is a 64 y.o. male    Primary Care Provider: Rocio Harris, APRN-CNP    DIAGNOSIS AND STAGING  Stage IVB (cT3 cN3 cM1c) poorly differentiated adenocarcinoma of RUL diagnosed on 08/03/23     SITES OF DISEASE  RUL  Supraclavicular nodes  Contralateral lung  Right gluteal muscle   Brain right occipital lobe      MOLECULAR GENOMICS  MICROSATELLITE STATUS: Microsatellite Stable (MAGDIEL)     DISEASE ASSOCIATED GENOMIC FINDINGS:   KRAS p.G13D ([id number] c.38G>A)   PIK3CA p.E542K ([id number] c.1624G>A)   TP53 p.E285K ([id number] c.853G>A)     DISEASE RELEVANT ALTERATIONS NOT DETECTED:   Negative for ALK fusion.   Negative for BRAF V600E.   Negative for EGFR sensitizing mutation.   Negative for KRAS G12C.   Negative for MET exon 14 skipping mutation.   Negative for NTRK fusion.   Negative for RET fusion.   Negative for ROS1 fusion.     PD-L1 TPS 80%     PRIOR THERAPIES  GKRS to right occipital lobe on  24 Gy in 1 fraction on 10/17/23      CURRENT THERAPY  Single agent pembrolizumab since 08/31/23    CURRENT ONCOLOGICAL PROBLEMS  Resolved GIII ICI colitis     HISTORY OF PRESENT ILLNESS  This is a pt with PMH of EtOH use disorder, CVA, CAD c/b NSTEMI s/p 2 stents and stage IVB ([id number]) poorly differentiated adenocarcinoma of RUL diagnosed on 08/03/23.      Patient was initially diagnosed 08/2022 after incidentally found RUL lesion was worked up during admission for hyponatremia presumed to be 2/2 beer potomania. He was then seen by his pulmonologist Dr. Callejas who ordered PET and MRI but this was not obtained  nor were multiple attempts to schedule for oncologic NPV.     I saw him in August 2023 and he endorsed haviing lost 15 lbs since stroke April 2022. His appetite is good, denies dysgeusia. He has slowed down a bit since he had his stroke. He can walk without becoming dyspneic but is limited by his stroke.    On 08/12/23 the patient underwent a bronchoscopy with EBUS:  Accession #: [id number]    Date of Procedure:8/3/2022   Date Reported: 8/9/2022   Date Received: 8/3/2022   Date of Birth / Sex 1960 (Age: 62) / M   Race: WHITE   Submitting Physician: DARYN YOUNG MD   Attending Physician: MD HEVER BROWNE MD   Procedures/Addenda Present     Other External #     FINAL CYTOLOGICAL INTERPRETATION     A. FINE NEEDLE ASPIRATION LYMPH NODE, L 4, CYTOLOGY AND CELL BLOCK:   NO MALIGNANT CELLS IDENTIFIED.   LYMPHOID SAMPLE IS PRESENT.     Note: Cell block shows presence of lymphoid cells along with bronchial cells   contamination.       B. FINE NEEDLE ASPIRATION LYMPH NODE, STATION 7, CYTOLOGY AND CELL BLOCK:   NO MALIGNANT CELLS IDENTIFIED.   LYMPHOID SAMPLE IS PRESENT.       C. FINE NEEDLE ASPIRATION LYMPH NODE, R 11, CYTOLOGY AND CELL BLOCK:   POORLY DIFFERENTIATED CARCINOMA, CONSISTENT WITH ADENOCARCINOMA, SEE NOTE.     Note: A limited panel of immunohistochemical stains performed showed neoplastic   cells staining strongly and diffusely positive with TTF-1 and negative with   p40. Findings are consistent with above.     Staff pathologist: Reviewed by Dr. Ellyn Valles.     Molecular testing has been ordered and results will be issued in   an addendum.     The cell block; C1 contains moderate tumor cellularity representing   roughly 70 % of all nucleated cells.       D. BRONCHO-ALVEOLAR LAVAGE OF RIGHT UPPER LOBE:   RARE ATYPICAL CELLS ARE PRESENT.     08/16/23: PET scan demonstrates multiple RUL FDG avid nodules as well as contra-lateral lung nodules, right hilar, mediastinal and supraclavicular nodes consistent with neoplastic involvement. There is also a large FDG avid right gluteal mass extending into the right SIJ and iliac bone.     08/16/23: MRI brain shows a 8 mm right occipital lobe metastasis with mild associated edema.     08/31/23: Single agent pembrolizumab begins     10/17/23: GKRS to right occipital lobe on  24 Gy in 1 fraction     06/06/24: grade 3 immune-related  diarrhea with episodes of incontinence despite Imodium   Rx prednisone 40 mg/day provided today      PAST MEDICAL HISTORY  CAD s/p 2 stents, CVA, Dupuytren's contracture   BPH  Lumbar radiculopathy   Ir-diarrhea (pembrolizumab) treated with prednisone months of May and      SURGICAL HISTORY  None      SOCIAL HISTORY  Born in [place], lives in [place] with 2 roommates.   Brother Salas is NOK. Drinks 2 24 oz beers a day.   + Tobacco - 50 pack year smoking history, active.   Rare cannabis, acid use in 70s, no additional illicits.    Was working at [employer] in [place]  before stroke.      FAMILY HISTORY  Mother  of melanoma in      CURRENT MEDS REVIEWED    ALLERGIES REVIEWED     SUBJECTIVE:  Patient doing well today. His energy is great. His stools are back to normal - one episode of loose stool but he took a stool softener. He is on 10 mg prednisone.    A 13-point review of systems was performed, with significant findings documented above in subjective history.    OBJECTIVE:  Vitals:    24 1257   BP: 114/70   Pulse: 57   Resp: 18   Temp: 36.2 °C (97.2 °F)   SpO2: 96%        Body surface area is 1.7 meters squared.     Wt Readings from Last 5 Encounters:   24 59.3 kg (130 lb 11.7 oz)   24 60.3 kg (133 lb)   24 60.3 kg (132 lb 14.4 oz)   24 60.9 kg (134 lb 4.2 oz)   24 62.6 kg (138 lb)       ECOGSCORE: 1- Restricted in physically strenuous activity.  Carries out light duty.     Physical Exam  Vitals reviewed.   Constitutional:       Appearance: Normal appearance.      Comments: Thin framed   HENT:      Head: Normocephalic and atraumatic.      Mouth/Throat:      Mouth: Mucous membranes are moist.      Pharynx: Oropharynx is clear.   Eyes:      Extraocular Movements: Extraocular movements intact.      Conjunctiva/sclera: Conjunctivae normal.   Cardiovascular:      Rate and Rhythm: Regular rhythm. Bradycardia present.      Pulses: Normal pulses.      Heart  sounds: Normal heart sounds.   Pulmonary:      Breath sounds: Normal breath sounds.   Abdominal:      General: Bowel sounds are normal.      Palpations: Abdomen is soft.   Musculoskeletal:         General: Normal range of motion.      Cervical back: Normal range of motion and neck supple.      Comments: Leg brace to LLE   Skin:     General: Skin is warm and dry.   Neurological:      General: No focal deficit present.      Mental Status: He is alert and oriented to person, place, and time.   Psychiatric:         Mood and Affect: Mood normal.         Behavior: Behavior normal.         Thought Content: Thought content normal.         Judgment: Judgment normal.        Diagnostic Results   Results:  Labs:  Lab Results   Component Value Date    WBC 8.3 08/06/2024    HGB 13.2 (L) 08/06/2024    HCT 39.9 (L) 08/06/2024    MCV 65 (L) 08/06/2024     08/06/2024      Lab Results   Component Value Date    NEUTROABS 5.06 08/06/2024        Lab Results   Component Value Date    GLUCOSE 95 07/15/2024    CALCIUM 9.1 07/15/2024     (L) 07/15/2024    K 4.5 07/15/2024    CO2 30 07/15/2024    CL 98 07/15/2024    BUN 10 07/15/2024    CREATININE 0.65 07/15/2024    MG 1.86 06/12/2024     Lab Results   Component Value Date    ALT 15 07/15/2024    AST 15 07/15/2024    ALKPHOS 49 07/15/2024    BILITOT 0.6 07/15/2024    BILIDIR 0.3 07/31/2022      Lab Results   Component Value Date    ACTH 8.2 07/15/2024    CORTISOL 24.6 (H) 07/15/2024    TSH 2.02 07/15/2024    FREET4 1.14 04/22/2024     No new imaging.      Assessment/Plan     Lung cancer (Multi), Clinical: Stage IVB (cT3, cN3, cM1c)  Mr. Pillo Guerin is a 63 YO with Stage IVB ([id number]) poorly differentiated adenocarcinoma of RUL, TPS 80%, lack of actionable genomic alterations noted.    He was started on single agent pembrolizumab since 08/31/23 - however had to be held in May 2024 due to grade III ICI colitis. This has resolved and he is currently on maintenance 10 mg  prednisone.    #Stage IVB NSCLC  -Continue with c14 pembrolizumab    #Grade IIII ICI Diarrhea - Tapered to 10 mg prednisone  - Diarrhea has resolved.  Reports no episodes within the last two weeks. Pt to continue daily 10 mg maintenance dose.  Partial fill with initial Rx.  Recommended pt contact pharmacy for remaining doses.      #Pruritus, immune related  Continue moisturizing BUE with CeraVe and triamcinolone as needed     #Right occipital lobe 8 mm metastasis   S/p GKRS 24 Gy in 1 fraction on 10/17/23  Repeat MRI brain every 3 months -   Last on 05/15/24 showing no signs of PD  - repeat in 08/2024     #Pain on right gluteal region- resolved   Due to large centrally necrotic and peripherally hypermetabolic mass in the right lower paramediastinal muscle extending into the adjacent subcutaneous tissue and superior right gluteal muscle with involvement of the superior aspect of the right iliac  bone, right florencio sacrum -   He has been taking gabapentin for pain (controlled) in LLE that is chronic      #Stroke in April 2022 and hx of CAD  -   Stable -   On ASA and ticagrelor      #HTN - on amlodipine      #HLD - atorvastatin     Jorge Gray MD  Thoracic Medical Oncology   [id number] [facility] [id number]  Phone: [phone]

## 2024-08-07 ENCOUNTER — INFUSION (OUTPATIENT)
Dept: HEMATOLOGY/ONCOLOGY | Facility: CLINIC | Age: 64
End: 2024-08-07
Payer: COMMERCIAL

## 2024-08-07 ENCOUNTER — OFFICE VISIT (OUTPATIENT)
Dept: HEMATOLOGY/ONCOLOGY | Facility: CLINIC | Age: 64
End: 2024-08-07
Payer: COMMERCIAL

## 2024-08-07 ENCOUNTER — SOCIAL WORK (OUTPATIENT)
Dept: CASE MANAGEMENT | Facility: HOSPITAL | Age: 64
End: 2024-08-07

## 2024-08-07 VITALS
TEMPERATURE: 97.2 F | HEART RATE: 57 BPM | RESPIRATION RATE: 18 BRPM | DIASTOLIC BLOOD PRESSURE: 70 MMHG | OXYGEN SATURATION: 96 % | BODY MASS INDEX: 19.21 KG/M2 | WEIGHT: 130.73 LBS | SYSTOLIC BLOOD PRESSURE: 114 MMHG

## 2024-08-07 VITALS — BODY MASS INDEX: 19.3 KG/M2 | HEIGHT: 69 IN

## 2024-08-07 DIAGNOSIS — C34.91 NSCLC OF RIGHT LUNG (MULTI): ICD-10-CM

## 2024-08-07 DIAGNOSIS — C79.31 SECONDARY MALIGNANT NEOPLASM OF BRAIN (MULTI): Primary | ICD-10-CM

## 2024-08-07 DIAGNOSIS — C34.11 MALIGNANT NEOPLASM OF UPPER LOBE OF RIGHT LUNG (MULTI): ICD-10-CM

## 2024-08-07 PROCEDURE — 1159F MED LIST DOCD IN RCRD: CPT | Performed by: STUDENT IN AN ORGANIZED HEALTH CARE EDUCATION/TRAINING PROGRAM

## 2024-08-07 PROCEDURE — 2500000004 HC RX 250 GENERAL PHARMACY W/ HCPCS (ALT 636 FOR OP/ED): Mod: JZ,JG | Performed by: NURSE PRACTITIONER

## 2024-08-07 PROCEDURE — 99214 OFFICE O/P EST MOD 30 MIN: CPT | Performed by: STUDENT IN AN ORGANIZED HEALTH CARE EDUCATION/TRAINING PROGRAM

## 2024-08-07 PROCEDURE — 3074F SYST BP LT 130 MM HG: CPT | Performed by: STUDENT IN AN ORGANIZED HEALTH CARE EDUCATION/TRAINING PROGRAM

## 2024-08-07 PROCEDURE — 99214 OFFICE O/P EST MOD 30 MIN: CPT | Mod: 25 | Performed by: STUDENT IN AN ORGANIZED HEALTH CARE EDUCATION/TRAINING PROGRAM

## 2024-08-07 PROCEDURE — 96413 CHEMO IV INFUSION 1 HR: CPT

## 2024-08-07 PROCEDURE — 4004F PT TOBACCO SCREEN RCVD TLK: CPT | Performed by: STUDENT IN AN ORGANIZED HEALTH CARE EDUCATION/TRAINING PROGRAM

## 2024-08-07 PROCEDURE — 3078F DIAST BP <80 MM HG: CPT | Performed by: STUDENT IN AN ORGANIZED HEALTH CARE EDUCATION/TRAINING PROGRAM

## 2024-08-07 PROCEDURE — 1125F AMNT PAIN NOTED PAIN PRSNT: CPT | Performed by: STUDENT IN AN ORGANIZED HEALTH CARE EDUCATION/TRAINING PROGRAM

## 2024-08-07 RX ORDER — HEPARIN 100 UNIT/ML
500 SYRINGE INTRAVENOUS AS NEEDED
Status: DISCONTINUED | OUTPATIENT
Start: 2024-08-07 | End: 2024-08-07 | Stop reason: HOSPADM

## 2024-08-07 RX ORDER — HEPARIN 100 UNIT/ML
500 SYRINGE INTRAVENOUS AS NEEDED
OUTPATIENT
Start: 2024-08-07

## 2024-08-07 RX ORDER — HEPARIN SODIUM,PORCINE/PF 10 UNIT/ML
50 SYRINGE (ML) INTRAVENOUS AS NEEDED
Status: DISCONTINUED | OUTPATIENT
Start: 2024-08-07 | End: 2024-08-07 | Stop reason: HOSPADM

## 2024-08-07 RX ORDER — FAMOTIDINE 10 MG/ML
20 INJECTION INTRAVENOUS ONCE AS NEEDED
Status: DISCONTINUED | OUTPATIENT
Start: 2024-08-07 | End: 2024-08-07 | Stop reason: HOSPADM

## 2024-08-07 RX ORDER — PROCHLORPERAZINE EDISYLATE 5 MG/ML
10 INJECTION INTRAMUSCULAR; INTRAVENOUS EVERY 6 HOURS PRN
Status: DISCONTINUED | OUTPATIENT
Start: 2024-08-07 | End: 2024-08-07 | Stop reason: HOSPADM

## 2024-08-07 RX ORDER — ALBUTEROL SULFATE 0.83 MG/ML
3 SOLUTION RESPIRATORY (INHALATION) AS NEEDED
Status: DISCONTINUED | OUTPATIENT
Start: 2024-08-07 | End: 2024-08-07 | Stop reason: HOSPADM

## 2024-08-07 RX ORDER — HEPARIN SODIUM,PORCINE/PF 10 UNIT/ML
50 SYRINGE (ML) INTRAVENOUS AS NEEDED
OUTPATIENT
Start: 2024-08-07

## 2024-08-07 RX ORDER — EPINEPHRINE 0.3 MG/.3ML
0.3 INJECTION SUBCUTANEOUS EVERY 5 MIN PRN
Status: DISCONTINUED | OUTPATIENT
Start: 2024-08-07 | End: 2024-08-07 | Stop reason: HOSPADM

## 2024-08-07 RX ORDER — PROCHLORPERAZINE MALEATE 10 MG
10 TABLET ORAL EVERY 6 HOURS PRN
Status: DISCONTINUED | OUTPATIENT
Start: 2024-08-07 | End: 2024-08-07 | Stop reason: HOSPADM

## 2024-08-07 RX ORDER — DIPHENHYDRAMINE HYDROCHLORIDE 50 MG/ML
50 INJECTION INTRAMUSCULAR; INTRAVENOUS AS NEEDED
Status: DISCONTINUED | OUTPATIENT
Start: 2024-08-07 | End: 2024-08-07 | Stop reason: HOSPADM

## 2024-08-07 ASSESSMENT — ENCOUNTER SYMPTOMS
LOSS OF SENSATION IN FEET: 0
DEPRESSION: 0
OCCASIONAL FEELINGS OF UNSTEADINESS: 0

## 2024-08-07 ASSESSMENT — PAIN SCALES - GENERAL: PAINLEVEL: 7

## 2024-08-07 NOTE — PROGRESS NOTES
JEANA met with patient at [facility] today at [facility]. Patient has been managing well at home and feeling ok. No concerns today. Patient spoke about his first encounter with Dr. Isaac Cai. Patient also spoke about his past work at [employer] in [place]. SW provided support.

## 2024-08-08 LAB — ACTH PLAS-MCNC: 3.8 PG/ML (ref 7.2–63.3)

## 2024-08-23 ENCOUNTER — TELEPHONE (OUTPATIENT)
Dept: CARDIOLOGY | Facility: CLINIC | Age: 64
End: 2024-08-23
Payer: COMMERCIAL

## 2024-08-23 NOTE — TELEPHONE ENCOUNTER
Received message from patient regarding resuming blood thinners after multiple teeth extraction. States he has stayed on ASA 81mg daily. Explained that we prefer he resume as soon as safe to do so but that will be up to his dentist. States understanding and agreement

## 2024-08-26 ENCOUNTER — HOSPITAL ENCOUNTER (OUTPATIENT)
Dept: RADIOLOGY | Facility: CLINIC | Age: 64
Discharge: HOME | End: 2024-08-26
Payer: COMMERCIAL

## 2024-08-26 ENCOUNTER — LAB (OUTPATIENT)
Dept: LAB | Facility: CLINIC | Age: 64
End: 2024-08-26
Payer: COMMERCIAL

## 2024-08-26 ENCOUNTER — NUTRITION (OUTPATIENT)
Dept: HEMATOLOGY/ONCOLOGY | Facility: CLINIC | Age: 64
End: 2024-08-26

## 2024-08-26 ENCOUNTER — APPOINTMENT (OUTPATIENT)
Dept: PULMONOLOGY | Facility: CLINIC | Age: 64
End: 2024-08-26
Payer: COMMERCIAL

## 2024-08-26 ENCOUNTER — SOCIAL WORK (OUTPATIENT)
Dept: CASE MANAGEMENT | Facility: HOSPITAL | Age: 64
End: 2024-08-26
Payer: COMMERCIAL

## 2024-08-26 VITALS — WEIGHT: 136.47 LBS | BODY MASS INDEX: 20.21 KG/M2 | HEIGHT: 69 IN

## 2024-08-26 DIAGNOSIS — R53.0 NEOPLASTIC (MALIGNANT) RELATED FATIGUE: ICD-10-CM

## 2024-08-26 DIAGNOSIS — C79.31 SECONDARY MALIGNANT NEOPLASM OF BRAIN (MULTI): ICD-10-CM

## 2024-08-26 DIAGNOSIS — C34.11 MALIGNANT NEOPLASM OF UPPER LOBE OF RIGHT LUNG (MULTI): Primary | ICD-10-CM

## 2024-08-26 DIAGNOSIS — C34.11 MALIGNANT NEOPLASM OF UPPER LOBE OF RIGHT LUNG (MULTI): ICD-10-CM

## 2024-08-26 LAB
ALBUMIN SERPL BCP-MCNC: 4.3 G/DL (ref 3.4–5)
ALP SERPL-CCNC: 45 U/L (ref 33–136)
ALT SERPL W P-5'-P-CCNC: 16 U/L (ref 10–52)
ANION GAP SERPL CALC-SCNC: 14 MMOL/L (ref 10–20)
AST SERPL W P-5'-P-CCNC: 14 U/L (ref 9–39)
BASOPHILS # BLD AUTO: 0.06 X10*3/UL (ref 0–0.1)
BASOPHILS NFR BLD AUTO: 0.7 %
BILIRUB SERPL-MCNC: 0.8 MG/DL (ref 0–1.2)
BUN SERPL-MCNC: 8 MG/DL (ref 6–23)
CALCIUM SERPL-MCNC: 9 MG/DL (ref 8.6–10.6)
CHLORIDE SERPL-SCNC: 100 MMOL/L (ref 98–107)
CO2 SERPL-SCNC: 26 MMOL/L (ref 21–32)
CORTIS SERPL-MCNC: 7.9 UG/DL (ref 2.5–20)
CREAT SERPL-MCNC: 0.74 MG/DL (ref 0.5–1.3)
EGFRCR SERPLBLD CKD-EPI 2021: >90 ML/MIN/1.73M*2
EOSINOPHIL # BLD AUTO: 0.25 X10*3/UL (ref 0–0.7)
EOSINOPHIL NFR BLD AUTO: 3 %
ERYTHROCYTE [DISTWIDTH] IN BLOOD BY AUTOMATED COUNT: 17.7 % (ref 11.5–14.5)
GLUCOSE SERPL-MCNC: 113 MG/DL (ref 74–99)
HCT VFR BLD AUTO: 35.4 % (ref 41–52)
HGB BLD-MCNC: 11.7 G/DL (ref 13.5–17.5)
IMM GRANULOCYTES # BLD AUTO: 0.06 X10*3/UL (ref 0–0.7)
IMM GRANULOCYTES NFR BLD AUTO: 0.7 % (ref 0–0.9)
LYMPHOCYTES # BLD AUTO: 2.05 X10*3/UL (ref 1.2–4.8)
LYMPHOCYTES NFR BLD AUTO: 25 %
MCH RBC QN AUTO: 22 PG (ref 26–34)
MCHC RBC AUTO-ENTMCNC: 33.1 G/DL (ref 32–36)
MCV RBC AUTO: 67 FL (ref 80–100)
MONOCYTES # BLD AUTO: 0.33 X10*3/UL (ref 0.1–1)
MONOCYTES NFR BLD AUTO: 4 %
NEUTROPHILS # BLD AUTO: 5.46 X10*3/UL (ref 1.2–7.7)
NEUTROPHILS NFR BLD AUTO: 66.6 %
NRBC BLD-RTO: ABNORMAL /100{WBCS}
PLATELET # BLD AUTO: 339 X10*3/UL (ref 150–450)
POTASSIUM SERPL-SCNC: 4.5 MMOL/L (ref 3.5–5.3)
PROT SERPL-MCNC: 6.5 G/DL (ref 6.4–8.2)
RBC # BLD AUTO: 5.31 X10*6/UL (ref 4.5–5.9)
SODIUM SERPL-SCNC: 135 MMOL/L (ref 136–145)
TSH SERPL-ACNC: 2.13 MIU/L (ref 0.44–3.98)
WBC # BLD AUTO: 8.2 X10*3/UL (ref 4.4–11.3)

## 2024-08-26 PROCEDURE — 71260 CT THORAX DX C+: CPT | Performed by: RADIOLOGY

## 2024-08-26 PROCEDURE — 85025 COMPLETE CBC W/AUTO DIFF WBC: CPT

## 2024-08-26 PROCEDURE — 82024 ASSAY OF ACTH: CPT

## 2024-08-26 PROCEDURE — 84443 ASSAY THYROID STIM HORMONE: CPT

## 2024-08-26 PROCEDURE — 2550000001 HC RX 255 CONTRASTS: Mod: SE | Performed by: STUDENT IN AN ORGANIZED HEALTH CARE EDUCATION/TRAINING PROGRAM

## 2024-08-26 PROCEDURE — 74177 CT ABD & PELVIS W/CONTRAST: CPT

## 2024-08-26 PROCEDURE — 36415 COLL VENOUS BLD VENIPUNCTURE: CPT

## 2024-08-26 PROCEDURE — 82533 TOTAL CORTISOL: CPT

## 2024-08-26 PROCEDURE — 74177 CT ABD & PELVIS W/CONTRAST: CPT | Performed by: RADIOLOGY

## 2024-08-26 PROCEDURE — 80053 COMPREHEN METABOLIC PANEL: CPT

## 2024-08-26 NOTE — PROGRESS NOTES
JEANA met with patient at [facility] today. He had an appt with dietician. Patient requesting help with transportation home as he did not have the button to press for Lyft to get home like he normally does. JEANA called [facility] Customer Services. Patient has transportation through [facility] [phone]. JEANA set up return trip home for  at 1:45pm today. JEANA also provided patient with a print out of his schedule per request.

## 2024-08-26 NOTE — PROGRESS NOTES
Patient ID: Pillo Guerin is a 64 y.o. male    Primary Care Provider: Rocio Harris, APRN-CNP    DIAGNOSIS AND STAGING  Stage IVB (cT3 cN3 cM1c) poorly differentiated adenocarcinoma of RUL diagnosed on 08/03/23     SITES OF DISEASE  RUL  Supraclavicular nodes  Contralateral lung  Right gluteal muscle   Brain right occipital lobe      MOLECULAR GENOMICS  MICROSATELLITE STATUS: Microsatellite Stable (MAGDIEL)     DISEASE ASSOCIATED GENOMIC FINDINGS:   KRAS p.G13D ([id number] c.38G>A)   PIK3CA p.E542K ([id number] c.1624G>A)   TP53 p.E285K ([id number] c.853G>A)     DISEASE RELEVANT ALTERATIONS NOT DETECTED:   Negative for ALK fusion.   Negative for BRAF V600E.   Negative for EGFR sensitizing mutation.   Negative for KRAS G12C.   Negative for MET exon 14 skipping mutation.   Negative for NTRK fusion.   Negative for RET fusion.   Negative for ROS1 fusion.     PD-L1 TPS 80%     PRIOR THERAPIES  GKRS to right occipital lobe on  24 Gy in 1 fraction on 10/17/23      CURRENT THERAPY  Single agent pembrolizumab since 08/31/23    CURRENT ONCOLOGICAL PROBLEMS  Resolved GIII ICI colitis     HISTORY OF PRESENT ILLNESS  This is a pt with PMH of EtOH use disorder, CVA, CAD c/b NSTEMI s/p 2 stents and stage IVB ([id number]) poorly differentiated adenocarcinoma of RUL diagnosed on 08/03/23.      Patient was initially diagnosed 08/2022 after incidentally found RUL lesion was worked up during admission for hyponatremia presumed to be 2/2 beer potomania. He was then seen by his pulmonologist Dr. Callejas who ordered PET and MRI but this was not obtained  nor were multiple attempts to schedule for oncologic NPV.     I saw him in August 2023 and he endorsed haviing lost 15 lbs since stroke April 2022. His appetite is good, denies dysgeusia. He has slowed down a bit since he had his stroke. He can walk without becoming dyspneic but is limited by his stroke.    On 08/12/23 the patient underwent a bronchoscopy with EBUS:  Accession #: [id number]    Date of Procedure:8/3/2022   Date Reported: 8/9/2022   Date Received: 8/3/2022   Date of Birth / Sex 1960 (Age: 62) / M   Race: WHITE   Submitting Physician: DARYN YOUNG MD   Attending Physician: MD HEVER BROWNE MD   Procedures/Addenda Present     Other External #     FINAL CYTOLOGICAL INTERPRETATION     A. FINE NEEDLE ASPIRATION LYMPH NODE, L 4, CYTOLOGY AND CELL BLOCK:   NO MALIGNANT CELLS IDENTIFIED.   LYMPHOID SAMPLE IS PRESENT.     Note: Cell block shows presence of lymphoid cells along with bronchial cells   contamination.       B. FINE NEEDLE ASPIRATION LYMPH NODE, STATION 7, CYTOLOGY AND CELL BLOCK:   NO MALIGNANT CELLS IDENTIFIED.   LYMPHOID SAMPLE IS PRESENT.       C. FINE NEEDLE ASPIRATION LYMPH NODE, R 11, CYTOLOGY AND CELL BLOCK:   POORLY DIFFERENTIATED CARCINOMA, CONSISTENT WITH ADENOCARCINOMA, SEE NOTE.     Note: A limited panel of immunohistochemical stains performed showed neoplastic   cells staining strongly and diffusely positive with TTF-1 and negative with   p40. Findings are consistent with above.     Staff pathologist: Reviewed by Dr. Ellyn Valles.     Molecular testing has been ordered and results will be issued in   an addendum.     The cell block; C1 contains moderate tumor cellularity representing   roughly 70 % of all nucleated cells.       D. BRONCHO-ALVEOLAR LAVAGE OF RIGHT UPPER LOBE:   RARE ATYPICAL CELLS ARE PRESENT.     08/16/23: PET scan demonstrates multiple RUL FDG avid nodules as well as contra-lateral lung nodules, right hilar, mediastinal and supraclavicular nodes consistent with neoplastic involvement. There is also a large FDG avid right gluteal mass extending into the right SIJ and iliac bone.     08/16/23: MRI brain shows a 8 mm right occipital lobe metastasis with mild associated edema.     08/31/23: Single agent pembrolizumab begins     10/17/23: GKRS to right occipital lobe on  24 Gy in 1 fraction     06/06/24: grade 3 immune-related  diarrhea with episodes of incontinence despite Imodium   Rx prednisone 40 mg/day provided today      PAST MEDICAL HISTORY  CAD s/p 2 stents, CVA, Dupuytren's contracture   BPH  Lumbar radiculopathy   Ir-diarrhea (pembrolizumab) treated with prednisone months of May and      SURGICAL HISTORY  None      SOCIAL HISTORY  Born in [place], lives in [place] with 2 roommates.   Brother Salas is NOK. Drinks 2 24 oz beers a day.   + Tobacco - 50 pack year smoking history, active.   Rare cannabis, acid use in 70s, no additional illicits.    Was working at [employer] in [place] before stroke.      FAMILY HISTORY  Mother  of melanoma in      CURRENT MEDS REVIEWED    ALLERGIES REVIEWED     SUBJECTIVE:  He had 1 day of diarrhea (2 total episodes) two days ago. He took loperamide successfully. His stool was normal today. He usually eats rice to slow down his stool if needed but did not have it two days ago.    A 13-point review of systems was performed, with significant findings documented above in subjective history.    OBJECTIVE:  Vitals:    24 1016   BP: 122/62   Pulse: 56   Resp: 18   Temp: 36.5 °C (97.7 °F)   SpO2: 95%          Body surface area is 1.7 meters squared.     Wt Readings from Last 5 Encounters:   24 59.6 kg (131 lb 6.3 oz)   24 61.9 kg (136 lb 7.4 oz)   24 59.3 kg (130 lb 11.7 oz)   24 60.3 kg (133 lb)   24 60.3 kg (132 lb 14.4 oz)       ECOGSCORE: 1- Restricted in physically strenuous activity.  Carries out light duty.   Gen: A&O, NAD  Head: Normocephalic, atraumatic  Eyes: no scleral icterus  ENT: mucous membranes moist, no oropharyngeal lesions  Resp: Lungs CTAB  Cardiac: Normal rate, regular rhythm, no murmurs appreciated  Abdomen: Soft, nondistended, nontender, +BS  Neuro: CNII-XII grossly intact  Psych: appropriate mood & affect  Skin: warm, dry, no apparent rashes     Diagnostic Results   Results:  Labs:  Lab Results   Component Value Date    WBC  8.2 08/26/2024    HGB 11.7 (L) 08/26/2024    HCT 35.4 (L) 08/26/2024    MCV 67 (L) 08/26/2024     08/26/2024      Lab Results   Component Value Date    NEUTROABS 5.46 08/26/2024        Lab Results   Component Value Date    GLUCOSE 113 (H) 08/26/2024    CALCIUM 9.0 08/26/2024     (L) 08/26/2024    K 4.5 08/26/2024    CO2 26 08/26/2024     08/26/2024    BUN 8 08/26/2024    CREATININE 0.74 08/26/2024    MG 2.28 08/06/2024     Lab Results   Component Value Date    ALT 16 08/26/2024    AST 14 08/26/2024    ALKPHOS 45 08/26/2024    BILITOT 0.8 08/26/2024    BILIDIR 0.3 07/31/2022      Lab Results   Component Value Date    ACTH 3.8 (L) 08/06/2024    CORTISOL 7.9 08/26/2024    TSH 2.13 08/26/2024    FREET4 1.14 04/22/2024     CT CAP personally reviewed. RUL dominant lesion now 1.9 x 2.7 cm (vs. 2.4 x 2.2 at last scan). JONH lesion stable.    Assessment/Plan     Lung cancer (Multi), Clinical: Stage IVB (cT3, cN3, cM1c)  Mr. Pillo Guerin is a 65 YO with Stage IVB ([id number]) poorly differentiated adenocarcinoma of RUL, TPS 80%, lack of actionable genomic alterations noted.    He was started on single agent pembrolizumab since 08/31/23 - however had to be held in May 2024 due to grade III ICI colitis. This has resolved and he is currently on maintenance 10 mg prednisone. His most recent scans were personally reviewed showing stability in his BL lung lesions. We will continue with cycle 15 pembrolizumab today and follow in 3 weeks.    #Stage IVB NSCLC  -Continue with c15 pembrolizumab  -Scans without evidence of progression. Will await formal read    #Grade IIII ICI Diarrhea - Tapered to 10 mg prednisone  - Diarrhea has resolved.  Reports no episodes within the last two weeks. Pt to continue daily 10 mg maintenance dose.  Partial fill with initial Rx.  Recommended pt contact pharmacy for remaining doses.      #Pruritus, immune related  Continue moisturizing BUE with CeraVe and triamcinolone as needed      #Right occipital lobe 8 mm metastasis   S/p GKRS 24 Gy in 1 fraction on 10/17/23  Repeat MRI brain every 3 months -   Last on 05/15/24 showing no signs of PD  - repeat in 08/2024 awaiting read     #Pain on right gluteal region- resolved   Due to large centrally necrotic and peripherally hypermetabolic mass in the right lower paramediastinal muscle extending into the adjacent subcutaneous tissue and superior right gluteal muscle with involvement of the superior aspect of the right iliac  bone, right florencio sacrum -   He has been taking gabapentin for pain (controlled) in LLE that is chronic      #Stroke in April 2022 and hx of CAD  -   Stable -   On ASA and ticagrelor      #HTN - on amlodipine      #HLD - atorvastatin     Jorge Gray MD  Thoracic Medical Oncology   [address] [place] [address]  Phone: [phone]

## 2024-08-26 NOTE — PROGRESS NOTES
"NUTRITION Assessment NOTE    Nutrition Assessment     Reason for Visit:  Pillo Guerin is a 64 y.o. 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   Today pt denies a h/o colitis.     Referred to this service for wt loss and assessed today in the clinic.    PMH of EtOH use disorder, CVA, CAD c/b NSTEMI s/p 2 stents, hyponatremia presumed to be 2/2 beer potomania.     Noted to have [facility] Dual Complete for coverage.  Pt states he is provided with $220 monthly for food and his insurance told him that he is to use this provision for ONS.  Pt reports he uses $170 monthly for his cable bill and has $50 is leftover for food.  Pt reports SW attempted to get ONS covered separately before and was unsuccessful for this reason.    Lab Results   Component Value Date/Time    GLUCOSE 113 (H) 08/06/2024 1032     (L) 08/06/2024 1032    K 4.7 08/06/2024 1032    CL 97 (L) 08/06/2024 1032    CO2 28 08/06/2024 1032    ANIONGAP 10 08/06/2024 1032    BUN 11 08/06/2024 1032    CREATININE 0.75 08/06/2024 1032    EGFR >90 08/06/2024 1032    CALCIUM 9.6 08/06/2024 1032    ALBUMIN 4.6 08/06/2024 1032    ALKPHOS 54 08/06/2024 1032    PROT 6.6 08/06/2024 1032    AST 15 08/06/2024 1032    BILITOT 0.9 08/06/2024 1032    ALT 15 08/06/2024 1032     Lab Results   Component Value Date/Time    VITD25 15 (A) 04/21/2022 0622   Reports he does not recall ever having to take Vit D to replete his level    Anthropometrics:  Anthropometrics  Height: 175.3 cm (5' 9.02\")  Weight: 61.9 kg (136 lb 7.4 oz)  BMI (Calculated): 20.14  IBW/kg (Dietitian " Calculated): 72.7 kg  Weight Change  Weight History / % Weight Change: Gain of 2.6 kg in < 3 weeks after significant loss  Significant Weight Loss: Yes (recent h/o)  Significant Weight Gain: Non-fluid related      Wt Readings from Last 25 Encounters:   8/26/24 61.9 kg    08/07/24 59.3 kg (130 lb 11.7 oz)- loss of 7.8 kg (11.6%) in < 6 months- significant   07/19/24 60.3 kg (133 lb)   07/17/24 60.3 kg (132 lb 14.4 oz)   06/12/24 60.9 kg (134 lb 4.2 oz)   05/16/24 62.6 kg (138 lb)   05/15/24 62.7 kg (138 lb 4.8 oz)   04/24/24 63.5 kg (139 lb 15.9 oz)   04/04/24 63.5 kg (140 lb)   04/03/24 61.4 kg (135 lb 5.8 oz)   03/13/24 63.6 kg (140 lb 1.6 oz)   02/26/24 63 kg (139 lb)   02/01/24 65 kg (143 lb 4.8 oz)   01/03/24 67.1 kg (147 lb 14.9 oz)     12/13/23 63.2 kg (139 lb 3.5 oz)   11/22/23 63.9 kg (140 lb 14 oz)   11/01/23 59.1 kg (130 lb 4.7 oz)   10/11/23 59.1 kg (130 lb 4.7 oz)   10/17/23 59 kg (130 lb)   10/08/23 52.5 kg (115 lb 11.9 oz)     07/18/22 55.1 kg (121 lb 7 oz)   06/20/22 54.6 kg (120 lb 6 oz)   06/13/22 54.4 kg (120 lb)   05/13/22 56.7 kg (125 lb)            Food And Nutrient Intake:  Food and Nutrient History  Food and Nutrient History: Pt receives [employer] meals weekly- 7 entrees per wk.  this week was all Jennie Calendar frozen meals which he enjoys. He notes he normally keeps rice cooked in the fridge to help with diarrhea as needed.  He does not eat fresh fruits/vegetables or nuts.  Energy Intake: Good > 75 %  Fluid Intake: 1.5 cups of coffee, a lot of water and beer- has 4 beers ([place]) and he is tired so he will then eat dinner and go to bed (Historically he would drink 8-10 beers but he gets tired sooner.)  GI Symptoms: diarrhea  Dentition: own, poor condition   Pt reports he drank a sip of his coffee this a.m. and he felt like he was going to have an episode of diarrhea, so he took 4 each imodium.  He then finished his coffee and moved his bowels- initially it was all liquid but this was followed  "by some formed stools.  He then took an additional 4 each imodium tabs for a total of 8 each.        Food Intake  Meal 1: Regular coffee, 2 waffles with butter and syrup and 3 slices of benitez  Meal 2: Salami sandwich- no cheese (wasn't sure if it would constipate him),  Meal 3: srini Calendar swedish meatballs frozen entree    Food Preparation  Grocery Shopping: Patient (He lives with 2 other adults)                        Bioactive Substance Intake  Alcohol Drink Size / Volume (ounces): 48 ounces  Alcohol Intake Frequency (drinking days/week): 7 drinking days/week  Provides 1420 calories per day or 65% of estimated energy needs       Micronutrient Intake  Vitamin Intake: Multivitamin (with iron d/t h/o anemia per pt)    Food Supplement Intake  Oral Nutrition Supplements:  (Reports when trialled in the hospital, ONS constipated him)    Medication and Complementary/Alternative Medicine Use  Prescription Medication Use: Taking his prednisone right after his breakfast daily.      Nutrition Focused Physical Exam Findings:      Subcutaneous Fat Loss  Orbital Fat Pads: Mild-Moderate (slight dark circles and slight hollowing)  Buccal Fat Pads: Mild-Moderate (flat cheeks, minimal bounce)  Triceps: Severe (negligible fat tissue)  Ribs: Defer    Muscle Wasting  Temporalis: Mild-Moderate (slight depression)  Pectoralis (Clavicular Region): Defer  Deltoid/Trapezius: Mild-Moderate (slight protrusion of acromion process)  Interosseous: Defer  Trapezius/Infraspinatus/Supraspinatus (Scapular Region): Defer  Quadriceps: Defer  Gastrocnemius: Mild-Moderate (not well developed muscle)    Edema  Edema: none         Energy Needs  Calculated Energy Needs Using Equations  Height: 175.3 cm (5' 9.02\")  Estimated Energy Needs  Total Energy Estimated Needs (kCal): 2166 kCal  Total Estimated Energy Need per Day (kCal/kg): 35 kCal/kg  Method for Estimating Needs: Increased for slow repletion  Estimated Fluid Needs  Total Fluid Estimated Needs " (mL): 1857 mL  Total Fluid Estimated Needs (mL/kg): 30 mL/kg  Estimated Protein Needs  Total Protein Estimated Needs (g): 87 g  Total Protein Estimated Needs (g/kg): 1.4 g/kg  Method for Estimating Needs: Increased for slow repletion        Nutrition Diagnosis   Malnutrition Diagnosis  Patient has Malnutrition Diagnosis: Yes  Diagnosis Status: New  Malnutrition Diagnosis: Severe malnutrition related to chronic disease or condition (h/o and current alcohol use disorder also contributing)  As Evidenced by: Significant wt as documented and moderate to severe depletion of reserves per NFPE    Nutrition Diagnosis  Patient has Nutrition Diagnosis: Yes  Diagnosis Status (1): New  Nutrition Diagnosis 1: Altered GI function  Related to (1): h/o GI compromise with chronic ethanol intake and side effects from  immunotherapy  As Evidenced by (1): pt h/o and current alcohol use disorder with significant malabsorption on tx- diagnosed with GIII Colitis and now with need for maintenance steroid therapy.  Additional Assessment Information (1): Pt is at risk for decreased serum B-12 and folate levels in the setting of alcohol use disorder. Recommend check levels and replete as needed.  Pt is likely thiamine deficient and would benefit from 100 mg daily.  Also likely with Zinc depletion or deficiency with above hx and then with losses d/t malabsorption.  Would benefit from Zinc repletion.    Pt with a h/o Vit D deficiency per lab draw in 2022. Pt denies use of repletion supplement and is likely still depleted at this time. Check current levels and replete as needed or consider maintenance dose as appropriate.      Nutrition Interventions/Recommendations   Nutrition Prescription  Individualized Nutrition Prescription Provided for : Regular TONO (Note pt with limited resources and provided Global meals vary but often provide frozen meals)    Food and Nutrition Delivery  Food and Nutrition Delivery  Meals & Snacks: Modify Composition of  Meals/Snacks, Modify schedule of foods/fluids  Goals: Encouraged to snack- at least nightly and add calories and protein to each meal and snack  Medical Food Supplement:  (Encouraged retrial for tolerance.  WIll mail Ensure coupons to pt)  Vitamin Supplement Therapy: Thiamin, Folate, D (MV)  Goals: Thiamine- 100 mg daily; Folic Acid- 1 mg (1,000 mcg) daily; Vit D replete as appropriate after serum level check (Continue DMV with iron)  Mineral Supplement Therapy: Zinc  Goals: 50 mg daily for 8 weeks  Bioactive Substance Management: Alcohol management  Goals: Pt has wreduced his intake from 8-10 servings daily to 4 servings daily.  Not likely that he will decrease intake further    Nutrition Education       Coordination of Care  Coordination of Nutrition Care by a Nutrition Professional  Collaboration and referral of nutrition care: Collaboration by nutrition professional with other providers  Goals: Provide appropriate and adequate micronutrient supplementation    There are no Patient Instructions on file for this visit.    Nutrition Monitoring and Evaluation   Food/Nutrient Related History Monitoring  Monitoring and Evaluation Plan: Fluid intake, Amount of food, Meal/snack pattern, Vitamin intake, Mineral/element intake (Alcohol intake)  Fluid Intake: Estimated fluid intake  Criteria: Meet daily hydration goals  Amount of Food: Estimated amout of food  Criteria: Meet energy and protein needs  Meal/Snack Pattern: Estimated meal and snack pattern  Criteria: as above  Vitamin Intake: Measured vitamin intake  Criteria: Serum B12, folate and vit D levels WNL  Criteria: Zinc as above  Body Composition/Growth/Weight History  Monitoring and Evaluation Plan: Weight  Weight: Weight change  Criteria: Maintenance or slow gain  Biochemical Data, Medical Tests and Procedures  Monitoring and Evaluation Plan: Electrolyte/renal panel, Vitamin profile  Criteria: WNL  Vitamin Profile: Vitamin D, 25 hydroxy  Criteria: > 30  Nutrition  Focused Physical Findings  Monitoring and Evaluation Plan: Adipose, Digestive System, Muscles  Adipose: Loss of subcutaneous fat  Criteria: No further loss  Digestive System: Diarrhea  Criteria: < 3 episodes daily managed with aprropriate use of imodium  Muscles: Muscle atrophy  Criteria: No further loss          Time Spent  Prep time on day of patient encounter: 5 minutes  Time spent directly with patient, family or caregiver: 50 minutes  Additional Time Spent on Patient Care Activities: 0 minutes  Documentation Time: 25 minutes  Other Time Spent: 0 minutes  Total: 80 minutes

## 2024-08-27 ENCOUNTER — TELEPHONE (OUTPATIENT)
Dept: RADIATION ONCOLOGY | Facility: HOSPITAL | Age: 64
End: 2024-08-27
Payer: COMMERCIAL

## 2024-08-27 NOTE — TELEPHONE ENCOUNTER
Called pt to remind of appointment on 8/28/24  at 2:00 Pt's phone went to voicemail left number if needs to reschedule.        Juanita Sher MA

## 2024-08-28 ENCOUNTER — APPOINTMENT (OUTPATIENT)
Dept: HEMATOLOGY/ONCOLOGY | Facility: CLINIC | Age: 64
End: 2024-08-28
Payer: COMMERCIAL

## 2024-08-28 ENCOUNTER — INFUSION (OUTPATIENT)
Dept: HEMATOLOGY/ONCOLOGY | Facility: CLINIC | Age: 64
End: 2024-08-28
Payer: COMMERCIAL

## 2024-08-28 ENCOUNTER — HOSPITAL ENCOUNTER (OUTPATIENT)
Dept: RADIOLOGY | Facility: CLINIC | Age: 64
Discharge: HOME | End: 2024-08-28
Payer: COMMERCIAL

## 2024-08-28 ENCOUNTER — OFFICE VISIT (OUTPATIENT)
Dept: HEMATOLOGY/ONCOLOGY | Facility: CLINIC | Age: 64
End: 2024-08-28
Payer: COMMERCIAL

## 2024-08-28 ENCOUNTER — HOSPITAL ENCOUNTER (OUTPATIENT)
Dept: RADIATION ONCOLOGY | Facility: HOSPITAL | Age: 64
Setting detail: RADIATION/ONCOLOGY SERIES
Discharge: HOME | End: 2024-08-28
Payer: COMMERCIAL

## 2024-08-28 ENCOUNTER — SOCIAL WORK (OUTPATIENT)
Dept: CASE MANAGEMENT | Facility: HOSPITAL | Age: 64
End: 2024-08-28

## 2024-08-28 VITALS
BODY MASS INDEX: 19.39 KG/M2 | HEART RATE: 56 BPM | SYSTOLIC BLOOD PRESSURE: 122 MMHG | DIASTOLIC BLOOD PRESSURE: 62 MMHG | RESPIRATION RATE: 18 BRPM | TEMPERATURE: 97.7 F | WEIGHT: 131.39 LBS | OXYGEN SATURATION: 95 %

## 2024-08-28 DIAGNOSIS — C34.11 MALIGNANT NEOPLASM OF UPPER LOBE OF RIGHT LUNG (MULTI): Primary | ICD-10-CM

## 2024-08-28 DIAGNOSIS — C79.31 SECONDARY MALIGNANT NEOPLASM OF BRAIN (MULTI): ICD-10-CM

## 2024-08-28 DIAGNOSIS — C34.90 MALIGNANT NEOPLASM OF LUNG, UNSPECIFIED LATERALITY, UNSPECIFIED PART OF LUNG (MULTI): ICD-10-CM

## 2024-08-28 DIAGNOSIS — C34.11 MALIGNANT NEOPLASM OF UPPER LOBE OF RIGHT LUNG (MULTI): ICD-10-CM

## 2024-08-28 LAB — ACTH PLAS-MCNC: 1.8 PG/ML (ref 7.2–63.3)

## 2024-08-28 PROCEDURE — 2550000001 HC RX 255 CONTRASTS: Mod: SE | Performed by: NURSE PRACTITIONER

## 2024-08-28 PROCEDURE — 70553 MRI BRAIN STEM W/O & W/DYE: CPT | Performed by: RADIOLOGY

## 2024-08-28 PROCEDURE — 3078F DIAST BP <80 MM HG: CPT | Performed by: STUDENT IN AN ORGANIZED HEALTH CARE EDUCATION/TRAINING PROGRAM

## 2024-08-28 PROCEDURE — 1159F MED LIST DOCD IN RCRD: CPT | Performed by: STUDENT IN AN ORGANIZED HEALTH CARE EDUCATION/TRAINING PROGRAM

## 2024-08-28 PROCEDURE — 2500000004 HC RX 250 GENERAL PHARMACY W/ HCPCS (ALT 636 FOR OP/ED): Mod: JZ,JG,SE | Performed by: STUDENT IN AN ORGANIZED HEALTH CARE EDUCATION/TRAINING PROGRAM

## 2024-08-28 PROCEDURE — A9575 INJ GADOTERATE MEGLUMI 0.1ML: HCPCS | Mod: SE | Performed by: NURSE PRACTITIONER

## 2024-08-28 PROCEDURE — 3074F SYST BP LT 130 MM HG: CPT | Performed by: STUDENT IN AN ORGANIZED HEALTH CARE EDUCATION/TRAINING PROGRAM

## 2024-08-28 PROCEDURE — 70553 MRI BRAIN STEM W/O & W/DYE: CPT

## 2024-08-28 PROCEDURE — 96413 CHEMO IV INFUSION 1 HR: CPT

## 2024-08-28 PROCEDURE — 1125F AMNT PAIN NOTED PAIN PRSNT: CPT | Performed by: STUDENT IN AN ORGANIZED HEALTH CARE EDUCATION/TRAINING PROGRAM

## 2024-08-28 PROCEDURE — 99215 OFFICE O/P EST HI 40 MIN: CPT | Performed by: STUDENT IN AN ORGANIZED HEALTH CARE EDUCATION/TRAINING PROGRAM

## 2024-08-28 PROCEDURE — 99214 OFFICE O/P EST MOD 30 MIN: CPT | Performed by: NURSE PRACTITIONER

## 2024-08-28 RX ORDER — ALBUTEROL SULFATE 0.83 MG/ML
3 SOLUTION RESPIRATORY (INHALATION) AS NEEDED
Status: DISCONTINUED | OUTPATIENT
Start: 2024-08-28 | End: 2024-08-28 | Stop reason: HOSPADM

## 2024-08-28 RX ORDER — PROCHLORPERAZINE EDISYLATE 5 MG/ML
10 INJECTION INTRAMUSCULAR; INTRAVENOUS EVERY 6 HOURS PRN
OUTPATIENT
Start: 2024-09-18

## 2024-08-28 RX ORDER — EPINEPHRINE 0.3 MG/.3ML
0.3 INJECTION SUBCUTANEOUS EVERY 5 MIN PRN
OUTPATIENT
Start: 2024-09-18

## 2024-08-28 RX ORDER — EPINEPHRINE 0.3 MG/.3ML
0.3 INJECTION SUBCUTANEOUS EVERY 5 MIN PRN
Status: DISCONTINUED | OUTPATIENT
Start: 2024-08-28 | End: 2024-08-28 | Stop reason: HOSPADM

## 2024-08-28 RX ORDER — PROCHLORPERAZINE EDISYLATE 5 MG/ML
10 INJECTION INTRAMUSCULAR; INTRAVENOUS EVERY 6 HOURS PRN
Status: CANCELLED | OUTPATIENT
Start: 2024-08-28

## 2024-08-28 RX ORDER — PROCHLORPERAZINE MALEATE 10 MG
10 TABLET ORAL EVERY 6 HOURS PRN
Status: DISCONTINUED | OUTPATIENT
Start: 2024-08-28 | End: 2024-08-28 | Stop reason: HOSPADM

## 2024-08-28 RX ORDER — FAMOTIDINE 10 MG/ML
20 INJECTION INTRAVENOUS ONCE AS NEEDED
Status: CANCELLED | OUTPATIENT
Start: 2024-08-28

## 2024-08-28 RX ORDER — GADOTERATE MEGLUMINE 376.9 MG/ML
13 INJECTION INTRAVENOUS
Status: COMPLETED | OUTPATIENT
Start: 2024-08-28 | End: 2024-08-28

## 2024-08-28 RX ORDER — EPINEPHRINE 0.3 MG/.3ML
0.3 INJECTION SUBCUTANEOUS EVERY 5 MIN PRN
Status: CANCELLED | OUTPATIENT
Start: 2024-08-28

## 2024-08-28 RX ORDER — DIPHENHYDRAMINE HYDROCHLORIDE 50 MG/ML
50 INJECTION INTRAMUSCULAR; INTRAVENOUS AS NEEDED
OUTPATIENT
Start: 2024-09-18

## 2024-08-28 RX ORDER — DIPHENHYDRAMINE HYDROCHLORIDE 50 MG/ML
50 INJECTION INTRAMUSCULAR; INTRAVENOUS AS NEEDED
Status: CANCELLED | OUTPATIENT
Start: 2024-08-28

## 2024-08-28 RX ORDER — FAMOTIDINE 10 MG/ML
20 INJECTION INTRAVENOUS ONCE AS NEEDED
Status: DISCONTINUED | OUTPATIENT
Start: 2024-08-28 | End: 2024-08-28 | Stop reason: HOSPADM

## 2024-08-28 RX ORDER — PROCHLORPERAZINE EDISYLATE 5 MG/ML
10 INJECTION INTRAMUSCULAR; INTRAVENOUS EVERY 6 HOURS PRN
Status: DISCONTINUED | OUTPATIENT
Start: 2024-08-28 | End: 2024-08-28 | Stop reason: HOSPADM

## 2024-08-28 RX ORDER — PROCHLORPERAZINE MALEATE 10 MG
10 TABLET ORAL EVERY 6 HOURS PRN
OUTPATIENT
Start: 2024-09-18

## 2024-08-28 RX ORDER — DIPHENHYDRAMINE HYDROCHLORIDE 50 MG/ML
50 INJECTION INTRAMUSCULAR; INTRAVENOUS AS NEEDED
Status: DISCONTINUED | OUTPATIENT
Start: 2024-08-28 | End: 2024-08-28 | Stop reason: HOSPADM

## 2024-08-28 RX ORDER — PROCHLORPERAZINE MALEATE 10 MG
10 TABLET ORAL EVERY 6 HOURS PRN
Status: CANCELLED | OUTPATIENT
Start: 2024-08-28

## 2024-08-28 RX ORDER — FAMOTIDINE 10 MG/ML
20 INJECTION INTRAVENOUS ONCE AS NEEDED
OUTPATIENT
Start: 2024-09-18

## 2024-08-28 RX ORDER — ALBUTEROL SULFATE 0.83 MG/ML
3 SOLUTION RESPIRATORY (INHALATION) AS NEEDED
Status: CANCELLED | OUTPATIENT
Start: 2024-08-28

## 2024-08-28 RX ORDER — ALBUTEROL SULFATE 0.83 MG/ML
3 SOLUTION RESPIRATORY (INHALATION) AS NEEDED
OUTPATIENT
Start: 2024-09-18

## 2024-08-28 ASSESSMENT — PAIN SCALES - GENERAL: PAINLEVEL: 8

## 2024-08-28 NOTE — PROGRESS NOTES
JEANA received message from HAI Fernandez, RN at [facility] stating patient is having issues with his ride through [employer] [phone]. JAENA and SW student met with patient today. He reported he was texted the link to activate his ride home however he has a flip phone and cannot click the link. SW offered to help when he is 30 min our from completing treatment. JEANA called [employer] and set up  at 12:20pm. SW made patient aware.

## 2024-08-28 NOTE — PROGRESS NOTES
Radiation Oncology Follow-Up    Patient Name:  Pillo Guerin  MRN:  [id number]  :  1960    Referring Provider: Fadumo Carranza, APR*  Primary Care Provider: AMAN Roland  Care Team: Patient Care Team:  AMAN Roland as PCP - General (Family Medicine)  Troy Callejas MD as PCP - [facility] Medicaid PCP  Zuleyma Vines MD as Consulting Physician (Hematology and Oncology)  Arslan Min RN as Nurse Navigator (Hematology and Oncology)  FABIEN Garrett as  ()  Jorge Gray MD as Consulting Physician (Hematology and Oncology)  Virtual or Telephone Consent    A telephone visit (audio only) between the patient (at the originating site) and the provider (at the distant site) was utilized to provide this telehealth service.   Verbal consent was requested and obtained from Pillo Guerin on this date, 24 for a telehealth visit.    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  Systemic therapy with pembrolizumab initiated 2023    SUBJECTIVE  History of Present Illness:   Telehealth visit with Mr. Guerin today for routine radiation follow up and review of MRI of  brain done earlier today at [facility]. He also had infusion today.   He says he is feeling well except has some issues at times with insomnia.  He does take melatonin prn which helps.  He continues to smoke at least 1 ppd of cigarettes and has no intention of quitting.  He denies SOB or SANCHEZ however. He has no new neurologic issues and has some persistent dragging of left leg after a previous stroke.  No headaches, seizures, visual changes or falls. He has diarrhea 2-3 times most days and takes 2-3 immodium per day.  Mild pruritic skin rash from immunotherapy.  Uses a cream which helps. No fever, chills, cough, hemoptysis, N/V or  "bony pain. He endorses \"electric shock\" type pain sometimes down his legs. He continues pembrolizumab infusions q 3 weeks. MRI without evidence for progression.     Review of Systems:    Review of Systems   All other systems reviewed and are negative.    OBJECTIVE    Current Outpatient Medications:   •  albuterol 90 mcg/actuation inhaler, Inhale 2 puffs every 4 hours if needed., Disp: , Rfl:   •  amLODIPine (Norvasc) 5 mg tablet, Take 0.5 tablets (2.5 mg) by mouth once daily., Disp: , Rfl:   •  atorvastatin (Lipitor) 80 mg tablet, TAKE 1 TABLET BY MOUTH ONCE DAILY, Disp: 30 tablet, Rfl: 1  •  diclofenac sodium 1 % kit, Apply 2 g topically every 6 hours if needed., Disp: , Rfl:   •  gabapentin (Neurontin) 300 mg capsule, Take 2 capsules (600 mg) by mouth 2 times a day., Disp: 120 capsule, Rfl: 3  •  loperamide (Imodium A-D) 2 mg tablet, Take it as instructed for diarrhea, Disp: 60 tablet, Rfl: 3  •  losartan (Cozaar) 50 mg tablet, Take 1 tablet (50 mg) by mouth., Disp: , Rfl:   •  methocarbamol (Robaxin) 750 mg tablet, Take 1 tablet (750 mg) by mouth 3 times a day as needed., Disp: , Rfl:   •  metoprolol tartrate (Lopressor) 25 mg tablet, TAKE 1 TABLET BY MOUTH TWO TIMES A DAY, Disp: 60 tablet, Rfl: 1  •  nitroglycerin (Nitrostat) 0.4 mg SL tablet, DISSOLVE 1 TABLET UNDER TONGUE EVERY 5 MINUTES FOR 3 DOSES AS NEEDED FOR CHEST PAIN. IF PAIN PERSISTS DIAL 911., Disp: 25 tablet, Rfl: 1  •  omeprazole (PriLOSEC) 20 mg DR capsule, Do not crush or chew. Take it 30 minutes before breakfast., Disp: 30 capsule, Rfl: 11  •  pantoprazole (ProtoNix) 40 mg EC tablet, Take 1 tablet (40 mg) by mouth once daily. Do not crush, chew, or split., Disp: 30 tablet, Rfl: 2  •  predniSONE (Deltasone) 10 mg tablet, Take it as instructed, Disp: 120 tablet, Rfl: 1  •  ticagrelor (Brilinta) 60 mg tablet, Take 1 tablet (60 mg) by mouth 2 times a day., Disp: 60 tablet, Rfl: 11  •  triamcinolone (Kenalog) 0.1 % ointment, Apply topically 2 times a " day., Disp: 80 g, Rfl: 1  •  umeclidinium (Incruse Ellipta) 62.5 mcg/actuation inhalation, Inhale 1 puff (62.5 mcg) once daily., Disp: 1 each, Rfl: 11    Current Facility-Administered Medications:   •  acetaminophen (Tylenol) tablet 650 mg, 650 mg, oral, q4h PRN, Angelica Munoz MD  •  HYDROmorphone (Dilaudid) injection 0.4 mg, 0.4 mg, intravenous, PRN, Angelica Munoz MD, 0.4 mg at 10/17/23 1230  •  HYDROmorphone (Dilaudid) injection 0.4 mg, 0.4 mg, intravenous, PRN, Angelica Munoz MD, 0.4 mg at 10/17/23 0830  •  ibuprofen tablet 400 mg, 400 mg, oral, PRN, Angelica Munoz MD  •  midazolam (Versed) injection 0.5 mg, 0.5 mg, intravenous, PRN, Angelica Munoz MD, 0.5 mg at 10/17/23 1230  •  midazolam (Versed) injection 0.5 mg, 0.5 mg, intravenous, PRN, Angelica Munoz MD, 0.5 mg at 10/17/23 0830  •  ondansetron (Zofran) injection 4 mg, 4 mg, intravenous, q4h PRN, Angelica Munoz MD  •  oxyCODONE (Roxicodone) immediate release tablet 10 mg, 10 mg, oral, q4h PRN, Angelica Munoz MD    Facility-Administered Medications Ordered in Other Encounters:   •  albuterol 2.5 mg /3 mL (0.083 %) nebulizer solution 3 mL, 3 mL, nebulization, PRN, Jorge Gray MD  •  dextrose 5 % in water (D5W) bolus, 500 mL, intravenous, PRN, Jorge Gray MD  •  diphenhydrAMINE (BENADryl) injection 50 mg, 50 mg, intravenous, PRN, Jorge Gray MD  •  EPINEPHrine (Epipen) injection syringe 0.3 mg, 0.3 mg, intramuscular, q5 min PRN, Jorge Gray MD  •  famotidine PF (Pepcid) injection 20 mg, 20 mg, intravenous, Once PRN, Jorge Gray MD  •  methylPREDNISolone sod succinate (SOLU-Medrol) 40 mg/mL injection 40 mg, 40 mg, intravenous, PRN, Jorge Gray MD  •  prochlorperazine (Compazine) injection 10 mg, 10 mg, intravenous, q6h PRN, Jorge Gray MD  •  prochlorperazine (Compazine) tablet 10 mg, 10 mg, oral, q6h PRN, Jorge Gray MD  •  sodium chloride 0.9 % bolus 500 mL, 500 mL, intravenous, PRN,  Jorge Gray MD     RESULTS:  MR brain w and wo IV contrast    Result Date: 8/28/2024  Interpreted By:  Jose Yeh, STUDY: MR BRAIN W AND WO IV CONTRAST;  8/28/2024 9:48 am   INDICATION: Signs/Symptoms:Metastatic lung cancer to the brain s/p gamma knife SRS. Surveillance imaging.   COMPARISON: 05/15/2024   ACCESSION NUMBER(S): [id number]   ORDERING CLINICIAN: MARY ANN STANFORD   TECHNIQUE: Axial diffusion, axial T2, axial FLAIR, axial gradient echo T2, axial T1, post gadolinium volumetric T1, as well as post gadolinium axial T1 weighted MRI images of the brain were obtained. The patient received  13 mL of  Dotarem gadolinium intravenously.   FINDINGS: The diffusion weighted images fail to demonstrate abnormal diffusion restriction to suggest acute infarction.   There is again evidence of moderate brain parenchymal volume loss.   Scattered as well as more patchy and confluent nonspecific white matter changes are again noted within cerebral hemispheres bilaterally along with ill-defined foci of increased signal on the FLAIR and T2 weighted images overlying the brainstem which while nonspecific, given the patient's age, likely represent sequelae of more remote small-vessel ischemic change.   Additional small foci of bright signal on the T2 weighted images are again noted within the subinsular regions, basal ganglia, and thalami bilaterally suggesting incidental mildly prominent perivascular spaces and/or small scattered more remote lacunar infarctions.   No abnormal intracranial enhancing mass lesion is noted.   There is opacification and partial opacification scattered ethmoid air cells. Mild mucosal thickening is noted within the maxillary sinuses, sphenoid sinus, and frontal sinuses.   There is persistent opacification of a few left mastoid air cells.       No abnormal intracranial enhancing mass lesion to suggest brain metastasis is noted.   There is again evidence of moderate brain parenchymal volume  loss.   Scattered as well as more patchy and confluent nonspecific white matter changes are again noted within cerebral hemispheres bilaterally along with ill-defined foci of increased signal on the FLAIR and T2 weighted images overlying the brainstem which while nonspecific, given the patient's age, likely represent sequelae of more remote small-vessel ischemic change. Additional small foci of bright signal on the T2 weighted images are again noted within the subinsular regions, basal ganglia, and thalami bilaterally suggesting incidental mildly prominent perivascular spaces and/or small scattered more remote lacunar infarctions.   MACRO: None.   Signed by: Jose Yeh 8/28/2024 11:45 AM Dictation workstation:   [id number]      ASSESSMENT/PLAN:  64 y.o. male with metastatic lung cancer to the brain s/p gamma knife SRS.  No new lesions noted on recent MRI of brain.  He continues systemic immunotherapy.      Plan: MRI of brain in 3 mo with rad onc follow up after scan. Discussed smoking cessation however pt not ready to quit or cut back. He will call with any questions or concerns.     Laverne Carranza CNP  [phone]

## 2024-09-06 ENCOUNTER — EVALUATION (OUTPATIENT)
Dept: PHYSICAL THERAPY | Facility: CLINIC | Age: 64
End: 2024-09-06
Payer: COMMERCIAL

## 2024-09-06 DIAGNOSIS — R26.9 ABNORMALITY OF GAIT AS LATE EFFECT OF CEREBROVASCULAR ACCIDENT (CVA): ICD-10-CM

## 2024-09-06 DIAGNOSIS — M25.552 PAIN IN LEFT HIP: ICD-10-CM

## 2024-09-06 DIAGNOSIS — R26.81 UNSTEADINESS ON FEET: ICD-10-CM

## 2024-09-06 DIAGNOSIS — I69.398 ABNORMALITY OF GAIT AS LATE EFFECT OF CEREBROVASCULAR ACCIDENT (CVA): ICD-10-CM

## 2024-09-06 DIAGNOSIS — R53.81 PHYSICAL DECONDITIONING: Primary | ICD-10-CM

## 2024-09-06 PROCEDURE — 97161 PT EVAL LOW COMPLEX 20 MIN: CPT | Mod: GP | Performed by: PHYSICAL THERAPIST

## 2024-09-06 PROCEDURE — 97110 THERAPEUTIC EXERCISES: CPT | Mod: GP | Performed by: PHYSICAL THERAPIST

## 2024-09-06 ASSESSMENT — ENCOUNTER SYMPTOMS
OCCASIONAL FEELINGS OF UNSTEADINESS: 0
LOSS OF SENSATION IN FEET: 1

## 2024-09-06 ASSESSMENT — PAIN SCALES - GENERAL: PAINLEVEL_OUTOF10: 8

## 2024-09-06 ASSESSMENT — PAIN - FUNCTIONAL ASSESSMENT: PAIN_FUNCTIONAL_ASSESSMENT: 0-10

## 2024-09-06 NOTE — PROGRESS NOTES
Physical Therapy    Physical Therapy Evaluation and Treatment      Patient Name: Pillo Guerin  MRN: [id number]  Today's Date: 9/9/2024  [facility]  Visit number: 1  Time Entry:   Time Calculation  Start Time: 0938  Stop Time: 1030  Time Calculation (min): 52 min  PT Evaluation Time Entry  PT Evaluation (Low) Time Entry: 28  PT Therapeutic Procedures Time Entry  Therapeutic Exercise Time Entry: 24    Assessment:  Patient is a 64 year old male referred to [facility]'s outpatient physical therapy services with a chief complaint of left hip pain and physical deconditioning.  The patient has a complex medical history including active cancer of upper right lobe of lung, atherosclerosis of leg with intermittent claudication, HTN, MI, and  COPD.  The patient presents with reduced lower extremity strength which was noted in manual muscle testing and functional in 30 second functional sit to stand test.  The patient will benefit from a lift recliner to utilize during times of fatigue associated with his medical co-morbidities.  and Mr. Guerni endorsed high level sof left hip pain especially during 6MWT, although he was able to complete test the patient demonstrated antalgic gait and decreased pace over time.  At this time the patient reports that he needs some time to decide if he wants to do physical therapy or not. I notified patient that his POC will be open for 30 days and after that he will be discharged and will require new script to return to physical therapy services.       Plan: keep POC open for 30 days  OP PT Plan  Treatment/Interventions: Education/ Instruction, Gait training, Neuromuscular re-education, Manual therapy, Self care/ home management, Taping techniques, Therapeutic activities, Therapeutic exercises, Cryotherapy, Hot pack  PT Plan: Skilled PT  PT Frequency: 1 time per week  Duration: 6 visits  Onset Date: 06/26/24  Certification Period Start Date: 09/06/24  Certification Period End Date: 12/05/24  Rehab  "Potential: Fair  Plan of Care Agreement: Patient    Current Problem:   1. Physical deconditioning        2. Unsteadiness on feet  Referral to Physical Therapy      3. Pain in left hip  Referral to Physical Therapy      4. Abnormality of gait as late effect of cerebrovascular accident (CVA)            Subjective    Pt. Had a stroke in 2022 and also broke his left hip the year ago.  He said that he too advil and that is a \"no-no.\"  He would like a lift chair.  He feels pretty good with his balance but has a problem if they take his attention away.  He can move okay during the day but his left side getting tired.  He has a hard time getting out of his chair at the end of the day.  He isnt able to get out of his couch.  Has a visiting nurse, takes his vitals, she visits every few months. He is going through cancer treatment right now.  He may want to come back for physical therapy treatment after today but he would like to think about it.      Home Setup: 2 FLAVIA with dog fence to hold onto, everything is on one level, he has as shower seat and tub shower, no grab bars but grabs onto walls  Equipment: AFO on L side, doesn't use a cane or anything  Hobbies: play with dog, watch TV  Physical Activity: walks around the neighborhood, goes about a mile  Occupation: retired  Sleep: okay  Hydration: 4-5  Falls: N/A  Pt. Goal: assessment  Preferences= \"Pal\"       Precautions: primary cs. Right upper lobe of lung, MI, CAD, atherosclerosis of leg with intermittent claudication, HTN     Vital Signs: 123/64 sitting LUE automatic cuff, HR=57     Pain: 8/10 L hip          Objective   Cognition: WNL     observation=pt. Arrived today with no device, AFO on LLE  Posture=excessive posture pelvic tilt, moderately rounded shoulders/forward head  Coordination=finger opposition moderately impaired coordination, pronation/supination WNL, DF/PF unable on L side due to hemiparesis, PF/DF WNL  Vision=WNL  Hearing=WNL  Sensation=constant tingling " "in his hand on the left side    Edema=neg.  Gait=foot slap noted on L side with reduced L push-off, decreased stance time on LLE without AFO and no device    Lower Extremity Strength MMT (tested in seated)    Left Right   Hip flexion 4+/5 5/5   Hip ADD  4+/5 4+/5   Hip ABD 4+/5 4+/5   Knee flexion 3/5 4+/5   Knee Extension 3+/5 4/5   Ankle Dorsiflexion 4-/5 4+/5   Ankle Plantarflexion 3+/5 5/5   Ankle Inversion 3+/5 5/5   Ankle Eversion 3+/5 5/5      SLS:  L side=1 sec.  R side=2 sec.     Outcome Measures:  6MWT= 956 ft. With no device  10MWT=12 seconds \"normal\" and 9 sec. \"fast\" without device   30 second sit<>stand=5 reps with unilateral UE support   ABC functional outcome measure=54%    Treatments:    Gait training:  Functional mobility 60'x1 with no AFO with no device (foot slap noted on L side with reduced L push-off), decreased stance time on LLE    There Ex:  -Hip ABD x10 on each side with YTB on thighs with BUE support  -Hip ABD x10 on each side without band with BUE support  -hip marches x10 alt. With unilateral UE support  -provided handout for sit<>stands, hip ABD, hip Flexion  -educated pt. On taking shorter walks to reduce stress/ strain on L hip  -educated pt. On recumbent biking to improve cardiovascular endurance and reduce stress/strain on L hip    EDUCATION:  -see treatment/flowsheet     Goals:  -goals will be established if pt. Returned within 1 month.          "

## 2024-09-09 VITALS — SYSTOLIC BLOOD PRESSURE: 123 MMHG | DIASTOLIC BLOOD PRESSURE: 64 MMHG | HEART RATE: 57 BPM

## 2024-09-09 ASSESSMENT — ENCOUNTER SYMPTOMS: DEPRESSION: 0

## 2024-09-11 ENCOUNTER — TELEMEDICINE (OUTPATIENT)
Dept: PULMONOLOGY | Facility: CLINIC | Age: 64
End: 2024-09-11
Payer: COMMERCIAL

## 2024-09-11 DIAGNOSIS — J43.9 PULMONARY EMPHYSEMA, UNSPECIFIED EMPHYSEMA TYPE (MULTI): Primary | ICD-10-CM

## 2024-09-11 DIAGNOSIS — F17.210 CIGARETTE NICOTINE DEPENDENCE WITHOUT COMPLICATION: ICD-10-CM

## 2024-09-11 DIAGNOSIS — C34.90 MALIGNANT NEOPLASM OF LUNG, UNSPECIFIED LATERALITY, UNSPECIFIED PART OF LUNG (MULTI): ICD-10-CM

## 2024-09-11 PROCEDURE — 99442 PR PHYS/QHP TELEPHONE EVALUATION 11-20 MIN: CPT | Performed by: INTERNAL MEDICINE

## 2024-09-11 PROCEDURE — 1160F RVW MEDS BY RX/DR IN RCRD: CPT | Performed by: INTERNAL MEDICINE

## 2024-09-11 PROCEDURE — 1159F MED LIST DOCD IN RCRD: CPT | Performed by: INTERNAL MEDICINE

## 2024-09-11 PROCEDURE — 4004F PT TOBACCO SCREEN RCVD TLK: CPT | Performed by: INTERNAL MEDICINE

## 2024-09-11 ASSESSMENT — ENCOUNTER SYMPTOMS
GASTROINTESTINAL NEGATIVE: 1
COUGH: 0
FEVER: 0
CARDIOVASCULAR NEGATIVE: 1
DEPRESSION: 0
NEUROLOGICAL NEGATIVE: 1
CHILLS: 0
RESPIRATORY NEGATIVE: 1
PSYCHIATRIC NEGATIVE: 1

## 2024-09-11 ASSESSMENT — COLUMBIA-SUICIDE SEVERITY RATING SCALE - C-SSRS
2. HAVE YOU ACTUALLY HAD ANY THOUGHTS OF KILLING YOURSELF?: NO
1. IN THE PAST MONTH, HAVE YOU WISHED YOU WERE DEAD OR WISHED YOU COULD GO TO SLEEP AND NOT WAKE UP?: NO
6. HAVE YOU EVER DONE ANYTHING, STARTED TO DO ANYTHING, OR PREPARED TO DO ANYTHING TO END YOUR LIFE?: NO

## 2024-09-11 NOTE — PROGRESS NOTES
This visit was completed via telephone. All issues as below were discussed and addressed but no physical exam was performed. If it was felt that the patient should be evaluated in clinic then they were directed there. The patient verbally consented to visit.     Subjective   Patient ID: Pillo Guerin is a 64 y.o. male who presents for Lung Eval.   h/o CVA, lung ca here for f/u emphysema. NL PFTs 10/2023.  Pt now following w/ oncology.  Thinks breathing is ok.  Currently on Incruse.  Uses rescue 2x/ewek.  No fevers, chills or cough.     ET is a couple miles.   Current 1 pack/day smoker. Not ready to quit yet.        Review of Systems   Constitutional:  Negative for chills and fever.   Respiratory: Negative.  Negative for cough.    Cardiovascular: Negative.    Gastrointestinal: Negative.    Skin:  Negative for rash.   Neurological: Negative.    Psychiatric/Behavioral: Negative.     All other systems reviewed and are negative.      Objective   Physical Exam    Assessment/Plan   Problem List Items Addressed This Visit       Emphysema/COPD (Multi) - Primary     seen on CT. NL PFTs 10/2023.  On Incruse.  Cont albuterol.  Pt not interested in GA at this time          Lung cancer (Multi)     Followed by oncology.           Nicotine dependence     40 pack years. Pt not interested in quitting at this time          RTC in  6 months.    Time Spent  Prep time on day of patient encounter: 5 minutes  Time spent directly with patient, family or caregiver: 5 minutes  Additional Time Spent on Patient Care Activities: 0 minutes  Documentation Time: 5 minutes  Other Time Spent: 0 minutes  Total: 15 minutes        Troy Callejas MD 09/11/24 3:07 PM

## 2024-09-16 ENCOUNTER — LAB (OUTPATIENT)
Dept: LAB | Facility: CLINIC | Age: 64
End: 2024-09-16
Payer: COMMERCIAL

## 2024-09-16 DIAGNOSIS — C34.11 MALIGNANT NEOPLASM OF UPPER LOBE OF RIGHT LUNG (MULTI): ICD-10-CM

## 2024-09-16 LAB
ALBUMIN SERPL BCP-MCNC: 4.4 G/DL (ref 3.4–5)
ALP SERPL-CCNC: 52 U/L (ref 33–136)
ALT SERPL W P-5'-P-CCNC: 15 U/L (ref 10–52)
ANION GAP SERPL CALC-SCNC: 15 MMOL/L (ref 10–20)
AST SERPL W P-5'-P-CCNC: 12 U/L (ref 9–39)
BASOPHILS # BLD AUTO: 0.05 X10*3/UL (ref 0–0.1)
BASOPHILS NFR BLD AUTO: 0.5 %
BILIRUB SERPL-MCNC: 1 MG/DL (ref 0–1.2)
BUN SERPL-MCNC: 11 MG/DL (ref 6–23)
CALCIUM SERPL-MCNC: 9 MG/DL (ref 8.6–10.6)
CHLORIDE SERPL-SCNC: 98 MMOL/L (ref 98–107)
CO2 SERPL-SCNC: 25 MMOL/L (ref 21–32)
CREAT SERPL-MCNC: 0.7 MG/DL (ref 0.5–1.3)
EGFRCR SERPLBLD CKD-EPI 2021: >90 ML/MIN/1.73M*2
EOSINOPHIL # BLD AUTO: 0.14 X10*3/UL (ref 0–0.7)
EOSINOPHIL NFR BLD AUTO: 1.5 %
ERYTHROCYTE [DISTWIDTH] IN BLOOD BY AUTOMATED COUNT: 16.7 % (ref 11.5–14.5)
GLUCOSE SERPL-MCNC: 111 MG/DL (ref 74–99)
HCT VFR BLD AUTO: 36 % (ref 41–52)
HGB BLD-MCNC: 11.8 G/DL (ref 13.5–17.5)
IMM GRANULOCYTES # BLD AUTO: 0.03 X10*3/UL (ref 0–0.7)
IMM GRANULOCYTES NFR BLD AUTO: 0.3 % (ref 0–0.9)
LYMPHOCYTES # BLD AUTO: 2 X10*3/UL (ref 1.2–4.8)
LYMPHOCYTES NFR BLD AUTO: 20.7 %
MCH RBC QN AUTO: 22 PG (ref 26–34)
MCHC RBC AUTO-ENTMCNC: 32.8 G/DL (ref 32–36)
MCV RBC AUTO: 67 FL (ref 80–100)
MONOCYTES # BLD AUTO: 0.89 X10*3/UL (ref 0.1–1)
MONOCYTES NFR BLD AUTO: 9.2 %
NEUTROPHILS # BLD AUTO: 6.53 X10*3/UL (ref 1.2–7.7)
NEUTROPHILS NFR BLD AUTO: 67.8 %
NRBC BLD-RTO: ABNORMAL /100{WBCS}
PLATELET # BLD AUTO: 290 X10*3/UL (ref 150–450)
POTASSIUM SERPL-SCNC: 4.1 MMOL/L (ref 3.5–5.3)
PROT SERPL-MCNC: 6.5 G/DL (ref 6.4–8.2)
RBC # BLD AUTO: 5.37 X10*6/UL (ref 4.5–5.9)
SODIUM SERPL-SCNC: 134 MMOL/L (ref 136–145)
WBC # BLD AUTO: 9.6 X10*3/UL (ref 4.4–11.3)

## 2024-09-16 PROCEDURE — 36415 COLL VENOUS BLD VENIPUNCTURE: CPT

## 2024-09-16 PROCEDURE — 85025 COMPLETE CBC W/AUTO DIFF WBC: CPT

## 2024-09-16 PROCEDURE — 80053 COMPREHEN METABOLIC PANEL: CPT

## 2024-09-16 NOTE — PROGRESS NOTES
Patient ID: Pillo Guerin is a 64 y.o. male    Primary Care Provider: Rocio Harris, APRN-CNP    DIAGNOSIS AND STAGING  Stage IVB (cT3 cN3 cM1c) poorly differentiated adenocarcinoma of RUL diagnosed on 08/03/23     SITES OF DISEASE  RUL  Supraclavicular nodes  Contralateral lung  Right gluteal muscle   Brain right occipital lobe      MOLECULAR GENOMICS  MICROSATELLITE STATUS: Microsatellite Stable (MAGDIEL)     DISEASE ASSOCIATED GENOMIC FINDINGS:   KRAS p.G13D ([id number] c.38G>A)   PIK3CA p.E542K ([id number] c.1624G>A)   TP53 p.E285K ([id number] c.853G>A)     DISEASE RELEVANT ALTERATIONS NOT DETECTED:   Negative for ALK fusion.   Negative for BRAF V600E.   Negative for EGFR sensitizing mutation.   Negative for KRAS G12C.   Negative for MET exon 14 skipping mutation.   Negative for NTRK fusion.   Negative for RET fusion.   Negative for ROS1 fusion.     PD-L1 TPS 80%     PRIOR THERAPIES  GKRS to right occipital lobe on  24 Gy in 1 fraction on 10/17/23      CURRENT THERAPY  Single agent pembrolizumab since 08/31/23    CURRENT ONCOLOGICAL PROBLEMS  Resolved GIII ICI colitis     HISTORY OF PRESENT ILLNESS  This is a pt with PMH of EtOH use disorder, CVA, CAD c/b NSTEMI s/p 2 stents and stage IVB ([id number]) poorly differentiated adenocarcinoma of RUL diagnosed on 08/03/23.      Patient was initially diagnosed 08/2022 after incidentally found RUL lesion was worked up during admission for hyponatremia presumed to be 2/2 beer potomania. He was then seen by his pulmonologist Dr. Callejas who ordered PET and MRI but this was not obtained  nor were multiple attempts to schedule for oncologic NPV.     I saw him in August 2023 and he endorsed haviing lost 15 lbs since stroke April 2022. His appetite is good, denies dysgeusia. He has slowed down a bit since he had his stroke. He can walk without becoming dyspneic but is limited by his stroke.    On 08/12/23 the patient underwent a bronchoscopy with EBUS:  Accession #: [id number]    Date of Procedure:8/3/2022   Date Reported: 8/9/2022   Date Received: 8/3/2022   Date of Birth / Sex 1960 (Age: 62) / M   Race: WHITE   Submitting Physician: DARYN YOUNG MD   Attending Physician: MD HEVER BROWNE MD   Procedures/Addenda Present     Other External #     FINAL CYTOLOGICAL INTERPRETATION     A. FINE NEEDLE ASPIRATION LYMPH NODE, L 4, CYTOLOGY AND CELL BLOCK:   NO MALIGNANT CELLS IDENTIFIED.   LYMPHOID SAMPLE IS PRESENT.     Note: Cell block shows presence of lymphoid cells along with bronchial cells   contamination.       B. FINE NEEDLE ASPIRATION LYMPH NODE, STATION 7, CYTOLOGY AND CELL BLOCK:   NO MALIGNANT CELLS IDENTIFIED.   LYMPHOID SAMPLE IS PRESENT.       C. FINE NEEDLE ASPIRATION LYMPH NODE, R 11, CYTOLOGY AND CELL BLOCK:   POORLY DIFFERENTIATED CARCINOMA, CONSISTENT WITH ADENOCARCINOMA, SEE NOTE.     Note: A limited panel of immunohistochemical stains performed showed neoplastic   cells staining strongly and diffusely positive with TTF-1 and negative with   p40. Findings are consistent with above.     Staff pathologist: Reviewed by Dr. Ellyn Valles.     Molecular testing has been ordered and results will be issued in   an addendum.     The cell block; C1 contains moderate tumor cellularity representing   roughly 70 % of all nucleated cells.       D. BRONCHO-ALVEOLAR LAVAGE OF RIGHT UPPER LOBE:   RARE ATYPICAL CELLS ARE PRESENT.     08/16/23: PET scan demonstrates multiple RUL FDG avid nodules as well as contra-lateral lung nodules, right hilar, mediastinal and supraclavicular nodes consistent with neoplastic involvement. There is also a large FDG avid right gluteal mass extending into the right SIJ and iliac bone.     08/16/23: MRI brain shows a 8 mm right occipital lobe metastasis with mild associated edema.     08/31/23: Single agent pembrolizumab begins     10/17/23: GKRS to right occipital lobe on  24 Gy in 1 fraction     06/06/24: grade 3 immune-related  diarrhea with episodes of incontinence despite Imodium   Rx prednisone 40 mg/day provided today      PAST MEDICAL HISTORY  CAD s/p 2 stents, CVA, Dupuytren's contracture   BPH  Lumbar radiculopathy   Ir-diarrhea (pembrolizumab) treated with prednisone months of May and      SURGICAL HISTORY  None      SOCIAL HISTORY  Born in [place], lives in [place] with 2 roommates.   Brother Salas is NOK. Drinks 2 24 oz beers a day.   + Tobacco - 50 pack year smoking history, active.   Rare cannabis, acid use in 70s, no additional illicits.    Was working at [employer] in [place] before stroke.      FAMILY HISTORY  Mother  of melanoma in      CURRENT MEDS REVIEWED    ALLERGIES REVIEWED     SUBJECTIVE:  Patient doing well today. His diarrhea is still improving, he is eating well. He has gained 5 lbs since last visit. Currently on prednisone 10 mg.     A 13-point review of systems was performed, with significant findings documented above in subjective history.    OBJECTIVE:  Vitals:    24 0955   BP: 118/71   Pulse: 56   Resp: 18   Temp: 36.6 °C (97.9 °F)   SpO2: 96%        Body surface area is 1.73 meters squared.     Wt Readings from Last 5 Encounters:   24 61.8 kg (136 lb 3.9 oz)   24 59.6 kg (131 lb 6.3 oz)   24 61.9 kg (136 lb 7.4 oz)   24 59.3 kg (130 lb 11.7 oz)   24 60.3 kg (133 lb)     ECOGSCORE: 1- Restricted in physically strenuous activity.  Carries out light duty.   Gen: A&O, NAD  Head: Normocephalic, atraumatic  Eyes: no scleral icterus  ENT: mucous membranes moist, no oropharyngeal lesions  Resp: Lungs CTAB  Cardiac: Normal rate, regular rhythm, no murmurs appreciated  Abdomen: Soft, nondistended, nontender, +BS  Neuro: CNII-XII grossly intact  Psych: appropriate mood & affect  Skin: warm, dry, no apparent rashes     Diagnostic Results   Results:  Labs:  Lab Results   Component Value Date    WBC 9.6 2024    HGB 11.8 (L) 2024    HCT 36.0 (L)  09/16/2024    MCV 67 (L) 09/16/2024     09/16/2024      Lab Results   Component Value Date    NEUTROABS 6.53 09/16/2024        Lab Results   Component Value Date    GLUCOSE 113 (H) 08/26/2024    CALCIUM 9.0 08/26/2024     (L) 08/26/2024    K 4.5 08/26/2024    CO2 26 08/26/2024     08/26/2024    BUN 8 08/26/2024    CREATININE 0.74 08/26/2024    MG 2.28 08/06/2024     Lab Results   Component Value Date    ALT 16 08/26/2024    AST 14 08/26/2024    ALKPHOS 45 08/26/2024    BILITOT 0.8 08/26/2024    BILIDIR 0.3 07/31/2022      Lab Results   Component Value Date    ACTH 1.8 (L) 08/26/2024    CORTISOL 7.9 08/26/2024    TSH 2.13 08/26/2024    FREET4 1.14 04/22/2024     CT CAP 8/26    FINDINGS:      CHEST:      HEART AND VESSELS:  There are atherosclerotic calcifications of the aorta and its  branches. Aorta is unchanged in course and caliber.      The heart is unchanged in size.      No pericardial effusion is seen.      MEDIASTINUM AND SHANTA, LOWER NECK AND AXILLA:  The visualized thyroid gland is within normal limits.      Mildly prominent subcarinal lymph node measuring 1.1 cm in short axis  unchanged when compared to the previous study. Additional smaller  hilar and mediastinal lymph nodes are not pathologically enlarged by  size criteria. No axillary lymphadenopathy.      Esophagus appears within normal limits as seen.      LUNGS AND AIRWAYS:  The trachea and central airways are patent. No endobronchial lesion.      Patient's known right upper lobe spiculated nodule has increased in  size, measuring up to 2.7 cm in greatest dimension on today's exam  versus 2.4 cm on the previous study. This is best seen on image 100  of 362. Spiculated nodule in the left upper lobe abutting the major  fissure is not significantly changed measuring approximate 1.2 cm,  image 130. Right lower lobe cluster of nodules measuring up to 1.0 cm  in size, image 217, is not significantly changed. Additional  scattered nodules,  some of which are ground-glass, are also  unchanged, for example, 1.4 cm ground-glass nodule in the left lower  lobe, image 85. Emphysematous changes. Scattered areas of  scarring/atelectasis. No new areas of consolidation. No effusion. No  pneumothorax. No definite new pulmonary nodule or mass.      CHEST WALL AND OSSEOUS STRUCTURES:  Degenerative changes. No acute process.          ABDOMEN:      LIVER:  No acute process.      BILE DUCTS:  Not abnormally dilated.      GALLBLADDER:  No calcified stones. No wall thickening.      PANCREAS:  Appears unremarkable.      SPLEEN:  Calcified granulomas. No acute process.      ADRENAL GLANDS:  Appear unremarkable.      KIDNEYS, URETERS, AND BLADDER:  The kidneys enhance with contrast material symmetrically. There is no  evidence of hydronephrosis. 2.1 cm cystic lesion in the upper pole  the left kidney is unchanged. The urinary bladder appears grossly  unremarkable. Prostate appears enlarged.      BOWEL:  Colonic diverticulosis. No evidence of diverticulitis. There is no  evidence of a bowel obstruction.  Appendix is normal in caliber.  Although CT has limited sensitivity and specificity for gastric  pathology, the stomach appears grossly unremarkable.      RETROPERITONEUM, VESSELS:  There is no aneurysmal dilatation of the abdominal aorta. The IVC is  within normal limits.  There are atherosclerotic calcifications of  the aorta and its branches. No pathologically enlarged  retroperitoneal lymph nodes are noted.      PERITONEUM:  There is no evidence of pneumoperitoneum.  No ascites or loculated  fluid collection noted.  No pathologically enlarged mesenteric lymph  nodes are identified.      ABDOMINAL WALL, SOFT TISSUES:  Small fat containing inguinal hernias, larger on the right. Tiny fat  containing umbilical hernia.      SKELETON:  Left hip hardware. Degenerative changes. No acute process.      IMPRESSION:  Slight interval increase in the size of the largest spiculated  nodule  in the right upper lobe. Multiple additional solid, spiculated, and  ground-glass nodules are unchanged. No definite new point nodule or  mass. Additional unchanged findings as above.    Assessment/Plan     Lung cancer (Multi), Clinical: Stage IVB (cT3, cN3, cM1c)  Mr. Pillo Guerin is a 63 YO with Stage IVB ([id number]) poorly differentiated adenocarcinoma of RUL, TPS 80%, lack of actionable genomic alterations noted.    He was started on single agent pembrolizumab since 08/31/23 - however had to be held in May 2024 due to grade III ICI colitis. This has resolved and he is currently on maintenance 10 mg prednisone which we will taper to 5 mg today. His most recent scans were personally reviewed and discussed with patient showing stability in his BL lung lesions. We will continue with cycle 15 pembrolizumab today and follow in 3 weeks.    #Stage IVB NSCLC  -Continue with c16 pembrolizumab  -Scans without evidence of progression.    #Grade IIII ICI Diarrhea - Tapered to 5 mg prednisone  - Diarrhea has resolved.  Reports no episodes within the last two weeks. Pt to continue daily 5 mg maintenance dose.  Partial fill with initial Rx.  Recommended pt contact pharmacy for remaining doses.      #Pruritus, immune related  Continue moisturizing BUE with CeraVe and triamcinolone as needed     #Right occipital lobe 8 mm metastasis   S/p GKRS 24 Gy in 1 fraction on 10/17/23  Repeat MRI brain every 3 months -   Last on 05/15/24 showing no signs of PD  - repeat in 08/2024 awaiting read     #Pain on right gluteal region- resolved   Due to large centrally necrotic and peripherally hypermetabolic mass in the right lower paramediastinal muscle extending into the adjacent subcutaneous tissue and superior right gluteal muscle with involvement of the superior aspect of the right iliac  bone, right florencio sacrum -   He has been taking gabapentin for pain (controlled) in LLE that is chronic      #Stroke in April 2022 and hx of CAD  -    Stable -   On ASA and ticagrelor      #HTN - on amlodipine      #HLD - atorvastatin    Greater than 40 min spent in care of this patient.     Jorge Gray MD  Thoracic Medical Oncology   [address] [place], [place] [id number]  Phone: [phone]

## 2024-09-18 ENCOUNTER — INFUSION (OUTPATIENT)
Dept: HEMATOLOGY/ONCOLOGY | Facility: CLINIC | Age: 64
End: 2024-09-18
Payer: COMMERCIAL

## 2024-09-18 ENCOUNTER — OFFICE VISIT (OUTPATIENT)
Dept: HEMATOLOGY/ONCOLOGY | Facility: CLINIC | Age: 64
End: 2024-09-18
Payer: COMMERCIAL

## 2024-09-18 VITALS
WEIGHT: 136.24 LBS | HEART RATE: 56 BPM | SYSTOLIC BLOOD PRESSURE: 118 MMHG | TEMPERATURE: 97.9 F | DIASTOLIC BLOOD PRESSURE: 71 MMHG | RESPIRATION RATE: 18 BRPM | OXYGEN SATURATION: 96 % | BODY MASS INDEX: 20.11 KG/M2

## 2024-09-18 DIAGNOSIS — C34.11 MALIGNANT NEOPLASM OF UPPER LOBE OF RIGHT LUNG (MULTI): Primary | ICD-10-CM

## 2024-09-18 DIAGNOSIS — C34.11 MALIGNANT NEOPLASM OF UPPER LOBE OF RIGHT LUNG (MULTI): ICD-10-CM

## 2024-09-18 DIAGNOSIS — K52.1 DIARRHEA DUE TO DRUG: ICD-10-CM

## 2024-09-18 PROCEDURE — 3074F SYST BP LT 130 MM HG: CPT | Performed by: STUDENT IN AN ORGANIZED HEALTH CARE EDUCATION/TRAINING PROGRAM

## 2024-09-18 PROCEDURE — 2500000004 HC RX 250 GENERAL PHARMACY W/ HCPCS (ALT 636 FOR OP/ED): Mod: JZ,JG,SE | Performed by: STUDENT IN AN ORGANIZED HEALTH CARE EDUCATION/TRAINING PROGRAM

## 2024-09-18 PROCEDURE — 99215 OFFICE O/P EST HI 40 MIN: CPT | Performed by: STUDENT IN AN ORGANIZED HEALTH CARE EDUCATION/TRAINING PROGRAM

## 2024-09-18 PROCEDURE — 1159F MED LIST DOCD IN RCRD: CPT | Performed by: STUDENT IN AN ORGANIZED HEALTH CARE EDUCATION/TRAINING PROGRAM

## 2024-09-18 PROCEDURE — 99215 OFFICE O/P EST HI 40 MIN: CPT | Mod: 25 | Performed by: STUDENT IN AN ORGANIZED HEALTH CARE EDUCATION/TRAINING PROGRAM

## 2024-09-18 PROCEDURE — 96413 CHEMO IV INFUSION 1 HR: CPT

## 2024-09-18 PROCEDURE — 1125F AMNT PAIN NOTED PAIN PRSNT: CPT | Performed by: STUDENT IN AN ORGANIZED HEALTH CARE EDUCATION/TRAINING PROGRAM

## 2024-09-18 PROCEDURE — 3078F DIAST BP <80 MM HG: CPT | Performed by: STUDENT IN AN ORGANIZED HEALTH CARE EDUCATION/TRAINING PROGRAM

## 2024-09-18 RX ORDER — ALBUTEROL SULFATE 0.83 MG/ML
3 SOLUTION RESPIRATORY (INHALATION) AS NEEDED
OUTPATIENT
Start: 2024-10-09

## 2024-09-18 RX ORDER — DIPHENHYDRAMINE HYDROCHLORIDE 50 MG/ML
50 INJECTION INTRAMUSCULAR; INTRAVENOUS AS NEEDED
OUTPATIENT
Start: 2024-10-09

## 2024-09-18 RX ORDER — FAMOTIDINE 10 MG/ML
20 INJECTION INTRAVENOUS ONCE AS NEEDED
Status: DISCONTINUED | OUTPATIENT
Start: 2024-09-18 | End: 2024-09-18 | Stop reason: HOSPADM

## 2024-09-18 RX ORDER — EPINEPHRINE 0.3 MG/.3ML
0.3 INJECTION SUBCUTANEOUS EVERY 5 MIN PRN
OUTPATIENT
Start: 2024-10-09

## 2024-09-18 RX ORDER — FAMOTIDINE 10 MG/ML
20 INJECTION INTRAVENOUS ONCE AS NEEDED
OUTPATIENT
Start: 2024-10-09

## 2024-09-18 RX ORDER — DIPHENHYDRAMINE HYDROCHLORIDE 50 MG/ML
50 INJECTION INTRAMUSCULAR; INTRAVENOUS AS NEEDED
OUTPATIENT
Start: 2024-10-30

## 2024-09-18 RX ORDER — PROCHLORPERAZINE EDISYLATE 5 MG/ML
10 INJECTION INTRAMUSCULAR; INTRAVENOUS EVERY 6 HOURS PRN
Status: DISCONTINUED | OUTPATIENT
Start: 2024-09-18 | End: 2024-09-18 | Stop reason: HOSPADM

## 2024-09-18 RX ORDER — ALBUTEROL SULFATE 0.83 MG/ML
3 SOLUTION RESPIRATORY (INHALATION) AS NEEDED
Status: DISCONTINUED | OUTPATIENT
Start: 2024-09-18 | End: 2024-09-18 | Stop reason: HOSPADM

## 2024-09-18 RX ORDER — HEPARIN SODIUM,PORCINE/PF 10 UNIT/ML
50 SYRINGE (ML) INTRAVENOUS AS NEEDED
Status: DISCONTINUED | OUTPATIENT
Start: 2024-09-18 | End: 2024-09-18 | Stop reason: HOSPADM

## 2024-09-18 RX ORDER — EPINEPHRINE 0.3 MG/.3ML
0.3 INJECTION SUBCUTANEOUS EVERY 5 MIN PRN
Status: DISCONTINUED | OUTPATIENT
Start: 2024-09-18 | End: 2024-09-18 | Stop reason: HOSPADM

## 2024-09-18 RX ORDER — PROCHLORPERAZINE EDISYLATE 5 MG/ML
10 INJECTION INTRAMUSCULAR; INTRAVENOUS EVERY 6 HOURS PRN
OUTPATIENT
Start: 2024-10-30

## 2024-09-18 RX ORDER — PROCHLORPERAZINE EDISYLATE 5 MG/ML
10 INJECTION INTRAMUSCULAR; INTRAVENOUS EVERY 6 HOURS PRN
OUTPATIENT
Start: 2024-10-09

## 2024-09-18 RX ORDER — HEPARIN 100 UNIT/ML
500 SYRINGE INTRAVENOUS AS NEEDED
OUTPATIENT
Start: 2024-09-18

## 2024-09-18 RX ORDER — DIPHENHYDRAMINE HYDROCHLORIDE 50 MG/ML
50 INJECTION INTRAMUSCULAR; INTRAVENOUS AS NEEDED
Status: DISCONTINUED | OUTPATIENT
Start: 2024-09-18 | End: 2024-09-18 | Stop reason: HOSPADM

## 2024-09-18 RX ORDER — HEPARIN SODIUM,PORCINE/PF 10 UNIT/ML
50 SYRINGE (ML) INTRAVENOUS AS NEEDED
OUTPATIENT
Start: 2024-09-18

## 2024-09-18 RX ORDER — ALBUTEROL SULFATE 0.83 MG/ML
3 SOLUTION RESPIRATORY (INHALATION) AS NEEDED
OUTPATIENT
Start: 2024-10-30

## 2024-09-18 RX ORDER — PROCHLORPERAZINE MALEATE 10 MG
10 TABLET ORAL EVERY 6 HOURS PRN
OUTPATIENT
Start: 2024-10-09

## 2024-09-18 RX ORDER — PROCHLORPERAZINE MALEATE 10 MG
10 TABLET ORAL EVERY 6 HOURS PRN
OUTPATIENT
Start: 2024-10-30

## 2024-09-18 RX ORDER — EPINEPHRINE 0.3 MG/.3ML
0.3 INJECTION SUBCUTANEOUS EVERY 5 MIN PRN
OUTPATIENT
Start: 2024-10-30

## 2024-09-18 RX ORDER — HEPARIN 100 UNIT/ML
500 SYRINGE INTRAVENOUS AS NEEDED
Status: DISCONTINUED | OUTPATIENT
Start: 2024-09-18 | End: 2024-09-18 | Stop reason: HOSPADM

## 2024-09-18 RX ORDER — FAMOTIDINE 10 MG/ML
20 INJECTION INTRAVENOUS ONCE AS NEEDED
OUTPATIENT
Start: 2024-10-30

## 2024-09-18 RX ORDER — PREDNISONE 5 MG/1
TABLET ORAL
Qty: 1 TABLET | Refills: 0 | Status: SHIPPED | OUTPATIENT
Start: 2024-09-18

## 2024-09-18 RX ORDER — PROCHLORPERAZINE MALEATE 10 MG
10 TABLET ORAL EVERY 6 HOURS PRN
Status: DISCONTINUED | OUTPATIENT
Start: 2024-09-18 | End: 2024-09-18 | Stop reason: HOSPADM

## 2024-09-18 ASSESSMENT — PAIN SCALES - GENERAL: PAINLEVEL: 8

## 2024-09-18 NOTE — SIGNIFICANT EVENT

## 2024-09-18 NOTE — SIGNIFICANT EVENT
09/18/24 1036   Prechemo Checklist   Has the patient been in the hospital, ED, or urgent care since last date of service No   Chemo/Immuno Consent Completed and Signed Yes   Protocol/Indications Verified Yes   Confirmed to previous date/time of medication Yes   Compared to previous dose Yes   All medications are dated accurately Yes   Pregnancy Test Negative Not applicable   Parameters Met Yes   BSA/Weight-Height Verified Yes   Dose Calculations Verified (current, total, cumulative) Yes        Initiate Treatment: Spironolactone 100 mg QD, Rhofade qam and Soolantra qhs Otc Regimen: KP Away cream QD Detail Level: Zone Samples Given: Amzeeq foam QD Plan: Finished Accutane last year may 2020, not on bcp any more, was on Spironolactone before. Initiate Treatment: Spironolactone 100 mg qd, might go to 150 if not helping at next visit Plan: Pt had GI upset from Minocycline before, prefers no pills.

## 2024-09-25 ENCOUNTER — TELEPHONE (OUTPATIENT)
Dept: CARDIAC REHAB | Facility: CLINIC | Age: 64
End: 2024-09-25
Payer: COMMERCIAL

## 2024-10-01 DIAGNOSIS — G89.3 CANCER ASSOCIATED PAIN: ICD-10-CM

## 2024-10-01 RX ORDER — GABAPENTIN 300 MG/1
600 CAPSULE ORAL 2 TIMES DAILY
Qty: 120 CAPSULE | Refills: 0 | Status: SHIPPED | OUTPATIENT
Start: 2024-10-01 | End: 2024-10-31

## 2024-10-01 NOTE — TELEPHONE ENCOUNTER
Refill request received for Gabapentin 300mg  Preferred pharmacy is [facility] in [place].  Medication pended to provider to refill if appropriate.

## 2024-10-09 ENCOUNTER — LAB (OUTPATIENT)
Dept: LAB | Facility: CLINIC | Age: 64
End: 2024-10-09
Payer: COMMERCIAL

## 2024-10-09 DIAGNOSIS — C34.11 MALIGNANT NEOPLASM OF UPPER LOBE OF RIGHT LUNG (MULTI): ICD-10-CM

## 2024-10-09 LAB
ALBUMIN SERPL BCP-MCNC: 4.3 G/DL (ref 3.4–5)
ALP SERPL-CCNC: 47 U/L (ref 33–136)
ALT SERPL W P-5'-P-CCNC: 20 U/L (ref 10–52)
ANION GAP SERPL CALC-SCNC: 12 MMOL/L (ref 10–20)
AST SERPL W P-5'-P-CCNC: 18 U/L (ref 9–39)
BASOPHILS # BLD AUTO: 0.05 X10*3/UL (ref 0–0.1)
BASOPHILS NFR BLD AUTO: 0.6 %
BILIRUB SERPL-MCNC: 0.7 MG/DL (ref 0–1.2)
BUN SERPL-MCNC: 7 MG/DL (ref 6–23)
CALCIUM SERPL-MCNC: 9.1 MG/DL (ref 8.6–10.6)
CHLORIDE SERPL-SCNC: 98 MMOL/L (ref 98–107)
CO2 SERPL-SCNC: 28 MMOL/L (ref 21–32)
CORTIS AM PEAK SERPL-MSCNC: 10.3 UG/DL (ref 5–20)
CREAT SERPL-MCNC: 0.67 MG/DL (ref 0.5–1.3)
EGFRCR SERPLBLD CKD-EPI 2021: >90 ML/MIN/1.73M*2
EOSINOPHIL # BLD AUTO: 0.34 X10*3/UL (ref 0–0.7)
EOSINOPHIL NFR BLD AUTO: 3.8 %
ERYTHROCYTE [DISTWIDTH] IN BLOOD BY AUTOMATED COUNT: 17.5 % (ref 11.5–14.5)
GLUCOSE SERPL-MCNC: 109 MG/DL (ref 74–99)
HCT VFR BLD AUTO: 38.3 % (ref 41–52)
HGB BLD-MCNC: 12.3 G/DL (ref 13.5–17.5)
IMM GRANULOCYTES # BLD AUTO: 0.04 X10*3/UL (ref 0–0.7)
IMM GRANULOCYTES NFR BLD AUTO: 0.4 % (ref 0–0.9)
LYMPHOCYTES # BLD AUTO: 2.01 X10*3/UL (ref 1.2–4.8)
LYMPHOCYTES NFR BLD AUTO: 22.6 %
MCH RBC QN AUTO: 21.7 PG (ref 26–34)
MCHC RBC AUTO-ENTMCNC: 32.1 G/DL (ref 32–36)
MCV RBC AUTO: 68 FL (ref 80–100)
MONOCYTES # BLD AUTO: 0.45 X10*3/UL (ref 0.1–1)
MONOCYTES NFR BLD AUTO: 5.1 %
NEUTROPHILS # BLD AUTO: 6 X10*3/UL (ref 1.2–7.7)
NEUTROPHILS NFR BLD AUTO: 67.5 %
NRBC BLD-RTO: ABNORMAL /100{WBCS}
PLATELET # BLD AUTO: 306 X10*3/UL (ref 150–450)
POTASSIUM SERPL-SCNC: 4.4 MMOL/L (ref 3.5–5.3)
PROT SERPL-MCNC: 6.5 G/DL (ref 6.4–8.2)
RBC # BLD AUTO: 5.67 X10*6/UL (ref 4.5–5.9)
SODIUM SERPL-SCNC: 134 MMOL/L (ref 136–145)
TSH SERPL-ACNC: 1.5 MIU/L (ref 0.44–3.98)
WBC # BLD AUTO: 8.9 X10*3/UL (ref 4.4–11.3)

## 2024-10-09 PROCEDURE — 85025 COMPLETE CBC W/AUTO DIFF WBC: CPT

## 2024-10-09 PROCEDURE — 36415 COLL VENOUS BLD VENIPUNCTURE: CPT

## 2024-10-09 PROCEDURE — 80053 COMPREHEN METABOLIC PANEL: CPT

## 2024-10-09 PROCEDURE — 82533 TOTAL CORTISOL: CPT

## 2024-10-09 PROCEDURE — 82024 ASSAY OF ACTH: CPT

## 2024-10-09 PROCEDURE — 84443 ASSAY THYROID STIM HORMONE: CPT

## 2024-10-10 ENCOUNTER — INFUSION (OUTPATIENT)
Dept: HEMATOLOGY/ONCOLOGY | Facility: CLINIC | Age: 64
End: 2024-10-10
Payer: COMMERCIAL

## 2024-10-10 ENCOUNTER — SOCIAL WORK (OUTPATIENT)
Dept: CASE MANAGEMENT | Facility: HOSPITAL | Age: 64
End: 2024-10-10
Payer: COMMERCIAL

## 2024-10-10 VITALS
SYSTOLIC BLOOD PRESSURE: 143 MMHG | BODY MASS INDEX: 20.31 KG/M2 | WEIGHT: 137.57 LBS | TEMPERATURE: 98.1 F | HEART RATE: 64 BPM | DIASTOLIC BLOOD PRESSURE: 76 MMHG | RESPIRATION RATE: 16 BRPM

## 2024-10-10 DIAGNOSIS — C34.11 MALIGNANT NEOPLASM OF UPPER LOBE OF RIGHT LUNG (MULTI): ICD-10-CM

## 2024-10-10 PROCEDURE — 2500000004 HC RX 250 GENERAL PHARMACY W/ HCPCS (ALT 636 FOR OP/ED): Mod: SE | Performed by: STUDENT IN AN ORGANIZED HEALTH CARE EDUCATION/TRAINING PROGRAM

## 2024-10-10 PROCEDURE — 96413 CHEMO IV INFUSION 1 HR: CPT

## 2024-10-10 RX ORDER — HEPARIN 100 UNIT/ML
500 SYRINGE INTRAVENOUS AS NEEDED
OUTPATIENT
Start: 2024-10-10

## 2024-10-10 RX ORDER — PROCHLORPERAZINE MALEATE 10 MG
10 TABLET ORAL EVERY 6 HOURS PRN
Status: DISCONTINUED | OUTPATIENT
Start: 2024-10-10 | End: 2024-10-10 | Stop reason: HOSPADM

## 2024-10-10 RX ORDER — HEPARIN SODIUM,PORCINE/PF 10 UNIT/ML
50 SYRINGE (ML) INTRAVENOUS AS NEEDED
Status: DISCONTINUED | OUTPATIENT
Start: 2024-10-10 | End: 2024-10-10 | Stop reason: HOSPADM

## 2024-10-10 RX ORDER — DIPHENHYDRAMINE HYDROCHLORIDE 50 MG/ML
50 INJECTION INTRAMUSCULAR; INTRAVENOUS AS NEEDED
Status: DISCONTINUED | OUTPATIENT
Start: 2024-10-10 | End: 2024-10-10 | Stop reason: HOSPADM

## 2024-10-10 RX ORDER — PROCHLORPERAZINE EDISYLATE 5 MG/ML
10 INJECTION INTRAMUSCULAR; INTRAVENOUS EVERY 6 HOURS PRN
Status: DISCONTINUED | OUTPATIENT
Start: 2024-10-10 | End: 2024-10-10 | Stop reason: HOSPADM

## 2024-10-10 RX ORDER — EPINEPHRINE 0.3 MG/.3ML
0.3 INJECTION SUBCUTANEOUS EVERY 5 MIN PRN
Status: DISCONTINUED | OUTPATIENT
Start: 2024-10-10 | End: 2024-10-10 | Stop reason: HOSPADM

## 2024-10-10 RX ORDER — HEPARIN 100 UNIT/ML
500 SYRINGE INTRAVENOUS AS NEEDED
Status: DISCONTINUED | OUTPATIENT
Start: 2024-10-10 | End: 2024-10-10 | Stop reason: HOSPADM

## 2024-10-10 RX ORDER — FAMOTIDINE 10 MG/ML
20 INJECTION INTRAVENOUS ONCE AS NEEDED
Status: DISCONTINUED | OUTPATIENT
Start: 2024-10-10 | End: 2024-10-10 | Stop reason: HOSPADM

## 2024-10-10 RX ORDER — ALBUTEROL SULFATE 0.83 MG/ML
3 SOLUTION RESPIRATORY (INHALATION) AS NEEDED
Status: DISCONTINUED | OUTPATIENT
Start: 2024-10-10 | End: 2024-10-10 | Stop reason: HOSPADM

## 2024-10-10 RX ORDER — HEPARIN SODIUM,PORCINE/PF 10 UNIT/ML
50 SYRINGE (ML) INTRAVENOUS AS NEEDED
OUTPATIENT
Start: 2024-10-10

## 2024-10-10 ASSESSMENT — PAIN SCALES - GENERAL: PAINLEVEL: 8

## 2024-10-10 NOTE — PROGRESS NOTES
SW met with patient today at [facility]. Patient has been doing ok and in good spirits. He understands his treatment process. No needs at this time. SW provided support.

## 2024-10-10 NOTE — SIGNIFICANT EVENT

## 2024-10-11 LAB — ACTH PLAS-MCNC: 4.2 PG/ML (ref 7.2–63.3)

## 2024-10-23 DIAGNOSIS — G89.3 CANCER ASSOCIATED PAIN: ICD-10-CM

## 2024-10-29 ENCOUNTER — LAB (OUTPATIENT)
Dept: LAB | Facility: CLINIC | Age: 64
End: 2024-10-29
Payer: COMMERCIAL

## 2024-10-29 DIAGNOSIS — C34.11 MALIGNANT NEOPLASM OF UPPER LOBE OF RIGHT LUNG (MULTI): ICD-10-CM

## 2024-10-29 LAB
ALBUMIN SERPL BCP-MCNC: 4.5 G/DL (ref 3.4–5)
ALP SERPL-CCNC: 47 U/L (ref 33–136)
ALT SERPL W P-5'-P-CCNC: 35 U/L (ref 10–52)
ANION GAP SERPL CALC-SCNC: 13 MMOL/L (ref 10–20)
AST SERPL W P-5'-P-CCNC: 24 U/L (ref 9–39)
BASOPHILS # BLD AUTO: 0.06 X10*3/UL (ref 0–0.1)
BASOPHILS NFR BLD AUTO: 0.8 %
BILIRUB SERPL-MCNC: 0.9 MG/DL (ref 0–1.2)
BUN SERPL-MCNC: 11 MG/DL (ref 6–23)
CALCIUM SERPL-MCNC: 8.8 MG/DL (ref 8.6–10.6)
CHLORIDE SERPL-SCNC: 98 MMOL/L (ref 98–107)
CO2 SERPL-SCNC: 27 MMOL/L (ref 21–32)
CORTIS AM PEAK SERPL-MSCNC: 7.9 UG/DL (ref 5–20)
CREAT SERPL-MCNC: 0.72 MG/DL (ref 0.5–1.3)
EGFRCR SERPLBLD CKD-EPI 2021: >90 ML/MIN/1.73M*2
EOSINOPHIL # BLD AUTO: 0.2 X10*3/UL (ref 0–0.7)
EOSINOPHIL NFR BLD AUTO: 2.5 %
ERYTHROCYTE [DISTWIDTH] IN BLOOD BY AUTOMATED COUNT: 16.3 % (ref 11.5–14.5)
GLUCOSE SERPL-MCNC: 125 MG/DL (ref 74–99)
HCT VFR BLD AUTO: 36.4 % (ref 41–52)
HGB BLD-MCNC: 12 G/DL (ref 13.5–17.5)
IMM GRANULOCYTES # BLD AUTO: 0.04 X10*3/UL (ref 0–0.7)
IMM GRANULOCYTES NFR BLD AUTO: 0.5 % (ref 0–0.9)
LYMPHOCYTES # BLD AUTO: 1.93 X10*3/UL (ref 1.2–4.8)
LYMPHOCYTES NFR BLD AUTO: 24.2 %
MCH RBC QN AUTO: 22.2 PG (ref 26–34)
MCHC RBC AUTO-ENTMCNC: 33 G/DL (ref 32–36)
MCV RBC AUTO: 67 FL (ref 80–100)
MONOCYTES # BLD AUTO: 0.46 X10*3/UL (ref 0.1–1)
MONOCYTES NFR BLD AUTO: 5.8 %
NEUTROPHILS # BLD AUTO: 5.27 X10*3/UL (ref 1.2–7.7)
NEUTROPHILS NFR BLD AUTO: 66.2 %
NRBC BLD-RTO: ABNORMAL /100{WBCS}
PLATELET # BLD AUTO: 297 X10*3/UL (ref 150–450)
POTASSIUM SERPL-SCNC: 4.5 MMOL/L (ref 3.5–5.3)
PROT SERPL-MCNC: 6.3 G/DL (ref 6.4–8.2)
RBC # BLD AUTO: 5.41 X10*6/UL (ref 4.5–5.9)
SODIUM SERPL-SCNC: 133 MMOL/L (ref 136–145)
WBC # BLD AUTO: 8 X10*3/UL (ref 4.4–11.3)

## 2024-10-29 PROCEDURE — 82024 ASSAY OF ACTH: CPT

## 2024-10-29 PROCEDURE — 36415 COLL VENOUS BLD VENIPUNCTURE: CPT

## 2024-10-29 PROCEDURE — 80053 COMPREHEN METABOLIC PANEL: CPT

## 2024-10-29 PROCEDURE — 82533 TOTAL CORTISOL: CPT

## 2024-10-29 PROCEDURE — 85025 COMPLETE CBC W/AUTO DIFF WBC: CPT

## 2024-10-30 ENCOUNTER — OFFICE VISIT (OUTPATIENT)
Dept: HEMATOLOGY/ONCOLOGY | Facility: CLINIC | Age: 64
End: 2024-10-30
Payer: COMMERCIAL

## 2024-10-30 ENCOUNTER — SOCIAL WORK (OUTPATIENT)
Dept: CASE MANAGEMENT | Facility: HOSPITAL | Age: 64
End: 2024-10-30
Payer: COMMERCIAL

## 2024-10-30 ENCOUNTER — INFUSION (OUTPATIENT)
Dept: HEMATOLOGY/ONCOLOGY | Facility: CLINIC | Age: 64
End: 2024-10-30
Payer: COMMERCIAL

## 2024-10-30 VITALS
OXYGEN SATURATION: 98 % | RESPIRATION RATE: 18 BRPM | TEMPERATURE: 97.5 F | BODY MASS INDEX: 20.42 KG/M2 | DIASTOLIC BLOOD PRESSURE: 72 MMHG | SYSTOLIC BLOOD PRESSURE: 131 MMHG | WEIGHT: 138.34 LBS | HEART RATE: 58 BPM

## 2024-10-30 DIAGNOSIS — C34.11 MALIGNANT NEOPLASM OF UPPER LOBE OF RIGHT LUNG (MULTI): Primary | ICD-10-CM

## 2024-10-30 DIAGNOSIS — G89.3 CANCER ASSOCIATED PAIN: ICD-10-CM

## 2024-10-30 DIAGNOSIS — C34.11 MALIGNANT NEOPLASM OF UPPER LOBE OF RIGHT LUNG (MULTI): ICD-10-CM

## 2024-10-30 DIAGNOSIS — K52.1 DIARRHEA DUE TO DRUG: ICD-10-CM

## 2024-10-30 LAB — ACTH PLAS-MCNC: 4 PG/ML (ref 7.2–63.3)

## 2024-10-30 PROCEDURE — 4004F PT TOBACCO SCREEN RCVD TLK: CPT | Performed by: NURSE PRACTITIONER

## 2024-10-30 PROCEDURE — 2500000004 HC RX 250 GENERAL PHARMACY W/ HCPCS (ALT 636 FOR OP/ED): Mod: JZ,JG,SE | Performed by: STUDENT IN AN ORGANIZED HEALTH CARE EDUCATION/TRAINING PROGRAM

## 2024-10-30 PROCEDURE — 99214 OFFICE O/P EST MOD 30 MIN: CPT | Performed by: NURSE PRACTITIONER

## 2024-10-30 PROCEDURE — 3075F SYST BP GE 130 - 139MM HG: CPT | Performed by: NURSE PRACTITIONER

## 2024-10-30 PROCEDURE — 3078F DIAST BP <80 MM HG: CPT | Performed by: NURSE PRACTITIONER

## 2024-10-30 PROCEDURE — 99214 OFFICE O/P EST MOD 30 MIN: CPT | Mod: 25 | Performed by: NURSE PRACTITIONER

## 2024-10-30 PROCEDURE — 96413 CHEMO IV INFUSION 1 HR: CPT

## 2024-10-30 PROCEDURE — 1125F AMNT PAIN NOTED PAIN PRSNT: CPT | Performed by: NURSE PRACTITIONER

## 2024-10-30 RX ORDER — HEPARIN 100 UNIT/ML
500 SYRINGE INTRAVENOUS AS NEEDED
Status: DISCONTINUED | OUTPATIENT
Start: 2024-10-30 | End: 2024-10-30 | Stop reason: HOSPADM

## 2024-10-30 RX ORDER — EPINEPHRINE 0.3 MG/.3ML
0.3 INJECTION SUBCUTANEOUS EVERY 5 MIN PRN
Status: DISCONTINUED | OUTPATIENT
Start: 2024-10-30 | End: 2024-10-30 | Stop reason: HOSPADM

## 2024-10-30 RX ORDER — PROCHLORPERAZINE EDISYLATE 5 MG/ML
10 INJECTION INTRAMUSCULAR; INTRAVENOUS EVERY 6 HOURS PRN
Status: DISCONTINUED | OUTPATIENT
Start: 2024-10-30 | End: 2024-10-30 | Stop reason: HOSPADM

## 2024-10-30 RX ORDER — DIPHENHYDRAMINE HYDROCHLORIDE 50 MG/ML
50 INJECTION INTRAMUSCULAR; INTRAVENOUS AS NEEDED
Status: DISCONTINUED | OUTPATIENT
Start: 2024-10-30 | End: 2024-10-30 | Stop reason: HOSPADM

## 2024-10-30 RX ORDER — FAMOTIDINE 10 MG/ML
20 INJECTION INTRAVENOUS ONCE AS NEEDED
Status: DISCONTINUED | OUTPATIENT
Start: 2024-10-30 | End: 2024-10-30 | Stop reason: HOSPADM

## 2024-10-30 RX ORDER — HEPARIN 100 UNIT/ML
500 SYRINGE INTRAVENOUS AS NEEDED
OUTPATIENT
Start: 2024-10-30

## 2024-10-30 RX ORDER — PREDNISONE 5 MG/1
5 TABLET ORAL DAILY
Qty: 30 TABLET | Refills: 2 | Status: SHIPPED | OUTPATIENT
Start: 2024-10-30 | End: 2025-10-30

## 2024-10-30 RX ORDER — HEPARIN SODIUM,PORCINE/PF 10 UNIT/ML
50 SYRINGE (ML) INTRAVENOUS AS NEEDED
OUTPATIENT
Start: 2024-10-30

## 2024-10-30 RX ORDER — GABAPENTIN 300 MG/1
600 CAPSULE ORAL 2 TIMES DAILY
Qty: 120 CAPSULE | Refills: 2 | Status: SHIPPED | OUTPATIENT
Start: 2024-10-30 | End: 2025-10-30

## 2024-10-30 RX ORDER — HEPARIN SODIUM,PORCINE/PF 10 UNIT/ML
50 SYRINGE (ML) INTRAVENOUS AS NEEDED
Status: DISCONTINUED | OUTPATIENT
Start: 2024-10-30 | End: 2024-10-30 | Stop reason: HOSPADM

## 2024-10-30 RX ORDER — PROCHLORPERAZINE MALEATE 10 MG
10 TABLET ORAL EVERY 6 HOURS PRN
Status: DISCONTINUED | OUTPATIENT
Start: 2024-10-30 | End: 2024-10-30 | Stop reason: HOSPADM

## 2024-10-30 RX ORDER — ALBUTEROL SULFATE 0.83 MG/ML
3 SOLUTION RESPIRATORY (INHALATION) AS NEEDED
Status: DISCONTINUED | OUTPATIENT
Start: 2024-10-30 | End: 2024-10-30 | Stop reason: HOSPADM

## 2024-10-30 ASSESSMENT — ENCOUNTER SYMPTOMS
OCCASIONAL FEELINGS OF UNSTEADINESS: 0
LOSS OF SENSATION IN FEET: 1
DEPRESSION: 0

## 2024-10-30 ASSESSMENT — PAIN SCALES - GENERAL: PAINLEVEL_OUTOF10: 7

## 2024-10-31 DIAGNOSIS — C34.11 MALIGNANT NEOPLASM OF UPPER LOBE OF RIGHT LUNG (MULTI): Primary | ICD-10-CM

## 2024-11-14 ENCOUNTER — CLINICAL SUPPORT (OUTPATIENT)
Dept: PHYSICAL THERAPY | Facility: CLINIC | Age: 64
End: 2024-11-14
Payer: MEDICAID

## 2024-11-14 DIAGNOSIS — R53.81 PHYSICAL DECONDITIONING: Primary | ICD-10-CM

## 2024-11-14 DIAGNOSIS — I69.398 ABNORMALITY OF GAIT AS LATE EFFECT OF CEREBROVASCULAR ACCIDENT (CVA): ICD-10-CM

## 2024-11-14 DIAGNOSIS — R26.9 ABNORMALITY OF GAIT AS LATE EFFECT OF CEREBROVASCULAR ACCIDENT (CVA): ICD-10-CM

## 2024-11-14 DIAGNOSIS — I63.9 CVA (CEREBRAL VASCULAR ACCIDENT) (MULTI): ICD-10-CM

## 2024-11-14 PROCEDURE — 95851 RANGE OF MOTION MEASUREMENTS: CPT | Mod: GP | Performed by: PHYSICAL THERAPIST

## 2024-11-14 PROCEDURE — 97535 SELF CARE MNGMENT TRAINING: CPT | Mod: GP | Performed by: PHYSICAL THERAPIST

## 2024-11-14 ASSESSMENT — PAIN - FUNCTIONAL ASSESSMENT: PAIN_FUNCTIONAL_ASSESSMENT: 0-10

## 2024-11-14 ASSESSMENT — PAIN SCALES - GENERAL: PAINLEVEL_OUTOF10: 7

## 2024-11-14 NOTE — PROGRESS NOTES
Physical Therapy    Physical Therapy Treatment/Discharge    Patient Name: Pillo Guerin  MRN: [id number]  Today's Date: 11/14/2024  Medicaid  Time Entry:   Time Calculation  Start Time: 1635  Stop Time: 1710  Time Calculation (min): 35 min     PT Therapeutic Procedures Time Entry  Self-Care/Home Mgmt Training: 10  Range Motion Measure, Each Extrem-Range of Motion Testing Time Entry : 25       Assessment:  Pt. Returned today for measurements of his seat height, depth and seat width in order to ensure proper fit of his recliner chair. The patient's height is at 67 inches and weight is 141 lb.  Mr. Guerin declined completing any other therapy for today.  He has been non-compliant with the exercises that were given to him at his evaluation. At this time there is no need for skilled therapy and the patient will be discharged.     Plan: discharge       Current Problem  1. Physical deconditioning        2. CVA (cerebral vascular accident) (Multi)  Follow Up In Physical Therapy      3. Abnormality of gait as late effect of cerebrovascular accident (CVA)                  Subjective    Pt. Reports that nothing is new. No falls.  He is getting fatigued at the end of the day.  He has a walker that he uses when he needs.  His left side is always in pain, he takes Gabapentin and that helps a little bit.  He has a shower chair     Vital Signs: not taken     Pain  Pain Assessment  Pain Assessment: 0-10  0-10 (Numeric) Pain Score: 7  Pain Type: Chronic pain  Pain Location: Hip  Pain Orientation: Left  Pain Radiating Towards: towards knee    Objective         Seat depth: 24 inches  Seat width: 16 inches  Seat height: 21 inches  From back to top of head: 32 inches  Elbow to hip: 10 inches  Height: 67 inches/1.7 meterse  Weight: 141 lb.     Treatments:    Physical Performance Testing:  -measurement of height, weight, seat depth, seat width, elbow to hip in order to ensure proper fit of lift chair    Self Care:  Educated pt. On benefits of  raised toilet seat to increase efficiency  Educated pt. On utilization of non-slip stickers in bathroom to reduce fall risk  Educated pt. On utilizing walker for longer distances to provide energy conservation/reduce pressure on left hip    OP EDUCATION:  -see treatment       Goals:

## 2024-11-15 ENCOUNTER — HOSPITAL ENCOUNTER (OUTPATIENT)
Dept: RADIOLOGY | Facility: CLINIC | Age: 64
Discharge: HOME | End: 2024-11-15
Payer: COMMERCIAL

## 2024-11-15 DIAGNOSIS — C34.11 MALIGNANT NEOPLASM OF UPPER LOBE OF RIGHT LUNG (MULTI): ICD-10-CM

## 2024-11-15 PROCEDURE — 2550000001 HC RX 255 CONTRASTS: Performed by: NURSE PRACTITIONER

## 2024-11-15 PROCEDURE — 74177 CT ABD & PELVIS W/CONTRAST: CPT

## 2024-11-18 RX ORDER — GABAPENTIN 300 MG/1
600 CAPSULE ORAL 2 TIMES DAILY
Qty: 120 CAPSULE | Refills: 0 | OUTPATIENT
Start: 2024-11-18 | End: 2024-12-18

## 2024-11-19 ENCOUNTER — LAB (OUTPATIENT)
Dept: LAB | Facility: CLINIC | Age: 64
End: 2024-11-19
Payer: COMMERCIAL

## 2024-11-19 DIAGNOSIS — C34.11 MALIGNANT NEOPLASM OF UPPER LOBE OF RIGHT LUNG (MULTI): ICD-10-CM

## 2024-11-19 LAB
ALBUMIN SERPL BCP-MCNC: 4.6 G/DL (ref 3.4–5)
ALP SERPL-CCNC: 52 U/L (ref 33–136)
ALT SERPL W P-5'-P-CCNC: 25 U/L (ref 10–52)
ANION GAP SERPL CALC-SCNC: 11 MMOL/L (ref 10–20)
AST SERPL W P-5'-P-CCNC: 21 U/L (ref 9–39)
BASOPHILS # BLD AUTO: 0.05 X10*3/UL (ref 0–0.1)
BASOPHILS NFR BLD AUTO: 0.6 %
BILIRUB SERPL-MCNC: 0.9 MG/DL (ref 0–1.2)
BUN SERPL-MCNC: 11 MG/DL (ref 6–23)
CALCIUM SERPL-MCNC: 9.2 MG/DL (ref 8.6–10.6)
CHLORIDE SERPL-SCNC: 99 MMOL/L (ref 98–107)
CO2 SERPL-SCNC: 29 MMOL/L (ref 21–32)
CORTIS AM PEAK SERPL-MSCNC: 5.3 UG/DL (ref 5–20)
CREAT SERPL-MCNC: 0.68 MG/DL (ref 0.5–1.3)
EGFRCR SERPLBLD CKD-EPI 2021: >90 ML/MIN/1.73M*2
EOSINOPHIL # BLD AUTO: 0.63 X10*3/UL (ref 0–0.7)
EOSINOPHIL NFR BLD AUTO: 7.6 %
ERYTHROCYTE [DISTWIDTH] IN BLOOD BY AUTOMATED COUNT: 16.6 % (ref 11.5–14.5)
GLUCOSE SERPL-MCNC: 82 MG/DL (ref 74–99)
HBV CORE AB SER QL: NONREACTIVE
HBV SURFACE AB SER-ACNC: <3.1 MIU/ML
HBV SURFACE AG SERPL QL IA: NONREACTIVE
HCT VFR BLD AUTO: 37.9 % (ref 41–52)
HGB BLD-MCNC: 12.3 G/DL (ref 13.5–17.5)
IMM GRANULOCYTES # BLD AUTO: 0.03 X10*3/UL (ref 0–0.7)
IMM GRANULOCYTES NFR BLD AUTO: 0.4 % (ref 0–0.9)
LYMPHOCYTES # BLD AUTO: 2.36 X10*3/UL (ref 1.2–4.8)
LYMPHOCYTES NFR BLD AUTO: 28.6 %
MCH RBC QN AUTO: 22 PG (ref 26–34)
MCHC RBC AUTO-ENTMCNC: 32.5 G/DL (ref 32–36)
MCV RBC AUTO: 68 FL (ref 80–100)
MONOCYTES # BLD AUTO: 0.67 X10*3/UL (ref 0.1–1)
MONOCYTES NFR BLD AUTO: 8.1 %
NEUTROPHILS # BLD AUTO: 4.52 X10*3/UL (ref 1.2–7.7)
NEUTROPHILS NFR BLD AUTO: 54.7 %
NRBC BLD-RTO: ABNORMAL /100{WBCS}
PLATELET # BLD AUTO: 316 X10*3/UL (ref 150–450)
POTASSIUM SERPL-SCNC: 4.6 MMOL/L (ref 3.5–5.3)
PROT SERPL-MCNC: 6.8 G/DL (ref 6.4–8.2)
RBC # BLD AUTO: 5.59 X10*6/UL (ref 4.5–5.9)
SODIUM SERPL-SCNC: 134 MMOL/L (ref 136–145)
TSH SERPL-ACNC: 3.76 MIU/L (ref 0.44–3.98)
WBC # BLD AUTO: 8.3 X10*3/UL (ref 4.4–11.3)

## 2024-11-19 PROCEDURE — 86704 HEP B CORE ANTIBODY TOTAL: CPT

## 2024-11-19 PROCEDURE — 80053 COMPREHEN METABOLIC PANEL: CPT

## 2024-11-19 PROCEDURE — 87340 HEPATITIS B SURFACE AG IA: CPT

## 2024-11-19 PROCEDURE — 84443 ASSAY THYROID STIM HORMONE: CPT

## 2024-11-19 PROCEDURE — 86706 HEP B SURFACE ANTIBODY: CPT

## 2024-11-19 PROCEDURE — 82533 TOTAL CORTISOL: CPT

## 2024-11-19 PROCEDURE — 85025 COMPLETE CBC W/AUTO DIFF WBC: CPT

## 2024-11-19 PROCEDURE — 82024 ASSAY OF ACTH: CPT

## 2024-11-19 PROCEDURE — 36415 COLL VENOUS BLD VENIPUNCTURE: CPT

## 2024-11-20 ENCOUNTER — INFUSION (OUTPATIENT)
Dept: HEMATOLOGY/ONCOLOGY | Facility: CLINIC | Age: 64
End: 2024-11-20
Payer: COMMERCIAL

## 2024-11-20 ENCOUNTER — SOCIAL WORK (OUTPATIENT)
Dept: CASE MANAGEMENT | Facility: HOSPITAL | Age: 64
End: 2024-11-20

## 2024-11-20 ENCOUNTER — OFFICE VISIT (OUTPATIENT)
Dept: HEMATOLOGY/ONCOLOGY | Facility: CLINIC | Age: 64
End: 2024-11-20
Payer: COMMERCIAL

## 2024-11-20 VITALS
SYSTOLIC BLOOD PRESSURE: 126 MMHG | DIASTOLIC BLOOD PRESSURE: 57 MMHG | TEMPERATURE: 97.5 F | HEART RATE: 64 BPM | WEIGHT: 139 LBS | OXYGEN SATURATION: 97 % | RESPIRATION RATE: 16 BRPM | BODY MASS INDEX: 20.52 KG/M2

## 2024-11-20 DIAGNOSIS — C34.11 MALIGNANT NEOPLASM OF UPPER LOBE OF RIGHT LUNG (MULTI): ICD-10-CM

## 2024-11-20 DIAGNOSIS — C79.31 SECONDARY MALIGNANT NEOPLASM OF BRAIN (MULTI): Primary | ICD-10-CM

## 2024-11-20 PROCEDURE — 4004F PT TOBACCO SCREEN RCVD TLK: CPT | Performed by: STUDENT IN AN ORGANIZED HEALTH CARE EDUCATION/TRAINING PROGRAM

## 2024-11-20 PROCEDURE — 3074F SYST BP LT 130 MM HG: CPT | Performed by: STUDENT IN AN ORGANIZED HEALTH CARE EDUCATION/TRAINING PROGRAM

## 2024-11-20 PROCEDURE — 2500000004 HC RX 250 GENERAL PHARMACY W/ HCPCS (ALT 636 FOR OP/ED): Mod: JZ,JG | Performed by: STUDENT IN AN ORGANIZED HEALTH CARE EDUCATION/TRAINING PROGRAM

## 2024-11-20 PROCEDURE — 1125F AMNT PAIN NOTED PAIN PRSNT: CPT | Performed by: STUDENT IN AN ORGANIZED HEALTH CARE EDUCATION/TRAINING PROGRAM

## 2024-11-20 PROCEDURE — 1159F MED LIST DOCD IN RCRD: CPT | Performed by: STUDENT IN AN ORGANIZED HEALTH CARE EDUCATION/TRAINING PROGRAM

## 2024-11-20 PROCEDURE — 96413 CHEMO IV INFUSION 1 HR: CPT

## 2024-11-20 PROCEDURE — 3078F DIAST BP <80 MM HG: CPT | Performed by: STUDENT IN AN ORGANIZED HEALTH CARE EDUCATION/TRAINING PROGRAM

## 2024-11-20 PROCEDURE — 99215 OFFICE O/P EST HI 40 MIN: CPT | Mod: 25 | Performed by: STUDENT IN AN ORGANIZED HEALTH CARE EDUCATION/TRAINING PROGRAM

## 2024-11-20 PROCEDURE — 99215 OFFICE O/P EST HI 40 MIN: CPT | Performed by: STUDENT IN AN ORGANIZED HEALTH CARE EDUCATION/TRAINING PROGRAM

## 2024-11-20 PROCEDURE — 99417 PROLNG OP E/M EACH 15 MIN: CPT | Performed by: STUDENT IN AN ORGANIZED HEALTH CARE EDUCATION/TRAINING PROGRAM

## 2024-11-20 RX ORDER — PROCHLORPERAZINE EDISYLATE 5 MG/ML
10 INJECTION INTRAMUSCULAR; INTRAVENOUS EVERY 6 HOURS PRN
OUTPATIENT
Start: 2025-01-01

## 2024-11-20 RX ORDER — PROCHLORPERAZINE EDISYLATE 5 MG/ML
10 INJECTION INTRAMUSCULAR; INTRAVENOUS EVERY 6 HOURS PRN
OUTPATIENT
Start: 2024-12-11

## 2024-11-20 RX ORDER — DIPHENHYDRAMINE HYDROCHLORIDE 50 MG/ML
50 INJECTION INTRAMUSCULAR; INTRAVENOUS AS NEEDED
OUTPATIENT
Start: 2025-01-01

## 2024-11-20 RX ORDER — FAMOTIDINE 10 MG/ML
20 INJECTION INTRAVENOUS ONCE AS NEEDED
OUTPATIENT
Start: 2024-12-11

## 2024-11-20 RX ORDER — PROCHLORPERAZINE MALEATE 10 MG
10 TABLET ORAL EVERY 6 HOURS PRN
OUTPATIENT
Start: 2024-12-11

## 2024-11-20 RX ORDER — DIPHENHYDRAMINE HYDROCHLORIDE 50 MG/ML
50 INJECTION INTRAMUSCULAR; INTRAVENOUS AS NEEDED
OUTPATIENT
Start: 2024-12-11

## 2024-11-20 RX ORDER — ALBUTEROL SULFATE 0.83 MG/ML
3 SOLUTION RESPIRATORY (INHALATION) AS NEEDED
Status: DISCONTINUED | OUTPATIENT
Start: 2024-11-20 | End: 2024-11-20 | Stop reason: HOSPADM

## 2024-11-20 RX ORDER — EPINEPHRINE 0.3 MG/.3ML
0.3 INJECTION SUBCUTANEOUS EVERY 5 MIN PRN
OUTPATIENT
Start: 2025-01-01

## 2024-11-20 RX ORDER — FAMOTIDINE 10 MG/ML
20 INJECTION INTRAVENOUS ONCE AS NEEDED
Status: DISCONTINUED | OUTPATIENT
Start: 2024-11-20 | End: 2024-11-20 | Stop reason: HOSPADM

## 2024-11-20 RX ORDER — HEPARIN SODIUM,PORCINE/PF 10 UNIT/ML
50 SYRINGE (ML) INTRAVENOUS AS NEEDED
OUTPATIENT
Start: 2024-11-20

## 2024-11-20 RX ORDER — EPINEPHRINE 0.3 MG/.3ML
0.3 INJECTION SUBCUTANEOUS EVERY 5 MIN PRN
OUTPATIENT
Start: 2024-12-11

## 2024-11-20 RX ORDER — ALBUTEROL SULFATE 0.83 MG/ML
3 SOLUTION RESPIRATORY (INHALATION) AS NEEDED
OUTPATIENT
Start: 2024-12-11

## 2024-11-20 RX ORDER — HEPARIN 100 UNIT/ML
500 SYRINGE INTRAVENOUS AS NEEDED
Status: DISCONTINUED | OUTPATIENT
Start: 2024-11-20 | End: 2024-11-20 | Stop reason: HOSPADM

## 2024-11-20 RX ORDER — PROCHLORPERAZINE MALEATE 10 MG
10 TABLET ORAL EVERY 6 HOURS PRN
Status: CANCELLED | OUTPATIENT
Start: 2024-11-20

## 2024-11-20 RX ORDER — HEPARIN 100 UNIT/ML
500 SYRINGE INTRAVENOUS AS NEEDED
OUTPATIENT
Start: 2024-11-20

## 2024-11-20 RX ORDER — PROCHLORPERAZINE MALEATE 10 MG
10 TABLET ORAL EVERY 6 HOURS PRN
Status: DISCONTINUED | OUTPATIENT
Start: 2024-11-20 | End: 2024-11-20 | Stop reason: HOSPADM

## 2024-11-20 RX ORDER — ALBUTEROL SULFATE 0.83 MG/ML
3 SOLUTION RESPIRATORY (INHALATION) AS NEEDED
Status: CANCELLED | OUTPATIENT
Start: 2024-11-20

## 2024-11-20 RX ORDER — DIPHENHYDRAMINE HYDROCHLORIDE 50 MG/ML
50 INJECTION INTRAMUSCULAR; INTRAVENOUS AS NEEDED
Status: DISCONTINUED | OUTPATIENT
Start: 2024-11-20 | End: 2024-11-20 | Stop reason: HOSPADM

## 2024-11-20 RX ORDER — PROCHLORPERAZINE MALEATE 10 MG
10 TABLET ORAL EVERY 6 HOURS PRN
OUTPATIENT
Start: 2025-01-01

## 2024-11-20 RX ORDER — EPINEPHRINE 0.3 MG/.3ML
0.3 INJECTION SUBCUTANEOUS EVERY 5 MIN PRN
Status: DISCONTINUED | OUTPATIENT
Start: 2024-11-20 | End: 2024-11-20 | Stop reason: HOSPADM

## 2024-11-20 RX ORDER — FAMOTIDINE 10 MG/ML
20 INJECTION INTRAVENOUS ONCE AS NEEDED
OUTPATIENT
Start: 2025-01-01

## 2024-11-20 RX ORDER — HEPARIN SODIUM,PORCINE/PF 10 UNIT/ML
50 SYRINGE (ML) INTRAVENOUS AS NEEDED
Status: DISCONTINUED | OUTPATIENT
Start: 2024-11-20 | End: 2024-11-20 | Stop reason: HOSPADM

## 2024-11-20 RX ORDER — FAMOTIDINE 10 MG/ML
20 INJECTION INTRAVENOUS ONCE AS NEEDED
Status: CANCELLED | OUTPATIENT
Start: 2024-11-20

## 2024-11-20 RX ORDER — PROCHLORPERAZINE EDISYLATE 5 MG/ML
10 INJECTION INTRAMUSCULAR; INTRAVENOUS EVERY 6 HOURS PRN
Status: DISCONTINUED | OUTPATIENT
Start: 2024-11-20 | End: 2024-11-20 | Stop reason: HOSPADM

## 2024-11-20 RX ORDER — DIPHENHYDRAMINE HYDROCHLORIDE 50 MG/ML
50 INJECTION INTRAMUSCULAR; INTRAVENOUS AS NEEDED
Status: CANCELLED | OUTPATIENT
Start: 2024-11-20

## 2024-11-20 RX ORDER — EPINEPHRINE 0.3 MG/.3ML
0.3 INJECTION SUBCUTANEOUS EVERY 5 MIN PRN
Status: CANCELLED | OUTPATIENT
Start: 2024-11-20

## 2024-11-20 RX ORDER — PROCHLORPERAZINE EDISYLATE 5 MG/ML
10 INJECTION INTRAMUSCULAR; INTRAVENOUS EVERY 6 HOURS PRN
Status: CANCELLED | OUTPATIENT
Start: 2024-11-20

## 2024-11-20 RX ORDER — ALBUTEROL SULFATE 0.83 MG/ML
3 SOLUTION RESPIRATORY (INHALATION) AS NEEDED
OUTPATIENT
Start: 2025-01-01

## 2024-11-20 ASSESSMENT — PAIN SCALES - GENERAL: PAINLEVEL_OUTOF10: 8

## 2024-11-20 NOTE — PROGRESS NOTES
SW met with patient today at [facility] to check in. Patient reports he is managing and got a good report from the provider. SW attempted to discuss Tobacco Treatment Counseling but patient is not ready. SW will continue to provide support.

## 2024-11-20 NOTE — SIGNIFICANT EVENT

## 2024-11-20 NOTE — PROGRESS NOTES
Patient ID: Pillo Guerin is a 64 y.o. male    Primary Care Provider: Rocio Harris, APRN-CNP    DIAGNOSIS AND STAGING  Stage IVB (cT3 cN3 cM1c) poorly differentiated adenocarcinoma of RUL diagnosed on 08/03/23     SITES OF DISEASE  RUL  Supraclavicular nodes  Contralateral lung  Right gluteal muscle   Brain right occipital lobe      MOLECULAR GENOMICS  MICROSATELLITE STATUS: Microsatellite Stable (MAGDIEL)     DISEASE ASSOCIATED GENOMIC FINDINGS:   KRAS p.G13D ([id number] c.38G>A)   PIK3CA p.E542K ([id number] c.1624G>A)   TP53 p.E285K ([id number] c.853G>A)     DISEASE RELEVANT ALTERATIONS NOT DETECTED:   Negative for ALK fusion.   Negative for BRAF V600E.   Negative for EGFR sensitizing mutation.   Negative for KRAS G12C.   Negative for MET exon 14 skipping mutation.   Negative for NTRK fusion.   Negative for RET fusion.   Negative for ROS1 fusion.     PD-L1 TPS 80%     PRIOR THERAPIES  GKRS to right occipital lobe on  24 Gy in 1 fraction on 10/17/23      CURRENT THERAPY  Single agent pembrolizumab since 08/31/23    CURRENT ONCOLOGICAL PROBLEMS  Resolved GIII ICI colitis     HISTORY OF PRESENT ILLNESS  This is a pt with PMH of EtOH use disorder, CVA, CAD c/b NSTEMI s/p 2 stents and stage IVB ([id number]) poorly differentiated adenocarcinoma of RUL diagnosed on 08/03/23.      Patient was initially diagnosed 08/2022 after incidentally found RUL lesion was worked up during admission for hyponatremia presumed to be 2/2 beer potomania. He was then seen by his pulmonologist Dr. Callejas who ordered PET and MRI but this was not obtained  nor were multiple attempts to schedule for oncologic NPV.     I saw him in August 2023 and he endorsed haviing lost 15 lbs since stroke April 2022. His appetite is good, denies dysgeusia. He has slowed down a bit since he had his stroke. He can walk without becoming dyspneic but is limited by his stroke.    On 08/12/23 the patient underwent a bronchoscopy with EBUS:  Accession #: [id number]    Date of Procedure:8/3/2022   Date Reported: 8/9/2022   Date Received: 8/3/2022   Date of Birth / Sex 1960 (Age: 62) / M   Race: WHITE   Submitting Physician: DARYN YOUNG MD   Attending Physician: MD HEVER BROWNE MD   Procedures/Addenda Present     Other External #     FINAL CYTOLOGICAL INTERPRETATION     A. FINE NEEDLE ASPIRATION LYMPH NODE, L 4, CYTOLOGY AND CELL BLOCK:   NO MALIGNANT CELLS IDENTIFIED.   LYMPHOID SAMPLE IS PRESENT.     Note: Cell block shows presence of lymphoid cells along with bronchial cells   contamination.       B. FINE NEEDLE ASPIRATION LYMPH NODE, STATION 7, CYTOLOGY AND CELL BLOCK:   NO MALIGNANT CELLS IDENTIFIED.   LYMPHOID SAMPLE IS PRESENT.       C. FINE NEEDLE ASPIRATION LYMPH NODE, R 11, CYTOLOGY AND CELL BLOCK:   POORLY DIFFERENTIATED CARCINOMA, CONSISTENT WITH ADENOCARCINOMA, SEE NOTE.     Note: A limited panel of immunohistochemical stains performed showed neoplastic   cells staining strongly and diffusely positive with TTF-1 and negative with   p40. Findings are consistent with above.     Staff pathologist: Reviewed by Dr. Ellyn Valles.     Molecular testing has been ordered and results will be issued in   an addendum.     The cell block; C1 contains moderate tumor cellularity representing   roughly 70 % of all nucleated cells.       D. BRONCHO-ALVEOLAR LAVAGE OF RIGHT UPPER LOBE:   RARE ATYPICAL CELLS ARE PRESENT.     08/16/23: PET scan demonstrates multiple RUL FDG avid nodules as well as contra-lateral lung nodules, right hilar, mediastinal and supraclavicular nodes consistent with neoplastic involvement. There is also a large FDG avid right gluteal mass extending into the right SIJ and iliac bone.     08/16/23: MRI brain shows a 8 mm right occipital lobe metastasis with mild associated edema.     08/31/23: Single agent pembrolizumab begins     10/17/23: GKRS to right occipital lobe on  24 Gy in 1 fraction     06/06/24: grade 3 immune-related  diarrhea with episodes of incontinence despite Imodium   Rx prednisone 40 mg/day provided today      PAST MEDICAL HISTORY  CAD s/p 2 stents, CVA, Dupuytren's contracture   BPH  Lumbar radiculopathy   Ir-diarrhea (pembrolizumab) treated with prednisone months of May and      SURGICAL HISTORY  None      SOCIAL HISTORY  Born in [place], lives in [place] with 2 roommates.   Brother Salas is NOK. Drinks 2 24 oz beers a day.   + Tobacco - 50 pack year smoking history, active.   Rare cannabis, acid use in 70s, no additional illicits.    Was working at [employer] in [place] before stroke.      FAMILY HISTORY  Mother  of melanoma in      CURRENT MEDS REVIEWED    ALLERGIES REVIEWED     SUBJECTIVE: Patient doing well today. He walked 1 mile yesterday. He has gained 2 pounds since last visit. He is off prednisone and feels great.    A 13-point review of systems was performed, with significant findings documented above in subjective history.    OBJECTIVE:  Vitals:    24 1342   BP: 126/57   Pulse: 64   Resp: 16   Temp: 36.4 °C (97.5 °F)   SpO2: 97%        Body surface area is 1.75 meters squared.     Wt Readings from Last 5 Encounters:   24 63 kg (139 lb)   10/30/24 62.8 kg (138 lb 5.4 oz)   10/10/24 62.4 kg (137 lb 9.1 oz)   24 61.8 kg (136 lb 3.9 oz)   24 59.6 kg (131 lb 6.3 oz)     ECOGSCORE: 1- Restricted in physically strenuous activity.  Carries out light duty.   Gen: A&O, NAD  Head: Normocephalic, atraumatic  Eyes: no scleral icterus  ENT: mucous membranes moist, no oropharyngeal lesions  Resp: Lungs CTAB  Cardiac: Normal rate, regular rhythm, no murmurs appreciated  Abdomen: Soft, nondistended, nontender, +BS  Neuro: CNII-XII grossly intact  Psych: appropriate mood & affect  Skin: warm, dry, no apparent rashes     Diagnostic Results   Results:  Labs:  Lab Results   Component Value Date    WBC 8.3 2024    HGB 12.3 (L) 2024    HCT 37.9 (L) 2024    MCV 68 (L)  11/19/2024     11/19/2024      Lab Results   Component Value Date    NEUTROABS 4.52 11/19/2024        Lab Results   Component Value Date    GLUCOSE 82 11/19/2024    CALCIUM 9.2 11/19/2024     (L) 11/19/2024    K 4.6 11/19/2024    CO2 29 11/19/2024    CL 99 11/19/2024    BUN 11 11/19/2024    CREATININE 0.68 11/19/2024    MG 2.28 08/06/2024     Lab Results   Component Value Date    ALT 25 11/19/2024    AST 21 11/19/2024    ALKPHOS 52 11/19/2024    BILITOT 0.9 11/19/2024    BILIDIR 0.3 07/31/2022      Lab Results   Component Value Date    ACTH 4.0 (L) 10/29/2024    CORTISOL 5.3 11/19/2024    TSH 3.76 11/19/2024    FREET4 1.14 04/22/2024     CT CAP 11/15/24  IMPRESSION:  CHEST:  1.  Interval minimal increase in the right upper lobe dominant  spiculated lung mass and increase in the soft tissue component of a  another spiculated right upper lobe nodule. No significant change in  the right lower lobe lung nodules and spiculated opacity in the left  upper lobe.  2. No significant change in the scattered mediastinal and bilateral  hilar lymph nodes.      ABDOMEN-PELVIS:  1.  No definite metastatic disease in the abdomen and pelvis.  2. Additional chronic and incidental findings as described above.    Assessment/Plan     Lung cancer (Multi), Clinical: Stage IVB (cT3, cN3, cM1c)  Mr. Pillo Guerin is a 63 YO with Stage IVB ([id number]) poorly differentiated adenocarcinoma of RUL, TPS 80%, lack of actionable genomic alterations noted.    He was started on single agent pembrolizumab since 08/31/23 - however had to be held in May 2024 due to grade III ICI colitis which has completed resolved.     He has now completed 18 cycles pembrolizumab. CT scans from 11/15 personally reviewed showing increase in dominant lesion by ~3 mm in each direction, other lung lesions generally stable. We discussed this and I showed him images. Clinically he is doing well and there are limited options for him in second line. We will  continue with pembrolizumab and obtain scans in January.    #Stage IVB NSCLC  -Continue with c19 pembrolizumab  -Scans prior to c21 pembrolizumab    #Grade III ICI Diarrhea - resolved    #Pruritus, immune related  Continue moisturizing BUE with CeraVe and triamcinolone as needed     #Right occipital lobe 8 mm metastasis   S/p GKRS 24 Gy in 1 fraction on 10/17/23  Repeat MRI brain every 3 months -   Last on 05/15/24 showing no signs of PD  - repeat in 08/2024 awaiting read     #Pain on right gluteal region- resolved   Due to large centrally necrotic and peripherally hypermetabolic mass in the right lower paramediastinal muscle extending into the adjacent subcutaneous tissue and superior right gluteal muscle with involvement of the superior aspect of the right iliac  bone, right florencio sacrum -   He has been taking gabapentin for pain (controlled) in LLE that is chronic      #Stroke in April 2022 and hx of CAD  -   Stable -   On ASA and ticagrelor      #HTN - on amlodipine      #HLD - atorvastatin    Jorge Gray MD  Thoracic Medical Oncology   [address] [place] [address]  Phone: [phone]

## 2024-11-21 LAB — ACTH PLAS-MCNC: 6.3 PG/ML (ref 7.2–63.3)

## 2024-12-04 ENCOUNTER — HOSPITAL ENCOUNTER (OUTPATIENT)
Dept: RADIOLOGY | Facility: CLINIC | Age: 64
Discharge: HOME | End: 2024-12-04
Payer: COMMERCIAL

## 2024-12-04 DIAGNOSIS — C34.90 MALIGNANT NEOPLASM OF LUNG, UNSPECIFIED LATERALITY, UNSPECIFIED PART OF LUNG (MULTI): ICD-10-CM

## 2024-12-04 PROCEDURE — 2550000001 HC RX 255 CONTRASTS: Performed by: NURSE PRACTITIONER

## 2024-12-04 PROCEDURE — 70553 MRI BRAIN STEM W/O & W/DYE: CPT | Performed by: RADIOLOGY

## 2024-12-04 PROCEDURE — A9575 INJ GADOTERATE MEGLUMI 0.1ML: HCPCS | Performed by: NURSE PRACTITIONER

## 2024-12-04 PROCEDURE — 70553 MRI BRAIN STEM W/O & W/DYE: CPT

## 2024-12-04 RX ORDER — GADOTERATE MEGLUMINE 376.9 MG/ML
13 INJECTION INTRAVENOUS
Status: COMPLETED | OUTPATIENT
Start: 2024-12-04 | End: 2024-12-04

## 2024-12-10 ENCOUNTER — LAB (OUTPATIENT)
Dept: LAB | Facility: CLINIC | Age: 64
End: 2024-12-10
Payer: COMMERCIAL

## 2024-12-10 DIAGNOSIS — C34.11 MALIGNANT NEOPLASM OF UPPER LOBE OF RIGHT LUNG (MULTI): ICD-10-CM

## 2024-12-10 LAB
ALBUMIN SERPL BCP-MCNC: 4.7 G/DL (ref 3.4–5)
ALP SERPL-CCNC: 59 U/L (ref 33–136)
ALT SERPL W P-5'-P-CCNC: 22 U/L (ref 10–52)
ANION GAP SERPL CALC-SCNC: 13 MMOL/L (ref 10–20)
AST SERPL W P-5'-P-CCNC: 20 U/L (ref 9–39)
BASOPHILS # BLD AUTO: 0.09 X10*3/UL (ref 0–0.1)
BASOPHILS NFR BLD AUTO: 1 %
BILIRUB SERPL-MCNC: 0.8 MG/DL (ref 0–1.2)
BUN SERPL-MCNC: 9 MG/DL (ref 6–23)
CALCIUM SERPL-MCNC: 9.3 MG/DL (ref 8.6–10.6)
CHLORIDE SERPL-SCNC: 101 MMOL/L (ref 98–107)
CO2 SERPL-SCNC: 27 MMOL/L (ref 21–32)
CREAT SERPL-MCNC: 0.78 MG/DL (ref 0.5–1.3)
EGFRCR SERPLBLD CKD-EPI 2021: >90 ML/MIN/1.73M*2
EOSINOPHIL # BLD AUTO: 0.66 X10*3/UL (ref 0–0.7)
EOSINOPHIL NFR BLD AUTO: 7.4 %
ERYTHROCYTE [DISTWIDTH] IN BLOOD BY AUTOMATED COUNT: 16.2 % (ref 11.5–14.5)
GLUCOSE SERPL-MCNC: 122 MG/DL (ref 74–99)
HCT VFR BLD AUTO: 38.9 % (ref 41–52)
HGB BLD-MCNC: 12.5 G/DL (ref 13.5–17.5)
IMM GRANULOCYTES # BLD AUTO: 0.04 X10*3/UL (ref 0–0.7)
IMM GRANULOCYTES NFR BLD AUTO: 0.4 % (ref 0–0.9)
LYMPHOCYTES # BLD AUTO: 2.66 X10*3/UL (ref 1.2–4.8)
LYMPHOCYTES NFR BLD AUTO: 29.7 %
MCH RBC QN AUTO: 21.8 PG (ref 26–34)
MCHC RBC AUTO-ENTMCNC: 32.1 G/DL (ref 32–36)
MCV RBC AUTO: 68 FL (ref 80–100)
MONOCYTES # BLD AUTO: 0.67 X10*3/UL (ref 0.1–1)
MONOCYTES NFR BLD AUTO: 7.5 %
NEUTROPHILS # BLD AUTO: 4.84 X10*3/UL (ref 1.2–7.7)
NEUTROPHILS NFR BLD AUTO: 54 %
NRBC BLD-RTO: ABNORMAL /100{WBCS}
PLATELET # BLD AUTO: 331 X10*3/UL (ref 150–450)
POTASSIUM SERPL-SCNC: 4.7 MMOL/L (ref 3.5–5.3)
PROT SERPL-MCNC: 6.5 G/DL (ref 6.4–8.2)
RBC # BLD AUTO: 5.74 X10*6/UL (ref 4.5–5.9)
SODIUM SERPL-SCNC: 136 MMOL/L (ref 136–145)
WBC # BLD AUTO: 9 X10*3/UL (ref 4.4–11.3)

## 2024-12-10 PROCEDURE — 36415 COLL VENOUS BLD VENIPUNCTURE: CPT

## 2024-12-10 PROCEDURE — 80053 COMPREHEN METABOLIC PANEL: CPT

## 2024-12-10 PROCEDURE — 85025 COMPLETE CBC W/AUTO DIFF WBC: CPT

## 2024-12-11 ENCOUNTER — TELEPHONE (OUTPATIENT)
Dept: RADIATION ONCOLOGY | Facility: HOSPITAL | Age: 64
End: 2024-12-11
Payer: COMMERCIAL

## 2024-12-11 ENCOUNTER — INFUSION (OUTPATIENT)
Dept: HEMATOLOGY/ONCOLOGY | Facility: CLINIC | Age: 64
End: 2024-12-11
Payer: COMMERCIAL

## 2024-12-11 ENCOUNTER — OFFICE VISIT (OUTPATIENT)
Dept: HEMATOLOGY/ONCOLOGY | Facility: CLINIC | Age: 64
End: 2024-12-11
Payer: COMMERCIAL

## 2024-12-11 VITALS
SYSTOLIC BLOOD PRESSURE: 144 MMHG | RESPIRATION RATE: 18 BRPM | BODY MASS INDEX: 20.8 KG/M2 | OXYGEN SATURATION: 94 % | WEIGHT: 140.87 LBS | DIASTOLIC BLOOD PRESSURE: 70 MMHG | HEART RATE: 62 BPM | TEMPERATURE: 97.2 F

## 2024-12-11 DIAGNOSIS — D64.9 ANEMIA, UNSPECIFIED TYPE: Primary | ICD-10-CM

## 2024-12-11 DIAGNOSIS — K52.1 DIARRHEA DUE TO DRUG: ICD-10-CM

## 2024-12-11 DIAGNOSIS — G89.3 CANCER ASSOCIATED PAIN: ICD-10-CM

## 2024-12-11 DIAGNOSIS — C34.11 MALIGNANT NEOPLASM OF UPPER LOBE OF RIGHT LUNG (MULTI): ICD-10-CM

## 2024-12-11 DIAGNOSIS — C34.11 MALIGNANT NEOPLASM OF UPPER LOBE OF RIGHT LUNG (MULTI): Primary | ICD-10-CM

## 2024-12-11 PROCEDURE — 99213 OFFICE O/P EST LOW 20 MIN: CPT | Mod: 25 | Performed by: NURSE PRACTITIONER

## 2024-12-11 PROCEDURE — 3077F SYST BP >= 140 MM HG: CPT | Performed by: NURSE PRACTITIONER

## 2024-12-11 PROCEDURE — 96413 CHEMO IV INFUSION 1 HR: CPT

## 2024-12-11 PROCEDURE — 1159F MED LIST DOCD IN RCRD: CPT | Performed by: NURSE PRACTITIONER

## 2024-12-11 PROCEDURE — 1125F AMNT PAIN NOTED PAIN PRSNT: CPT | Performed by: NURSE PRACTITIONER

## 2024-12-11 PROCEDURE — 99213 OFFICE O/P EST LOW 20 MIN: CPT | Performed by: NURSE PRACTITIONER

## 2024-12-11 PROCEDURE — 3078F DIAST BP <80 MM HG: CPT | Performed by: NURSE PRACTITIONER

## 2024-12-11 PROCEDURE — 2500000004 HC RX 250 GENERAL PHARMACY W/ HCPCS (ALT 636 FOR OP/ED): Performed by: STUDENT IN AN ORGANIZED HEALTH CARE EDUCATION/TRAINING PROGRAM

## 2024-12-11 RX ORDER — PROCHLORPERAZINE EDISYLATE 5 MG/ML
10 INJECTION INTRAMUSCULAR; INTRAVENOUS EVERY 6 HOURS PRN
Status: DISCONTINUED | OUTPATIENT
Start: 2024-12-11 | End: 2024-12-11 | Stop reason: HOSPADM

## 2024-12-11 RX ORDER — FAMOTIDINE 10 MG/ML
20 INJECTION INTRAVENOUS ONCE AS NEEDED
Status: DISCONTINUED | OUTPATIENT
Start: 2024-12-11 | End: 2024-12-11 | Stop reason: HOSPADM

## 2024-12-11 RX ORDER — HEPARIN 100 UNIT/ML
500 SYRINGE INTRAVENOUS AS NEEDED
Status: DISCONTINUED | OUTPATIENT
Start: 2024-12-11 | End: 2024-12-11 | Stop reason: HOSPADM

## 2024-12-11 RX ORDER — DIPHENHYDRAMINE HYDROCHLORIDE 50 MG/ML
50 INJECTION INTRAMUSCULAR; INTRAVENOUS AS NEEDED
Status: DISCONTINUED | OUTPATIENT
Start: 2024-12-11 | End: 2024-12-11 | Stop reason: HOSPADM

## 2024-12-11 RX ORDER — PROCHLORPERAZINE MALEATE 10 MG
10 TABLET ORAL EVERY 6 HOURS PRN
Status: DISCONTINUED | OUTPATIENT
Start: 2024-12-11 | End: 2024-12-11 | Stop reason: HOSPADM

## 2024-12-11 RX ORDER — PREDNISONE 5 MG/1
5 TABLET ORAL DAILY
Qty: 30 TABLET | Refills: 2 | Status: SHIPPED | OUTPATIENT
Start: 2024-12-11 | End: 2025-12-11

## 2024-12-11 RX ORDER — HEPARIN 100 UNIT/ML
500 SYRINGE INTRAVENOUS AS NEEDED
OUTPATIENT
Start: 2024-12-11

## 2024-12-11 RX ORDER — EPINEPHRINE 0.3 MG/.3ML
0.3 INJECTION SUBCUTANEOUS EVERY 5 MIN PRN
Status: DISCONTINUED | OUTPATIENT
Start: 2024-12-11 | End: 2024-12-11 | Stop reason: HOSPADM

## 2024-12-11 RX ORDER — GABAPENTIN 300 MG/1
600 CAPSULE ORAL 2 TIMES DAILY
Qty: 120 CAPSULE | Refills: 2 | Status: SHIPPED | OUTPATIENT
Start: 2024-12-11 | End: 2025-12-11

## 2024-12-11 RX ORDER — ALBUTEROL SULFATE 0.83 MG/ML
3 SOLUTION RESPIRATORY (INHALATION) AS NEEDED
Status: DISCONTINUED | OUTPATIENT
Start: 2024-12-11 | End: 2024-12-11 | Stop reason: HOSPADM

## 2024-12-11 RX ORDER — HEPARIN SODIUM,PORCINE/PF 10 UNIT/ML
50 SYRINGE (ML) INTRAVENOUS AS NEEDED
OUTPATIENT
Start: 2024-12-11

## 2024-12-11 RX ORDER — HEPARIN SODIUM,PORCINE/PF 10 UNIT/ML
50 SYRINGE (ML) INTRAVENOUS AS NEEDED
Status: DISCONTINUED | OUTPATIENT
Start: 2024-12-11 | End: 2024-12-11 | Stop reason: HOSPADM

## 2024-12-11 ASSESSMENT — PAIN SCALES - GENERAL: PAINLEVEL_OUTOF10: 8

## 2024-12-11 NOTE — SIGNIFICANT EVENT

## 2024-12-11 NOTE — PROGRESS NOTES
Patient ID: Pillo Guerin is a 64 y.o. male    Primary Care Provider: Rocio Harris, APRN-CNP    DIAGNOSIS AND STAGING  Stage IVB (cT3 cN3 cM1c) poorly differentiated adenocarcinoma of RUL diagnosed on 08/03/23     SITES OF DISEASE  RUL  Supraclavicular nodes  Contralateral lung  Right gluteal muscle   Brain right occipital lobe      MOLECULAR GENOMICS  MICROSATELLITE STATUS: Microsatellite Stable (MAGDIEL)     DISEASE ASSOCIATED GENOMIC FINDINGS:   KRAS p.G13D ([id number] c.38G>A)   PIK3CA p.E542K ([id number] c.1624G>A)   TP53 p.E285K ([id number] c.853G>A)     DISEASE RELEVANT ALTERATIONS NOT DETECTED:   Negative for ALK fusion.   Negative for BRAF V600E.   Negative for EGFR sensitizing mutation.   Negative for KRAS G12C.   Negative for MET exon 14 skipping mutation.   Negative for NTRK fusion.   Negative for RET fusion.   Negative for ROS1 fusion.     PD-L1 TPS 80%     PRIOR THERAPIES  GKRS to right occipital lobe on  24 Gy in 1 fraction on 10/17/23      CURRENT THERAPY  Single agent pembrolizumab since 08/31/23    CURRENT ONCOLOGICAL PROBLEMS  Resolved GIII ICI colitis     HISTORY OF PRESENT ILLNESS  This is a pt with PMH of EtOH use disorder, CVA, CAD c/b NSTEMI s/p 2 stents and stage IVB ([id number]) poorly differentiated adenocarcinoma of RUL diagnosed on 08/03/23.      Patient was initially diagnosed 08/2022 after incidentally found RUL lesion was worked up during admission for hyponatremia presumed to be 2/2 beer potomania. He was then seen by his pulmonologist Dr. Callejas who ordered PET and MRI but this was not obtained  nor were multiple attempts to schedule for oncologic NPV.     I saw him in August 2023 and he endorsed haviing lost 15 lbs since stroke April 2022. His appetite is good, denies dysgeusia. He has slowed down a bit since he had his stroke. He can walk without becoming dyspneic but is limited by his stroke.    On 08/12/23 the patient underwent a bronchoscopy with EBUS:  Accession #: [id number]    Date of Procedure:8/3/2022   Date Reported: 8/9/2022   Date Received: 8/3/2022   Date of Birth / Sex 1960 (Age: 62) / M   Race: WHITE   Submitting Physician: DARYN YOUNG MD   Attending Physician: MD HEVER BROWNE MD   Procedures/Addenda Present     Other External #     FINAL CYTOLOGICAL INTERPRETATION     A. FINE NEEDLE ASPIRATION LYMPH NODE, L 4, CYTOLOGY AND CELL BLOCK:   NO MALIGNANT CELLS IDENTIFIED.   LYMPHOID SAMPLE IS PRESENT.     Note: Cell block shows presence of lymphoid cells along with bronchial cells   contamination.       B. FINE NEEDLE ASPIRATION LYMPH NODE, STATION 7, CYTOLOGY AND CELL BLOCK:   NO MALIGNANT CELLS IDENTIFIED.   LYMPHOID SAMPLE IS PRESENT.       C. FINE NEEDLE ASPIRATION LYMPH NODE, R 11, CYTOLOGY AND CELL BLOCK:   POORLY DIFFERENTIATED CARCINOMA, CONSISTENT WITH ADENOCARCINOMA, SEE NOTE.     Note: A limited panel of immunohistochemical stains performed showed neoplastic   cells staining strongly and diffusely positive with TTF-1 and negative with   p40. Findings are consistent with above.     Staff pathologist: Reviewed by Dr. Ellyn Valles.     Molecular testing has been ordered and results will be issued in   an addendum.     The cell block; C1 contains moderate tumor cellularity representing   roughly 70 % of all nucleated cells.       D. BRONCHO-ALVEOLAR LAVAGE OF RIGHT UPPER LOBE:   RARE ATYPICAL CELLS ARE PRESENT.     08/16/23: PET scan demonstrates multiple RUL FDG avid nodules as well as contra-lateral lung nodules, right hilar, mediastinal and supraclavicular nodes consistent with neoplastic involvement. There is also a large FDG avid right gluteal mass extending into the right SIJ and iliac bone.     08/16/23: MRI brain shows a 8 mm right occipital lobe metastasis with mild associated edema.     08/31/23: Single agent pembrolizumab begins     10/17/23: GKRS to right occipital lobe on  24 Gy in 1 fraction     06/06/24: grade 3 immune-related  diarrhea with episodes of incontinence despite Imodium   Rx prednisone 40 mg/day provided today      PAST MEDICAL HISTORY  CAD s/p 2 stents, CVA, Dupuytren's contracture   BPH  Lumbar radiculopathy   Ir-diarrhea (pembrolizumab) treated with prednisone months of May and      SURGICAL HISTORY  None      SOCIAL HISTORY  Born in [place], lives in [place] with 2 roommates.   Brother Salas is NOK. Drinks 2 24 oz beers a day.   + Tobacco - 50 pack year smoking history, active.   Rare cannabis, acid use in 70s, no additional illicits.    Was working at [employer] in [place] before stroke.      FAMILY HISTORY  Mother  of melanoma in      CURRENT MEDS REVIEWED    Current Outpatient Medications:     albuterol 90 mcg/actuation inhaler, Inhale 2 puffs every 4 hours if needed., Disp: , Rfl:     amLODIPine (Norvasc) 5 mg tablet, Take 0.5 tablets (2.5 mg) by mouth once daily., Disp: , Rfl:     diclofenac sodium 1 % kit, Apply 2 g topically every 6 hours if needed., Disp: , Rfl:     loperamide (Imodium A-D) 2 mg tablet, Take it as instructed for diarrhea, Disp: 60 tablet, Rfl: 3    losartan (Cozaar) 50 mg tablet, Take 1 tablet (50 mg) by mouth., Disp: , Rfl:     methocarbamol (Robaxin) 750 mg tablet, Take 1 tablet (750 mg) by mouth 3 times a day as needed., Disp: , Rfl:     nitroglycerin (Nitrostat) 0.4 mg SL tablet, DISSOLVE 1 TABLET UNDER TONGUE EVERY 5 MINUTES FOR 3 DOSES AS NEEDED FOR CHEST PAIN. IF PAIN PERSISTS DIAL 911., Disp: 25 tablet, Rfl: 1    omeprazole (PriLOSEC) 20 mg DR capsule, Do not crush or chew. Take it 30 minutes before breakfast., Disp: 30 capsule, Rfl: 11    ticagrelor (Brilinta) 60 mg tablet, Take 1 tablet (60 mg) by mouth 2 times a day., Disp: 60 tablet, Rfl: 11    triamcinolone (Kenalog) 0.1 % ointment, Apply topically 2 times a day., Disp: 80 g, Rfl: 1    umeclidinium (Incruse Ellipta) 62.5 mcg/actuation inhalation, Inhale 1 puff (62.5 mcg) once daily., Disp: 1 each, Rfl: 11     atorvastatin (Lipitor) 80 mg tablet, TAKE 1 TABLET BY MOUTH ONCE DAILY, Disp: 30 tablet, Rfl: 1    gabapentin (Neurontin) 300 mg capsule, Take 2 capsules (600 mg) by mouth 2 times a day., Disp: 120 capsule, Rfl: 2    metoprolol tartrate (Lopressor) 25 mg tablet, TAKE 1 TABLET BY MOUTH TWO TIMES A DAY, Disp: 60 tablet, Rfl: 1    pantoprazole (ProtoNix) 40 mg EC tablet, Take 1 tablet (40 mg) by mouth once daily. Do not crush, chew, or split., Disp: 30 tablet, Rfl: 2    predniSONE (Deltasone) 5 mg tablet, Take 1 tablet (5 mg) by mouth once daily., Disp: 30 tablet, Rfl: 2    Current Facility-Administered Medications:     acetaminophen (Tylenol) tablet 650 mg, 650 mg, oral, q4h PRN, Angelica Munoz MD    HYDROmorphone (Dilaudid) injection 0.4 mg, 0.4 mg, intravenous, PRN, Angelica Munoz MD, 0.4 mg at 10/17/23 1230    HYDROmorphone (Dilaudid) injection 0.4 mg, 0.4 mg, intravenous, PRN, Angelica Munoz MD, 0.4 mg at 10/17/23 0830    ibuprofen tablet 400 mg, 400 mg, oral, PRN, Angelica Munoz MD    midazolam (Versed) injection 0.5 mg, 0.5 mg, intravenous, PRN, Angelica Munoz MD, 0.5 mg at 10/17/23 1230    midazolam (Versed) injection 0.5 mg, 0.5 mg, intravenous, PRN, Angelica Munoz MD, 0.5 mg at 10/17/23 0830    ondansetron (Zofran) injection 4 mg, 4 mg, intravenous, q4h PRN, Angelica Munoz MD    oxyCODONE (Roxicodone) immediate release tablet 10 mg, 10 mg, oral, q4h PRN, Angelica Munoz MD    Facility-Administered Medications Ordered in Other Visits:     albuterol 2.5 mg /3 mL (0.083 %) nebulizer solution 3 mL, 3 mL, nebulization, PRN, Jorge Gray MD    alteplase (Cathflo Activase) injection 2 mg, 2 mg, intra-catheter, PRN, Haritha Zuniga MD    dextrose 5 % in water (D5W) bolus 500 mL, 500 mL, intravenous, PRN, Jorge Gray MD    diphenhydrAMINE (BENADryl) injection 50 mg, 50 mg, intravenous, PRN, Jorge Gray MD    EPINEPHrine (Epipen) injection syringe 0.3 mg, 0.3 mg, intramuscular, q5  min PRN, Jorge Gray MD    famotidine PF (Pepcid) injection 20 mg, 20 mg, intravenous, Once PRN, Jorge Gray MD    heparin flush 10 unit/mL syringe 50 Units, 50 Units, intra-catheter, PRN, Haritha Zuniga MD    heparin flush 100 unit/mL syringe 500 Units, 500 Units, intra-catheter, PRN, Haritha Zuniga MD    methylPREDNISolone sod succinate (SOLU-Medrol) 40 mg/mL injection 40 mg, 40 mg, intravenous, PRN, Jorge Gray MD    prochlorperazine (Compazine) injection 10 mg, 10 mg, intravenous, q6h PRN, Jorge Gray MD    prochlorperazine (Compazine) tablet 10 mg, 10 mg, oral, q6h PRN, Jorge Gray MD    sodium chloride 0.9 % bolus 500 mL, 500 mL, intravenous, PRN, Jorge Gray MD    ALLERGIES REVIEWED     SUBJECTIVE:   Patient present for routine follow-up evaluation, doing well today. He remains active. He does all the shopping and much of the house work for his home. No specific complaints today. He denies any fevers, chills or night sweats. No cough, chest pain or shortness of breath. No nausea or vomiting. No constipation or diarrhea. No urinary symptoms. No rash. No neuropathy.     OBJECTIVE:  /70   Pulse 62   Temp 36.2 °C (97.2 °F)   Resp 18   Wt 63.9 kg (140 lb 14 oz)   SpO2 94%   BMI 20.80 kg/m²     Wt Readings from Last 5 Encounters:   24 63.9 kg (140 lb 14 oz)   24 63 kg (139 lb)   10/30/24 62.8 kg (138 lb 5.4 oz)   10/10/24 62.4 kg (137 lb 9.1 oz)   24 61.8 kg (136 lb 3.9 oz)     Physical Exam:  ECO-1  Pain: 0  Constitutional: Well developed, awake/alert/oriented x3, no distress, alert and cooperative  Eyes: PER. sclera anicteric  ENMT: Oral mucosa moist, no lesions or thrush identified  Respiratory/Thorax: Breathing is non-labored. Lungs are clear to auscultation bilaterally. No adventitious breath sounds  Cardiovascular: S1-S2. Regular rate and rhythm. No murmurs, rubs, or gallops appreciated  Gastrointestinal: Abdomen soft nontender,  nondistended, normal active bowel sounds.  Musculoskeletal: ROM intact, no joint swelling, normal strength  Extremities: normal extremities, no cyanosis, no edema, no clubbing  Lymphatics: no palpable lymphadenopathy  Skin: no rash  Neurologic: alert and oriented x3. Nonfocal exam. No myoclonus  Psychological: Pleasant, appropriate and easily engaged         Diagnostic Results   Results:  Labs  Results from last 7 days   Lab Units 12/10/24  1008   WBC AUTO x10*3/uL 9.0   HEMOGLOBIN g/dL 12.5*   HEMATOCRIT % 38.9*   PLATELETS AUTO x10*3/uL 331   NEUTROS ABS x10*3/uL 4.84   LYMPHS ABS AUTO x10*3/uL 2.66   MONOS ABS AUTO x10*3/uL 0.67   EOS ABS AUTO x10*3/uL 0.66   NEUTROS PCT AUTO % 54.0   LYMPHS PCT AUTO % 29.7   MONOS PCT AUTO % 7.5   EOS PCT AUTO % 7.4      Results from last 7 days   Lab Units 12/10/24  1008   GLUCOSE mg/dL 122*   SODIUM mmol/L 136   POTASSIUM mmol/L 4.7   CHLORIDE mmol/L 101   CO2 mmol/L 27   BUN mg/dL 9   CREATININE mg/dL 0.78   EGFR mL/min/1.73m*2 >90   CALCIUM mg/dL 9.3   ALBUMIN g/dL 4.7   PROTEIN TOTAL g/dL 6.5   BILIRUBIN TOTAL mg/dL 0.8   ALK PHOS U/L 59   ALT U/L 22   AST U/L 20       Lab Results   Component Value Date    ACTH 4.0 (L) 10/29/2024    CORTISOL 5.3 11/19/2024    TSH 3.76 11/19/2024    FREET4 1.14 04/22/2024     CT CAP 11/15/24  IMPRESSION:  CHEST:  1.  Interval minimal increase in the right upper lobe dominant spiculated lung mass and increase in the soft tissue component of a  another spiculated right upper lobe nodule. No significant change in the right lower lobe lung nodules and spiculated opacity in the left  upper lobe.   2. No significant change in the scattered mediastinal and bilateral hilar lymph nodes.      ABDOMEN-PELVIS:  1.  No definite metastatic disease in the abdomen and pelvis.  2. Additional chronic and incidental findings as described above.    MRI Brain 12/4/2024  IMPRESSION:  1. No new enhancing lesions suggestive of brain metastasis. The small enhancing  lesion in the bony calvarium on the left is not measurably changed from the previous examination. It is indeterminate. Osseous metastasis among others could give this appearance.  2. Nonspecific white matter changes most likely represent small-vessel ischemic disease in a patient of this age. There is involvement of the jagruti. There are also likely several remote lacunar infarcts. No evidence of acute ischemic injury.  3. Diffuse parenchymal volume loss.  4. Inflammatory changes of the paranasal sinuses. There is also interval increased opacification of left-sided mastoid air cells and  possible fluid in the left middle ear cavity. Correlate with concern for otomastoiditis.    Assessment/Plan     Lung cancer (Multi), Clinical: Stage IVB (cT3, cN3, cM1c)  Mr. Pillo Guerin is a 65 YO with Stage IVB ([id number]) poorly differentiated adenocarcinoma of RUL, TPS 80%, lack of actionable genomic alterations noted.    He was started on single agent pembrolizumab since 08/31/23 - however had to be held in May 2024 due to grade III ICI colitis which has completed resolved.     He has now completed 18 cycles pembrolizumab. CT scans from 11/15 personally reviewed showing increase in dominant lesion by ~3 mm in each direction, other lung lesions generally stable. We discussed this and I showed him images. Clinically he is doing well and there are limited options for him in second line. We will continue with pembrolizumab and obtain scans in January.    #Stage IVB NSCLC  -Continue with c20 pembrolizumab  -Scans prior to c21 pembrolizumab - ordered     #Grade III ICI Diarrhea - resolved    #Pruritus, immune related  Continue moisturizing BUE with CeraVe and triamcinolone as needed     #Right occipital lobe 8 mm metastasis   S/p GKRS 24 Gy in 1 fraction on 10/17/23  Stable MRI 12/4/2024  Repeat MRI brain every 3 months -        #Pain on right gluteal region- resolved   Due to large centrally necrotic and peripherally hypermetabolic  mass in the right lower paramediastinal muscle extending into the adjacent subcutaneous tissue and superior right gluteal muscle with involvement of the superior aspect of the right iliac  bone, right florencio sacrum -   He has been taking gabapentin for pain (controlled) in LLE that is chronic      #Stroke in April 2022 and hx of CAD  -   Stable -   On ASA and ticagrelor      #HTN - on amlodipine      #HLD - atorvastatin    #Microcytic Anemia - Chronic in nature. Iron panel and ferritin sent today. If found to be normal will send hemoglobin electrophoresis.   Has never been told or heard about family history of thalassemia or other blood disorders.     Damian Carpenter, APRN-CNP  [facility]  Thoracic + [facility]&N Medical Oncology     I spent a total of 30+ minutes on the date of the service which included preparing to see the patient, face-to-face patient care, completing clinical documentation, obtaining and / or reviewing separately obtained history, counseling and educating the patient/family/caregiver, ordering medications, tests, or procedures, communicating with other healthcare providers (not separately reported), independently interpreting results, not separately reported, and communicating results to the patient/family/caregiver, Name and date of birth verified.

## 2024-12-11 NOTE — TELEPHONE ENCOUNTER
Appointment on 12/12/2024 at 1:30 Pt confirmed appointment changed to virtual.     Juanita Sher MA

## 2024-12-12 ENCOUNTER — HOSPITAL ENCOUNTER (OUTPATIENT)
Dept: RADIATION ONCOLOGY | Facility: HOSPITAL | Age: 64
Setting detail: RADIATION/ONCOLOGY SERIES
Discharge: HOME | End: 2024-12-12
Payer: COMMERCIAL

## 2024-12-12 DIAGNOSIS — C34.90 MALIGNANT NEOPLASM OF LUNG, UNSPECIFIED LATERALITY, UNSPECIFIED PART OF LUNG (MULTI): ICD-10-CM

## 2024-12-12 PROCEDURE — 99214 OFFICE O/P EST MOD 30 MIN: CPT | Mod: 95 | Performed by: NURSE PRACTITIONER

## 2024-12-12 PROCEDURE — 99214 OFFICE O/P EST MOD 30 MIN: CPT | Performed by: NURSE PRACTITIONER

## 2024-12-12 NOTE — PROGRESS NOTES
"Radiation Oncology Follow-Up    Patient Name:  Pillo Guerin  MRN:  [id number]  :  1960    Referring Provider: Fadumo Carranza, APR*  Primary Care Provider: AMAN Roland  Care Team: Patient Care Team:  AMAN Roland as PCP - General (Family Medicine)  Zuleyma Vines MD as Consulting Physician (Hematology and Oncology)  Arslan Min RN as Nurse Navigator (Hematology and Oncology)  FABIEN Garrett as  ()  Jorge Gray MD as Consulting Physician (Hematology and Oncology)  Virtual or Telephone Consent    A telephone visit (audio only) between the patient (at the originating site) and the provider (at the distant site) was utilized to provide this telehealth service.   Verbal consent was requested and obtained from Pillo Guerin on this date, 24 for a telehealth visit.    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  Systemic therapy with pembrolizumab initiated 2023    SUBJECTIVE  History of Present Illness:   Telehealth visit with Mr. Guerin today for routine radiation follow up and review of MRI of  brain done earlier today at [facility]. He also had infusion today.   He says he is feeling well except has some issues at times with insomnia.  He does take melatonin prn which helps.  He continues to smoke less than 1 ppd of cigarettes and says he is moving toward quitting.  He denies SOB or SANCHEZ however. He has no new neurologic issues and has some persistent dragging of left leg after a previous stroke.  No headaches, seizures, visual changes or falls. He has diarrhea 2-3 times most days and takes 2-3 immodium per day.  Mild pruritic skin rash from immunotherapy.  Uses a cream which helps. No fever, chills, cough, hemoptysis, N/V or bony pain. He endorses \"electric shock\" " type pain sometimes down his legs. He continues pembrolizumab infusions q 3 weeks. MRI without evidence for progression.     Review of Systems:    Review of Systems   All other systems reviewed and are negative.    OBJECTIVE    Current Outpatient Medications:   •  albuterol 90 mcg/actuation inhaler, Inhale 2 puffs every 4 hours if needed., Disp: , Rfl:   •  amLODIPine (Norvasc) 5 mg tablet, Take 0.5 tablets (2.5 mg) by mouth once daily., Disp: , Rfl:   •  atorvastatin (Lipitor) 80 mg tablet, TAKE 1 TABLET BY MOUTH ONCE DAILY, Disp: 30 tablet, Rfl: 1  •  diclofenac sodium 1 % kit, Apply 2 g topically every 6 hours if needed., Disp: , Rfl:   •  gabapentin (Neurontin) 300 mg capsule, Take 2 capsules (600 mg) by mouth 2 times a day., Disp: 120 capsule, Rfl: 2  •  loperamide (Imodium A-D) 2 mg tablet, Take it as instructed for diarrhea, Disp: 60 tablet, Rfl: 3  •  losartan (Cozaar) 50 mg tablet, Take 1 tablet (50 mg) by mouth., Disp: , Rfl:   •  methocarbamol (Robaxin) 750 mg tablet, Take 1 tablet (750 mg) by mouth 3 times a day as needed., Disp: , Rfl:   •  metoprolol tartrate (Lopressor) 25 mg tablet, TAKE 1 TABLET BY MOUTH TWO TIMES A DAY, Disp: 60 tablet, Rfl: 1  •  nitroglycerin (Nitrostat) 0.4 mg SL tablet, DISSOLVE 1 TABLET UNDER TONGUE EVERY 5 MINUTES FOR 3 DOSES AS NEEDED FOR CHEST PAIN. IF PAIN PERSISTS DIAL 911., Disp: 25 tablet, Rfl: 1  •  omeprazole (PriLOSEC) 20 mg DR capsule, Do not crush or chew. Take it 30 minutes before breakfast., Disp: 30 capsule, Rfl: 11  •  pantoprazole (ProtoNix) 40 mg EC tablet, Take 1 tablet (40 mg) by mouth once daily. Do not crush, chew, or split., Disp: 30 tablet, Rfl: 2  •  predniSONE (Deltasone) 5 mg tablet, Take 1 tablet (5 mg) by mouth once daily., Disp: 30 tablet, Rfl: 2  •  ticagrelor (Brilinta) 60 mg tablet, Take 1 tablet (60 mg) by mouth 2 times a day., Disp: 60 tablet, Rfl: 11  •  triamcinolone (Kenalog) 0.1 % ointment, Apply topically 2 times a day., Disp: 80 g, Rfl:  1  •  umeclidinium (Incruse Ellipta) 62.5 mcg/actuation inhalation, Inhale 1 puff (62.5 mcg) once daily., Disp: 1 each, Rfl: 11    Current Facility-Administered Medications:   •  acetaminophen (Tylenol) tablet 650 mg, 650 mg, oral, q4h PRN, Angelica Munoz MD  •  HYDROmorphone (Dilaudid) injection 0.4 mg, 0.4 mg, intravenous, PRN, Angelica Munoz MD, 0.4 mg at 10/17/23 1230  •  HYDROmorphone (Dilaudid) injection 0.4 mg, 0.4 mg, intravenous, PRN, Angelica Munoz MD, 0.4 mg at 10/17/23 0830  •  ibuprofen tablet 400 mg, 400 mg, oral, PRN, Angelica Munoz MD  •  midazolam (Versed) injection 0.5 mg, 0.5 mg, intravenous, PRN, Angelica Munoz MD, 0.5 mg at 10/17/23 1230  •  midazolam (Versed) injection 0.5 mg, 0.5 mg, intravenous, PRN, Angelica Munoz MD, 0.5 mg at 10/17/23 0830  •  ondansetron (Zofran) injection 4 mg, 4 mg, intravenous, q4h PRN, Angelica Munoz MD  •  oxyCODONE (Roxicodone) immediate release tablet 10 mg, 10 mg, oral, q4h PRN, Angelica Munoz MD     RESULTS:   MR brain w and wo IV contrast    Result Date: 12/5/2024  Interpreted By:  Tanja Vasquez, STUDY: MR BRAIN W AND WO IV CONTRAST;  12/4/2024 11:21 am   INDICATION: Signs/Symptoms:Metastatic lung cancer to the brain s/p gamma knife srs. Surveillance imaging..   COMPARISON: August 28, 2024   ACCESSION NUMBER(S): [id number]   ORDERING CLINICIAN: MARY ANN STANFORD   TECHNIQUE: Axial T2, FLAIR, DWI, gradient echo T2 and  T1 weighted images of brain were acquired. Post contrast T1 weighted images were acquired after administration of 13 mL Dotarem gadolinium based intravenous contrast.   FINDINGS: CSF Spaces: There is again prominence of ventricles and sulci compatible with parenchymal volume loss.   Parenchyma: There is no diffusion restriction abnormality to suggest acute infarct.  There is again an irregularly-shaped focus of encephalomalacia and gliosis in the posterior right frontal lobe. There are patchy and confluent  hyperintensities on FLAIR and T2 weighted imaging in the subcortical and periventricular white matter. There is subtle involvement of the jagruti. Prominent perivascular spaces and/or remote lacunar infarcts are demonstrated in the basal ganglia, thalami, and external capsules. No new enhancing lesions are identified. There is no mass effect or midline shift.   Paranasal Sinuses and Mastoids: Mucosal thickening and retention cysts or polyps are noted in the maxillary sinuses. There is partial to complete opacification of numerous ethmoid air cells. There is mucosal thickening within the sphenoid and frontal sinuses. There is opacification of numerous left-sided mastoid air cells. There may be fluid in the left middle ear cavity.   There is again an enhancing lesion within the greater wing of the left sphenoid bone measuring approximately 5 mm, similar from the previous examination. Given the patient's history, osseous metastasis can not be excluded.       1. No new enhancing lesions suggestive of brain metastasis. The small enhancing lesion in the bony calvarium on the left is not measurably changed from the previous examination. It is indeterminate. Osseous metastasis among others could give this appearance. 2. Nonspecific white matter changes most likely represent small-vessel ischemic disease in a patient of this age. There is involvement of the jagruti. There are also likely several remote lacunar infarcts. No evidence of acute ischemic injury. 3. Diffuse parenchymal volume loss. 4. Inflammatory changes of the paranasal sinuses. There is also interval increased opacification of left-sided mastoid air cells and possible fluid in the left middle ear cavity. Correlate with concern for otomastoiditis.       MACRO: None   Signed by: Tanja Vasquez 12/5/2024 10:08 AM Dictation workstation:   [id number]    CT chest abdomen pelvis w IV contrast    Result Date: 11/17/2024  Interpreted By:  Reymundo Neri, STUDY: CT  CHEST ABDOMEN PELVIS W IV CONTRAST;  11/15/2024 1:35 pm   INDICATION: Signs/Symptoms:stage IVB NSCLC on pembrolizumab.   ,C34.11 Malignant neoplasm of upper lobe, right bronchus or lung   COMPARISON: CT chest, abdomen and pelvis 08/26/2024.   ACCESSION NUMBER(S): [id number]   ORDERING CLINICIAN: CARIN ROMERO   TECHNIQUE: CT of the chest, abdomen, and pelvis was performed.  Contiguous axial images were obtained at 3 mm slice thickness through the chest, abdomen and pelvis. Coronal and sagittal reconstructions at 3 mm slice thickness were performed. 75 ML of Omnipaque 350 was administered intravenously without immediate complication.   FINDINGS: CHEST:   LUNG/PLEURA/LARGE AIRWAYS: Interval minimal increase in the spiculated right upper lobe lung mass which measures 3.0 x 2.3 cm compared to 2.7 x 2.2 cm previously. There is also increase in the anterosuperior extension from the mass as well as increase in the adjacent pleural thickening and retraction. Interval mild increase in the solid component of another spiculated nodule in the right upper lobe medially measuring 7 mm (series 202, image 73).   Spiculated nodule in the left upper lobe nodule abutting the left major fissure is unchanged measuring 10 x 10 mm with the retraction of the fissure. Stable right lower lobe solid nodules measuring up to 9 mm (series 202, image 219). Subpleural scarring/atelectasis in the right lower lobe is similar to prior. Biapical scarring is similar. There are extensive centrilobular emphysema changes in both lungs. Trachea and right and left main bronchi are patent. No pleural effusion or pneumothorax.   VESSELS: Aorta and pulmonary arteries are normal caliber.  Moderate atherosclerotic changes are noted of the aorta and branching vessels. Severe coronary artery calcifications are present.   HEART: Heart is normal in size. No pericardial effusion.   MEDIASTINUM AND SHANTA: No significant interval change in multiple mediastinal and  bilateral hilar lymph nodes. For example an AP window node measures 13 x 8 mm (series 201, image 34). A right hilar lymph node measures a 12 x 10 mm (series 201, image 42). A left hilar lymph node measures a 9 x 8 mm (series 201, image 49). No new supraclavicular or axillary lymphadenopathy. Esophagus is within normal limits.   CHEST WALL AND LOWER NECK: No soft tissue masses in the chest wall. Thyroid gland is within normal limits.   ABDOMEN:   LIVER: Liver is mildly enlarged measuring 18 cm craniocaudally. No focal liver lesion is seen.   BILE DUCTS: No intra or extrahepatic bile duct dilatation.   GALLBLADDER: The gallbladder is nondistended and without evidence of radiopaque stones.   PANCREAS: The pancreas appears unremarkable without evidence of ductal dilatation or masses.   SPLEEN: The spleen is normal in size and demonstrates multiple calcific foci.   ADRENAL GLANDS: No adrenal nodule or thickening.   KIDNEYS AND URETERS: The kidneys are normal in size and enhance symmetrically. A few bilateral renal cortical hypodensities are noted. No hydroureteronephrosis or nephroureterolithiasis is identified.   PELVIS:   BLADDER: The urinary bladder appears normal without abnormal wall thickening.   REPRODUCTIVE ORGANS: Prostate gland is mildly enlarged.   BOWEL: Stomach is underdistended and shows diffuse gastric wall thickening which can be due to underdistention. Duodenum is within normal limits. Small and large bowel loops are normal in caliber. Moderate colonic stool burden is seen. Numerous diverticula are seen predominantly in the sigmoid colon. Normal appendix.     VESSELS: Moderate atherosclerotic changes noted in the abdominal aorta. No aortic aneurysm. IVC is within normal limits. Portal vein, splenic vein and SMV are patent.   PERITONEUM/RETROPERITONEUM/LYMPH NODES: No ascites or fluid collection in the abdomen and pelvis. No significant abdominopelvic lymphadenopathy.   BONE AND SOFT TISSUE: Similar mixed  lytic sclerotic area involving the right posterior iliac bone. Left hip hardware is in place. Degenerative changes noted throughout the thoracolumbar spine. No soft tissue masses in the abdominal wall.       CHEST: 1.  Interval minimal increase in the right upper lobe dominant spiculated lung mass and increase in the soft tissue component of a another spiculated right upper lobe nodule. No significant change in the right lower lobe lung nodules and spiculated opacity in the left upper lobe. 2. No significant change in the scattered mediastinal and bilateral hilar lymph nodes.   ABDOMEN-PELVIS: 1.  No definite metastatic disease in the abdomen and pelvis. 2. Additional chronic and incidental findings as described above.     MACRO: None   Signed by: Reymundo Neri 11/17/2024 10:23 AM Dictation workstation:   [id number]      ASSESSMENT/PLAN:  64 y.o. male with metastatic lung cancer to the brain s/p gamma knife SRS.  No new lesions noted on recent MRI of brain.  He continues systemic immunotherapy.      Plan: MRI of brain in 3-4 mo with rad onc follow up after scan. Discussed smoking cessation again with pt. He will call with any questions or concerns.     Laverne Carranza CNP  [phone]  Physical Exam

## 2025-01-03 ENCOUNTER — HOSPITAL ENCOUNTER (OUTPATIENT)
Dept: RADIOLOGY | Facility: CLINIC | Age: 65
Discharge: HOME | End: 2025-01-03
Payer: COMMERCIAL

## 2025-01-03 ENCOUNTER — LAB (OUTPATIENT)
Dept: LAB | Facility: CLINIC | Age: 65
End: 2025-01-03
Payer: COMMERCIAL

## 2025-01-03 DIAGNOSIS — C34.11 MALIGNANT NEOPLASM OF UPPER LOBE OF RIGHT LUNG (MULTI): ICD-10-CM

## 2025-01-03 DIAGNOSIS — C79.31 SECONDARY MALIGNANT NEOPLASM OF BRAIN (MULTI): ICD-10-CM

## 2025-01-03 DIAGNOSIS — D64.9 ANEMIA, UNSPECIFIED TYPE: ICD-10-CM

## 2025-01-03 LAB
ALBUMIN SERPL BCP-MCNC: 5 G/DL (ref 3.4–5)
ALP SERPL-CCNC: 60 U/L (ref 33–136)
ALT SERPL W P-5'-P-CCNC: 21 U/L (ref 10–52)
ANION GAP SERPL CALC-SCNC: 16 MMOL/L (ref 10–20)
AST SERPL W P-5'-P-CCNC: 19 U/L (ref 9–39)
BASOPHILS # BLD AUTO: 0.09 X10*3/UL (ref 0–0.1)
BASOPHILS NFR BLD AUTO: 1.2 %
BILIRUB SERPL-MCNC: 1.2 MG/DL (ref 0–1.2)
BUN SERPL-MCNC: 12 MG/DL (ref 6–23)
CALCIUM SERPL-MCNC: 9.5 MG/DL (ref 8.6–10.6)
CHLORIDE SERPL-SCNC: 96 MMOL/L (ref 98–107)
CO2 SERPL-SCNC: 25 MMOL/L (ref 21–32)
CORTIS AM PEAK SERPL-MSCNC: 5.2 UG/DL (ref 5–20)
CREAT SERPL-MCNC: 0.65 MG/DL (ref 0.5–1.3)
EGFRCR SERPLBLD CKD-EPI 2021: >90 ML/MIN/1.73M*2
EOSINOPHIL # BLD AUTO: 0.55 X10*3/UL (ref 0–0.7)
EOSINOPHIL NFR BLD AUTO: 7.2 %
ERYTHROCYTE [DISTWIDTH] IN BLOOD BY AUTOMATED COUNT: 15.8 % (ref 11.5–14.5)
FERRITIN SERPL-MCNC: 249 NG/ML (ref 20–300)
GLUCOSE SERPL-MCNC: 90 MG/DL (ref 74–99)
HCT VFR BLD AUTO: 40 % (ref 41–52)
HGB BLD-MCNC: 12.8 G/DL (ref 13.5–17.5)
IMM GRANULOCYTES # BLD AUTO: 0.04 X10*3/UL (ref 0–0.7)
IMM GRANULOCYTES NFR BLD AUTO: 0.5 % (ref 0–0.9)
IRON SATN MFR SERPL: 44 % (ref 25–45)
IRON SERPL-MCNC: 141 UG/DL (ref 35–150)
LYMPHOCYTES # BLD AUTO: 2.03 X10*3/UL (ref 1.2–4.8)
LYMPHOCYTES NFR BLD AUTO: 26.6 %
MCH RBC QN AUTO: 21.3 PG (ref 26–34)
MCHC RBC AUTO-ENTMCNC: 32 G/DL (ref 32–36)
MCV RBC AUTO: 67 FL (ref 80–100)
MONOCYTES # BLD AUTO: 0.59 X10*3/UL (ref 0.1–1)
MONOCYTES NFR BLD AUTO: 7.7 %
NEUTROPHILS # BLD AUTO: 4.33 X10*3/UL (ref 1.2–7.7)
NEUTROPHILS NFR BLD AUTO: 56.8 %
NRBC BLD-RTO: ABNORMAL /100{WBCS}
PLATELET # BLD AUTO: 315 X10*3/UL (ref 150–450)
POTASSIUM SERPL-SCNC: 4.4 MMOL/L (ref 3.5–5.3)
PROT SERPL-MCNC: 7.2 G/DL (ref 6.4–8.2)
RBC # BLD AUTO: 6 X10*6/UL (ref 4.5–5.9)
SODIUM SERPL-SCNC: 133 MMOL/L (ref 136–145)
TIBC SERPL-MCNC: 324 UG/DL (ref 240–445)
TSH SERPL-ACNC: 2.47 MIU/L (ref 0.44–3.98)
UIBC SERPL-MCNC: 183 UG/DL (ref 110–370)
WBC # BLD AUTO: 7.6 X10*3/UL (ref 4.4–11.3)

## 2025-01-03 PROCEDURE — 71260 CT THORAX DX C+: CPT

## 2025-01-03 PROCEDURE — 85025 COMPLETE CBC W/AUTO DIFF WBC: CPT

## 2025-01-03 PROCEDURE — 83540 ASSAY OF IRON: CPT

## 2025-01-03 PROCEDURE — 82728 ASSAY OF FERRITIN: CPT

## 2025-01-03 PROCEDURE — 82024 ASSAY OF ACTH: CPT

## 2025-01-03 PROCEDURE — 82533 TOTAL CORTISOL: CPT

## 2025-01-03 PROCEDURE — 36415 COLL VENOUS BLD VENIPUNCTURE: CPT

## 2025-01-03 PROCEDURE — 2550000001 HC RX 255 CONTRASTS: Performed by: STUDENT IN AN ORGANIZED HEALTH CARE EDUCATION/TRAINING PROGRAM

## 2025-01-03 PROCEDURE — 84075 ASSAY ALKALINE PHOSPHATASE: CPT

## 2025-01-03 PROCEDURE — 84443 ASSAY THYROID STIM HORMONE: CPT

## 2025-01-03 RX ADMIN — IOHEXOL 75 ML: 350 INJECTION, SOLUTION INTRAVENOUS at 12:24

## 2025-01-06 ENCOUNTER — NURSE TRIAGE (OUTPATIENT)
Dept: ADMISSION | Facility: HOSPITAL | Age: 65
End: 2025-01-06
Payer: COMMERCIAL

## 2025-01-06 DIAGNOSIS — C34.11 MALIGNANT NEOPLASM OF UPPER LOBE OF RIGHT LUNG (MULTI): Primary | ICD-10-CM

## 2025-01-06 LAB — ACTH PLAS-MCNC: 11.5 PG/ML (ref 7.2–63.3)

## 2025-01-06 RX ORDER — OXYCODONE HYDROCHLORIDE 5 MG/1
5 TABLET ORAL EVERY 6 HOURS PRN
Qty: 15 TABLET | Refills: 0 | Status: SHIPPED | OUTPATIENT
Start: 2025-01-06 | End: 2025-01-13

## 2025-01-06 NOTE — TELEPHONE ENCOUNTER
Per Dr. Gray: pain medication sent to local pharmacy- pt updated. Aware to report to ED if new/worsening dyspnea.   Confirmed FUV on 1/8

## 2025-01-06 NOTE — TELEPHONE ENCOUNTER
Pt had MRI on Friday- states that today he started having pain upper chest/bilat upper arms. Pain radiates into back as well. Denies new/worsening dyspnea.   Pain worsens with movement/especially lifting of arms.   Pt denies unilateral weakness. Pt does have  a cough, but not new/worse.   Pt has infusion and FUV on 1/8.   Pt not taking any pain medications. Using gabapentin daily as directed.

## 2025-01-06 NOTE — PROGRESS NOTES
Patient ID: Pillo Guerin is a 64 y.o. male    Primary Care Provider: Rocio Harris, APRN-CNP    DIAGNOSIS AND STAGING  Stage IVB (cT3 cN3 cM1c) poorly differentiated adenocarcinoma of RUL diagnosed on 08/03/23     SITES OF DISEASE  RUL  Supraclavicular nodes  Contralateral lung  Right gluteal muscle   Brain right occipital lobe      MOLECULAR GENOMICS  MICROSATELLITE STATUS: Microsatellite Stable (MAGDIEL)     DISEASE ASSOCIATED GENOMIC FINDINGS:   KRAS p.G13D ([id number] c.38G>A)   PIK3CA p.E542K ([id number] c.1624G>A)   TP53 p.E285K ([id number] c.853G>A)     DISEASE RELEVANT ALTERATIONS NOT DETECTED:   Negative for ALK fusion.   Negative for BRAF V600E.   Negative for EGFR sensitizing mutation.   Negative for KRAS G12C.   Negative for MET exon 14 skipping mutation.   Negative for NTRK fusion.   Negative for RET fusion.   Negative for ROS1 fusion.     PD-L1 TPS 80%     PRIOR THERAPIES  GKRS to right occipital lobe on  24 Gy in 1 fraction on 10/17/23      CURRENT THERAPY  Single agent pembrolizumab since 08/31/23    CURRENT ONCOLOGICAL PROBLEMS  Resolved GIII ICI colitis     HISTORY OF PRESENT ILLNESS  This is a pt with PMH of EtOH use disorder, CVA, CAD c/b NSTEMI s/p 2 stents and stage IVB ([id number]) poorly differentiated adenocarcinoma of RUL diagnosed on 08/03/23.      Patient was initially diagnosed 08/2022 after incidentally found RUL lesion was worked up during admission for hyponatremia presumed to be 2/2 beer potomania. He was then seen by his pulmonologist Dr. Callejas who ordered PET and MRI but this was not obtained  nor were multiple attempts to schedule for oncologic NPV.     I saw him in August 2023 and he endorsed haviing lost 15 lbs since stroke April 2022. His appetite is good, denies dysgeusia. He has slowed down a bit since he had his stroke. He can walk without becoming dyspneic but is limited by his stroke.    On 08/12/23 the patient underwent a bronchoscopy with EBUS:  Accession #: [id number]    Date of Procedure:8/3/2022   Date Reported: 8/9/2022   Date Received: 8/3/2022   Date of Birth / Sex 1960 (Age: 62) / M   Race: WHITE   Submitting Physician: DARYN YOUNG MD   Attending Physician: MD HEVER BROWNE MD   Procedures/Addenda Present     Other External #     FINAL CYTOLOGICAL INTERPRETATION     A. FINE NEEDLE ASPIRATION LYMPH NODE, L 4, CYTOLOGY AND CELL BLOCK:   NO MALIGNANT CELLS IDENTIFIED.   LYMPHOID SAMPLE IS PRESENT.     Note: Cell block shows presence of lymphoid cells along with bronchial cells   contamination.       B. FINE NEEDLE ASPIRATION LYMPH NODE, STATION 7, CYTOLOGY AND CELL BLOCK:   NO MALIGNANT CELLS IDENTIFIED.   LYMPHOID SAMPLE IS PRESENT.       C. FINE NEEDLE ASPIRATION LYMPH NODE, R 11, CYTOLOGY AND CELL BLOCK:   POORLY DIFFERENTIATED CARCINOMA, CONSISTENT WITH ADENOCARCINOMA, SEE NOTE.     Note: A limited panel of immunohistochemical stains performed showed neoplastic   cells staining strongly and diffusely positive with TTF-1 and negative with   p40. Findings are consistent with above.     Staff pathologist: Reviewed by Dr. Ellyn Valles.     Molecular testing has been ordered and results will be issued in   an addendum.     The cell block; C1 contains moderate tumor cellularity representing   roughly 70 % of all nucleated cells.       D. BRONCHO-ALVEOLAR LAVAGE OF RIGHT UPPER LOBE:   RARE ATYPICAL CELLS ARE PRESENT.     08/16/23: PET scan demonstrates multiple RUL FDG avid nodules as well as contra-lateral lung nodules, right hilar, mediastinal and supraclavicular nodes consistent with neoplastic involvement. There is also a large FDG avid right gluteal mass extending into the right SIJ and iliac bone.     08/16/23: MRI brain shows a 8 mm right occipital lobe metastasis with mild associated edema.     08/31/23: Single agent pembrolizumab begins     10/17/23: GKRS to right occipital lobe on  24 Gy in 1 fraction     06/06/24: grade 3 immune-related  diarrhea with episodes of incontinence despite Imodium   Rx prednisone 40 mg/day provided today      PAST MEDICAL HISTORY  CAD s/p 2 stents, CVA, Dupuytren's contracture   BPH  Lumbar radiculopathy   Ir-diarrhea (pembrolizumab) treated with prednisone months of May and      SURGICAL HISTORY  None      SOCIAL HISTORY  Born in [place], lives in [place] with 2 roommates.   Brother Salas is NOK. Drinks 2 24 oz beers a day.   + Tobacco - 50 pack year smoking history, active.   Rare cannabis, acid use in 70s, no additional illicits.    Was working at [employer] in [place] before stroke.      FAMILY HISTORY  Mother  of melanoma in      CURRENT MEDS REVIEWED    Current Outpatient Medications:     albuterol 90 mcg/actuation inhaler, Inhale 2 puffs every 4 hours if needed., Disp: , Rfl:     amLODIPine (Norvasc) 5 mg tablet, Take 0.5 tablets (2.5 mg) by mouth once daily., Disp: , Rfl:     atorvastatin (Lipitor) 80 mg tablet, TAKE 1 TABLET BY MOUTH ONCE DAILY, Disp: 30 tablet, Rfl: 1    diclofenac sodium 1 % kit, Apply 2 g topically every 6 hours if needed., Disp: , Rfl:     gabapentin (Neurontin) 300 mg capsule, Take 2 capsules (600 mg) by mouth 2 times a day., Disp: 120 capsule, Rfl: 2    loperamide (Imodium A-D) 2 mg tablet, Take it as instructed for diarrhea, Disp: 60 tablet, Rfl: 3    losartan (Cozaar) 50 mg tablet, Take 1 tablet (50 mg) by mouth., Disp: , Rfl:     methocarbamol (Robaxin) 750 mg tablet, Take 1 tablet (750 mg) by mouth 3 times a day as needed., Disp: , Rfl:     metoprolol tartrate (Lopressor) 25 mg tablet, TAKE 1 TABLET BY MOUTH TWO TIMES A DAY, Disp: 60 tablet, Rfl: 1    nitroglycerin (Nitrostat) 0.4 mg SL tablet, DISSOLVE 1 TABLET UNDER TONGUE EVERY 5 MINUTES FOR 3 DOSES AS NEEDED FOR CHEST PAIN. IF PAIN PERSISTS DIAL 911., Disp: 25 tablet, Rfl: 1    omeprazole (PriLOSEC) 20 mg DR capsule, Do not crush or chew. Take it 30 minutes before breakfast., Disp: 30 capsule, Rfl: 11     pantoprazole (ProtoNix) 40 mg EC tablet, Take 1 tablet (40 mg) by mouth once daily. Do not crush, chew, or split., Disp: 30 tablet, Rfl: 2    predniSONE (Deltasone) 5 mg tablet, Take 1 tablet (5 mg) by mouth once daily., Disp: 30 tablet, Rfl: 2    ticagrelor (Brilinta) 60 mg tablet, Take 1 tablet (60 mg) by mouth 2 times a day., Disp: 60 tablet, Rfl: 11    triamcinolone (Kenalog) 0.1 % ointment, Apply topically 2 times a day., Disp: 80 g, Rfl: 1    umeclidinium (Incruse Ellipta) 62.5 mcg/actuation inhalation, Inhale 1 puff (62.5 mcg) once daily., Disp: 1 each, Rfl: 11    Current Facility-Administered Medications:     acetaminophen (Tylenol) tablet 650 mg, 650 mg, oral, q4h PRN, Angelica Munoz MD    HYDROmorphone (Dilaudid) injection 0.4 mg, 0.4 mg, intravenous, PRN, Angelica Munoz MD, 0.4 mg at 10/17/23 1230    HYDROmorphone (Dilaudid) injection 0.4 mg, 0.4 mg, intravenous, PRN, Angelica Munoz MD, 0.4 mg at 10/17/23 0830    ibuprofen tablet 400 mg, 400 mg, oral, PRN, Angelica Munoz MD    midazolam (Versed) injection 0.5 mg, 0.5 mg, intravenous, PRN, Angelica Munoz MD, 0.5 mg at 10/17/23 1230    midazolam (Versed) injection 0.5 mg, 0.5 mg, intravenous, PRN, Angelica Munoz MD, 0.5 mg at 10/17/23 0830    ondansetron (Zofran) injection 4 mg, 4 mg, intravenous, q4h PRN, Angelica Munoz MD    oxyCODONE (Roxicodone) immediate release tablet 10 mg, 10 mg, oral, q4h PRN, Angelica Munoz MD    ALLERGIES REVIEWED     SUBJECTIVE:   Patient present for routine follow-up evaluation, doing well today. He remains active. He does all the shopping and much of the house work for his home. No specific complaints today. He denies any fevers, chills or night sweats. No cough, chest pain or shortness of breath. No nausea or vomiting. No constipation or diarrhea. No urinary symptoms. No rash. No neuropathy.     OBJECTIVE:  There were no vitals taken for this visit.    Wt Readings from Last 5 Encounters:   12/11/24  63.9 kg (140 lb 14 oz)   24 63 kg (139 lb)   10/30/24 62.8 kg (138 lb 5.4 oz)   10/10/24 62.4 kg (137 lb 9.1 oz)   24 61.8 kg (136 lb 3.9 oz)     Physical Exam:  ECO-1  Pain: 0  Constitutional: Well developed, awake/alert/oriented x3, no distress, alert and cooperative  Eyes: PER. sclera anicteric  ENMT: Oral mucosa moist, no lesions or thrush identified  Respiratory/Thorax: Breathing is non-labored. Lungs are clear to auscultation bilaterally. No adventitious breath sounds  Cardiovascular: S1-S2. Regular rate and rhythm. No murmurs, rubs, or gallops appreciated  Gastrointestinal: Abdomen soft nontender, nondistended, normal active bowel sounds.  Musculoskeletal: ROM intact, no joint swelling, normal strength  Extremities: normal extremities, no cyanosis, no edema, no clubbing  Lymphatics: no palpable lymphadenopathy  Skin: no rash  Neurologic: alert and oriented x3. Nonfocal exam. No myoclonus  Psychological: Pleasant, appropriate and easily engaged         Diagnostic Results   Results:  Labs  Results from last 7 days   Lab Units 25  1221   WBC AUTO x10*3/uL 7.6   HEMOGLOBIN g/dL 12.8*   HEMATOCRIT % 40.0*   PLATELETS AUTO x10*3/uL 315   NEUTROS ABS x10*3/uL 4.33   LYMPHS ABS AUTO x10*3/uL 2.03   MONOS ABS AUTO x10*3/uL 0.59   EOS ABS AUTO x10*3/uL 0.55   NEUTROS PCT AUTO % 56.8   LYMPHS PCT AUTO % 26.6   MONOS PCT AUTO % 7.7   EOS PCT AUTO % 7.2      Results from last 7 days   Lab Units 25  1221   GLUCOSE mg/dL 90   SODIUM mmol/L 133*   POTASSIUM mmol/L 4.4   CHLORIDE mmol/L 96*   CO2 mmol/L 25   BUN mg/dL 12   CREATININE mg/dL 0.65   EGFR mL/min/1.73m*2 >90   CALCIUM mg/dL 9.5   ALBUMIN g/dL 5.0   PROTEIN TOTAL g/dL 7.2   BILIRUBIN TOTAL mg/dL 1.2   ALK PHOS U/L 60   ALT U/L 21   AST U/L 19       Lab Results   Component Value Date    ACTH 4.0 (L) 10/29/2024    CORTISOL 5.3 2024    TSH 3.76 2024    FREET4 1.14 2024     CT CAP 11/15/24  IMPRESSION:  CHEST:  1.  Interval  minimal increase in the right upper lobe dominant spiculated lung mass and increase in the soft tissue component of a  another spiculated right upper lobe nodule. No significant change in the right lower lobe lung nodules and spiculated opacity in the left  upper lobe.   2. No significant change in the scattered mediastinal and bilateral hilar lymph nodes.      ABDOMEN-PELVIS:  1.  No definite metastatic disease in the abdomen and pelvis.  2. Additional chronic and incidental findings as described above.    MRI Brain 12/4/2024  IMPRESSION:  1. No new enhancing lesions suggestive of brain metastasis. The small enhancing lesion in the bony calvarium on the left is not measurably changed from the previous examination. It is indeterminate. Osseous metastasis among others could give this appearance.  2. Nonspecific white matter changes most likely represent small-vessel ischemic disease in a patient of this age. There is involvement of the jagruti. There are also likely several remote lacunar infarcts. No evidence of acute ischemic injury.  3. Diffuse parenchymal volume loss.  4. Inflammatory changes of the paranasal sinuses. There is also interval increased opacification of left-sided mastoid air cells and  possible fluid in the left middle ear cavity. Correlate with concern for otomastoiditis.    Assessment/Plan     Lung cancer (Multi), Clinical: Stage IVB (cT3, cN3, cM1c)  Mr. Pillo Guerin is a 63 YO with Stage IVB ([id number]) poorly differentiated adenocarcinoma of RUL, TPS 80%, lack of actionable genomic alterations noted.    He was started on single agent pembrolizumab since 08/31/23 - however had to be held in May 2024 due to grade III ICI colitis which has completed resolved.     He has now completed 18 cycles pembrolizumab. CT scans from 11/15 personally reviewed showing increase in dominant lesion by ~3 mm in each direction, other lung lesions generally stable. We discussed this and I showed him images. Clinically  he is doing well and there are limited options for him in second line. We will continue with pembrolizumab and obtain scans in January.    #Stage IVB NSCLC  -Continue with c20 pembrolizumab  -Scans prior to c21 pembrolizumab - ordered     #Grade III ICI Diarrhea - resolved    #Pruritus, immune related  Continue moisturizing BUE with CeraVe and triamcinolone as needed     #Right occipital lobe 8 mm metastasis   S/p GKRS 24 Gy in 1 fraction on 10/17/23  Stable MRI 12/4/2024  Repeat MRI brain every 3 months -        #Pain on right gluteal region- resolved   Due to large centrally necrotic and peripherally hypermetabolic mass in the right lower paramediastinal muscle extending into the adjacent subcutaneous tissue and superior right gluteal muscle with involvement of the superior aspect of the right iliac  bone, right florencio sacrum -   He has been taking gabapentin for pain (controlled) in LLE that is chronic      #Stroke in April 2022 and hx of CAD  -   Stable -   On ASA and ticagrelor      #HTN - on amlodipine      #HLD - atorvastatin    #Microcytic Anemia - Chronic in nature. Iron panel and ferritin sent today. If found to be normal will send hemoglobin electrophoresis.   Has never been told or heard about family history of thalassemia or other blood disorders.       Jorge Gray MD  Thoracic Medical Oncology   [id number] [place] [id number]  Phone: [phone]

## 2025-01-08 ENCOUNTER — SOCIAL WORK (OUTPATIENT)
Dept: CASE MANAGEMENT | Facility: HOSPITAL | Age: 65
End: 2025-01-08
Payer: COMMERCIAL

## 2025-01-08 ENCOUNTER — APPOINTMENT (OUTPATIENT)
Dept: HEMATOLOGY/ONCOLOGY | Facility: CLINIC | Age: 65
End: 2025-01-08
Payer: COMMERCIAL

## 2025-01-08 NOTE — PROGRESS NOTES
Transportation Referral     Referral Source: KATEY Min  Multiple Rides Needed? No  First Date Transportation is needed? 1/8/2025  Ambulation: Independently  Pick-up Address: [address] [place] [address]  Patient Phone: [phone]  Accompaniment: No  Phone Receives Text Messages? Yes  Transportation Service: [employer] ( p:[phone]) , Reason: Last minute appt and patient needed a ride      Scheduled Ride(s):     Date: 1/8/2025   Appointment: FUHONORIO Gray at 11:30am  Drop off Address: [facility]: [address] [place] [address]   Time: 11:00am   Return Ride: Will Call   Confirmation: N/A    Patient cancelled appt today at [facility] due to pain. Patient needs to be seen.     Pt agreeable to plan. SW will continue to follow as needed.      Update: Patient cancelled his ride with [employer] today. SW called him and he said he didn't want to come out in the weather. He asked that office reschedule. SW updated KATEY Min today.

## 2025-01-13 ENCOUNTER — LAB (OUTPATIENT)
Dept: LAB | Facility: CLINIC | Age: 65
End: 2025-01-13
Payer: COMMERCIAL

## 2025-01-13 DIAGNOSIS — C34.11 MALIGNANT NEOPLASM OF UPPER LOBE OF RIGHT LUNG (MULTI): ICD-10-CM

## 2025-01-13 LAB
ALBUMIN SERPL BCP-MCNC: 4.3 G/DL (ref 3.4–5)
ALP SERPL-CCNC: 70 U/L (ref 33–136)
ALT SERPL W P-5'-P-CCNC: 12 U/L (ref 10–52)
ANION GAP SERPL CALC-SCNC: 12 MMOL/L (ref 10–20)
AST SERPL W P-5'-P-CCNC: 14 U/L (ref 9–39)
BASOPHILS # BLD AUTO: 0.07 X10*3/UL (ref 0–0.1)
BASOPHILS NFR BLD AUTO: 0.8 %
BILIRUB SERPL-MCNC: 0.6 MG/DL (ref 0–1.2)
BUN SERPL-MCNC: 14 MG/DL (ref 6–23)
CALCIUM SERPL-MCNC: 9.1 MG/DL (ref 8.6–10.6)
CHLORIDE SERPL-SCNC: 103 MMOL/L (ref 98–107)
CO2 SERPL-SCNC: 26 MMOL/L (ref 21–32)
CREAT SERPL-MCNC: 0.81 MG/DL (ref 0.5–1.3)
EGFRCR SERPLBLD CKD-EPI 2021: >90 ML/MIN/1.73M*2
EOSINOPHIL # BLD AUTO: 0.29 X10*3/UL (ref 0–0.7)
EOSINOPHIL NFR BLD AUTO: 3.4 %
ERYTHROCYTE [DISTWIDTH] IN BLOOD BY AUTOMATED COUNT: 15.5 % (ref 11.5–14.5)
GLUCOSE SERPL-MCNC: 78 MG/DL (ref 74–99)
HCT VFR BLD AUTO: 38.1 % (ref 41–52)
HGB BLD-MCNC: 12.4 G/DL (ref 13.5–17.5)
IMM GRANULOCYTES # BLD AUTO: 0.04 X10*3/UL (ref 0–0.7)
IMM GRANULOCYTES NFR BLD AUTO: 0.5 % (ref 0–0.9)
LYMPHOCYTES # BLD AUTO: 1.83 X10*3/UL (ref 1.2–4.8)
LYMPHOCYTES NFR BLD AUTO: 21.2 %
MCH RBC QN AUTO: 21.8 PG (ref 26–34)
MCHC RBC AUTO-ENTMCNC: 32.5 G/DL (ref 32–36)
MCV RBC AUTO: 67 FL (ref 80–100)
MONOCYTES # BLD AUTO: 0.98 X10*3/UL (ref 0.1–1)
MONOCYTES NFR BLD AUTO: 11.3 %
NEUTROPHILS # BLD AUTO: 5.43 X10*3/UL (ref 1.2–7.7)
NEUTROPHILS NFR BLD AUTO: 62.8 %
NRBC BLD-RTO: ABNORMAL /100{WBCS}
PLATELET # BLD AUTO: 379 X10*3/UL (ref 150–450)
POTASSIUM SERPL-SCNC: 4.4 MMOL/L (ref 3.5–5.3)
PROT SERPL-MCNC: 6.5 G/DL (ref 6.4–8.2)
RBC # BLD AUTO: 5.7 X10*6/UL (ref 4.5–5.9)
SODIUM SERPL-SCNC: 137 MMOL/L (ref 136–145)
WBC # BLD AUTO: 8.6 X10*3/UL (ref 4.4–11.3)

## 2025-01-13 PROCEDURE — 85025 COMPLETE CBC W/AUTO DIFF WBC: CPT

## 2025-01-13 PROCEDURE — 36415 COLL VENOUS BLD VENIPUNCTURE: CPT

## 2025-01-13 PROCEDURE — 80053 COMPREHEN METABOLIC PANEL: CPT

## 2025-01-15 ENCOUNTER — INFUSION (OUTPATIENT)
Dept: HEMATOLOGY/ONCOLOGY | Facility: CLINIC | Age: 65
End: 2025-01-15
Payer: COMMERCIAL

## 2025-01-15 ENCOUNTER — OFFICE VISIT (OUTPATIENT)
Dept: HEMATOLOGY/ONCOLOGY | Facility: CLINIC | Age: 65
End: 2025-01-15
Payer: COMMERCIAL

## 2025-01-15 VITALS
TEMPERATURE: 97.2 F | HEART RATE: 69 BPM | OXYGEN SATURATION: 98 % | DIASTOLIC BLOOD PRESSURE: 70 MMHG | WEIGHT: 141 LBS | BODY MASS INDEX: 20.82 KG/M2 | RESPIRATION RATE: 18 BRPM | SYSTOLIC BLOOD PRESSURE: 129 MMHG

## 2025-01-15 DIAGNOSIS — C34.11 MALIGNANT NEOPLASM OF UPPER LOBE OF RIGHT LUNG (MULTI): ICD-10-CM

## 2025-01-15 DIAGNOSIS — C34.11 MALIGNANT NEOPLASM OF UPPER LOBE OF RIGHT LUNG (MULTI): Primary | ICD-10-CM

## 2025-01-15 PROCEDURE — 3074F SYST BP LT 130 MM HG: CPT | Performed by: STUDENT IN AN ORGANIZED HEALTH CARE EDUCATION/TRAINING PROGRAM

## 2025-01-15 PROCEDURE — 3078F DIAST BP <80 MM HG: CPT | Performed by: STUDENT IN AN ORGANIZED HEALTH CARE EDUCATION/TRAINING PROGRAM

## 2025-01-15 PROCEDURE — 1125F AMNT PAIN NOTED PAIN PRSNT: CPT | Performed by: STUDENT IN AN ORGANIZED HEALTH CARE EDUCATION/TRAINING PROGRAM

## 2025-01-15 PROCEDURE — G2211 COMPLEX E/M VISIT ADD ON: HCPCS | Performed by: STUDENT IN AN ORGANIZED HEALTH CARE EDUCATION/TRAINING PROGRAM

## 2025-01-15 PROCEDURE — 1159F MED LIST DOCD IN RCRD: CPT | Performed by: STUDENT IN AN ORGANIZED HEALTH CARE EDUCATION/TRAINING PROGRAM

## 2025-01-15 PROCEDURE — 99215 OFFICE O/P EST HI 40 MIN: CPT | Performed by: STUDENT IN AN ORGANIZED HEALTH CARE EDUCATION/TRAINING PROGRAM

## 2025-01-15 PROCEDURE — 96413 CHEMO IV INFUSION 1 HR: CPT

## 2025-01-15 PROCEDURE — 99215 OFFICE O/P EST HI 40 MIN: CPT | Mod: 25 | Performed by: STUDENT IN AN ORGANIZED HEALTH CARE EDUCATION/TRAINING PROGRAM

## 2025-01-15 PROCEDURE — 2500000004 HC RX 250 GENERAL PHARMACY W/ HCPCS (ALT 636 FOR OP/ED): Mod: JZ,JG,TB | Performed by: STUDENT IN AN ORGANIZED HEALTH CARE EDUCATION/TRAINING PROGRAM

## 2025-01-15 RX ORDER — PROCHLORPERAZINE MALEATE 10 MG
10 TABLET ORAL EVERY 6 HOURS PRN
OUTPATIENT
Start: 2025-02-05

## 2025-01-15 RX ORDER — ALBUTEROL SULFATE 0.83 MG/ML
3 SOLUTION RESPIRATORY (INHALATION) AS NEEDED
Status: DISCONTINUED | OUTPATIENT
Start: 2025-01-15 | End: 2025-01-15 | Stop reason: HOSPADM

## 2025-01-15 RX ORDER — DIPHENHYDRAMINE HYDROCHLORIDE 50 MG/ML
50 INJECTION INTRAMUSCULAR; INTRAVENOUS AS NEEDED
OUTPATIENT
Start: 2025-02-26

## 2025-01-15 RX ORDER — ALBUTEROL SULFATE 0.83 MG/ML
3 SOLUTION RESPIRATORY (INHALATION) AS NEEDED
OUTPATIENT
Start: 2025-02-26

## 2025-01-15 RX ORDER — PROCHLORPERAZINE EDISYLATE 5 MG/ML
10 INJECTION INTRAMUSCULAR; INTRAVENOUS EVERY 6 HOURS PRN
Status: DISCONTINUED | OUTPATIENT
Start: 2025-01-15 | End: 2025-01-15 | Stop reason: HOSPADM

## 2025-01-15 RX ORDER — EPINEPHRINE 0.3 MG/.3ML
0.3 INJECTION SUBCUTANEOUS EVERY 5 MIN PRN
Status: DISCONTINUED | OUTPATIENT
Start: 2025-01-15 | End: 2025-01-15 | Stop reason: HOSPADM

## 2025-01-15 RX ORDER — DIPHENHYDRAMINE HYDROCHLORIDE 50 MG/ML
50 INJECTION INTRAMUSCULAR; INTRAVENOUS AS NEEDED
Status: DISCONTINUED | OUTPATIENT
Start: 2025-01-15 | End: 2025-01-15 | Stop reason: HOSPADM

## 2025-01-15 RX ORDER — PROCHLORPERAZINE EDISYLATE 5 MG/ML
10 INJECTION INTRAMUSCULAR; INTRAVENOUS EVERY 6 HOURS PRN
OUTPATIENT
Start: 2025-02-05

## 2025-01-15 RX ORDER — FAMOTIDINE 10 MG/ML
20 INJECTION INTRAVENOUS ONCE AS NEEDED
Status: DISCONTINUED | OUTPATIENT
Start: 2025-01-15 | End: 2025-01-15 | Stop reason: HOSPADM

## 2025-01-15 RX ORDER — FAMOTIDINE 10 MG/ML
20 INJECTION INTRAVENOUS ONCE AS NEEDED
OUTPATIENT
Start: 2025-02-26

## 2025-01-15 RX ORDER — EPINEPHRINE 0.3 MG/.3ML
0.3 INJECTION SUBCUTANEOUS EVERY 5 MIN PRN
OUTPATIENT
Start: 2025-02-05

## 2025-01-15 RX ORDER — DIPHENHYDRAMINE HYDROCHLORIDE 50 MG/ML
50 INJECTION INTRAMUSCULAR; INTRAVENOUS AS NEEDED
OUTPATIENT
Start: 2025-02-05

## 2025-01-15 RX ORDER — PROCHLORPERAZINE MALEATE 10 MG
10 TABLET ORAL EVERY 6 HOURS PRN
OUTPATIENT
Start: 2025-02-26

## 2025-01-15 RX ORDER — OXYCODONE HYDROCHLORIDE 5 MG/1
5 TABLET ORAL EVERY 6 HOURS PRN
Qty: 30 TABLET | Refills: 0 | Status: SHIPPED | OUTPATIENT
Start: 2025-01-15 | End: 2025-01-23

## 2025-01-15 RX ORDER — PROCHLORPERAZINE EDISYLATE 5 MG/ML
10 INJECTION INTRAMUSCULAR; INTRAVENOUS EVERY 6 HOURS PRN
OUTPATIENT
Start: 2025-02-26

## 2025-01-15 RX ORDER — FAMOTIDINE 10 MG/ML
20 INJECTION INTRAVENOUS ONCE AS NEEDED
OUTPATIENT
Start: 2025-02-05

## 2025-01-15 RX ORDER — HEPARIN SODIUM,PORCINE/PF 10 UNIT/ML
50 SYRINGE (ML) INTRAVENOUS AS NEEDED
OUTPATIENT
Start: 2025-01-15

## 2025-01-15 RX ORDER — HEPARIN SODIUM,PORCINE/PF 10 UNIT/ML
50 SYRINGE (ML) INTRAVENOUS AS NEEDED
Status: DISCONTINUED | OUTPATIENT
Start: 2025-01-15 | End: 2025-01-15 | Stop reason: HOSPADM

## 2025-01-15 RX ORDER — HEPARIN 100 UNIT/ML
500 SYRINGE INTRAVENOUS AS NEEDED
OUTPATIENT
Start: 2025-01-15

## 2025-01-15 RX ORDER — PROCHLORPERAZINE MALEATE 10 MG
10 TABLET ORAL EVERY 6 HOURS PRN
Status: DISCONTINUED | OUTPATIENT
Start: 2025-01-15 | End: 2025-01-15 | Stop reason: HOSPADM

## 2025-01-15 RX ORDER — HEPARIN 100 UNIT/ML
500 SYRINGE INTRAVENOUS AS NEEDED
Status: DISCONTINUED | OUTPATIENT
Start: 2025-01-15 | End: 2025-01-15 | Stop reason: HOSPADM

## 2025-01-15 RX ORDER — ALBUTEROL SULFATE 0.83 MG/ML
3 SOLUTION RESPIRATORY (INHALATION) AS NEEDED
OUTPATIENT
Start: 2025-02-05

## 2025-01-15 RX ORDER — EPINEPHRINE 0.3 MG/.3ML
0.3 INJECTION SUBCUTANEOUS EVERY 5 MIN PRN
OUTPATIENT
Start: 2025-02-26

## 2025-01-15 RX ADMIN — SODIUM CHLORIDE 200 MG: 9 INJECTION, SOLUTION INTRAVENOUS at 16:09

## 2025-01-15 ASSESSMENT — PAIN SCALES - GENERAL: PAINLEVEL_OUTOF10: 10-WORST PAIN EVER

## 2025-01-15 NOTE — PROGRESS NOTES
Patient ID: Pillo Guerin is a 64 y.o. male    Primary Care Provider: Rocio Harris, APRN-CNP    DIAGNOSIS AND STAGING  Stage IVB (cT3 cN3 cM1c) poorly differentiated adenocarcinoma of RUL diagnosed on 08/03/23     SITES OF DISEASE  RUL  Supraclavicular nodes  Contralateral lung  Right gluteal muscle   Brain right occipital lobe      MOLECULAR GENOMICS  MICROSATELLITE STATUS: Microsatellite Stable (MAGDIEL)     DISEASE ASSOCIATED GENOMIC FINDINGS:   KRAS p.G13D ([id number] c.38G>A)   PIK3CA p.E542K ([id number] c.1624G>A)   TP53 p.E285K ([id number] c.853G>A)     DISEASE RELEVANT ALTERATIONS NOT DETECTED:   Negative for ALK fusion.   Negative for BRAF V600E.   Negative for EGFR sensitizing mutation.   Negative for KRAS G12C.   Negative for MET exon 14 skipping mutation.   Negative for NTRK fusion.   Negative for RET fusion.   Negative for ROS1 fusion.     PD-L1 TPS 80%     PRIOR THERAPIES  GKRS to right occipital lobe on  24 Gy in 1 fraction on 10/17/23      CURRENT THERAPY  Single agent pembrolizumab since 08/31/23    CURRENT ONCOLOGICAL PROBLEMS  Resolved GIII ICI colitis     HISTORY OF PRESENT ILLNESS  This is a pt with PMH of EtOH use disorder, CVA, CAD c/b NSTEMI s/p 2 stents and stage IVB ([id number]) poorly differentiated adenocarcinoma of RUL diagnosed on 08/03/23.      Patient was initially diagnosed 08/2022 after incidentally found RUL lesion was worked up during admission for hyponatremia presumed to be 2/2 beer potomania. He was then seen by his pulmonologist Dr. Callejas who ordered PET and MRI but this was not obtained  nor were multiple attempts to schedule for oncologic NPV.     I saw him in August 2023 and he endorsed haviing lost 15 lbs since stroke April 2022. His appetite is good, denies dysgeusia. He has slowed down a bit since he had his stroke. He can walk without becoming dyspneic but is limited by his stroke.    On 08/12/23 the patient underwent a bronchoscopy with EBUS:  Accession #: [id number]    Date of Procedure:8/3/2022   Date Reported: 8/9/2022   Date Received: 8/3/2022   Date of Birth / Sex 1960 (Age: 62) / M   Race: WHITE   Submitting Physician: DARYN YOUNG MD   Attending Physician: MD HEVER BROWNE MD   Procedures/Addenda Present     Other External #     FINAL CYTOLOGICAL INTERPRETATION     A. FINE NEEDLE ASPIRATION LYMPH NODE, L 4, CYTOLOGY AND CELL BLOCK:   NO MALIGNANT CELLS IDENTIFIED.   LYMPHOID SAMPLE IS PRESENT.     Note: Cell block shows presence of lymphoid cells along with bronchial cells   contamination.       B. FINE NEEDLE ASPIRATION LYMPH NODE, STATION 7, CYTOLOGY AND CELL BLOCK:   NO MALIGNANT CELLS IDENTIFIED.   LYMPHOID SAMPLE IS PRESENT.       C. FINE NEEDLE ASPIRATION LYMPH NODE, R 11, CYTOLOGY AND CELL BLOCK:   POORLY DIFFERENTIATED CARCINOMA, CONSISTENT WITH ADENOCARCINOMA, SEE NOTE.     Note: A limited panel of immunohistochemical stains performed showed neoplastic   cells staining strongly and diffusely positive with TTF-1 and negative with   p40. Findings are consistent with above.     Staff pathologist: Reviewed by Dr. Ellyn Valles.     Molecular testing has been ordered and results will be issued in   an addendum.     The cell block; C1 contains moderate tumor cellularity representing   roughly 70 % of all nucleated cells.       D. BRONCHO-ALVEOLAR LAVAGE OF RIGHT UPPER LOBE:   RARE ATYPICAL CELLS ARE PRESENT.     08/16/23: PET scan demonstrates multiple RUL FDG avid nodules as well as contra-lateral lung nodules, right hilar, mediastinal and supraclavicular nodes consistent with neoplastic involvement. There is also a large FDG avid right gluteal mass extending into the right SIJ and iliac bone.     08/16/23: MRI brain shows a 8 mm right occipital lobe metastasis with mild associated edema.     08/31/23: Single agent pembrolizumab begins     10/17/23: GKRS to right occipital lobe on  24 Gy in 1 fraction     06/06/24: grade 3 immune-related  diarrhea with episodes of incontinence despite Imodium   Rx prednisone 40 mg/day provided today      PAST MEDICAL HISTORY  CAD s/p 2 stents, CVA, Dupuytren's contracture   BPH  Lumbar radiculopathy   Ir-diarrhea (pembrolizumab) treated with prednisone months of May and      SURGICAL HISTORY  None      SOCIAL HISTORY  Born in [place], lives in [place] with 2 roommates.   Brother Salas is NOK. Drinks 2 24 oz beers a day.   + Tobacco - 50 pack year smoking history, active.   Rare cannabis, acid use in 70s, no additional illicits.    Was working at [employer] in [place] before stroke.      FAMILY HISTORY  Mother  of melanoma in      CURRENT MEDS REVIEWED    Current Outpatient Medications:     albuterol 90 mcg/actuation inhaler, Inhale 2 puffs every 4 hours if needed., Disp: , Rfl:     amLODIPine (Norvasc) 5 mg tablet, Take 0.5 tablets (2.5 mg) by mouth once daily., Disp: , Rfl:     diclofenac sodium 1 % kit, Apply 2 g topically every 6 hours if needed., Disp: , Rfl:     gabapentin (Neurontin) 300 mg capsule, Take 2 capsules (600 mg) by mouth 2 times a day., Disp: 120 capsule, Rfl: 2    loperamide (Imodium A-D) 2 mg tablet, Take it as instructed for diarrhea, Disp: 60 tablet, Rfl: 3    losartan (Cozaar) 50 mg tablet, Take 1 tablet (50 mg) by mouth., Disp: , Rfl:     methocarbamol (Robaxin) 750 mg tablet, Take 1 tablet (750 mg) by mouth 3 times a day as needed., Disp: , Rfl:     nitroglycerin (Nitrostat) 0.4 mg SL tablet, DISSOLVE 1 TABLET UNDER TONGUE EVERY 5 MINUTES FOR 3 DOSES AS NEEDED FOR CHEST PAIN. IF PAIN PERSISTS DIAL 911., Disp: 25 tablet, Rfl: 1    omeprazole (PriLOSEC) 20 mg DR capsule, Do not crush or chew. Take it 30 minutes before breakfast., Disp: 30 capsule, Rfl: 11    predniSONE (Deltasone) 5 mg tablet, Take 1 tablet (5 mg) by mouth once daily., Disp: 30 tablet, Rfl: 2    ticagrelor (Brilinta) 60 mg tablet, Take 1 tablet (60 mg) by mouth 2 times a day., Disp: 60 tablet, Rfl: 11     triamcinolone (Kenalog) 0.1 % ointment, Apply topically 2 times a day., Disp: 80 g, Rfl: 1    umeclidinium (Incruse Ellipta) 62.5 mcg/actuation inhalation, Inhale 1 puff (62.5 mcg) once daily., Disp: 1 each, Rfl: 11    atorvastatin (Lipitor) 80 mg tablet, TAKE 1 TABLET BY MOUTH ONCE DAILY, Disp: 30 tablet, Rfl: 1    metoprolol tartrate (Lopressor) 25 mg tablet, TAKE 1 TABLET BY MOUTH TWO TIMES A DAY, Disp: 60 tablet, Rfl: 1    pantoprazole (ProtoNix) 40 mg EC tablet, Take 1 tablet (40 mg) by mouth once daily. Do not crush, chew, or split., Disp: 30 tablet, Rfl: 2    Current Facility-Administered Medications:     acetaminophen (Tylenol) tablet 650 mg, 650 mg, oral, q4h PRN, Angelica Munoz MD    HYDROmorphone (Dilaudid) injection 0.4 mg, 0.4 mg, intravenous, PRN, Angelica Munoz MD, 0.4 mg at 10/17/23 1230    HYDROmorphone (Dilaudid) injection 0.4 mg, 0.4 mg, intravenous, PRN, Angelica Munoz MD, 0.4 mg at 10/17/23 0830    ibuprofen tablet 400 mg, 400 mg, oral, PRN, Angelica Munoz MD    midazolam (Versed) injection 0.5 mg, 0.5 mg, intravenous, PRN, Angelica Munoz MD, 0.5 mg at 10/17/23 1230    midazolam (Versed) injection 0.5 mg, 0.5 mg, intravenous, PRN, Angelica Munoz MD, 0.5 mg at 10/17/23 0830    ondansetron (Zofran) injection 4 mg, 4 mg, intravenous, q4h PRN, Angelica Munoz MD    oxyCODONE (Roxicodone) immediate release tablet 10 mg, 10 mg, oral, q4h PRN, Angelica Munoz MD    ALLERGIES REVIEWED     SUBJECTIVE:   Patient present for routine follow-up evaluation, doing decent today. He remains active, was just grocery shopping today. He endorses mid back pain. He denies any fevers, chills or night sweats. No cough, chest pain or shortness of breath. No nausea or vomiting. No constipation or diarrhea. No urinary symptoms. No rash. No neuropathy.     OBJECTIVE:  /70 (BP Location: Right arm, Patient Position: Sitting, BP Cuff Size: Adult)   Pulse 69   Temp 36.2 °C (97.2 °F) (Core)    Resp 18   Wt 64 kg (141 lb)   SpO2 98%   BMI 20.82 kg/m²     Wt Readings from Last 5 Encounters:   01/15/25 64 kg (141 lb)   24 63.9 kg (140 lb 14 oz)   24 63 kg (139 lb)   10/30/24 62.8 kg (138 lb 5.4 oz)   10/10/24 62.4 kg (137 lb 9.1 oz)     Physical Exam:  ECO  Pain: 0  Constitutional: Well developed, awake/alert/oriented x3, no distress, alert and cooperative  Eyes: PER. sclera anicteric  ENMT: Oral mucosa moist, no lesions or thrush identified  Respiratory/Thorax: Breathing is non-labored. Lungs are clear to auscultation bilaterally. No adventitious breath sounds  Cardiovascular: S1-S2. Regular rate and rhythm. No murmurs, rubs, or gallops appreciated  Gastrointestinal: Abdomen soft nontender, nondistended, normal active bowel sounds.  Musculoskeletal: ROM intact, no joint swelling, normal strength  Extremities: normal extremities, no cyanosis, no edema, no clubbing  Lymphatics: no palpable lymphadenopathy  Skin: no rash  Neurologic: alert and oriented x3. Nonfocal exam. No myoclonus  Psychological: Pleasant, appropriate and easily engaged       Diagnostic Results   Results:  Labs  Results from last 7 days   Lab Units 25  1452   WBC AUTO x10*3/uL 8.6   HEMOGLOBIN g/dL 12.4*   HEMATOCRIT % 38.1*   PLATELETS AUTO x10*3/uL 379   NEUTROS ABS x10*3/uL 5.43   LYMPHS ABS AUTO x10*3/uL 1.83   MONOS ABS AUTO x10*3/uL 0.98   EOS ABS AUTO x10*3/uL 0.29   NEUTROS PCT AUTO % 62.8   LYMPHS PCT AUTO % 21.2   MONOS PCT AUTO % 11.3   EOS PCT AUTO % 3.4      Results from last 7 days   Lab Units 25  1452   GLUCOSE mg/dL 78   SODIUM mmol/L 137   POTASSIUM mmol/L 4.4   CHLORIDE mmol/L 103   CO2 mmol/L 26   BUN mg/dL 14   CREATININE mg/dL 0.81   EGFR mL/min/1.73m*2 >90   CALCIUM mg/dL 9.1   ALBUMIN g/dL 4.3   PROTEIN TOTAL g/dL 6.5   BILIRUBIN TOTAL mg/dL 0.6   ALK PHOS U/L 70   ALT U/L 12   AST U/L 14       Lab Results   Component Value Date    ACTH 4.0 (L) 10/29/2024    CORTISOL 5.3 2024     TSH 3.76 11/19/2024    FREET4 1.14 04/22/2024     CT CAP 11/15/24  IMPRESSION:  CHEST:  1.  Interval minimal increase in the right upper lobe dominant spiculated lung mass and increase in the soft tissue component of a  another spiculated right upper lobe nodule. No significant change in the right lower lobe lung nodules and spiculated opacity in the left  upper lobe.   2. No significant change in the scattered mediastinal and bilateral hilar lymph nodes.      ABDOMEN-PELVIS:  1.  No definite metastatic disease in the abdomen and pelvis.  2. Additional chronic and incidental findings as described above.    MRI Brain 12/4/2024  IMPRESSION:  1. No new enhancing lesions suggestive of brain metastasis. The small enhancing lesion in the bony calvarium on the left is not measurably changed from the previous examination. It is indeterminate. Osseous metastasis among others could give this appearance.  2. Nonspecific white matter changes most likely represent small-vessel ischemic disease in a patient of this age. There is involvement of the jagruti. There are also likely several remote lacunar infarcts. No evidence of acute ischemic injury.  3. Diffuse parenchymal volume loss.  4. Inflammatory changes of the paranasal sinuses. There is also interval increased opacification of left-sided mastoid air cells and  possible fluid in the left middle ear cavity. Correlate with concern for otomastoiditis.    Assessment/Plan     Lung cancer (Multi), Clinical: Stage IVB (cT3, cN3, cM1c)  Mr. Pillo Guerin is a 65 YO with Stage IVB ([id number]) poorly differentiated adenocarcinoma of RUL, TPS 80%, lack of actionable genomic alterations noted.    He was started on single agent pembrolizumab since 08/31/23 - however had to be held in May 2024 due to grade III ICI colitis which has completed resolved.     He has now completed 20 cycles pembrolizumab. CT scans from 1/15 personally reviewed showing increase in dominant lesion by ~4 mm in one  direction, other lung lesions generally stable. We discussed this slow progression and I showed him images. Clinically he is doing well and there are limited options for him in second line. We will continue with pembrolizumab and obtain scans in March.    #Stage IVB NSCLC with early, slow progression and no options in second line  -Continue with c21 pembrolizumab  -Scans prior to c24    #Grade III ICI Diarrhea - resolved    #Pruritus, immune related  Continue moisturizing BUE with CeraVe and triamcinolone as needed     #Right occipital lobe 8 mm metastasis   S/p GKRS 24 Gy in 1 fraction on 10/17/23  Stable MRI 12/4/2024  Repeat MRI brain every 3 months -      #Pain on right gluteal region- resolved   Due to large centrally necrotic and peripherally hypermetabolic mass in the right lower paramediastinal muscle extending into the adjacent subcutaneous tissue and superior right gluteal muscle with involvement of the superior aspect of the right iliac  bone, right florencio sacrum -   He has been taking gabapentin for pain (controlled) in LLE that is chronic      #Stroke in April 2022 and hx of CAD  -   Stable -   On ASA and ticagrelor      #HTN - on amlodipine      #HLD - atorvastatin    #Microcytic Anemia - Chronic in nature. Iron panel and ferritin sent today. If found to be normal will send hemoglobin electrophoresis.   Has never been told or heard about family history of thalassemia or other blood disorders.       Jorge Gray MD  Thoracic Medical Oncology   [address] [facility] [address]  Phone: [phone]

## 2025-01-15 NOTE — SIGNIFICANT EVENT
01/15/25 1552   Prechemo Checklist   Has the patient been in the hospital, ED, or urgent care since last date of service No   Chemo/Immuno Consent Completed and Signed Yes   Protocol/Indications Verified Yes   Confirmed to previous date/time of medication No  (delayed a week, md gave the okay to treat)   Compared to previous dose Yes   All medications are dated accurately Yes   Pregnancy Test Negative Not applicable   Parameters Met Yes   BSA/Weight-Height Verified Yes   Dose Calculations Verified (current, total, cumulative) Yes

## 2025-01-27 ENCOUNTER — TELEPHONE (OUTPATIENT)
Dept: ADMISSION | Facility: HOSPITAL | Age: 65
End: 2025-01-27
Payer: COMMERCIAL

## 2025-01-27 DIAGNOSIS — C34.11 MALIGNANT NEOPLASM OF UPPER LOBE OF RIGHT LUNG (MULTI): ICD-10-CM

## 2025-01-27 RX ORDER — OXYCODONE HYDROCHLORIDE 5 MG/1
5 TABLET ORAL EVERY 6 HOURS PRN
Qty: 30 TABLET | Refills: 0 | Status: SHIPPED | OUTPATIENT
Start: 2025-01-27 | End: 2025-02-04

## 2025-01-29 ENCOUNTER — APPOINTMENT (OUTPATIENT)
Dept: HEMATOLOGY/ONCOLOGY | Facility: CLINIC | Age: 65
End: 2025-01-29
Payer: COMMERCIAL

## 2025-02-03 ENCOUNTER — LAB (OUTPATIENT)
Dept: LAB | Facility: CLINIC | Age: 65
End: 2025-02-03
Payer: COMMERCIAL

## 2025-02-03 DIAGNOSIS — E55.9 VITAMIN D DEFICIENCY: ICD-10-CM

## 2025-02-03 DIAGNOSIS — C34.11 MALIGNANT NEOPLASM OF UPPER LOBE OF RIGHT LUNG (MULTI): ICD-10-CM

## 2025-02-03 LAB
ALBUMIN SERPL BCP-MCNC: 4.9 G/DL (ref 3.4–5)
ALP SERPL-CCNC: 88 U/L (ref 33–136)
ALT SERPL W P-5'-P-CCNC: 17 U/L (ref 10–52)
ANION GAP SERPL CALC-SCNC: 13 MMOL/L (ref 10–20)
AST SERPL W P-5'-P-CCNC: 17 U/L (ref 9–39)
BASOPHILS # BLD AUTO: 0.08 X10*3/UL (ref 0–0.1)
BASOPHILS NFR BLD AUTO: 0.9 %
BILIRUB SERPL-MCNC: 0.9 MG/DL (ref 0–1.2)
BUN SERPL-MCNC: 14 MG/DL (ref 6–23)
CALCIUM SERPL-MCNC: 10 MG/DL (ref 8.6–10.6)
CHLORIDE SERPL-SCNC: 99 MMOL/L (ref 98–107)
CO2 SERPL-SCNC: 26 MMOL/L (ref 21–32)
CORTIS AM PEAK SERPL-MSCNC: 11.2 UG/DL (ref 5–20)
CREAT SERPL-MCNC: 0.73 MG/DL (ref 0.5–1.3)
EGFRCR SERPLBLD CKD-EPI 2021: >90 ML/MIN/1.73M*2
EOSINOPHIL # BLD AUTO: 0.37 X10*3/UL (ref 0–0.7)
EOSINOPHIL NFR BLD AUTO: 4 %
ERYTHROCYTE [DISTWIDTH] IN BLOOD BY AUTOMATED COUNT: 16.1 % (ref 11.5–14.5)
GLUCOSE SERPL-MCNC: 130 MG/DL (ref 74–99)
HCT VFR BLD AUTO: 40.5 % (ref 41–52)
HGB BLD-MCNC: 13.1 G/DL (ref 13.5–17.5)
IMM GRANULOCYTES # BLD AUTO: 0.02 X10*3/UL (ref 0–0.7)
IMM GRANULOCYTES NFR BLD AUTO: 0.2 % (ref 0–0.9)
LYMPHOCYTES # BLD AUTO: 1.91 X10*3/UL (ref 1.2–4.8)
LYMPHOCYTES NFR BLD AUTO: 20.4 %
MCH RBC QN AUTO: 21.5 PG (ref 26–34)
MCHC RBC AUTO-ENTMCNC: 32.3 G/DL (ref 32–36)
MCV RBC AUTO: 67 FL (ref 80–100)
MONOCYTES # BLD AUTO: 0.44 X10*3/UL (ref 0.1–1)
MONOCYTES NFR BLD AUTO: 4.7 %
NEUTROPHILS # BLD AUTO: 6.53 X10*3/UL (ref 1.2–7.7)
NEUTROPHILS NFR BLD AUTO: 69.8 %
NRBC BLD-RTO: ABNORMAL /100{WBCS}
PLATELET # BLD AUTO: 333 X10*3/UL (ref 150–450)
POTASSIUM SERPL-SCNC: 5 MMOL/L (ref 3.5–5.3)
PROT SERPL-MCNC: 7.2 G/DL (ref 6.4–8.2)
RBC # BLD AUTO: 6.09 X10*6/UL (ref 4.5–5.9)
SODIUM SERPL-SCNC: 133 MMOL/L (ref 136–145)
TSH SERPL-ACNC: 1.57 MIU/L (ref 0.44–3.98)
WBC # BLD AUTO: 9.4 X10*3/UL (ref 4.4–11.3)

## 2025-02-03 PROCEDURE — 82533 TOTAL CORTISOL: CPT

## 2025-02-03 PROCEDURE — 82306 VITAMIN D 25 HYDROXY: CPT

## 2025-02-03 PROCEDURE — 85025 COMPLETE CBC W/AUTO DIFF WBC: CPT

## 2025-02-03 PROCEDURE — 82024 ASSAY OF ACTH: CPT

## 2025-02-03 PROCEDURE — 84443 ASSAY THYROID STIM HORMONE: CPT

## 2025-02-03 PROCEDURE — 36415 COLL VENOUS BLD VENIPUNCTURE: CPT

## 2025-02-03 PROCEDURE — 80053 COMPREHEN METABOLIC PANEL: CPT

## 2025-02-05 ENCOUNTER — NUTRITION (OUTPATIENT)
Dept: HEMATOLOGY/ONCOLOGY | Facility: CLINIC | Age: 65
End: 2025-02-05

## 2025-02-05 ENCOUNTER — INFUSION (OUTPATIENT)
Dept: HEMATOLOGY/ONCOLOGY | Facility: CLINIC | Age: 65
End: 2025-02-05
Payer: COMMERCIAL

## 2025-02-05 ENCOUNTER — OFFICE VISIT (OUTPATIENT)
Dept: HEMATOLOGY/ONCOLOGY | Facility: CLINIC | Age: 65
End: 2025-02-05
Payer: COMMERCIAL

## 2025-02-05 ENCOUNTER — SOCIAL WORK (OUTPATIENT)
Dept: CASE MANAGEMENT | Facility: HOSPITAL | Age: 65
End: 2025-02-05
Payer: COMMERCIAL

## 2025-02-05 VITALS
HEART RATE: 66 BPM | BODY MASS INDEX: 19.94 KG/M2 | OXYGEN SATURATION: 91 % | SYSTOLIC BLOOD PRESSURE: 124 MMHG | WEIGHT: 135 LBS | TEMPERATURE: 97 F | DIASTOLIC BLOOD PRESSURE: 72 MMHG | RESPIRATION RATE: 18 BRPM

## 2025-02-05 DIAGNOSIS — C34.11 MALIGNANT NEOPLASM OF UPPER LOBE OF RIGHT LUNG (MULTI): Primary | ICD-10-CM

## 2025-02-05 DIAGNOSIS — C34.90 MALIGNANT NEOPLASM OF LUNG, UNSPECIFIED LATERALITY, UNSPECIFIED PART OF LUNG (MULTI): ICD-10-CM

## 2025-02-05 DIAGNOSIS — C34.11 MALIGNANT NEOPLASM OF UPPER LOBE OF RIGHT LUNG (MULTI): ICD-10-CM

## 2025-02-05 DIAGNOSIS — E55.9 VITAMIN D DEFICIENCY: ICD-10-CM

## 2025-02-05 LAB
25(OH)D3 SERPL-MCNC: 8 NG/ML (ref 30–100)
ACTH PLAS-MCNC: 8.8 PG/ML (ref 7.2–63.3)

## 2025-02-05 PROCEDURE — 3074F SYST BP LT 130 MM HG: CPT | Performed by: NURSE PRACTITIONER

## 2025-02-05 PROCEDURE — 99213 OFFICE O/P EST LOW 20 MIN: CPT | Performed by: NURSE PRACTITIONER

## 2025-02-05 PROCEDURE — 2500000004 HC RX 250 GENERAL PHARMACY W/ HCPCS (ALT 636 FOR OP/ED): Mod: JZ,TB | Performed by: STUDENT IN AN ORGANIZED HEALTH CARE EDUCATION/TRAINING PROGRAM

## 2025-02-05 PROCEDURE — 96413 CHEMO IV INFUSION 1 HR: CPT

## 2025-02-05 PROCEDURE — 1125F AMNT PAIN NOTED PAIN PRSNT: CPT | Performed by: NURSE PRACTITIONER

## 2025-02-05 PROCEDURE — 1159F MED LIST DOCD IN RCRD: CPT | Performed by: NURSE PRACTITIONER

## 2025-02-05 PROCEDURE — 3078F DIAST BP <80 MM HG: CPT | Performed by: NURSE PRACTITIONER

## 2025-02-05 RX ORDER — ALBUTEROL SULFATE 0.83 MG/ML
3 SOLUTION RESPIRATORY (INHALATION) AS NEEDED
Status: DISCONTINUED | OUTPATIENT
Start: 2025-02-05 | End: 2025-02-05 | Stop reason: HOSPADM

## 2025-02-05 RX ORDER — HEPARIN 100 UNIT/ML
500 SYRINGE INTRAVENOUS AS NEEDED
Status: DISCONTINUED | OUTPATIENT
Start: 2025-02-05 | End: 2025-02-05 | Stop reason: HOSPADM

## 2025-02-05 RX ORDER — OXYCODONE HYDROCHLORIDE 5 MG/1
5 TABLET ORAL EVERY 6 HOURS PRN
Qty: 30 TABLET | Refills: 0 | Status: SHIPPED | OUTPATIENT
Start: 2025-02-05 | End: 2025-02-13

## 2025-02-05 RX ORDER — HEPARIN SODIUM,PORCINE/PF 10 UNIT/ML
50 SYRINGE (ML) INTRAVENOUS AS NEEDED
OUTPATIENT
Start: 2025-02-05

## 2025-02-05 RX ORDER — EPINEPHRINE 0.3 MG/.3ML
0.3 INJECTION SUBCUTANEOUS EVERY 5 MIN PRN
Status: DISCONTINUED | OUTPATIENT
Start: 2025-02-05 | End: 2025-02-05 | Stop reason: HOSPADM

## 2025-02-05 RX ORDER — FAMOTIDINE 10 MG/ML
20 INJECTION INTRAVENOUS ONCE AS NEEDED
Status: DISCONTINUED | OUTPATIENT
Start: 2025-02-05 | End: 2025-02-05 | Stop reason: HOSPADM

## 2025-02-05 RX ORDER — PROCHLORPERAZINE EDISYLATE 5 MG/ML
10 INJECTION INTRAMUSCULAR; INTRAVENOUS EVERY 6 HOURS PRN
Status: DISCONTINUED | OUTPATIENT
Start: 2025-02-05 | End: 2025-02-05 | Stop reason: HOSPADM

## 2025-02-05 RX ORDER — HEPARIN 100 UNIT/ML
500 SYRINGE INTRAVENOUS AS NEEDED
OUTPATIENT
Start: 2025-02-05

## 2025-02-05 RX ORDER — TADALAFIL 10 MG/1
10 TABLET ORAL DAILY PRN
COMMUNITY
Start: 2024-11-08

## 2025-02-05 RX ORDER — HEPARIN SODIUM,PORCINE/PF 10 UNIT/ML
50 SYRINGE (ML) INTRAVENOUS AS NEEDED
Status: DISCONTINUED | OUTPATIENT
Start: 2025-02-05 | End: 2025-02-05 | Stop reason: HOSPADM

## 2025-02-05 RX ORDER — PROCHLORPERAZINE MALEATE 10 MG
10 TABLET ORAL EVERY 6 HOURS PRN
Status: DISCONTINUED | OUTPATIENT
Start: 2025-02-05 | End: 2025-02-05 | Stop reason: HOSPADM

## 2025-02-05 RX ORDER — NALOXONE HYDROCHLORIDE 4 MG/.1ML
1 SPRAY NASAL AS NEEDED
Qty: 2 EACH | Refills: 0 | Status: SHIPPED | OUTPATIENT
Start: 2025-02-05

## 2025-02-05 RX ORDER — DIPHENHYDRAMINE HYDROCHLORIDE 50 MG/ML
50 INJECTION INTRAMUSCULAR; INTRAVENOUS AS NEEDED
Status: DISCONTINUED | OUTPATIENT
Start: 2025-02-05 | End: 2025-02-05 | Stop reason: HOSPADM

## 2025-02-05 RX ADMIN — SODIUM CHLORIDE 200 MG: 9 INJECTION, SOLUTION INTRAVENOUS at 15:20

## 2025-02-05 ASSESSMENT — PAIN SCALES - GENERAL: PAINLEVEL_OUTOF10: 8

## 2025-02-05 NOTE — SIGNIFICANT EVENT

## 2025-02-05 NOTE — PROGRESS NOTES
Patient ID: Pillo Guerin is a 64 y.o. male    Primary Care Provider: Rocio Harris, APRN-CNP    DIAGNOSIS AND STAGING  Stage IVB (cT3 cN3 cM1c) poorly differentiated adenocarcinoma of RUL diagnosed on 08/03/23     SITES OF DISEASE  RUL  Supraclavicular nodes  Contralateral lung  Right gluteal muscle   Brain right occipital lobe      MOLECULAR GENOMICS  MICROSATELLITE STATUS: Microsatellite Stable (MAGDIEL)     DISEASE ASSOCIATED GENOMIC FINDINGS:   KRAS p.G13D ([id number] c.38G>A)   PIK3CA p.E542K ([id number] c.1624G>A)   TP53 p.E285K ([id number] c.853G>A)     DISEASE RELEVANT ALTERATIONS NOT DETECTED:   Negative for ALK fusion.   Negative for BRAF V600E.   Negative for EGFR sensitizing mutation.   Negative for KRAS G12C.   Negative for MET exon 14 skipping mutation.   Negative for NTRK fusion.   Negative for RET fusion.   Negative for ROS1 fusion.     PD-L1 TPS 80%     PRIOR THERAPIES  GKRS to right occipital lobe on  24 Gy in 1 fraction on 10/17/23      CURRENT THERAPY  Single agent pembrolizumab since 08/31/23    CURRENT ONCOLOGICAL PROBLEMS  Resolved GIII ICI colitis     HISTORY OF PRESENT ILLNESS  This is a pt with PMH of EtOH use disorder, CVA, CAD c/b NSTEMI s/p 2 stents and stage IVB ([id number]) poorly differentiated adenocarcinoma of RUL diagnosed on 08/03/23.      Patient was initially diagnosed 08/2022 after incidentally found RUL lesion was worked up during admission for hyponatremia presumed to be 2/2 beer potomania. He was then seen by his pulmonologist Dr. Callejas who ordered PET and MRI but this was not obtained  nor were multiple attempts to schedule for oncologic NPV.     I saw him in August 2023 and he endorsed haviing lost 15 lbs since stroke April 2022. His appetite is good, denies dysgeusia. He has slowed down a bit since he had his stroke. He can walk without becoming dyspneic but is limited by his stroke.    On 08/12/23 the patient underwent a bronchoscopy with EBUS:  Accession #: [id number]    Date of Procedure:8/3/2022   Date Reported: 8/9/2022   Date Received: 8/3/2022   Date of Birth / Sex 1960 (Age: 62) / M   Race: WHITE   Submitting Physician: DARYN YOUNG MD   Attending Physician: MD HEVER BROWNE MD   Procedures/Addenda Present     Other External #     FINAL CYTOLOGICAL INTERPRETATION     A. FINE NEEDLE ASPIRATION LYMPH NODE, L 4, CYTOLOGY AND CELL BLOCK:   NO MALIGNANT CELLS IDENTIFIED.   LYMPHOID SAMPLE IS PRESENT.     Note: Cell block shows presence of lymphoid cells along with bronchial cells contamination.     B. FINE NEEDLE ASPIRATION LYMPH NODE, STATION 7, CYTOLOGY AND CELL BLOCK:   NO MALIGNANT CELLS IDENTIFIED.   LYMPHOID SAMPLE IS PRESENT.     C. FINE NEEDLE ASPIRATION LYMPH NODE, R 11, CYTOLOGY AND CELL BLOCK:   POORLY DIFFERENTIATED CARCINOMA, CONSISTENT WITH ADENOCARCINOMA, SEE NOTE.     Note: A limited panel of immunohistochemical stains performed showed neoplastic   cells staining strongly and diffusely positive with TTF-1 and negative with   p40. Findings are consistent with above.     Staff pathologist: Reviewed by Dr. Ellyn Valles.     Molecular testing has been ordered and results will be issued in an addendum.     The cell block; C1 contains moderate tumor cellularity representing roughly 70 % of all nucleated cells.       D. BRONCHO-ALVEOLAR LAVAGE OF RIGHT UPPER LOBE:   RARE ATYPICAL CELLS ARE PRESENT.     08/16/23: PET scan demonstrates multiple RUL FDG avid nodules as well as contra-lateral lung nodules, right hilar, mediastinal and supraclavicular nodes consistent with neoplastic involvement. There is also a large FDG avid right gluteal mass extending into the right SIJ and iliac bone.     08/16/23: MRI brain shows a 8 mm right occipital lobe metastasis with mild associated edema.     08/31/23: Single agent pembrolizumab begins     10/17/23: GKRS to right occipital lobe on  24 Gy in 1 fraction     06/06/24: grade 3 immune-related diarrhea with  episodes of incontinence despite Imodium   Rx prednisone 40 mg/day provided today      PAST MEDICAL HISTORY  CAD s/p 2 stents, CVA, Dupuytren's contracture   BPH  Lumbar radiculopathy   Ir-diarrhea (pembrolizumab) treated with prednisone months of May and      SURGICAL HISTORY  None      SOCIAL HISTORY  Born in [place], lives in [place] with 2 roommates.   Brother Salas is NOK. Drinks 2 24 oz beers a day.   + Tobacco - 50 pack year smoking history, active.   Rare cannabis, acid use in 70s, no additional illicits.    Was working at [employer] in [place] before stroke.      FAMILY HISTORY  Mother  of melanoma in      CURRENT MEDS REVIEWED    Current Outpatient Medications:     amLODIPine (Norvasc) 5 mg tablet, Take 0.5 tablets (2.5 mg) by mouth once daily., Disp: , Rfl:     atorvastatin (Lipitor) 80 mg tablet, TAKE 1 TABLET BY MOUTH ONCE DAILY, Disp: 30 tablet, Rfl: 1    dextromethorphan-guaifenesin (Mucinex DM)  mg 12 hr tablet, Take 1 tablet by mouth twice a day., Disp: , Rfl:     diclofenac sodium 1 % kit, Apply 2 g topically every 6 hours if needed., Disp: , Rfl:     gabapentin (Neurontin) 300 mg capsule, Take 2 capsules (600 mg) by mouth 2 times a day., Disp: 120 capsule, Rfl: 2    loperamide (Imodium A-D) 2 mg tablet, Take it as instructed for diarrhea, Disp: 60 tablet, Rfl: 3    losartan (Cozaar) 50 mg tablet, Take 1 tablet (50 mg) by mouth., Disp: , Rfl:     metoprolol tartrate (Lopressor) 25 mg tablet, TAKE 1 TABLET BY MOUTH TWO TIMES A DAY, Disp: 60 tablet, Rfl: 1    omeprazole (PriLOSEC) 20 mg DR capsule, Do not crush or chew. Take it 30 minutes before breakfast., Disp: 30 capsule, Rfl: 11    oxyCODONE (Roxicodone) 5 mg immediate release tablet, Take 1 tablet (5 mg) by mouth every 6 hours if needed for severe pain (7 - 10) for up to 8 days., Disp: 30 tablet, Rfl: 0    predniSONE (Deltasone) 5 mg tablet, Take 1 tablet (5 mg) by mouth once daily., Disp: 30 tablet, Rfl: 2     tadalafil (Cialis) 10 mg tablet, Take 1 tablet (10 mg) by mouth once daily as needed., Disp: , Rfl:     ticagrelor (Brilinta) 60 mg tablet, Take 1 tablet (60 mg) by mouth 2 times a day., Disp: 60 tablet, Rfl: 11    triamcinolone (Kenalog) 0.1 % ointment, Apply topically 2 times a day., Disp: 80 g, Rfl: 1    umeclidinium (Incruse Ellipta) 62.5 mcg/actuation inhalation, Inhale 1 puff (62.5 mcg) once daily., Disp: 1 each, Rfl: 11    methocarbamol (Robaxin) 750 mg tablet, Take 1 tablet (750 mg) by mouth 3 times a day as needed., Disp: , Rfl:     nitroglycerin (Nitrostat) 0.4 mg SL tablet, DISSOLVE 1 TABLET UNDER TONGUE EVERY 5 MINUTES FOR 3 DOSES AS NEEDED FOR CHEST PAIN. IF PAIN PERSISTS DIAL 911. (Patient not taking: Reported on 2/5/2025), Disp: 25 tablet, Rfl: 1    pantoprazole (ProtoNix) 40 mg EC tablet, Take 1 tablet (40 mg) by mouth once daily. Do not crush, chew, or split., Disp: 30 tablet, Rfl: 2    Current Facility-Administered Medications:     acetaminophen (Tylenol) tablet 650 mg, 650 mg, oral, q4h PRN, Angelica Munoz MD    HYDROmorphone (Dilaudid) injection 0.4 mg, 0.4 mg, intravenous, PRN, Angelica Munoz MD, 0.4 mg at 10/17/23 1230    HYDROmorphone (Dilaudid) injection 0.4 mg, 0.4 mg, intravenous, PRN, Angelica Munoz MD, 0.4 mg at 10/17/23 0830    ibuprofen tablet 400 mg, 400 mg, oral, PRN, Angelica Munoz MD    midazolam (Versed) injection 0.5 mg, 0.5 mg, intravenous, PRN, Angelica Munoz MD, 0.5 mg at 10/17/23 1230    midazolam (Versed) injection 0.5 mg, 0.5 mg, intravenous, PRN, Angelica Munoz MD, 0.5 mg at 10/17/23 0830    ondansetron (Zofran) injection 4 mg, 4 mg, intravenous, q4h PRN, Angelica Munoz MD    oxyCODONE (Roxicodone) immediate release tablet 10 mg, 10 mg, oral, q4h PRN, Angelica Munoz MD    ALLERGIES REVIEWED     SUBJECTIVE:   2/5/2025  Patient present for routine follow-up evaluation, doing well today. He remains active, he grocery shops 3 days each week. He endorses  mid back pain. He denies any fevers, chills or night sweats. No cough, chest pain or shortness of breath. No nausea or vomiting. No constipation or diarrhea. No urinary symptoms. No rash. No neuropathy.     OBJECTIVE:  /72 (BP Location: Right arm, Patient Position: Sitting, BP Cuff Size: Adult)   Pulse 66   Temp 36.1 °C (97 °F) (Core)   Resp 18   Wt 61.2 kg (135 lb)   SpO2 91%   BMI 19.94 kg/m²     Wt Readings from Last 5 Encounters:   25 61.2 kg (135 lb)   01/15/25 64 kg (141 lb)   24 63.9 kg (140 lb 14 oz)   24 63 kg (139 lb)   10/30/24 62.8 kg (138 lb 5.4 oz)     Physical Exam:  ECO  Pain: 0  Constitutional: Well developed, awake/alert/oriented x3, no distress, alert and cooperative  Eyes: PER. sclera anicteric  ENMT: Oral mucosa moist, no lesions or thrush identified  Respiratory/Thorax: Breathing is non-labored. Lungs are clear to auscultation bilaterally. No adventitious breath sounds  Cardiovascular: S1-S2. Regular rate and rhythm. No murmurs, rubs, or gallops appreciated  Gastrointestinal: Abdomen soft nontender, nondistended, normal active bowel sounds.  Musculoskeletal: ROM intact, no joint swelling, normal strength  Extremities: normal extremities, no cyanosis, no edema, no clubbing  Lymphatics: no palpable lymphadenopathy  Skin: no rash  Neurologic: alert and oriented x3. Nonfocal exam. No myoclonus  Psychological: Pleasant, appropriate and easily engaged     Results  Lab Results   Component Value Date    WBC 9.4 2025    HGB 13.1 (L) 2025    HCT 40.5 (L) 2025    MCV 67 (L) 2025     2025      Lab Results   Component Value Date    NEUTROABS 6.53 2025      Lab Results   Component Value Date    GLUCOSE 130 (H) 2025    CALCIUM 10.0 2025     (L) 2025    K 5.0 2025    CO2 26 2025    CL 99 2025    BUN 14 2025    CREATININE 0.73 2025    MG 2.28 2024     Lab Results   Component  Value Date    ALT 17 02/03/2025    AST 17 02/03/2025    ALKPHOS 88 02/03/2025    BILITOT 0.9 02/03/2025    BILIDIR 0.3 07/31/2022      Lab Results   Component Value Date    ACTH 11.5 01/03/2025    CORTISOL 11.2 02/03/2025    TSH 1.57 02/03/2025    FREET4 1.14 04/22/2024       Diagnostic Results   CT CAP 11/15/24  IMPRESSION:  CHEST:  1.  Interval minimal increase in the right upper lobe dominant spiculated lung mass and increase in the soft tissue component of a  another spiculated right upper lobe nodule. No significant change in the right lower lobe lung nodules and spiculated opacity in the left  upper lobe.   2. No significant change in the scattered mediastinal and bilateral hilar lymph nodes.      ABDOMEN-PELVIS:  1.  No definite metastatic disease in the abdomen and pelvis.  2. Additional chronic and incidental findings as described above.    MRI Brain 12/4/2024  IMPRESSION:  1. No new enhancing lesions suggestive of brain metastasis. The small enhancing lesion in the bony calvarium on the left is not measurably changed from the previous examination. It is indeterminate. Osseous metastasis among others could give this appearance.  2. Nonspecific white matter changes most likely represent small-vessel ischemic disease in a patient of this age. There is involvement of the jagruti. There are also likely several remote lacunar infarcts. No evidence of acute ischemic injury.  3. Diffuse parenchymal volume loss.  4. Inflammatory changes of the paranasal sinuses. There is also interval increased opacification of left-sided mastoid air cells and  possible fluid in the left middle ear cavity. Correlate with concern for otomastoiditis.    Assessment/Plan     Lung cancer (Multi), Clinical: Stage IVB (cT3, cN3, cM1c)  Mr. Pillo Guerin is a 65 YO with Stage IVB ([id number]) poorly differentiated adenocarcinoma of RUL, TPS 80%, lack of actionable genomic alterations noted.    He was started on single agent pembrolizumab since  08/31/23 - however had to be held in May 2024 due to grade III ICI colitis which has completed resolved.     He has now completed 20 cycles pembrolizumab. CT scans from 1/15 personally reviewed showing increase in dominant lesion by ~4 mm in one direction, other lung lesions generally stable. We discussed this slow progression and I showed him images. Clinically he is doing well and there are limited options for him in second line. We will continue with pembrolizumab and obtain scans in March.    #Stage IVB NSCLC with early, slow progression and no options in second line  -Continue with c21 pembrolizumab  -Scans prior to c24 - ordered    #Grade III ICI Diarrhea - resolved    #Pruritus, immune related  Continue moisturizing BUE with CeraVe and triamcinolone as needed     #Right occipital lobe 8 mm metastasis   S/p GKRS 24 Gy in 1 fraction on 10/17/23  Stable MRI 12/4/2024  Repeat MRI brain every 3 months -      #Pain on right gluteal region- resolved   Due to large centrally necrotic and peripherally hypermetabolic mass in the right lower paramediastinal muscle extending into the adjacent subcutaneous tissue and superior right gluteal muscle with involvement of the superior aspect of the right iliac  bone, right florencio sacrum -   He has been taking gabapentin for pain (controlled) in LLE that is chronic      #Stroke in April 2022 and hx of CAD  -   Stable -   On ASA and ticagrelor      #HTN - on amlodipine      #HLD - atorvastatin    #Microcytic Anemia - Chronic in nature. Iron panel and ferritin sent today. If found to be normal will send hemoglobin electrophoresis.   Has never been told or heard about family history of thalassemia or other blood disorders.       Damian Carpenter, APRN-CNP  [facility]  Thoracic + H&N Medical Oncology     I spent a total of 30+ minutes on the date of the service which included preparing to see the patient, face-to-face patient care, completing clinical documentation,  obtaining and / or reviewing separately obtained history, counseling and educating the patient/family/caregiver, ordering medications, tests, or procedures, communicating with other healthcare providers (not separately reported), independently interpreting results, not separately reported, and communicating results to the patient/family/caregiver, Name and date of birth verified.

## 2025-02-05 NOTE — PROGRESS NOTES
[facility]. He reported he is doing well and apologized for not coming into the appt when SW set up a ride. He is feeling better and heading to treatment today. SW provided support.

## 2025-02-05 NOTE — PROGRESS NOTES
Pillo is here alone for follow up with JULIA Carpenter. He reports he is doing well with no new complaints. Meds and allergies reviewed and updated. NP made aware.   Education Documentation  Monitoring Weight, taught by Patsy Rodriguez RN at 2/5/2025  2:15 PM.  Learner: Patient  Readiness: Acceptance  Method: Explanation  Response: Verbalizes Understanding    Tips for Daily Living, taught by Patsy Rodriguez RN at 2/5/2025  2:15 PM.  Learner: Patient  Readiness: Acceptance  Method: Explanation  Response: Verbalizes Understanding    Healthy Lifestyle, taught by Patsy Rodriguez RN at 2/5/2025  2:15 PM.  Learner: Patient  Readiness: Acceptance  Method: Explanation  Response: Verbalizes Understanding    General Medication Information, taught by Patsy Rodriguez RN at 2/5/2025  2:15 PM.  Learner: Patient  Readiness: Acceptance  Method: Explanation  Response: Verbalizes Understanding    Supportive Medications, taught by Patsy Rodriguez RN at 2/5/2025  2:15 PM.  Learner: Patient  Readiness: Acceptance  Method: Explanation  Response: Verbalizes Understanding    Treatment Plan and Schedule, taught by Patsy Rodriguez RN at 2/5/2025  2:15 PM.  Learner: Patient  Readiness: Acceptance  Method: Explanation  Response: Verbalizes Understanding    Education Comments  No comments found.

## 2025-02-05 NOTE — PROGRESS NOTES
NUTRITION Assessment NOTE    Nutrition Assessment     Reason for Visit:  Pillo Guerin is a 64 y.o. 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   Today pt denies a h/o colitis.     Referred to this service for wt loss and assessed today in the clinic.    PMH of EtOH use disorder, CVA, CAD c/b NSTEMI s/p 2 stents, hyponatremia presumed to be 2/2 beer potomania.     Met with patient for nutrition follow up visit today.  He is here in infusion for treatment   Lab Results   Component Value Date/Time    GLUCOSE 130 (H) 02/03/2025 1110     (L) 02/03/2025 1110    K 5.0 02/03/2025 1110    CL 99 02/03/2025 1110    CO2 26 02/03/2025 1110    ANIONGAP 13 02/03/2025 1110    BUN 14 02/03/2025 1110    CREATININE 0.73 02/03/2025 1110    EGFR >90 02/03/2025 1110    CALCIUM 10.0 02/03/2025 1110    ALBUMIN 4.9 02/03/2025 1110    ALKPHOS 88 02/03/2025 1110    PROT 7.2 02/03/2025 1110    AST 17 02/03/2025 1110    BILITOT 0.9 02/03/2025 1110    ALT 17 02/03/2025 1110     Lab Results   Component Value Date/Time    VITD25 15 (A) 04/21/2022 0622   Reports he does not recall ever having to take Vit D to replete his level    Anthropometrics:  Anthropometrics  IBW/kg (Dietitian Calculated): 72.7 kg (72.7 kg)  Per patient, weight fluctuates 60-62 kg .  Weight was up Dec/January (unsure if this was true weight gain or fluid related)  Wt Readings from Last 20 Encounters:   02/05/25 61.2 kg (135 lb)   01/15/25 64 kg (141 lb)   12/11/24 63.9 kg (140 lb 14 oz)   11/20/24 63 kg (139 lb)   10/30/24 62.8 kg (138 lb 5.4 oz)   10/10/24  62.4 kg (137 lb 9.1 oz)   09/18/24 61.8 kg (136 lb 3.9 oz)   12/04/24 62.1 kg (137 lb)   08/28/24 59.6 kg (131 lb 6.3 oz)   08/26/24 61.9 kg (136 lb 7.4 oz)   08/07/24 59.3 kg (130 lb 11.7 oz)   07/19/24 60.3 kg (133 lb)   07/17/24 60.3 kg (132 lb 14.4 oz)   06/12/24 60.9 kg (134 lb 4.2 oz)   05/16/24 62.6 kg (138 lb)   05/15/24 62.7 kg (138 lb 4.8 oz)   04/24/24 63.5 kg (139 lb 15.9 oz)   04/04/24 63.5 kg (140 lb)   04/03/24 61.4 kg (135 lb 5.8 oz)   03/13/24 63.6 kg (140 lb 1.6 oz)              Food And Nutrient Intake:  Food and Nutrient History  Food and Nutrient History: Patient reports fair-good appetite and oral intake.  He receives both [employer] meals and Meals on Wheels. He generally eats 3 meals/day.  Denies n/v/d/c.  He states that he takes a steroid daily and diarrhea has pretty much resolved.  He has not been needing to take any Imodium.  He does indicate that he drinks 3  24 oz. beers daily . He does not take any vitamins/minerals.  He states his weight is relatively stable but reports his doctor would like for him to gain a little bit more.         Food Intake  Meal 1: 2 waffles (butter and syrup), benitez, and coffee  Meal 2: Hot meal from Meals on Wheels  Meal 3: [employer] Frozen meal  Fluid Intake: 1-2 cups of coffee, lots of water, beer  Alcohol Drink Size / Volume (ounces):  (72 oz beer ([place]))  Alcohol Intake Frequency (drinking days/week):  (7days/week)          Bioactive Substance Intake  Alcohol Drink Size / Volume (ounces):  (72 oz beer ([place]))  Alcohol Intake Frequency (drinking days/week):  (7days/week)     Nutrition Focused Physical Exam Findings:      Subcutaneous Fat Loss  Orbital Fat Pads: Mild-Moderate (slight dark circles and slight hollowing)  Buccal Fat Pads: Mild-Moderate (flat cheeks, minimal bounce)  Triceps: Severe (negligible fat tissue)  Ribs: Defer    Muscle Wasting  Temporalis: Mild-Moderate (slight depression)      Energy Needs  Estimated Energy Needs  Total Energy  Estimated Needs in 24 hours (kCal):  (8581-4872 (32-35 cals/kg))  Estimated Fluid Needs  Total Fluid Estimated Needs in 24 Hours (mL):  (1900 mls)  Estimated Protein Needs  Total Protein Estimated Needs in 24 Hours (g):  ( g)        Nutrition Diagnosis   Malnutrition Diagnosis  Patient has Malnutrition Diagnosis: Yes  Diagnosis Status: Resolved  Malnutrition Diagnosis: Moderate malnutrition related to chronic disease or condition  Related to: h/o chronic alcoholism and cancer  As Evidenced by: previously with significant weight loss but now appears to be maintaining , mild-moderate muscle/fat loss (Previously with severe  malnutrition when experiencing malabsorption , now diarrhea has resolved and weight is stabilizing, has transitioned to moderate malnutrition)    Nutrition Diagnosis  Patient has Nutrition Diagnosis: Yes  Diagnosis Status (1): Resolved  Nutrition Diagnosis 1: Altered GI function (Patient with previous hx of colitis/diarrhea which has now resolved and patient currently not requiring any Imodium)    Pt with a h/o Vit D deficiency per lab draw in 2022. Pt denies use of repletion supplement and is likely still depleted at this time. Check current levels and replete as needed or consider maintenance dose as appropriate.      Nutrition Interventions/Recommendations   Nutrition Prescription  Individualized Nutrition Prescription Provided for : Regular TONO (Note pt with limited resources and provided Global meals vary but often provide frozen meals)    Food and Nutrition Delivery  Food and Nutrition Delivery  Goals: Encouraged to snack- at least nightly and add calories and protein to each meal and snack  Medical Food Supplement:  (Encouraged retrial for tolerance.  WIll mail Ensure coupons to pt)  Goals: Thiamine- 100 mg daily; Folic Acid- 1 mg (1,000 mcg) daily; Vit D replete as appropriate after serum level check (Continue DMV with iron)  Bioactive Substance Management: Alcohol management  Goals:  Pt has wreduced his intake from 8-10 servings daily to 6 servings daily.  Not likely that he will decrease intake further    Nutrition Education     Encouraged trialing high calorie oral nutrition supplement (350+cals) to help optimize rodney/protein intake.  Ensure coupons provided to patient to assist with cost savings.   Suggested , due to chronic alcohol use, 50 mg thiamin x 2 weeks, 1 mg folic acid daily, and a one-a day Multivitamin daily to meet vitamin/mineral needs. Information was written and provided to patient.   -Patient may consider one-a-day but unsure if he is willing to do the others at this time   Coordination of Care  Coordination of Nutrition Care by a Nutrition Professional  Goals: Provide appropriate and adequate micronutrient supplementation    There are no Patient Instructions on file for this visit.    Nutrition Monitoring and Evaluation   Food and Nutrient Intake  Fluid Intake: Estimated fluid intake  Criteria: Meet daily hydration goals  Amount of Food: Estimated amout of food  Criteria: Meet energy and protein needs  Meal/Snack Pattern: Estimated meal and snack pattern  Criteria: as above  Anthropometric measurements  Monitoring and Evaluation Plan: Weight  Criteria: Maintenance or slow gain  Biochemical Data, Medical Tests and Procedures  Monitoring and Evaluation Plan: Electrolyte/renal panel, Vitamin profile  Criteria: WNL  Vitamin Profile: Vitamin D, 25 hydroxy  Criteria: > 30  Nutrition Focused Physical Findings  Adipose Finding: Loss of subcutaneous fat  Criteria: No further loss  Muscle Finding: Muscle atrophy  Criteria: No further loss          Time Spent  Prep time on day of patient encounter: 10 minutes  Time spent directly with patient, family or caregiver: 15 minutes  Additional Time Spent on Patient Care Activities: 10 minutes  Documentation Time: 25 minutes  Other Time Spent: 5 minutes  Total: 65 minutes

## 2025-02-06 ENCOUNTER — TELEPHONE (OUTPATIENT)
Dept: HEMATOLOGY/ONCOLOGY | Facility: CLINIC | Age: 65
End: 2025-02-06
Payer: COMMERCIAL

## 2025-02-06 DIAGNOSIS — E55.9 VITAMIN D DEFICIENCY: Primary | ICD-10-CM

## 2025-02-06 RX ORDER — ACETAMINOPHEN 500 MG
5000 TABLET ORAL DAILY
Qty: 42 TABLET | Refills: 0 | Status: SHIPPED | OUTPATIENT
Start: 2025-02-06 | End: 2025-03-20

## 2025-02-06 NOTE — TELEPHONE ENCOUNTER
Left message for pt on identified VM. Vitamin D level found to be 8. Order placed for vitamin D 5,000IU daily x6 weeks.   Will repeat vitamin D level in about 6 weeks. Orders placed.

## 2025-02-17 ENCOUNTER — TELEPHONE (OUTPATIENT)
Dept: HEMATOLOGY/ONCOLOGY | Facility: HOSPITAL | Age: 65
End: 2025-02-17
Payer: COMMERCIAL

## 2025-02-17 DIAGNOSIS — C34.11 MALIGNANT NEOPLASM OF UPPER LOBE OF RIGHT LUNG (MULTI): ICD-10-CM

## 2025-02-17 RX ORDER — OXYCODONE HYDROCHLORIDE 5 MG/1
5 TABLET ORAL EVERY 6 HOURS PRN
Qty: 30 TABLET | Refills: 0 | Status: SHIPPED | OUTPATIENT
Start: 2025-02-17 | End: 2025-02-25

## 2025-02-17 NOTE — TELEPHONE ENCOUNTER
Refill request received for Oxycodone 5mg.  Preferred pharmacy is [facility] in [place].  Message sent to team to refill if appropriate.

## 2025-02-24 ENCOUNTER — LAB (OUTPATIENT)
Dept: LAB | Facility: CLINIC | Age: 65
End: 2025-02-24
Payer: COMMERCIAL

## 2025-02-24 DIAGNOSIS — Z51.12 ENCOUNTER FOR ANTINEOPLASTIC IMMUNOTHERAPY: ICD-10-CM

## 2025-02-24 DIAGNOSIS — C34.11 MALIGNANT NEOPLASM OF UPPER LOBE OF RIGHT LUNG (MULTI): ICD-10-CM

## 2025-02-24 DIAGNOSIS — R09.89 OTHER SPECIFIED SYMPTOMS AND SIGNS INVOLVING THE CIRCULATORY AND RESPIRATORY SYSTEMS: ICD-10-CM

## 2025-02-24 LAB
ALBUMIN SERPL BCP-MCNC: 4.4 G/DL (ref 3.4–5)
ALP SERPL-CCNC: 61 U/L (ref 33–136)
ALT SERPL W P-5'-P-CCNC: 22 U/L (ref 10–52)
ANION GAP SERPL CALC-SCNC: 12 MMOL/L (ref 10–20)
AST SERPL W P-5'-P-CCNC: 22 U/L (ref 9–39)
BASOPHILS # BLD AUTO: 0.06 X10*3/UL (ref 0–0.1)
BASOPHILS NFR BLD AUTO: 0.6 %
BILIRUB SERPL-MCNC: 0.8 MG/DL (ref 0–1.2)
BUN SERPL-MCNC: 11 MG/DL (ref 6–23)
CALCIUM SERPL-MCNC: 9 MG/DL (ref 8.6–10.6)
CHLORIDE SERPL-SCNC: 99 MMOL/L (ref 98–107)
CO2 SERPL-SCNC: 27 MMOL/L (ref 21–32)
CORTIS AM PEAK SERPL-MSCNC: 10.6 UG/DL (ref 5–20)
CREAT SERPL-MCNC: 0.63 MG/DL (ref 0.5–1.3)
EGFRCR SERPLBLD CKD-EPI 2021: >90 ML/MIN/1.73M*2
EOSINOPHIL # BLD AUTO: 0.64 X10*3/UL (ref 0–0.7)
EOSINOPHIL NFR BLD AUTO: 6.8 %
ERYTHROCYTE [DISTWIDTH] IN BLOOD BY AUTOMATED COUNT: 15.6 % (ref 11.5–14.5)
GLUCOSE SERPL-MCNC: 114 MG/DL (ref 74–99)
HCT VFR BLD AUTO: 37.6 % (ref 41–52)
HGB BLD-MCNC: 12.3 G/DL (ref 13.5–17.5)
IMM GRANULOCYTES # BLD AUTO: 0.03 X10*3/UL (ref 0–0.7)
IMM GRANULOCYTES NFR BLD AUTO: 0.3 % (ref 0–0.9)
LYMPHOCYTES # BLD AUTO: 1.85 X10*3/UL (ref 1.2–4.8)
LYMPHOCYTES NFR BLD AUTO: 19.7 %
MCH RBC QN AUTO: 21.6 PG (ref 26–34)
MCHC RBC AUTO-ENTMCNC: 32.7 G/DL (ref 32–36)
MCV RBC AUTO: 66 FL (ref 80–100)
MONOCYTES # BLD AUTO: 0.54 X10*3/UL (ref 0.1–1)
MONOCYTES NFR BLD AUTO: 5.8 %
NEUTROPHILS # BLD AUTO: 6.26 X10*3/UL (ref 1.2–7.7)
NEUTROPHILS NFR BLD AUTO: 66.8 %
NRBC BLD-RTO: ABNORMAL /100{WBCS}
PLATELET # BLD AUTO: 306 X10*3/UL (ref 150–450)
POTASSIUM SERPL-SCNC: 4.5 MMOL/L (ref 3.5–5.3)
PROT SERPL-MCNC: 6.3 G/DL (ref 6.4–8.2)
RBC # BLD AUTO: 5.7 X10*6/UL (ref 4.5–5.9)
SODIUM SERPL-SCNC: 133 MMOL/L (ref 136–145)
TSH SERPL-ACNC: 1.81 MIU/L (ref 0.44–3.98)
WBC # BLD AUTO: 9.4 X10*3/UL (ref 4.4–11.3)

## 2025-02-24 PROCEDURE — 85025 COMPLETE CBC W/AUTO DIFF WBC: CPT

## 2025-02-24 PROCEDURE — 36415 COLL VENOUS BLD VENIPUNCTURE: CPT

## 2025-02-24 PROCEDURE — 82024 ASSAY OF ACTH: CPT

## 2025-02-24 PROCEDURE — 80053 COMPREHEN METABOLIC PANEL: CPT

## 2025-02-24 PROCEDURE — 84443 ASSAY THYROID STIM HORMONE: CPT

## 2025-02-24 PROCEDURE — 82533 TOTAL CORTISOL: CPT

## 2025-02-24 RX ORDER — ALBUTEROL SULFATE 0.83 MG/ML
3 SOLUTION RESPIRATORY (INHALATION) AS NEEDED
OUTPATIENT
Start: 2025-03-19

## 2025-02-24 RX ORDER — EPINEPHRINE 0.3 MG/.3ML
0.3 INJECTION SUBCUTANEOUS EVERY 5 MIN PRN
OUTPATIENT
Start: 2025-03-19

## 2025-02-24 RX ORDER — PROCHLORPERAZINE MALEATE 10 MG
10 TABLET ORAL EVERY 6 HOURS PRN
OUTPATIENT
Start: 2025-03-19

## 2025-02-24 RX ORDER — PROCHLORPERAZINE EDISYLATE 5 MG/ML
10 INJECTION INTRAMUSCULAR; INTRAVENOUS EVERY 6 HOURS PRN
OUTPATIENT
Start: 2025-03-19

## 2025-02-24 RX ORDER — DIPHENHYDRAMINE HYDROCHLORIDE 50 MG/ML
50 INJECTION INTRAMUSCULAR; INTRAVENOUS AS NEEDED
OUTPATIENT
Start: 2025-03-19

## 2025-02-24 RX ORDER — FAMOTIDINE 10 MG/ML
20 INJECTION, SOLUTION INTRAVENOUS ONCE AS NEEDED
OUTPATIENT
Start: 2025-03-19

## 2025-02-26 ENCOUNTER — NUTRITION (OUTPATIENT)
Dept: HEMATOLOGY/ONCOLOGY | Facility: CLINIC | Age: 65
End: 2025-02-26

## 2025-02-26 ENCOUNTER — TELEPHONE (OUTPATIENT)
Dept: PALLIATIVE MEDICINE | Facility: HOSPITAL | Age: 65
End: 2025-02-26

## 2025-02-26 ENCOUNTER — INFUSION (OUTPATIENT)
Dept: HEMATOLOGY/ONCOLOGY | Facility: CLINIC | Age: 65
End: 2025-02-26
Payer: COMMERCIAL

## 2025-02-26 ENCOUNTER — OFFICE VISIT (OUTPATIENT)
Dept: HEMATOLOGY/ONCOLOGY | Facility: CLINIC | Age: 65
End: 2025-02-26
Payer: COMMERCIAL

## 2025-02-26 ENCOUNTER — APPOINTMENT (OUTPATIENT)
Dept: HEMATOLOGY/ONCOLOGY | Facility: CLINIC | Age: 65
End: 2025-02-26
Payer: COMMERCIAL

## 2025-02-26 VITALS
TEMPERATURE: 96.6 F | WEIGHT: 139 LBS | RESPIRATION RATE: 18 BRPM | OXYGEN SATURATION: 97 % | HEART RATE: 64 BPM | SYSTOLIC BLOOD PRESSURE: 122 MMHG | DIASTOLIC BLOOD PRESSURE: 58 MMHG | BODY MASS INDEX: 20.53 KG/M2

## 2025-02-26 DIAGNOSIS — C34.11 MALIGNANT NEOPLASM OF UPPER LOBE OF RIGHT LUNG (MULTI): ICD-10-CM

## 2025-02-26 DIAGNOSIS — C34.11 MALIGNANT NEOPLASM OF UPPER LOBE OF RIGHT LUNG (MULTI): Primary | ICD-10-CM

## 2025-02-26 DIAGNOSIS — R09.89 OTHER SPECIFIED SYMPTOMS AND SIGNS INVOLVING THE CIRCULATORY AND RESPIRATORY SYSTEMS: ICD-10-CM

## 2025-02-26 DIAGNOSIS — Z51.12 ENCOUNTER FOR ANTINEOPLASTIC IMMUNOTHERAPY: ICD-10-CM

## 2025-02-26 LAB — ACTH PLAS-MCNC: 10.5 PG/ML (ref 7.2–63.3)

## 2025-02-26 PROCEDURE — 96413 CHEMO IV INFUSION 1 HR: CPT

## 2025-02-26 PROCEDURE — 99213 OFFICE O/P EST LOW 20 MIN: CPT | Performed by: NURSE PRACTITIONER

## 2025-02-26 PROCEDURE — 3074F SYST BP LT 130 MM HG: CPT | Performed by: NURSE PRACTITIONER

## 2025-02-26 PROCEDURE — 99213 OFFICE O/P EST LOW 20 MIN: CPT | Mod: 25 | Performed by: NURSE PRACTITIONER

## 2025-02-26 PROCEDURE — 2500000004 HC RX 250 GENERAL PHARMACY W/ HCPCS (ALT 636 FOR OP/ED): Mod: JZ,TB | Performed by: STUDENT IN AN ORGANIZED HEALTH CARE EDUCATION/TRAINING PROGRAM

## 2025-02-26 PROCEDURE — 1125F AMNT PAIN NOTED PAIN PRSNT: CPT | Performed by: NURSE PRACTITIONER

## 2025-02-26 PROCEDURE — 1159F MED LIST DOCD IN RCRD: CPT | Performed by: NURSE PRACTITIONER

## 2025-02-26 PROCEDURE — 3078F DIAST BP <80 MM HG: CPT | Performed by: NURSE PRACTITIONER

## 2025-02-26 RX ORDER — OXYCODONE HYDROCHLORIDE 5 MG/1
5 TABLET ORAL EVERY 6 HOURS PRN
Qty: 60 TABLET | Refills: 0 | Status: SHIPPED | OUTPATIENT
Start: 2025-02-26 | End: 2025-03-14

## 2025-02-26 RX ORDER — ALBUTEROL SULFATE 0.83 MG/ML
3 SOLUTION RESPIRATORY (INHALATION) AS NEEDED
Status: DISCONTINUED | OUTPATIENT
Start: 2025-02-26 | End: 2025-02-26 | Stop reason: HOSPADM

## 2025-02-26 RX ORDER — DIPHENHYDRAMINE HYDROCHLORIDE 50 MG/ML
50 INJECTION INTRAMUSCULAR; INTRAVENOUS AS NEEDED
Status: DISCONTINUED | OUTPATIENT
Start: 2025-02-26 | End: 2025-02-26 | Stop reason: HOSPADM

## 2025-02-26 RX ORDER — HEPARIN 100 UNIT/ML
500 SYRINGE INTRAVENOUS AS NEEDED
Status: DISCONTINUED | OUTPATIENT
Start: 2025-02-26 | End: 2025-02-26 | Stop reason: HOSPADM

## 2025-02-26 RX ORDER — HEPARIN 100 UNIT/ML
500 SYRINGE INTRAVENOUS AS NEEDED
OUTPATIENT
Start: 2025-02-26

## 2025-02-26 RX ORDER — HEPARIN SODIUM,PORCINE/PF 10 UNIT/ML
50 SYRINGE (ML) INTRAVENOUS AS NEEDED
Status: DISCONTINUED | OUTPATIENT
Start: 2025-02-26 | End: 2025-02-26 | Stop reason: HOSPADM

## 2025-02-26 RX ORDER — HEPARIN SODIUM,PORCINE/PF 10 UNIT/ML
50 SYRINGE (ML) INTRAVENOUS AS NEEDED
OUTPATIENT
Start: 2025-02-26

## 2025-02-26 RX ORDER — FAMOTIDINE 10 MG/ML
20 INJECTION, SOLUTION INTRAVENOUS ONCE AS NEEDED
Status: DISCONTINUED | OUTPATIENT
Start: 2025-02-26 | End: 2025-02-26 | Stop reason: HOSPADM

## 2025-02-26 RX ORDER — EPINEPHRINE 0.3 MG/.3ML
0.3 INJECTION SUBCUTANEOUS EVERY 5 MIN PRN
Status: DISCONTINUED | OUTPATIENT
Start: 2025-02-26 | End: 2025-02-26 | Stop reason: HOSPADM

## 2025-02-26 RX ORDER — PROCHLORPERAZINE MALEATE 10 MG
10 TABLET ORAL EVERY 6 HOURS PRN
Status: DISCONTINUED | OUTPATIENT
Start: 2025-02-26 | End: 2025-02-26 | Stop reason: HOSPADM

## 2025-02-26 RX ORDER — PROCHLORPERAZINE EDISYLATE 5 MG/ML
10 INJECTION INTRAMUSCULAR; INTRAVENOUS EVERY 6 HOURS PRN
Status: DISCONTINUED | OUTPATIENT
Start: 2025-02-26 | End: 2025-02-26 | Stop reason: HOSPADM

## 2025-02-26 RX ADMIN — SODIUM CHLORIDE 200 MG: 9 INJECTION, SOLUTION INTRAVENOUS at 13:17

## 2025-02-26 ASSESSMENT — PAIN SCALES - GENERAL: PAINLEVEL_OUTOF10: 7

## 2025-02-26 NOTE — PROGRESS NOTES
NUTRITION Assessment NOTE    Nutrition Assessment     Reason for Visit:  Pillo Guerin is a 64 y.o. 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   Today pt denies a h/o colitis.     Referred to this service for wt loss and assessed today in the clinic.    PMH of EtOH use disorder, CVA, CAD c/b NSTEMI s/p 2 stents, hyponatremia presumed to be 2/2 beer potomania.     Met with patient for nutrition follow up visit today.  He is here in infusion for treatment   Lab Results   Component Value Date/Time    GLUCOSE 114 (H) 02/24/2025 1139     (L) 02/24/2025 1139    K 4.5 02/24/2025 1139    CL 99 02/24/2025 1139    CO2 27 02/24/2025 1139    ANIONGAP 12 02/24/2025 1139    BUN 11 02/24/2025 1139    CREATININE 0.63 02/24/2025 1139    EGFR >90 02/24/2025 1139    CALCIUM 9.0 02/24/2025 1139    ALBUMIN 4.4 02/24/2025 1139    ALKPHOS 61 02/24/2025 1139    PROT 6.3 (L) 02/24/2025 1139    AST 22 02/24/2025 1139    BILITOT 0.8 02/24/2025 1139    ALT 22 02/24/2025 1139     Lab Results   Component Value Date    WBC 9.4 02/24/2025    HGB 12.3 (L) 02/24/2025    HCT 37.6 (L) 02/24/2025    MCV 66 (L) 02/24/2025     02/24/2025        Anthropometrics:     Per patient, weight fluctuates 60-62 kg .  Weight was up Dec/January (unsure if this was true weight gain or fluid related) Wt up 1 kg x 2 weeks  Wt Readings from Last 20 Encounters:   02/26/25 63 kg (139 lb)   02/05/25 61.2 kg (135 lb)   01/15/25 64 kg (141 lb)   12/11/24 63.9 kg (140 lb 14 oz)   11/20/24 63 kg (139 lb)   10/30/24 62.8 kg (138 lb 5.4 oz)   10/10/24  "62.4 kg (137 lb 9.1 oz)   09/18/24 61.8 kg (136 lb 3.9 oz)   12/04/24 62.1 kg (137 lb)   08/28/24 59.6 kg (131 lb 6.3 oz)   08/26/24 61.9 kg (136 lb 7.4 oz)   08/07/24 59.3 kg (130 lb 11.7 oz)   07/19/24 60.3 kg (133 lb)   07/17/24 60.3 kg (132 lb 14.4 oz)   06/12/24 60.9 kg (134 lb 4.2 oz)   05/16/24 62.6 kg (138 lb)   05/15/24 62.7 kg (138 lb 4.8 oz)   04/24/24 63.5 kg (139 lb 15.9 oz)   04/04/24 63.5 kg (140 lb)   04/03/24 61.4 kg (135 lb 5.8 oz)              Food And Nutrient Intake:  Food and Nutrient History  Food and Nutrient History: Patient reports great appetite and very good oral intake.  He eats 3 meals/day. He is receiving meals from both [employer] and [employer]. He states that he picked up  his vitamin D prescription and he has been taking it daily.  He is appreciative and states \" thanks for looking out for me and checking that level.\"   He is also asking about additional Ensure coupons, which I did provide for him today also to assist with cost savings.  Denies any n/v/d or other symptoms at this time.         Food Intake  Fluid Intake: 1-2 cups of coffee, lots of water, beer  Alcohol Drink Size / Volume (ounces):  (72 oz beer Dieter[place])  Alcohol Intake Frequency (drinking days/week):  (7)          Bioactive Substance Intake  Alcohol Drink Size / Volume (ounces):  (72 oz beer Dieterich)  Alcohol Intake Frequency (drinking days/week):  (7)     Nutrition Focused Physical Exam Findings:                  Energy Needs  Estimated Energy Needs  Total Energy Estimated Needs in 24 hours (kCal):  (2258-1058 cals (32-35 cals/kg))  Estimated Fluid Needs  Total Fluid Estimated Needs in 24 Hours (mL):  (1900 mls)  Estimated Protein Needs  Total Protein Estimated Needs in 24 Hours (g):  ( g)        Nutrition Diagnosis   Malnutrition Diagnosis  Patient has Malnutrition Diagnosis: Yes  Diagnosis Status: Active  Malnutrition Diagnosis: Moderate malnutrition related to chronic disease or condition  Related " to: h/o chronic alcoholism and cancer  As Evidenced by: previously with significant weight loss (but is now stabilizing) , mild-moderate fat loss       Nutrition Interventions/Recommendations   Nutrition Prescription   Regular TONO  5000 Ius vitamin D3 daily x 6 weeks to replete vitamin D (8 L)  One-a -day MVI     Food and Nutrition Delivery  Food and Nutrition Delivery  Bioactive Substance Management: Alcohol management  Goals: Patient has reduced his intake form 8-10 servings down to 6 servings . Not likely he will decrease intake further    Nutrition Education  Nutrition Education  Nutrition Education Content: Content related nutrition education  Encouraged trialing high calorie oral nutrition supplement (350+cals) to help optimize rodney/protein intake.  Ensure coupons provided to patient to assist with cost savings.     -At last visit reviewed trying 50 mg thiamin x 2 weeks, 1 mg folic acid daily, and a one-a day Multivitamin daily to meet vitamin/mineral needs. Information was written and provided to patient. He says he has it written down and is taking vit D and MVI    - -Continue with 5000 Ius vitamin D3 daily x 6 weeks and then team to re-check 25 (OH) D level.   Coordination of Care  Coordination of Nutrition Care by a Nutrition Professional  Collaboration and referral of nutrition care: Collaboration and Referral of Nutrition Care    There are no Patient Instructions on file for this visit.    Nutrition Monitoring and Evaluation   Food and Nutrient Intake  Fluid Intake: Estimated fluid intake  Criteria: Meet daily hydration goals  Amount of Food: Estimated amout of food  Criteria: Meet energy and protein needs  Meal/Snack Pattern: Estimated meal and snack pattern  Criteria: as above  Anthropometric measurements  Monitoring and Evaluation Plan: Weight  Criteria: Maintenance or slow gain  Biochemical Data, Medical Tests and Procedures  Monitoring and Evaluation Plan: Electrolyte/renal panel, Vitamin  profile  Criteria: WNL  Vitamin Profile: Vitamin D, 25 hydroxy  Criteria: > 30  Nutrition Focused Physical Findings  Adipose Finding: Loss of subcutaneous fat  Criteria: No further loss  Muscle Finding: Muscle atrophy  Criteria: No further loss          Time Spent  Prep time on day of patient encounter: 10 minutes  Time spent directly with patient, family or caregiver: 10 minutes  Additional Time Spent on Patient Care Activities: 5 minutes  Documentation Time: 20 minutes  Other Time Spent: 0 minutes  Total: 45 minutes

## 2025-02-26 NOTE — TELEPHONE ENCOUNTER
Patient contacted after referral sent to supportive oncology by Dr Gray and Damian Carpenter NP.  Patient has a history of RUL adenocarcinoma.  Patient agreed to a NPV with Preethi Robledo at  [facility] on 3/28 at 10 am.

## 2025-02-26 NOTE — SIGNIFICANT EVENT

## 2025-02-26 NOTE — PROGRESS NOTES
Patient ID: Pillo Guerin is a 64 y.o. male    Primary Care Provider: Rocio Harris, APRN-CNP    DIAGNOSIS AND STAGING  Stage IVB (cT3 cN3 cM1c) poorly differentiated adenocarcinoma of RUL diagnosed on 08/03/23     SITES OF DISEASE  RUL  Supraclavicular nodes  Contralateral lung  Right gluteal muscle   Brain right occipital lobe      MOLECULAR GENOMICS  MICROSATELLITE STATUS: Microsatellite Stable (MAGDIEL)     DISEASE ASSOCIATED GENOMIC FINDINGS:   KRAS p.G13D ([id number] c.38G>A)   PIK3CA p.E542K ([id number] c.1624G>A)   TP53 p.E285K ([id number] c.853G>A)     DISEASE RELEVANT ALTERATIONS NOT DETECTED:   Negative for ALK fusion.   Negative for BRAF V600E.   Negative for EGFR sensitizing mutation.   Negative for KRAS G12C.   Negative for MET exon 14 skipping mutation.   Negative for NTRK fusion.   Negative for RET fusion.   Negative for ROS1 fusion.     PD-L1 TPS 80%     PRIOR THERAPIES  GKRS to right occipital lobe on  24 Gy in 1 fraction on 10/17/23      CURRENT THERAPY  Single agent pembrolizumab since 08/31/23    CURRENT ONCOLOGICAL PROBLEMS  Resolved GIII ICI colitis     HISTORY OF PRESENT ILLNESS  This is a pt with PMH of EtOH use disorder, CVA, CAD c/b NSTEMI s/p 2 stents and stage IVB ([id number]) poorly differentiated adenocarcinoma of RUL diagnosed on 08/03/23.      Patient was initially diagnosed 08/2022 after incidentally found RUL lesion was worked up during admission for hyponatremia presumed to be 2/2 beer potomania. He was then seen by his pulmonologist Dr. Callejas who ordered PET and MRI but this was not obtained  nor were multiple attempts to schedule for oncologic NPV.     I saw him in August 2023 and he endorsed haviing lost 15 lbs since stroke April 2022. His appetite is good, denies dysgeusia. He has slowed down a bit since he had his stroke. He can walk without becoming dyspneic but is limited by his stroke.    On 08/12/23 the patient underwent a bronchoscopy with EBUS:  Accession #: [id number]    Date of Procedure:8/3/2022   Date Reported: 8/9/2022   Date Received: 8/3/2022   Date of Birth / Sex 1960 (Age: 62) / M   Race: WHITE   Submitting Physician: DARYN YOUNG MD   Attending Physician: MD HEVER BROWNE MD   Procedures/Addenda Present     Other External #     FINAL CYTOLOGICAL INTERPRETATION     A. FINE NEEDLE ASPIRATION LYMPH NODE, L 4, CYTOLOGY AND CELL BLOCK:   NO MALIGNANT CELLS IDENTIFIED.   LYMPHOID SAMPLE IS PRESENT.     Note: Cell block shows presence of lymphoid cells along with bronchial cells contamination.     B. FINE NEEDLE ASPIRATION LYMPH NODE, STATION 7, CYTOLOGY AND CELL BLOCK:   NO MALIGNANT CELLS IDENTIFIED.   LYMPHOID SAMPLE IS PRESENT.     C. FINE NEEDLE ASPIRATION LYMPH NODE, R 11, CYTOLOGY AND CELL BLOCK:   POORLY DIFFERENTIATED CARCINOMA, CONSISTENT WITH ADENOCARCINOMA, SEE NOTE.     Note: A limited panel of immunohistochemical stains performed showed neoplastic   cells staining strongly and diffusely positive with TTF-1 and negative with   p40. Findings are consistent with above.     Staff pathologist: Reviewed by Dr. Ellyn Valles.     Molecular testing has been ordered and results will be issued in an addendum.     The cell block; C1 contains moderate tumor cellularity representing roughly 70 % of all nucleated cells.       D. BRONCHO-ALVEOLAR LAVAGE OF RIGHT UPPER LOBE:   RARE ATYPICAL CELLS ARE PRESENT.     08/16/23: PET scan demonstrates multiple RUL FDG avid nodules as well as contra-lateral lung nodules, right hilar, mediastinal and supraclavicular nodes consistent with neoplastic involvement. There is also a large FDG avid right gluteal mass extending into the right SIJ and iliac bone.     08/16/23: MRI brain shows a 8 mm right occipital lobe metastasis with mild associated edema.     08/31/23: Single agent pembrolizumab begins     10/17/23: GKRS to right occipital lobe on  24 Gy in 1 fraction     06/06/24: grade 3 immune-related diarrhea with  episodes of incontinence despite Imodium   Rx prednisone 40 mg/day provided today      PAST MEDICAL HISTORY  CAD s/p 2 stents, CVA, Dupuytren's contracture   BPH  Lumbar radiculopathy   Ir-diarrhea (pembrolizumab) treated with prednisone months of May and      SURGICAL HISTORY  None      SOCIAL HISTORY  Born in [place], lives in [place] with 2 roommates.   Brother Salas is NOK. Drinks 2 24 oz beers a day.   + Tobacco - 50 pack year smoking history, active.   Rare cannabis, acid use in 70s, no additional illicits.    Was working at [email or web address] in [place] before stroke.      FAMILY HISTORY  Mother  of melanoma in      CURRENT MEDS REVIEWED    Current Outpatient Medications:     amLODIPine (Norvasc) 5 mg tablet, Take 0.5 tablets (2.5 mg) by mouth once daily., Disp: , Rfl:     atorvastatin (Lipitor) 80 mg tablet, TAKE 1 TABLET BY MOUTH ONCE DAILY, Disp: 30 tablet, Rfl: 1    cholecalciferol (Vitamin D-3) 5,000 Units tablet, Take 1 tablet (5,000 Units) by mouth once daily. X6 weeks., Disp: 42 tablet, Rfl: 0    diclofenac sodium 1 % kit, Apply 2 g topically every 6 hours if needed., Disp: , Rfl:     gabapentin (Neurontin) 300 mg capsule, Take 2 capsules (600 mg) by mouth 2 times a day., Disp: 120 capsule, Rfl: 2    loperamide (Imodium A-D) 2 mg tablet, Take it as instructed for diarrhea, Disp: 60 tablet, Rfl: 3    losartan (Cozaar) 50 mg tablet, Take 1 tablet (50 mg) by mouth., Disp: , Rfl:     methocarbamol (Robaxin) 750 mg tablet, Take 1 tablet (750 mg) by mouth 3 times a day as needed., Disp: , Rfl:     metoprolol tartrate (Lopressor) 25 mg tablet, TAKE 1 TABLET BY MOUTH TWO TIMES A DAY, Disp: 60 tablet, Rfl: 1    naloxone (Narcan) 4 mg/0.1 mL nasal spray, Administer 1 spray (4 mg) into affected nostril(s) if needed for opioid reversal. May repeat every 2-3 minutes if needed, alternating nostrils, until medical assistance becomes available., Disp: 2 each, Rfl: 0    omeprazole (PriLOSEC) 20 mg   capsule, Do not crush or chew. Take it 30 minutes before breakfast., Disp: 30 capsule, Rfl: 11    predniSONE (Deltasone) 5 mg tablet, Take 1 tablet (5 mg) by mouth once daily., Disp: 30 tablet, Rfl: 2    tadalafil (Cialis) 10 mg tablet, Take 1 tablet (10 mg) by mouth once daily as needed., Disp: , Rfl:     ticagrelor (Brilinta) 60 mg tablet, Take 1 tablet (60 mg) by mouth 2 times a day., Disp: 60 tablet, Rfl: 11    triamcinolone (Kenalog) 0.1 % ointment, Apply topically 2 times a day., Disp: 80 g, Rfl: 1    umeclidinium (Incruse Ellipta) 62.5 mcg/actuation inhalation, Inhale 1 puff (62.5 mcg) once daily., Disp: 1 each, Rfl: 11    nitroglycerin (Nitrostat) 0.4 mg SL tablet, DISSOLVE 1 TABLET UNDER TONGUE EVERY 5 MINUTES FOR 3 DOSES AS NEEDED FOR CHEST PAIN. IF PAIN PERSISTS DIAL 911. (Patient not taking: Reported on 2/26/2025), Disp: 25 tablet, Rfl: 1    oxyCODONE (Roxicodone) 5 mg immediate release tablet, Take 1 tablet (5 mg) by mouth every 6 hours if needed for severe pain (7 - 10) for up to 16 days., Disp: 60 tablet, Rfl: 0    Current Facility-Administered Medications:     acetaminophen (Tylenol) tablet 650 mg, 650 mg, oral, q4h PRN, Angelica Munoz MD    HYDROmorphone (Dilaudid) injection 0.4 mg, 0.4 mg, intravenous, PRN, Angelica Munoz MD, 0.4 mg at 10/17/23 1230    HYDROmorphone (Dilaudid) injection 0.4 mg, 0.4 mg, intravenous, PRN, Angelica Munoz MD, 0.4 mg at 10/17/23 0830    ibuprofen tablet 400 mg, 400 mg, oral, PRN, Angelica Munoz MD    midazolam (Versed) injection 0.5 mg, 0.5 mg, intravenous, PRN, Angelica Munoz MD, 0.5 mg at 10/17/23 1230    midazolam (Versed) injection 0.5 mg, 0.5 mg, intravenous, PRN, Angelica Munoz MD, 0.5 mg at 10/17/23 0830    ondansetron (Zofran) injection 4 mg, 4 mg, intravenous, q4h PRN, Angelica Munoz MD    oxyCODONE (Roxicodone) immediate release tablet 10 mg, 10 mg, oral, q4h PRN, Angelica Munoz MD    ALLERGIES REVIEWED     SUBJECTIVE:    2025  Patient present for routine follow-up evaluation, doing well today. He remains active, he grocery shops 3 days each week. He endorses mid back pain and left leg pain. He denies any fevers, chills or night sweats. No cough, chest pain or shortness of breath. No nausea or vomiting. No constipation or diarrhea. No urinary symptoms. No rash. No neuropathy.     OBJECTIVE:  /58 (BP Location: Right arm, Patient Position: Sitting, BP Cuff Size: Adult)   Pulse 64   Temp 35.9 °C (96.6 °F) (Core)   Resp 18   Wt 63 kg (139 lb)   SpO2 97%   BMI 20.53 kg/m²     Wt Readings from Last 5 Encounters:   25 63 kg (139 lb)   25 61.2 kg (135 lb)   01/15/25 64 kg (141 lb)   24 63.9 kg (140 lb 14 oz)   24 63 kg (139 lb)       Physical Exam:  ECO  Pain: 7/10 mainly left leg  Constitutional: Well developed, awake/alert/oriented x3, no distress, alert and cooperative  Eyes: PER. sclera anicteric  ENMT: Oral mucosa moist, no lesions or thrush identified  Respiratory/Thorax: Breathing is non-labored. Lungs are clear to auscultation bilaterally. No adventitious breath sounds  Cardiovascular: S1-S2. Regular rate and rhythm. No murmurs, rubs, or gallops appreciated  Gastrointestinal: Abdomen soft nontender, nondistended, normal active bowel sounds.  Musculoskeletal: ROM intact, no joint swelling, normal strength  Extremities: normal extremities, no cyanosis, no edema, no clubbing  Lymphatics: no palpable lymphadenopathy  Skin: no rash  Neurologic: alert and oriented x3. Nonfocal exam. No myoclonus  Psychological: Pleasant, appropriate and easily engaged     Results  Lab Results   Component Value Date    WBC 9.4 2025    HGB 12.3 (L) 2025    HCT 37.6 (L) 2025    MCV 66 (L) 2025     2025      Lab Results   Component Value Date    NEUTROABS 6.26 2025      Lab Results   Component Value Date    GLUCOSE 114 (H) 2025    CALCIUM 9.0 2025      (L) 02/24/2025    K 4.5 02/24/2025    CO2 27 02/24/2025    CL 99 02/24/2025    BUN 11 02/24/2025    CREATININE 0.63 02/24/2025    MG 2.28 08/06/2024     Lab Results   Component Value Date    ALT 22 02/24/2025    AST 22 02/24/2025    ALKPHOS 61 02/24/2025    BILITOT 0.8 02/24/2025    BILIDIR 0.3 07/31/2022      Lab Results   Component Value Date    ACTH 8.8 02/03/2025    CORTISOL 10.6 02/24/2025    TSH 1.81 02/24/2025    FREET4 1.14 04/22/2024           Diagnostic Results   CT CAP 11/15/24  IMPRESSION:  CHEST:  1.  Interval minimal increase in the right upper lobe dominant spiculated lung mass and increase in the soft tissue component of a  another spiculated right upper lobe nodule. No significant change in the right lower lobe lung nodules and spiculated opacity in the left  upper lobe.   2. No significant change in the scattered mediastinal and bilateral hilar lymph nodes.      ABDOMEN-PELVIS:  1.  No definite metastatic disease in the abdomen and pelvis.  2. Additional chronic and incidental findings as described above.    MRI Brain 12/4/2024  IMPRESSION:  1. No new enhancing lesions suggestive of brain metastasis. The small enhancing lesion in the bony calvarium on the left is not measurably changed from the previous examination. It is indeterminate. Osseous metastasis among others could give this appearance.  2. Nonspecific white matter changes most likely represent small-vessel ischemic disease in a patient of this age. There is involvement of the jagruti. There are also likely several remote lacunar infarcts. No evidence of acute ischemic injury.  3. Diffuse parenchymal volume loss.  4. Inflammatory changes of the paranasal sinuses. There is also interval increased opacification of left-sided mastoid air cells and  possible fluid in the left middle ear cavity. Correlate with concern for otomastoiditis.    Assessment/Plan     Lung cancer (Multi), Clinical: Stage IVB (cT3, cN3, cM1c)  Mr. Pillo Guerin is a 65 YO  with Stage IVB ([id number]) poorly differentiated adenocarcinoma of RUL, TPS 80%, lack of actionable genomic alterations noted.    He was started on single agent pembrolizumab since 08/31/23 - however had to be held in May 2024 due to grade III ICI colitis which has completed resolved.     He has now completed 20 cycles pembrolizumab. CT scans from 1/15 personally reviewed showing increase in dominant lesion by ~4 mm in one direction, other lung lesions generally stable. We discussed this slow progression and I showed him images. Clinically he is doing well and there are limited options for him in second line. We will continue with pembrolizumab and obtain scans in March.    #Stage IVB NSCLC with early, slow progression and no options in second line  -Continue pembrolizumab  -Scans prior to c24 - ordered    #Grade III ICI Diarrhea - resolved    #Pruritus, immune related  Continue moisturizing BUE with CeraVe and triamcinolone as needed     #Right occipital lobe 8 mm metastasis   S/p GKRS 24 Gy in 1 fraction on 10/17/23  Stable MRI 12/4/2024  Repeat MRI brain every 3 months -      #Pain on right gluteal region- resolved   Due to large centrally necrotic and peripherally hypermetabolic mass in the right lower paramediastinal muscle extending into the adjacent subcutaneous tissue and superior right gluteal muscle with involvement of the superior aspect of the right iliac  bone, right florencio sacrum -   He has been taking gabapentin for pain (controlled) in LLE that is chronic      #Stroke in April 2022 and hx of CAD  -   Stable -   On ASA and ticagrelor      #HTN - on amlodipine      #HLD - atorvastatin    #Microcytic Anemia - Chronic in nature. Iron panel and ferritin found to be WNL.  Could send hemoglobin electrophoresis if anemia becomes a process.   Has never been told or heard about family history of thalassemia or other blood disorders.       Damian Carpenter, APRN-CNP  [facility]  Thoracic + H&N Medical  Oncology     I spent a total of 30+ minutes on the date of the service which included preparing to see the patient, face-to-face patient care, completing clinical documentation, obtaining and / or reviewing separately obtained history, counseling and educating the patient/family/caregiver, ordering medications, tests, or procedures, communicating with other healthcare providers (not separately reported), independently interpreting results, not separately reported, and communicating results to the patient/family/caregiver, Name and date of birth verified.

## 2025-02-26 NOTE — PROGRESS NOTES
Pillo is here alone today for follow up with JULIA Reaves. He is doing well other than having continued pain on his left side - he reports the oxycodone works well for the pain and is requesting a refill. NP made aware. Meds and allergies reviewed and updated.   Education Documentation  Support Systems, taught by Patsy Rodriguez RN at 2/26/2025 12:17 PM.  Learner: Patient  Readiness: Acceptance  Method: Explanation  Response: Verbalizes Understanding    Pain Medication Actions & Side Effects, taught by Patsy Rodriguez RN at 2/26/2025 12:17 PM.  Learner: Patient  Readiness: Acceptance  Method: Explanation  Response: Verbalizes Understanding    Discuss the Use of Pain Control Measures Before Pain Becomes Severe, taught by Patsy Rodriguez RN at 2/26/2025 12:17 PM.  Learner: Patient  Readiness: Acceptance  Method: Explanation  Response: Verbalizes Understanding    Pain Management, taught by Patsy Rodriguez RN at 2/26/2025 12:17 PM.  Learner: Patient  Readiness: Acceptance  Method: Explanation  Response: Verbalizes Understanding    Pain Rating Scale, taught by Patsy Rodriguez RN at 2/26/2025 12:17 PM.  Learner: Patient  Readiness: Acceptance  Method: Explanation  Response: Verbalizes Understanding    Monitoring Weight, taught by Patsy Rodriguez RN at 2/26/2025 12:17 PM.  Learner: Patient  Readiness: Acceptance  Method: Explanation  Response: Verbalizes Understanding    Tips for Daily Living, taught by Patsy Rodriguez RN at 2/26/2025 12:17 PM.  Learner: Patient  Readiness: Acceptance  Method: Explanation  Response: Verbalizes Understanding    Nutrition/Diet, taught by Patsy Rodriguez RN at 2/26/2025 12:17 PM.  Learner: Patient  Readiness: Acceptance  Method: Explanation  Response: Verbalizes Understanding    Healthy Lifestyle, taught by Patsy Rodriguez RN at 2/26/2025 12:17 PM.  Learner: Patient  Readiness: Acceptance  Method: Explanation  Response: Verbalizes Understanding    General  Medication Information, taught by Patsy Rodriguez RN at 2/26/2025 12:17 PM.  Learner: Patient  Readiness: Acceptance  Method: Explanation  Response: Verbalizes Understanding    Supportive Medications, taught by Patsy Rodriguez RN at 2/26/2025 12:17 PM.  Learner: Patient  Readiness: Acceptance  Method: Explanation  Response: Verbalizes Understanding    Treatment Plan and Schedule, taught by Patsy Rodriguez RN at 2/26/2025 12:17 PM.  Learner: Patient  Readiness: Acceptance  Method: Explanation  Response: Verbalizes Understanding    Education Comments  No comments found.

## 2025-03-10 ENCOUNTER — APPOINTMENT (OUTPATIENT)
Dept: RADIOLOGY | Facility: CLINIC | Age: 65
End: 2025-03-10
Payer: COMMERCIAL

## 2025-03-10 NOTE — PROGRESS NOTES
Radiation Oncology Follow-Up    Patient Name:  Pillo Guerin  MRN:  [id number]  :  1960    Referring Provider: Fadumo Carranza, APR*  Primary Care Provider: AMAN Roland  Care Team: Patient Care Team:  AMAN Roland as PCP - General (Family Medicine)  Zuleyma Vines MD as Consulting Physician (Hematology and Oncology)  Arslan Min RN as Nurse Navigator (Hematology and Oncology)  FABIEN Garrett as  ()  Jorge Gray MD as Consulting Physician (Hematology and Oncology)    Date of Service: 3/13/2025    Diagnosis: Poorly differentiated adenocarcinoma of RUL diagnosed on 23     Staging: Stage IVB (cT3 cN3 cM1c)     Sites of disease:   RUL  Supraclavicular nodes  Contralateral lung  Right gluteal muscle   Brain right occipital lobe     Radiotherapy: GKRS to right occipital lobe on 24 Gy in 1 fraction on 10/17/23   Other Treatments    Treatment Period Technique Fraction Dose Fractions Total Dose   Course 1 10/17/2023-10/17/2023  (days elapsed: 0)         A: 10/17/2023-10/17/2023 Gamma-Knife 2400 / 2,400 cGy  2400 / 2,400 cGy     Current Therapies: Single agent pembrolizumab since 23     SUBJECTIVE  History of Present Illness:  Telephone Consent  An interactive audio and video telecommunication system which permits real time communications between the patient (at the originating site) and provider (at the distant site) was utilized to provide this telehealth service.  Verbal consent was requested and obtained from Pillo Guerin on this date, 25 for a telehealth visit and the patient's location was confirmed at the time of the visit.     Pillo Guerin is a 64 y.o. male who was previously seen at the [facility] Department of Radiation Oncology for Poorly differentiated adenocarcinoma of RUL (cT3 cN3 cM1c) metastatic to the brain.  Treatment consisted of GK (24 Gy in 1  fx) to a right occipital lobe lesion ending on 10/17/23.  Currently the patient is being treated systemically with single agent Pembrolizumab.  Unfortunately restaging imaging has shown slow progression and there are few options in second line.  Today the patient is in clinic for a routine Radiation Oncology FU visit and review of an MRI brain completed on 3/12/25.  Per a Radiology read, the imaging shows stable SARITA.  Overall, The patient is doing well.  He continues to smoke about 15 cigarettes/day which is down from 1.5 packs. He denies a cough, SOB, SANCHEZ or chest pain.  He does endorse some fatigue however.  Neurologically, he continues to c/o a weak left leg related to a prior stroke he had in 4/2022.  He also endorses some pain on his left side that he attributes to his stroke.  He's currently managing with oxycodone. He denies seizures, headaches, vision changes, dizziness, speech changes, memory changes or new focal sensory/motor deficits. Denies chills, fever, hemoptysis or bony pain or recent illnesses.  Denies abdominal pain, nausea, vomiting, diarrhea, or constipation.       Review of Systems:   Review of Systems   Constitutional:  Positive for fatigue. Negative for chills and fever.   HENT:   Negative for hearing loss and trouble swallowing.    Eyes:  Negative for eye problems.   Respiratory:  Negative for cough, hemoptysis, shortness of breath and wheezing.    Cardiovascular:  Negative for chest pain.   Gastrointestinal:  Negative for abdominal distention, abdominal pain, blood in stool, constipation, diarrhea, nausea and vomiting.   Genitourinary:  Negative for difficulty urinating and hematuria.    Musculoskeletal:  Negative for arthralgias, back pain, gait problem, neck pain and neck stiffness.   Skin:  Positive for itching and rash.   Neurological:  Positive for extremity weakness (Left leg from prior stroke.). Negative for dizziness, gait problem, headaches, light-headedness, numbness, seizures and  "speech difficulty.   Hematological:  Negative for adenopathy.   Psychiatric/Behavioral:  Negative for confusion and depression. The patient is not nervous/anxious.      Performance Status:   The Karnofsky performance scale today is 90, Able to carry on normal activity; minor signs or symptoms of disease (ECOG equivalent 0).    Pain: Denies pain today.     OBJECTIVE  Vital Signs:      11/20/2024     1:42 PM 12/4/2024    10:40 AM 12/11/2024     2:08 PM 1/15/2025     3:00 PM 2/5/2025     1:44 PM 2/26/2025    11:53 AM 3/12/2025    10:53 AM   Vitals   Systolic 126  144 129 124 122    Diastolic 57  70 70 72 58    BP Location Left arm   Right arm Right arm Right arm    Heart Rate 64  62 69 66 64    Temp 36.4 °C (97.5 °F)  36.2 °C (97.2 °F) 36.2 °C (97.2 °F) 36.1 °C (97 °F) 35.9 °C (96.6 °F)    Resp 16  18 18 18 18    Height  1.753 m (5' 9\")        Weight (lb) 139 137 140.87 141 135 139 138   BMI 20.52 kg/m2 20.23 kg/m2 20.8 kg/m2 20.82 kg/m2 19.94 kg/m2 20.53 kg/m2 20.38 kg/m2   BSA (m2) 1.75 m2 1.74 m2 1.76 m2 1.77 m2 1.73 m2 1.75 m2 1.75 m2   Visit Report Report  Report Report Report Report       Physical Exam     ASSESSMENT:   64 y.o. male who was previously seen at the [facility] Department of Radiation Oncology for Poorly differentiated adenocarcinoma of RUL (cT3 cN3 cM1c) metastatic to the brain.  Treatment consisted of GK (24 Gy in 1 fx) to a right occipital lobe lesion ending on 10/17/23.  An MRI of the brain completed on 3/12/25 show stable disease.  This was discussed with the patient.      PLAN:    --MRI brain in 3-4 months.   --FU with Phil Mcguire CNP in 3-4 months.  --Continue to follow with Dr. Gray and Damian Carpenter CNP for Medical Oncology management.     Please reach out with any questions or concerns.       NCCN Guidelines were applicable to guide this patients treatment plan.  Amos Mcguire APRN-CNP     "

## 2025-03-11 ENCOUNTER — TELEPHONE (OUTPATIENT)
Dept: RADIATION ONCOLOGY | Facility: HOSPITAL | Age: 65
End: 2025-03-11
Payer: COMMERCIAL

## 2025-03-11 ENCOUNTER — TELEPHONE (OUTPATIENT)
Dept: ADMISSION | Facility: HOSPITAL | Age: 65
End: 2025-03-11
Payer: COMMERCIAL

## 2025-03-11 DIAGNOSIS — C34.11 MALIGNANT NEOPLASM OF UPPER LOBE OF RIGHT LUNG (MULTI): ICD-10-CM

## 2025-03-11 RX ORDER — OXYCODONE HYDROCHLORIDE 5 MG/1
5 TABLET ORAL EVERY 6 HOURS PRN
Qty: 60 TABLET | Refills: 0 | Status: SHIPPED | OUTPATIENT
Start: 2025-03-11 | End: 2025-03-27

## 2025-03-11 NOTE — TELEPHONE ENCOUNTER
[facility]. Pt confirmed appointment.

## 2025-03-11 NOTE — TELEPHONE ENCOUNTER
Pillo Guerin called the refill line for Oxycodone 5mg. Would like refills to be sent to [facility] pharmacy on file. Message sent to team to send in.

## 2025-03-12 ENCOUNTER — HOSPITAL ENCOUNTER (OUTPATIENT)
Dept: RADIOLOGY | Facility: CLINIC | Age: 65
Discharge: HOME | End: 2025-03-12
Payer: COMMERCIAL

## 2025-03-12 ENCOUNTER — APPOINTMENT (OUTPATIENT)
Dept: RADIOLOGY | Facility: CLINIC | Age: 65
End: 2025-03-12
Payer: COMMERCIAL

## 2025-03-12 DIAGNOSIS — C34.90 MALIGNANT NEOPLASM OF LUNG, UNSPECIFIED LATERALITY, UNSPECIFIED PART OF LUNG (MULTI): ICD-10-CM

## 2025-03-12 DIAGNOSIS — C34.11 MALIGNANT NEOPLASM OF UPPER LOBE OF RIGHT LUNG (MULTI): ICD-10-CM

## 2025-03-12 PROCEDURE — A9575 INJ GADOTERATE MEGLUMI 0.1ML: HCPCS | Performed by: NURSE PRACTITIONER

## 2025-03-12 PROCEDURE — 2550000001 HC RX 255 CONTRASTS: Performed by: NURSE PRACTITIONER

## 2025-03-12 PROCEDURE — 70553 MRI BRAIN STEM W/O & W/DYE: CPT

## 2025-03-12 PROCEDURE — 74177 CT ABD & PELVIS W/CONTRAST: CPT

## 2025-03-12 RX ORDER — GADOTERATE MEGLUMINE 376.9 MG/ML
13 INJECTION INTRAVENOUS
Status: COMPLETED | OUTPATIENT
Start: 2025-03-12 | End: 2025-03-12

## 2025-03-12 RX ADMIN — GADOTERATE MEGLUMINE 13 ML: 376.9 INJECTION INTRAVENOUS at 12:27

## 2025-03-12 RX ADMIN — IOHEXOL 72 ML: 350 INJECTION, SOLUTION INTRAVENOUS at 09:47

## 2025-03-12 ASSESSMENT — ENCOUNTER SYMPTOMS
NECK STIFFNESS: 0
ABDOMINAL DISTENTION: 0
CHILLS: 0
COUGH: 0
DIFFICULTY URINATING: 0
HEMATURIA: 0
BLOOD IN STOOL: 0
CONSTIPATION: 0
BACK PAIN: 0
HEMOPTYSIS: 0
WHEEZING: 0
SEIZURES: 0
NERVOUS/ANXIOUS: 0
SHORTNESS OF BREATH: 0
EXTREMITY WEAKNESS: 1
NAUSEA: 0
CONFUSION: 0
NUMBNESS: 0
EYE PROBLEMS: 0
HEADACHES: 0
SPEECH DIFFICULTY: 0
VOMITING: 0
DIZZINESS: 0
LIGHT-HEADEDNESS: 0
FATIGUE: 1
TROUBLE SWALLOWING: 0
ARTHRALGIAS: 0
DEPRESSION: 0
NECK PAIN: 0
FEVER: 0
ABDOMINAL PAIN: 0
ADENOPATHY: 0

## 2025-03-13 ENCOUNTER — HOSPITAL ENCOUNTER (OUTPATIENT)
Dept: RADIATION ONCOLOGY | Facility: HOSPITAL | Age: 65
Setting detail: RADIATION/ONCOLOGY SERIES
Discharge: HOME | End: 2025-03-13
Payer: COMMERCIAL

## 2025-03-13 DIAGNOSIS — C79.31 LUNG CANCER METASTATIC TO BRAIN (MULTI): Primary | ICD-10-CM

## 2025-03-13 DIAGNOSIS — C34.90 MALIGNANT NEOPLASM OF LUNG, UNSPECIFIED LATERALITY, UNSPECIFIED PART OF LUNG (MULTI): ICD-10-CM

## 2025-03-13 DIAGNOSIS — C34.90 LUNG CANCER METASTATIC TO BRAIN (MULTI): Primary | ICD-10-CM

## 2025-03-13 PROCEDURE — 99214 OFFICE O/P EST MOD 30 MIN: CPT | Mod: 95

## 2025-03-13 PROCEDURE — 99214 OFFICE O/P EST MOD 30 MIN: CPT

## 2025-03-13 ASSESSMENT — ENCOUNTER SYMPTOMS: DIARRHEA: 0

## 2025-03-18 ENCOUNTER — LAB (OUTPATIENT)
Dept: LAB | Facility: CLINIC | Age: 65
End: 2025-03-18
Payer: COMMERCIAL

## 2025-03-18 DIAGNOSIS — E55.9 VITAMIN D DEFICIENCY: ICD-10-CM

## 2025-03-18 DIAGNOSIS — C34.11 MALIGNANT NEOPLASM OF UPPER LOBE OF RIGHT LUNG (MULTI): ICD-10-CM

## 2025-03-18 LAB
25(OH)D3 SERPL-MCNC: 50 NG/ML (ref 30–100)
ALBUMIN SERPL BCP-MCNC: 4.5 G/DL (ref 3.4–5)
ALP SERPL-CCNC: 61 U/L (ref 33–136)
ALT SERPL W P-5'-P-CCNC: 19 U/L (ref 10–52)
ANION GAP SERPL CALC-SCNC: 13 MMOL/L (ref 10–20)
AST SERPL W P-5'-P-CCNC: 17 U/L (ref 9–39)
BASOPHILS # BLD AUTO: 0.09 X10*3/UL (ref 0–0.1)
BASOPHILS NFR BLD AUTO: 1 %
BILIRUB SERPL-MCNC: 1 MG/DL (ref 0–1.2)
BUN SERPL-MCNC: 13 MG/DL (ref 6–23)
CALCIUM SERPL-MCNC: 9.3 MG/DL (ref 8.6–10.6)
CHLORIDE SERPL-SCNC: 98 MMOL/L (ref 98–107)
CO2 SERPL-SCNC: 28 MMOL/L (ref 21–32)
CREAT SERPL-MCNC: 0.85 MG/DL (ref 0.5–1.3)
EGFRCR SERPLBLD CKD-EPI 2021: >90 ML/MIN/1.73M*2
EOSINOPHIL # BLD AUTO: 0.66 X10*3/UL (ref 0–0.7)
EOSINOPHIL NFR BLD AUTO: 7 %
ERYTHROCYTE [DISTWIDTH] IN BLOOD BY AUTOMATED COUNT: 17.3 % (ref 11.5–14.5)
GLUCOSE SERPL-MCNC: 99 MG/DL (ref 74–99)
HCT VFR BLD AUTO: 43.8 % (ref 41–52)
HGB BLD-MCNC: 14 G/DL (ref 13.5–17.5)
IMM GRANULOCYTES # BLD AUTO: 0.05 X10*3/UL (ref 0–0.7)
IMM GRANULOCYTES NFR BLD AUTO: 0.5 % (ref 0–0.9)
LYMPHOCYTES # BLD AUTO: 2 X10*3/UL (ref 1.2–4.8)
LYMPHOCYTES NFR BLD AUTO: 21.3 %
MCH RBC QN AUTO: 21.2 PG (ref 26–34)
MCHC RBC AUTO-ENTMCNC: 32 G/DL (ref 32–36)
MCV RBC AUTO: 66 FL (ref 80–100)
MONOCYTES # BLD AUTO: 0.78 X10*3/UL (ref 0.1–1)
MONOCYTES NFR BLD AUTO: 8.3 %
NEUTROPHILS # BLD AUTO: 5.82 X10*3/UL (ref 1.2–7.7)
NEUTROPHILS NFR BLD AUTO: 61.9 %
NRBC BLD-RTO: ABNORMAL /100{WBCS}
PLATELET # BLD AUTO: 376 X10*3/UL (ref 150–450)
POTASSIUM SERPL-SCNC: 4.7 MMOL/L (ref 3.5–5.3)
PROT SERPL-MCNC: 6.7 G/DL (ref 6.4–8.2)
RBC # BLD AUTO: 6.6 X10*6/UL (ref 4.5–5.9)
SODIUM SERPL-SCNC: 134 MMOL/L (ref 136–145)
WBC # BLD AUTO: 9.4 X10*3/UL (ref 4.4–11.3)

## 2025-03-18 PROCEDURE — 82306 VITAMIN D 25 HYDROXY: CPT

## 2025-03-18 PROCEDURE — 36415 COLL VENOUS BLD VENIPUNCTURE: CPT

## 2025-03-18 PROCEDURE — 84075 ASSAY ALKALINE PHOSPHATASE: CPT

## 2025-03-18 PROCEDURE — 85025 COMPLETE CBC W/AUTO DIFF WBC: CPT

## 2025-03-19 ENCOUNTER — NUTRITION (OUTPATIENT)
Dept: HEMATOLOGY/ONCOLOGY | Facility: CLINIC | Age: 65
End: 2025-03-19
Payer: COMMERCIAL

## 2025-03-19 ENCOUNTER — OFFICE VISIT (OUTPATIENT)
Dept: HEMATOLOGY/ONCOLOGY | Facility: CLINIC | Age: 65
End: 2025-03-19
Payer: COMMERCIAL

## 2025-03-19 ENCOUNTER — INFUSION (OUTPATIENT)
Dept: HEMATOLOGY/ONCOLOGY | Facility: CLINIC | Age: 65
End: 2025-03-19
Payer: COMMERCIAL

## 2025-03-19 VITALS
OXYGEN SATURATION: 98 % | HEART RATE: 58 BPM | SYSTOLIC BLOOD PRESSURE: 117 MMHG | TEMPERATURE: 97.3 F | DIASTOLIC BLOOD PRESSURE: 65 MMHG | WEIGHT: 135.8 LBS | BODY MASS INDEX: 20.05 KG/M2 | RESPIRATION RATE: 18 BRPM

## 2025-03-19 DIAGNOSIS — C34.11 MALIGNANT NEOPLASM OF UPPER LOBE OF RIGHT LUNG (MULTI): ICD-10-CM

## 2025-03-19 DIAGNOSIS — E55.9 VITAMIN D DEFICIENCY: Primary | ICD-10-CM

## 2025-03-19 PROCEDURE — 1125F AMNT PAIN NOTED PAIN PRSNT: CPT | Performed by: STUDENT IN AN ORGANIZED HEALTH CARE EDUCATION/TRAINING PROGRAM

## 2025-03-19 PROCEDURE — 2500000004 HC RX 250 GENERAL PHARMACY W/ HCPCS (ALT 636 FOR OP/ED): Mod: JZ,TB | Performed by: STUDENT IN AN ORGANIZED HEALTH CARE EDUCATION/TRAINING PROGRAM

## 2025-03-19 PROCEDURE — G2211 COMPLEX E/M VISIT ADD ON: HCPCS | Performed by: STUDENT IN AN ORGANIZED HEALTH CARE EDUCATION/TRAINING PROGRAM

## 2025-03-19 PROCEDURE — 99215 OFFICE O/P EST HI 40 MIN: CPT | Performed by: STUDENT IN AN ORGANIZED HEALTH CARE EDUCATION/TRAINING PROGRAM

## 2025-03-19 PROCEDURE — 3074F SYST BP LT 130 MM HG: CPT | Performed by: STUDENT IN AN ORGANIZED HEALTH CARE EDUCATION/TRAINING PROGRAM

## 2025-03-19 PROCEDURE — 99215 OFFICE O/P EST HI 40 MIN: CPT | Mod: 25 | Performed by: STUDENT IN AN ORGANIZED HEALTH CARE EDUCATION/TRAINING PROGRAM

## 2025-03-19 PROCEDURE — 3078F DIAST BP <80 MM HG: CPT | Performed by: STUDENT IN AN ORGANIZED HEALTH CARE EDUCATION/TRAINING PROGRAM

## 2025-03-19 PROCEDURE — 1159F MED LIST DOCD IN RCRD: CPT | Performed by: STUDENT IN AN ORGANIZED HEALTH CARE EDUCATION/TRAINING PROGRAM

## 2025-03-19 PROCEDURE — 96413 CHEMO IV INFUSION 1 HR: CPT

## 2025-03-19 RX ORDER — DIPHENHYDRAMINE HYDROCHLORIDE 50 MG/ML
50 INJECTION, SOLUTION INTRAMUSCULAR; INTRAVENOUS AS NEEDED
Status: DISCONTINUED | OUTPATIENT
Start: 2025-03-19 | End: 2025-03-19 | Stop reason: HOSPADM

## 2025-03-19 RX ORDER — FAMOTIDINE 10 MG/ML
20 INJECTION, SOLUTION INTRAVENOUS ONCE AS NEEDED
Status: DISCONTINUED | OUTPATIENT
Start: 2025-03-19 | End: 2025-03-19 | Stop reason: HOSPADM

## 2025-03-19 RX ORDER — PROCHLORPERAZINE EDISYLATE 5 MG/ML
10 INJECTION INTRAMUSCULAR; INTRAVENOUS EVERY 6 HOURS PRN
Status: DISCONTINUED | OUTPATIENT
Start: 2025-03-19 | End: 2025-03-19 | Stop reason: HOSPADM

## 2025-03-19 RX ORDER — OXYCODONE HYDROCHLORIDE 5 MG/1
10 TABLET ORAL EVERY 6 HOURS PRN
Qty: 30 TABLET | Refills: 0 | Status: SHIPPED | OUTPATIENT
Start: 2025-03-19 | End: 2025-03-29

## 2025-03-19 RX ORDER — EPINEPHRINE 0.3 MG/.3ML
0.3 INJECTION SUBCUTANEOUS EVERY 5 MIN PRN
Status: DISCONTINUED | OUTPATIENT
Start: 2025-03-19 | End: 2025-03-19 | Stop reason: HOSPADM

## 2025-03-19 RX ORDER — CHOLECALCIFEROL (VITAMIN D3) 25 MCG
1000 TABLET ORAL DAILY
Qty: 30 TABLET | Refills: 11 | Status: SHIPPED | OUTPATIENT
Start: 2025-03-19 | End: 2026-03-19

## 2025-03-19 RX ORDER — ALBUTEROL SULFATE 0.83 MG/ML
3 SOLUTION RESPIRATORY (INHALATION) AS NEEDED
Status: DISCONTINUED | OUTPATIENT
Start: 2025-03-19 | End: 2025-03-19 | Stop reason: HOSPADM

## 2025-03-19 RX ORDER — PROCHLORPERAZINE MALEATE 10 MG
10 TABLET ORAL EVERY 6 HOURS PRN
Status: DISCONTINUED | OUTPATIENT
Start: 2025-03-19 | End: 2025-03-19 | Stop reason: HOSPADM

## 2025-03-19 RX ADMIN — SODIUM CHLORIDE 200 MG: 9 INJECTION, SOLUTION INTRAVENOUS at 13:20

## 2025-03-19 ASSESSMENT — PAIN SCALES - GENERAL: PAINLEVEL_OUTOF10: 8

## 2025-03-19 ASSESSMENT — ENCOUNTER SYMPTOMS
LOSS OF SENSATION IN FEET: 0
DEPRESSION: 0
OCCASIONAL FEELINGS OF UNSTEADINESS: 0

## 2025-03-19 NOTE — SIGNIFICANT EVENT

## 2025-03-19 NOTE — PROGRESS NOTES
NUTRITION Assessment NOTE    Nutrition Assessment     Reason for Visit:  Pillo Guerin is a 64 y.o. 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   Today pt denies a h/o colitis.     Referred to this service for wt loss and assessed today in the clinic.    PMH of EtOH use disorder, CVA, CAD c/b NSTEMI s/p 2 stents, hyponatremia presumed to be 2/2 beer potomania.     Met with patient for nutrition follow up visit today.  He is here in infusion for treatment   Lab Results   Component Value Date/Time    GLUCOSE 99 03/18/2025 1212     (L) 03/18/2025 1212    K 4.7 03/18/2025 1212    CL 98 03/18/2025 1212    CO2 28 03/18/2025 1212    ANIONGAP 13 03/18/2025 1212    BUN 13 03/18/2025 1212    CREATININE 0.85 03/18/2025 1212    EGFR >90 03/18/2025 1212    CALCIUM 9.3 03/18/2025 1212    ALBUMIN 4.5 03/18/2025 1212    ALKPHOS 61 03/18/2025 1212    PROT 6.7 03/18/2025 1212    AST 17 03/18/2025 1212    BILITOT 1.0 03/18/2025 1212    ALT 19 03/18/2025 1212    MG 2.28 08/06/2024 1032    PHOS 4.1 08/05/2022 0606     Lab Results   Component Value Date/Time    VITD25 50 03/18/2025 1212        Lab Results   Component Value Date    WBC 9.4 03/18/2025    HGB 14.0 03/18/2025    HCT 43.8 03/18/2025    MCV 66 (L) 03/18/2025     03/18/2025       Anthropometrics:     Per patient, weight fluctuates 60-62 kg .  Weight was up Dec/January (unsure if this was true weight gain or fluid related) Remains within UBW range   Wt Readings from Last 20 Encounters:   03/19/25 61.6 kg (135 lb 12.9 oz)   02/26/25 63 kg (139 lb)  "  02/05/25 61.2 kg (135 lb)   01/15/25 64 kg (141 lb)   03/12/25 62.6 kg (138 lb)   12/11/24 63.9 kg (140 lb 14 oz)   11/20/24 63 kg (139 lb)   10/30/24 62.8 kg (138 lb 5.4 oz)   10/10/24 62.4 kg (137 lb 9.1 oz)   09/18/24 61.8 kg (136 lb 3.9 oz)   12/04/24 62.1 kg (137 lb)   08/28/24 59.6 kg (131 lb 6.3 oz)   08/26/24 61.9 kg (136 lb 7.4 oz)   08/07/24 59.3 kg (130 lb 11.7 oz)   07/19/24 60.3 kg (133 lb)   07/17/24 60.3 kg (132 lb 14.4 oz)   06/12/24 60.9 kg (134 lb 4.2 oz)   05/16/24 62.6 kg (138 lb)   05/15/24 62.7 kg (138 lb 4.8 oz)   04/24/24 63.5 kg (139 lb 15.9 oz)              Food And Nutrient Intake:  Food and Nutrient History  Food and Nutrient History: Patient reports good appetite and oral intake.  He eats 3 meals/day and snacks. He receives global meals and Meals on Wheels. Denies any specific GI symptoms at this, time.  He is eating well and feels he doesn't need to drink Ensure, thus declining coupons.  Did discuss how vitamin D level increased with supplementation from 8 to 50 and now is WNL.         Food Intake  Meal 1: 2 waffles with butter and benitez, coffee  Meal 2: Hot meal from Meal on wheels  Meal 3: Marie Calendar frozen meal  Fluid Intake: 1-2  cups of coffee, \"lots of water\" beer.  At previous visit, states ~6 beers/day  Alcohol Drink Size / Volume (ounces):  (Lots of water)          Bioactive Substance Intake  Alcohol Drink Size / Volume (ounces):  (Lots of water)     Nutrition Focused Physical Exam Findings:      Subcutaneous Fat Loss  Orbital Fat Pads: Mild-Moderate (slight dark circles and slight hollowing)  Buccal Fat Pads: Mild-Moderate (flat cheeks, minimal bounce)  Triceps: Severe (negligible fat tissue)    Muscle Wasting  Temporalis: Mild-Moderate (slight depression)      Energy Needs  Estimated Energy Needs  Total Energy Estimated Needs in 24 hours (kCal):  (1672-1692 cals (32-35 cals/kg))  Energy Estimated Needs per kg Body Weight in 24 hours (kCal/kg): 35 kCal/kg  Method for " Estimating Needs: Increased for slow repletion  Estimated Fluid Needs  Total Fluid Estimated Needs in 24 Hours (mL):  (1900 mls)  Total Fluid Estimated Needs in 24 hours (mL/kg): 30 mL/kg  Estimated Protein Needs  Total Protein Estimated Needs in 24 Hours (g):  ( g)  Protein Estimated Needs per kg Body Weight in 24 Hours (g/kg): 1.4 g/kg  Method for Estimating 24 Hour Protein Needs: Increased for slow repletion        Nutrition Diagnosis   Malnutrition Diagnosis  Patient has Malnutrition Diagnosis: Yes  Diagnosis Status: New  Malnutrition Diagnosis: Mild malnutrition related to chronic disease or condition  Related to: h/o chronic alcoholism and cancer  As Evidenced by: previously with significant weight loss (now improving ) and remains with mild-moderate muscle/fat loss (Patient has been making progress and eating well with stable weight, thus has transitioned from Moderate to Mildly Malnourished.)       Nutrition Interventions/Recommendations   Nutrition Prescription  Individualized Nutrition Prescription Provided for : Regular TONO (Note pt with limited resources and provided Global meals vary but often provide frozen meals)Regular diet   1000 Ius vitamin D3 daily for maintenance   One-a -day MVI     Food and Nutrition Delivery  Food and Nutrition Delivery  Medical Food Supplement:  (Encouraged retrial for tolerance.  WIll mail Ensure coupons to pt)  Goals:  (Continue DMV with iron)  Bioactive Substance Management: Alcohol management  Goals: Patient has reduced his intake form 8-10 servings down to 6 servings . Not likely he will decrease intake further    Nutrition Education  Nutrition Education  Nutrition Education Content: Content related nutrition education    - Continue with MVI daily  -Discussed how vitamin d level now at 50 (from 8 ) and is in normal limits  CNP encouraged 1000 international units  vitamin D3 once daily for maintenance.   Coordination of Care  Coordination of Nutrition Care by kayla  Nutrition Professional  Collaboration and referral of nutrition care: Collaboration and Referral of Nutrition Care  Goals: Provide appropriate and adequate micronutrient supplementation    There are no Patient Instructions on file for this visit.    Nutrition Monitoring and Evaluation   Food and Nutrient Intake  Fluid Intake: Estimated fluid intake  Criteria: Meet daily hydration goals  Amount of Food: Estimated amout of food  Criteria: Meet energy and protein needs  Meal/Snack Pattern: Estimated meal and snack pattern  Criteria: as above  Anthropometric measurements  Monitoring and Evaluation Plan: Weight  Criteria: Maintenance or slow gain  Biochemical Data, Medical Tests and Procedures  Criteria: WNL  Vitamin Profile: Vitamin D, 25 hydroxy  Criteria: > 30          Time Spent  Prep time on day of patient encounter: 5 minutes  Time spent directly with patient, family or caregiver: 10 minutes  Additional Time Spent on Patient Care Activities: 0 minutes  Documentation Time: 20 minutes  Other Time Spent: 0 minutes  Total: 35 minutes

## 2025-03-19 NOTE — PROGRESS NOTES
Patient here today for follow up and to review recent imaging. Still having pain, mildly relieved with pain meds, but they don't last long. He is wondering if the dose can be increased.     Fatigue: no worse   PO intake: doing well  Weight: -3lbs since 3/12  N/V/D/C: denies    Medications and pharmacy preference reviewed with patient.     Labs drawn 3/18.  Plan for treatment today.

## 2025-03-19 NOTE — PROGRESS NOTES
Patient ID: Pillo Guerin is a 64 y.o. male    Primary Care Provider: Rocio Harris, APRN-CNP    DIAGNOSIS AND STAGING  Stage IVB (cT3 cN3 cM1c) poorly differentiated adenocarcinoma of RUL diagnosed on 08/03/23     SITES OF DISEASE  RUL  Supraclavicular nodes  Contralateral lung  Right gluteal muscle   Brain right occipital lobe      MOLECULAR GENOMICS  MICROSATELLITE STATUS: Microsatellite Stable (MAGDIEL)     DISEASE ASSOCIATED GENOMIC FINDINGS:   KRAS p.G13D ([id number] c.38G>A)   PIK3CA p.E542K ([id number] c.1624G>A)   TP53 p.E285K ([id number] c.853G>A)     DISEASE RELEVANT ALTERATIONS NOT DETECTED:   Negative for ALK fusion.   Negative for BRAF V600E.   Negative for EGFR sensitizing mutation.   Negative for KRAS G12C.   Negative for MET exon 14 skipping mutation.   Negative for NTRK fusion.   Negative for RET fusion.   Negative for ROS1 fusion.     PD-L1 TPS 80%     PRIOR THERAPIES  GKRS to right occipital lobe on  24 Gy in 1 fraction on 10/17/23      CURRENT THERAPY  Single agent pembrolizumab since 08/31/23    CURRENT ONCOLOGICAL PROBLEMS  Resolved GIII ICI colitis     HISTORY OF PRESENT ILLNESS  This is a pt with PMH of EtOH use disorder, CVA, CAD c/b NSTEMI s/p 2 stents and stage IVB ([id number]) poorly differentiated adenocarcinoma of RUL diagnosed on 08/03/23.      Patient was initially diagnosed 08/2022 after incidentally found RUL lesion was worked up during admission for hyponatremia presumed to be 2/2 beer potomania. He was then seen by his pulmonologist Dr. Callejas who ordered PET and MRI but this was not obtained  nor were multiple attempts to schedule for oncologic NPV.     I saw him in August 2023 and he endorsed haviing lost 15 lbs since stroke April 2022. His appetite is good, denies dysgeusia. He has slowed down a bit since he had his stroke. He can walk without becoming dyspneic but is limited by his stroke.    On 08/12/23 the patient underwent a bronchoscopy with EBUS:  Accession #: [id number]    Date of Procedure:8/3/2022   Date Reported: 8/9/2022   Date Received: 8/3/2022   Date of Birth / Sex 1960 (Age: 62) / M   Race: WHITE   Submitting Physician: DARYN YOUNG MD   Attending Physician: MD HEVER BROWNE MD   Procedures/Addenda Present     Other External #     FINAL CYTOLOGICAL INTERPRETATION     A. FINE NEEDLE ASPIRATION LYMPH NODE, L 4, CYTOLOGY AND CELL BLOCK:   NO MALIGNANT CELLS IDENTIFIED.   LYMPHOID SAMPLE IS PRESENT.     Note: Cell block shows presence of lymphoid cells along with bronchial cells contamination.     B. FINE NEEDLE ASPIRATION LYMPH NODE, STATION 7, CYTOLOGY AND CELL BLOCK:   NO MALIGNANT CELLS IDENTIFIED.   LYMPHOID SAMPLE IS PRESENT.     C. FINE NEEDLE ASPIRATION LYMPH NODE, R 11, CYTOLOGY AND CELL BLOCK:   POORLY DIFFERENTIATED CARCINOMA, CONSISTENT WITH ADENOCARCINOMA, SEE NOTE.     Note: A limited panel of immunohistochemical stains performed showed neoplastic   cells staining strongly and diffusely positive with TTF-1 and negative with   p40. Findings are consistent with above.     Staff pathologist: Reviewed by Dr. Ellyn Valles.     Molecular testing has been ordered and results will be issued in an addendum.     The cell block; C1 contains moderate tumor cellularity representing roughly 70 % of all nucleated cells.       D. BRONCHO-ALVEOLAR LAVAGE OF RIGHT UPPER LOBE:   RARE ATYPICAL CELLS ARE PRESENT.     08/16/23: PET scan demonstrates multiple RUL FDG avid nodules as well as contra-lateral lung nodules, right hilar, mediastinal and supraclavicular nodes consistent with neoplastic involvement. There is also a large FDG avid right gluteal mass extending into the right SIJ and iliac bone.     08/16/23: MRI brain shows a 8 mm right occipital lobe metastasis with mild associated edema.     08/31/23: Single agent pembrolizumab begins     10/17/23: GKRS to right occipital lobe on  24 Gy in 1 fraction     06/06/24: grade 3 immune-related diarrhea with  episodes of incontinence despite Imodium   Rx prednisone 40 mg/day provided today      PAST MEDICAL HISTORY  CAD s/p 2 stents, CVA, Dupuytren's contracture   BPH  Lumbar radiculopathy   Ir-diarrhea (pembrolizumab) treated with prednisone months of May and      SURGICAL HISTORY  None      SOCIAL HISTORY  Born in [place], lives in [place] with 2 roommates.   Brother Salas is NOK. Drinks 2 24 oz beers a day.   + Tobacco - 50 pack year smoking history, active.   Rare cannabis, acid use in 70s, no additional illicits.    Was working at [employer] in [place] before stroke.      FAMILY HISTORY  Mother  of melanoma in      CURRENT MEDS REVIEWED    Current Outpatient Medications:     amLODIPine (Norvasc) 5 mg tablet, Take 0.5 tablets (2.5 mg) by mouth once daily., Disp: , Rfl:     cholecalciferol (Vitamin D3) 25 mcg (1000 units) tablet, Take 1 tablet (1,000 Units) by mouth once daily., Disp: 30 tablet, Rfl: 11    diclofenac sodium 1 % kit, Apply 2 g topically every 6 hours if needed., Disp: , Rfl:     gabapentin (Neurontin) 300 mg capsule, Take 2 capsules (600 mg) by mouth 2 times a day., Disp: 120 capsule, Rfl: 2    loperamide (Imodium A-D) 2 mg tablet, Take it as instructed for diarrhea, Disp: 60 tablet, Rfl: 3    losartan (Cozaar) 50 mg tablet, Take 1 tablet (50 mg) by mouth., Disp: , Rfl:     methocarbamol (Robaxin) 750 mg tablet, Take 1 tablet (750 mg) by mouth 3 times a day as needed., Disp: , Rfl:     naloxone (Narcan) 4 mg/0.1 mL nasal spray, Administer 1 spray (4 mg) into affected nostril(s) if needed for opioid reversal. May repeat every 2-3 minutes if needed, alternating nostrils, until medical assistance becomes available., Disp: 2 each, Rfl: 0    omeprazole (PriLOSEC) 20 mg DR capsule, Do not crush or chew. Take it 30 minutes before breakfast., Disp: 30 capsule, Rfl: 11    oxyCODONE (Roxicodone) 5 mg immediate release tablet, Take 1 tablet (5 mg) by mouth every 6 hours if needed for  severe pain (7 - 10) for up to 16 days., Disp: 60 tablet, Rfl: 0    predniSONE (Deltasone) 5 mg tablet, Take 1 tablet (5 mg) by mouth once daily., Disp: 30 tablet, Rfl: 2    tadalafil (Cialis) 10 mg tablet, Take 1 tablet (10 mg) by mouth once daily as needed., Disp: , Rfl:     ticagrelor (Brilinta) 60 mg tablet, Take 1 tablet (60 mg) by mouth 2 times a day., Disp: 60 tablet, Rfl: 11    triamcinolone (Kenalog) 0.1 % ointment, Apply topically 2 times a day., Disp: 80 g, Rfl: 1    umeclidinium (Incruse Ellipta) 62.5 mcg/actuation inhalation, Inhale 1 puff (62.5 mcg) once daily., Disp: 1 each, Rfl: 11    atorvastatin (Lipitor) 80 mg tablet, TAKE 1 TABLET BY MOUTH ONCE DAILY, Disp: 30 tablet, Rfl: 1    metoprolol tartrate (Lopressor) 25 mg tablet, TAKE 1 TABLET BY MOUTH TWO TIMES A DAY, Disp: 60 tablet, Rfl: 1    nitroglycerin (Nitrostat) 0.4 mg SL tablet, DISSOLVE 1 TABLET UNDER TONGUE EVERY 5 MINUTES FOR 3 DOSES AS NEEDED FOR CHEST PAIN. IF PAIN PERSISTS DIAL 911. (Patient not taking: Reported on 2/26/2025), Disp: 25 tablet, Rfl: 1    Current Facility-Administered Medications:     acetaminophen (Tylenol) tablet 650 mg, 650 mg, oral, q4h PRN, Angelica Munoz MD    HYDROmorphone (Dilaudid) injection 0.4 mg, 0.4 mg, intravenous, PRN, Angelica Munoz MD, 0.4 mg at 10/17/23 1230    HYDROmorphone (Dilaudid) injection 0.4 mg, 0.4 mg, intravenous, PRN, Angelica Munoz MD, 0.4 mg at 10/17/23 0830    ibuprofen tablet 400 mg, 400 mg, oral, PRN, Angelica Munoz MD    midazolam (Versed) injection 0.5 mg, 0.5 mg, intravenous, PRN, Angelica Munoz MD, 0.5 mg at 10/17/23 1230    midazolam (Versed) injection 0.5 mg, 0.5 mg, intravenous, PRN, Angelica Munoz MD, 0.5 mg at 10/17/23 0830    ondansetron (Zofran) injection 4 mg, 4 mg, intravenous, q4h PRN, Angelica Munoz MD    oxyCODONE (Roxicodone) immediate release tablet 10 mg, 10 mg, oral, q4h PRN, Angelica Munoz MD    ALLERGIES REVIEWED     SUBJECTIVE:   Patient doing  ok today. He still has mid back and leg pain. Oxycodone 5 mg drops his pain from 8 to 7, lasts 3 hours. He is still active at home, grocery shopping. He is living with ex GF Radha and he talks with his brother frequently. 13 point ROS reviewed and otherwise negative.      OBJECTIVE:  /65   Pulse 58   Temp 36.3 °C (97.3 °F) (Core)   Resp 18   Wt 61.6 kg (135 lb 12.9 oz)   SpO2 98%   BMI 20.05 kg/m²     Wt Readings from Last 5 Encounters:   25 61.6 kg (135 lb 12.9 oz)   25 63 kg (139 lb)   25 61.2 kg (135 lb)   01/15/25 64 kg (141 lb)   25 62.6 kg (138 lb)       Physical Exam:  ECO  Pain: 7/10 mainly left leg  Constitutional: Well developed, awake/alert/oriented x3, no distress, alert and cooperative  Eyes: PER. sclera anicteric  ENMT: Oral mucosa moist, no lesions or thrush identified  Respiratory/Thorax: Breathing is non-labored. Lungs are clear to auscultation bilaterally. No adventitious breath sounds  Cardiovascular: S1-S2. Regular rate and rhythm. No murmurs, rubs, or gallops appreciated  Gastrointestinal: Abdomen soft nontender, nondistended, normal active bowel sounds.  Musculoskeletal: ROM intact, no joint swelling, normal strength  Extremities: normal extremities, no cyanosis, no edema, no clubbing  Lymphatics: no palpable lymphadenopathy  Skin: no rash  Neurologic: alert and oriented x3. Nonfocal exam. No myoclonus  Psychological: Pleasant, appropriate and easily engaged     Results  Lab Results   Component Value Date    WBC 9.4 2025    HGB 14.0 2025    HCT 43.8 2025    MCV 66 (L) 2025     2025      Lab Results   Component Value Date    NEUTROABS 5.82 2025      Lab Results   Component Value Date    GLUCOSE 99 2025    CALCIUM 9.3 2025     (L) 2025    K 4.7 2025    CO2 28 2025    CL 98 2025    BUN 13 2025    CREATININE 0.85 2025    MG 2.28 2024     Lab Results    Component Value Date    ALT 19 03/18/2025    AST 17 03/18/2025    ALKPHOS 61 03/18/2025    BILITOT 1.0 03/18/2025    BILIDIR 0.3 07/31/2022      Lab Results   Component Value Date    ACTH 10.5 02/24/2025    CORTISOL 10.6 02/24/2025    TSH 1.81 02/24/2025    FREET4 1.14 04/22/2024       Diagnostic Results   CT CAP 03/12/25  IMPRESSION:  Restaging of lung cancer compared with previous exam from 01/03/2025.      1. The dominant large right upper lobe spiculated mass has further  increased in size. Additional bilateral lung nodules have also  slightly increased in size. See above for details.  2. The known metastatic lesion with soft tissue component in the  right iliac bone posteriorly appear stable. Compression deformity of  the T6 vertebra with sclerotic changes is new since the prior exam  and possibly represents pathologic compression fracture. Also loss of  height with sclerotic superior endplate involving the T12 vertebra.  Correlate clinically. No associated spinal canal stenosis.  3. Additional chronic findings as above.        MRI Brain 03/12/2025  IMPRESSION:  1. No mass or enhancement suggestive of metastatic disease is noted.      2. The parenchymal abnormalities are similar to the prior MR and may  be secondary to chronic ischemic changes. No acute infarct or  hemorrhage is noted.    Assessment/Plan     Lung cancer (Multi), Clinical: Stage IVB (cT3, cN3, cM1c)  Mr. Pillo Guerin is a 65 YO with Stage IVB ([id number]) poorly differentiated adenocarcinoma of RUL, TPS 80%, lack of actionable genomic alterations noted.    He was started on single agent pembrolizumab since 08/31/23 - however had to be held in May 2024 due to grade III ICI colitis which has completed resolved.     He has now completed 20 cycles pembrolizumab. CT scans from 3/12 personally reviewed showing increase in dominant lesion by ~1 mm in one direction, other lung lesions generally stable. We discussed this slow progression and I showed  him images. Clinically he is doing well and there are limited options for him in second line. We will continue with pembrolizumab and obtain scans at end of May.    #Stage IVB NSCLC with early, slow progression and no options in second line  -Continue pembrolizumab  -Scans prior to c24 - ordered    #Grade III ICI Diarrhea - resolved    #Pruritus, immune related  Continue moisturizing BUE with CeraVe and triamcinolone as needed     #Right occipital lobe 8 mm metastasis   S/p GKRS 24 Gy in 1 fraction on 10/17/23. Stable MRI 3/12/2025  Repeat MRI brain every 3-4 months per Rad Onc     #Pain on right gluteal region-  Due to large centrally necrotic and peripherally hypermetabolic mass in the right lower paramediastinal muscle extending into the adjacent subcutaneous tissue and superior right gluteal muscle with involvement of the superior aspect of the right iliac  bone, right florencio sacrum -   He has been taking gabapentin for pain (controlled) in LLE that is chronic   -Increased oxycodone to 10 mg every 6 PRN. Patient to establish with supportive oncology    #Advanced Care Planning  -Discussed slow nature of progression and challenges with treatment in second line given PS. He is close with brother and roommate/ex gf Radha     #Stroke in April 2022 and hx of CAD  -   Stable -   On ASA and ticagrelor      #HTN - on amlodipine      #HLD - atorvastatin    #Microcytic Anemia - Chronic in nature. Iron panel and ferritin found to be WNL.  Could send hemoglobin electrophoresis if anemia becomes a process.   Has never been told or heard about family history of thalassemia or other blood disorders.     Jorge Gray MD  Thoracic Medical Oncology   [address] [place], [place] [address]  Phone: [phone]

## 2025-03-28 ENCOUNTER — PHARMACY VISIT (OUTPATIENT)
Dept: PHARMACY | Facility: CLINIC | Age: 65
End: 2025-03-28
Payer: COMMERCIAL

## 2025-03-28 ENCOUNTER — OFFICE VISIT (OUTPATIENT)
Dept: PALLIATIVE MEDICINE | Facility: CLINIC | Age: 65
End: 2025-03-28
Payer: COMMERCIAL

## 2025-03-28 ENCOUNTER — TELEPHONE (OUTPATIENT)
Dept: PALLIATIVE MEDICINE | Facility: CLINIC | Age: 65
End: 2025-03-28

## 2025-03-28 VITALS
BODY MASS INDEX: 20.44 KG/M2 | WEIGHT: 138.4 LBS | HEART RATE: 55 BPM | TEMPERATURE: 98.6 F | SYSTOLIC BLOOD PRESSURE: 134 MMHG | OXYGEN SATURATION: 99 % | RESPIRATION RATE: 18 BRPM | DIASTOLIC BLOOD PRESSURE: 68 MMHG

## 2025-03-28 DIAGNOSIS — T40.2X5A CONSTIPATION DUE TO OPIOID THERAPY: Primary | ICD-10-CM

## 2025-03-28 DIAGNOSIS — G89.3 CANCER RELATED PAIN: ICD-10-CM

## 2025-03-28 DIAGNOSIS — C34.11 MALIGNANT NEOPLASM OF UPPER LOBE OF RIGHT LUNG (MULTI): ICD-10-CM

## 2025-03-28 DIAGNOSIS — K59.03 CONSTIPATION DUE TO OPIOID THERAPY: ICD-10-CM

## 2025-03-28 DIAGNOSIS — T40.2X5A CONSTIPATION DUE TO OPIOID THERAPY: ICD-10-CM

## 2025-03-28 DIAGNOSIS — K59.03 CONSTIPATION DUE TO OPIOID THERAPY: Primary | ICD-10-CM

## 2025-03-28 DIAGNOSIS — Z51.5 PALLIATIVE CARE ENCOUNTER: ICD-10-CM

## 2025-03-28 PROCEDURE — RXMED WILLOW AMBULATORY MEDICATION CHARGE

## 2025-03-28 PROCEDURE — 99215 OFFICE O/P EST HI 40 MIN: CPT

## 2025-03-28 RX ORDER — DOCUSATE SODIUM 100 MG/1
200 CAPSULE, LIQUID FILLED ORAL NIGHTLY PRN
Qty: 120 CAPSULE | Refills: 0 | Status: SHIPPED | OUTPATIENT
Start: 2025-03-28 | End: 2025-03-28 | Stop reason: SDUPTHER

## 2025-03-28 RX ORDER — OXYCODONE HYDROCHLORIDE 10 MG/1
10 TABLET ORAL EVERY 6 HOURS PRN
Qty: 120 TABLET | Refills: 0 | Status: SHIPPED | OUTPATIENT
Start: 2025-03-28 | End: 2025-03-28 | Stop reason: SDUPTHER

## 2025-03-28 RX ORDER — DOCUSATE SODIUM 100 MG/1
200 CAPSULE, LIQUID FILLED ORAL NIGHTLY PRN
Qty: 120 CAPSULE | Refills: 0 | Status: SHIPPED | OUTPATIENT
Start: 2025-03-28 | End: 2025-05-27

## 2025-03-28 RX ORDER — OXYCODONE HYDROCHLORIDE 10 MG/1
10 TABLET ORAL EVERY 6 HOURS PRN
Qty: 120 TABLET | Refills: 0 | Status: SHIPPED | OUTPATIENT
Start: 2025-03-28 | End: 2025-04-27

## 2025-03-28 ASSESSMENT — PAIN SCALES - GENERAL: PAINLEVEL_OUTOF10: 8

## 2025-03-28 NOTE — TELEPHONE ENCOUNTER
[facility] pharmacy contacted.  Oxycodone IR 10 mg 120 tabs currently in stock.  Patient notified.  Script for oxycodone and colace pended to provider to send to [facility].

## 2025-03-28 NOTE — PROGRESS NOTES
SUPPORTIVE AND PALLIATIVE ONCOLOGY CONSULT - OUTPATIENT      SERVICE DATE: 3/28/2025    Medical Oncologist: MD Jorge Duque MD   Radiation Oncologist: No care team member to display  Primary Physician: Rocio Harris  [phone]    REASON FOR CONSULT/CHIEF CONSULT COMPLAINT: pain management and Introduction to Supportive and Palliative Oncology Services    Subjective   HISTORY OF PRESENT ILLNESS: Pillo Guerin is a 64 y.o. 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 Pt is s/p brain XRT 10/2023 and currently on Pembro. Pt follows with Dr. Gray.     Pain Assessment:  Pain Score:   8  Location:  L hip -> L leg (constant), RLE (intermittent)  Education:  changes to current regimen      Symptom Assessment:  Pain:very much - L hip -> L leg (constant), RLE (intermittent)  Headache: none  Dizziness:none  Lack of energy: none  Difficulty sleeping: none  Worrying: none  Anxiety: none  Depression: none  Pain in mouth/swallowing: none  Dry mouth: none  Taste changes: none  Shortness of breath: a little- with exertion  Lack of appetite: none   Nausea: none  Vomiting: none  Constipation: none  Diarrhea: a little- finds that eating white rice helps to manage this  Sore muscles: a little  Numbness or tingling in hands/feet/other: none  Weight loss: none  Other: none      Information obtained from: chart review and interview of patient  ______________________________________________________________________     Oncology History   Lung cancer (Multi)   8/31/2023 - 10/30/2024 Chemotherapy    Pembrolizumab, 21 Day Cycles     9/22/2023 Initial Diagnosis    Lung cancer (CMS/HCC)     11/21/2023 Cancer Staged    Staging form: Lung, AJCC 8th Edition, Clinical: Stage IVB (cT3, cN3, cM1c) - Signed by Zuleyma Vines MD on 11/21/2023 11/20/2024 -  Chemotherapy    Pembrolizumab, 21 Day Cycles    "      Past Medical History:   Diagnosis Date    Hyperlipidemia, unspecified 06/13/2022    Hyperlipidemia    Old myocardial infarction     History of myocardial infarction     Past Surgical History:   Procedure Laterality Date    MR HEAD ANGIO WO IV CONTRAST  4/19/2022    MR HEAD ANGIO WO IV CONTRAST 4/19/2022 [facility]    MR NECK ANGIO WO IV CONTRAST  4/19/2022    MR NECK ANGIO WO IV CONTRAST 4/19/2022 [facility]    OTHER SURGICAL HISTORY  09/01/2015    Previous Stent Placement     Family History   Problem Relation Name Age of Onset    Cancer Mother      Heart attack Father      Heart attack Brother          SOCIAL HISTORY  Lives with ex/current friendRadha   Social History:  reports that he has been smoking cigarettes. He has been exposed to tobacco smoke. He uses smokeless tobacco. He reports current alcohol use of about 4.0 standard drinks of alcohol per week. He reports current drug use. Drug: \"Crack\" cocaine.  Retired, previously worked in [employer]    Confucianism and Importance of Confucianism: unknown    REVIEW OF SYSTEMS  Review of systems negative unless noted in HPI.       Objective     Palliative Performance Scale % (PPS)       Current Outpatient Medications   Medication Instructions    amLODIPine (NORVASC) 2.5 mg, Daily RT    atorvastatin (Lipitor) 80 mg tablet TAKE 1 TABLET BY MOUTH ONCE DAILY    cholecalciferol (VITAMIN D3) 1,000 Units, oral, Daily    diclofenac sodium 2 g, Every 6 hours PRN    gabapentin (NEURONTIN) 600 mg, oral, 2 times daily    Incruse Ellipta 62.5 mcg, inhalation, Daily    loperamide (Imodium A-D) 2 mg tablet Take it as instructed for diarrhea    losartan (COZAAR) 50 mg    methocarbamol (ROBAXIN) 750 mg, 3 times daily PRN    metoprolol tartrate (Lopressor) 25 mg tablet TAKE 1 TABLET BY MOUTH TWO TIMES A DAY    naloxone (NARCAN) 4 mg, nasal, As needed, May repeat every 2-3 minutes if needed, alternating nostrils, until medical assistance becomes available.    " nitroglycerin (Nitrostat) 0.4 mg SL tablet DISSOLVE 1 TABLET UNDER TONGUE EVERY 5 MINUTES FOR 3 DOSES AS NEEDED FOR CHEST PAIN. IF PAIN PERSISTS DIAL 911.    omeprazole (PriLOSEC) 20 mg DR capsule Do not crush or chew. Take it 30 minutes before breakfast.    oxyCODONE (ROXICODONE) 10 mg, oral, Every 6 hours PRN    predniSONE (DELTASONE) 5 mg, oral, Daily    tadalafil (CIALIS) 10 mg, Daily PRN    ticagrelor (BRILINTA) 60 mg, oral, 2 times daily    triamcinolone (Kenalog) 0.1 % ointment Topical, 2 times daily       Allergies: No Known Allergies    No results found. However, due to the size of the patient record, not all encounters were searched. Please check Results Review for a complete set of results.             PHYSICAL EXAMINATION  Vital Signs:       12/11/2024     2:08 PM 1/15/2025     3:00 PM 2/5/2025     1:44 PM 2/26/2025    11:53 AM 3/12/2025    10:53 AM 3/19/2025    12:01 PM 3/28/2025     9:39 AM   Vitals   Systolic 144 129 124 122  117 134   Diastolic 70 70 72 58  65 68   BP Location  Right arm Right arm Right arm      Heart Rate 62 69 66 64  58 55   Temp 36.2 °C (97.2 °F) 36.2 °C (97.2 °F) 36.1 °C (97 °F) 35.9 °C (96.6 °F)  36.3 °C (97.3 °F) 37 °C (98.6 °F)   Resp 18 18 18 18  18 18   Weight (lb) 140.87 141 135 139 138 135.8 138.4   BMI 20.8 kg/m2 20.82 kg/m2 19.94 kg/m2 20.53 kg/m2 20.38 kg/m2 20.05 kg/m2 20.44 kg/m2   BSA (m2) 1.76 m2 1.77 m2 1.73 m2 1.75 m2 1.75 m2 1.73 m2 1.75 m2   Visit Report Report Report Report Report  Report Report      Physical Exam  Constitutional:       Appearance: Normal appearance.   HENT:      Mouth/Throat:      Mouth: Mucous membranes are moist.   Eyes:      Extraocular Movements: Extraocular movements intact.      Pupils: Pupils are equal, round, and reactive to light.   Cardiovascular:      Rate and Rhythm: Normal rate.   Pulmonary:      Effort: Pulmonary effort is normal.   Abdominal:      Palpations: Abdomen is soft.   Musculoskeletal:      Comments: LUE, LLE weakness,  wears brace to LLE   Skin:     General: Skin is warm and dry.   Neurological:      Mental Status: He is alert and oriented to person, place, and time.   Psychiatric:         Mood and Affect: Mood normal.         Behavior: Behavior normal.         ASSESSMENT/PLAN    Pain  Pain is: cancer related pain and chronic (cancer, CVA)  Type: somatic and neuropathic; L hip -> L leg (constant), RLE (intermittent)  Pain control: sub-optimally controlled  Home regimen:   Up Oxycodone to 10mg q6h as needed  Discussed the likely need to add a long-acting opiate in the future  Gabapentin 600mg BID  Tylenol 650mg q8h as needed (only takes occasionally)  Intolerances/previously tried: n/a  Personalized pain goal: feel comfortable enough to walk more    Opioid Use  Medication Management:   - OARRS report reviewed with no aberrant behavior; consistent with  prescriptions/records and patient history  - MED ~30.  Overdose Risk Score 170.   This has been discussed with patient.   - We will continue to closely monitor the patient for signs of prescription misuse including UDS, OARRS review and subjective reports at each visit.  - N/a concurrent benzodiazepine use   - I am a provider who either is or has consulted and collaborated with a provider certified in Hospice and Palliative Medicine and have conducted a face-face visit and examination for this patient.  - Routine Urine Drug Screen complete next visit  - Controlled Substance Agreement complete next visit  - Specifically discussed that controlled substance prescriptions will only be provided by our group as outlined in the completed agreement  - Prescribed naloxone previously  - Red Flags: n/a     Nausea- denies    Constipation   At risk for constipation related to opioids,   currently not constipated  Start Colace 100-200mg nightly as needed for signs of opiate-induced constipation  Encouraged adequate fluid intake  Manages occasional diarrhea by eating white rice    Altered Mood-  "denies  Sleeping Difficulty- denies    Decreased appetite- denies  Discussed viewing food as \"fuel\", especially with any increase in activity  On Prednisone 5mg daily for other reasons- monitor appetite if ever weaned off Prednisone      Supportive Interventions: n/a- will continue to monitor needs    Introduction to Supportive and Palliative Oncology:  Spoke with patient   Introduced the role and philosophy of Supportive and Palliative oncology in the evaluation and management of symptoms during cancer treatment  Palliative care was introduced as a service for patients with serious illness to help with symptoms, assist with goals of care conversations, navigate complex decision making, improve quality of life for patients, and provide support both patients and families.  Patient seemed to appreciate the extra layer of support.    Medical Decision Making/Goals of Care/Advance Care Planning:  Patient's current clinical condition, including diagnosis, prognosis, and management plan, and goals of care were discussed.   Life limiting disease: metastatic malignancy  Family: Supportive ex/friend  Performance status: Major limitations due to  previous stroke (manages well)  Goals: symptom control and cancer directed therapy    Advance Directives  Existence of Advance Directives:No - has interest  Decision maker: HCPOA is unknown- confirm at next visit  Code Status: Full code    Next Follow-Up Visit:  Return to clinic in 1 month at  [facility]    Signature and billing  Thank you for allowing us to participate in the care of this patient. Recommendations will be communicated back to the consulting service by way of shared electronic medical record or face-to-face.    Medical complexity was high level due to due to complexity of problems, extensive data review, and high risk of management/treatment.  Time was spent on the following: Prep Time, Time Directly with Patient/Family/Caregiver, Documentation Time. Total time spent: " 65min      DATA   Diagnostic tests and information reviewed for today's visit:  Most recent labs and imaging results, Medications     3/28/25: changes to plan indicated in bold. Continue all other regimens as listed.    Some elements copied from n/a note on n/a, the elements have been updated and all reflect current decision making from today, 3/28/2025.      Plan of Care discussed with: Provider, RN, Patient      SIGNATURE: SIMONE Garcia-CNP    Contact information:  Supportive and Palliative Oncology  Monday-Friday 8 AM-5 PM  Phone:  [phone], press option #5, then option #1.   Or Epic Secure Chat

## 2025-04-08 ENCOUNTER — LAB (OUTPATIENT)
Dept: LAB | Facility: CLINIC | Age: 65
End: 2025-04-08
Payer: COMMERCIAL

## 2025-04-08 DIAGNOSIS — C34.11 MALIGNANT NEOPLASM OF UPPER LOBE OF RIGHT LUNG (MULTI): ICD-10-CM

## 2025-04-08 DIAGNOSIS — J43.9 PULMONARY EMPHYSEMA, UNSPECIFIED EMPHYSEMA TYPE (MULTI): ICD-10-CM

## 2025-04-08 LAB
ALBUMIN SERPL BCP-MCNC: 4.3 G/DL (ref 3.4–5)
ALP SERPL-CCNC: 54 U/L (ref 33–136)
ALT SERPL W P-5'-P-CCNC: 16 U/L (ref 10–52)
ANION GAP SERPL CALC-SCNC: 15 MMOL/L (ref 10–20)
AST SERPL W P-5'-P-CCNC: 18 U/L (ref 9–39)
BASOPHILS # BLD AUTO: 0.1 X10*3/UL (ref 0–0.1)
BASOPHILS NFR BLD AUTO: 1.1 %
BILIRUB SERPL-MCNC: 0.8 MG/DL (ref 0–1.2)
BUN SERPL-MCNC: 14 MG/DL (ref 6–23)
CALCIUM SERPL-MCNC: 9.4 MG/DL (ref 8.6–10.6)
CHLORIDE SERPL-SCNC: 99 MMOL/L (ref 98–107)
CO2 SERPL-SCNC: 26 MMOL/L (ref 21–32)
CORTIS AM PEAK SERPL-MSCNC: 16.5 UG/DL (ref 5–20)
CREAT SERPL-MCNC: 0.85 MG/DL (ref 0.5–1.3)
EGFRCR SERPLBLD CKD-EPI 2021: >90 ML/MIN/1.73M*2
EOSINOPHIL # BLD AUTO: 0.75 X10*3/UL (ref 0–0.7)
EOSINOPHIL NFR BLD AUTO: 8.1 %
ERYTHROCYTE [DISTWIDTH] IN BLOOD BY AUTOMATED COUNT: 17.5 % (ref 11.5–14.5)
GLUCOSE SERPL-MCNC: 98 MG/DL (ref 74–99)
HCT VFR BLD AUTO: 41.5 % (ref 41–52)
HGB BLD-MCNC: 13.5 G/DL (ref 13.5–17.5)
IMM GRANULOCYTES # BLD AUTO: 0.04 X10*3/UL (ref 0–0.7)
IMM GRANULOCYTES NFR BLD AUTO: 0.4 % (ref 0–0.9)
LYMPHOCYTES # BLD AUTO: 2.16 X10*3/UL (ref 1.2–4.8)
LYMPHOCYTES NFR BLD AUTO: 23.4 %
MCH RBC QN AUTO: 21.5 PG (ref 26–34)
MCHC RBC AUTO-ENTMCNC: 32.5 G/DL (ref 32–36)
MCV RBC AUTO: 66 FL (ref 80–100)
MONOCYTES # BLD AUTO: 0.86 X10*3/UL (ref 0.1–1)
MONOCYTES NFR BLD AUTO: 9.3 %
NEUTROPHILS # BLD AUTO: 5.34 X10*3/UL (ref 1.2–7.7)
NEUTROPHILS NFR BLD AUTO: 57.7 %
NRBC BLD-RTO: ABNORMAL /100{WBCS}
PLATELET # BLD AUTO: 335 X10*3/UL (ref 150–450)
POTASSIUM SERPL-SCNC: 4.6 MMOL/L (ref 3.5–5.3)
PROT SERPL-MCNC: 6.5 G/DL (ref 6.4–8.2)
RBC # BLD AUTO: 6.27 X10*6/UL (ref 4.5–5.9)
SODIUM SERPL-SCNC: 135 MMOL/L (ref 136–145)
TSH SERPL-ACNC: 2.21 MIU/L (ref 0.44–3.98)
WBC # BLD AUTO: 9.3 X10*3/UL (ref 4.4–11.3)

## 2025-04-08 PROCEDURE — 82533 TOTAL CORTISOL: CPT

## 2025-04-08 PROCEDURE — 84443 ASSAY THYROID STIM HORMONE: CPT

## 2025-04-08 PROCEDURE — 80053 COMPREHEN METABOLIC PANEL: CPT

## 2025-04-08 PROCEDURE — 85025 COMPLETE CBC W/AUTO DIFF WBC: CPT

## 2025-04-08 PROCEDURE — 82024 ASSAY OF ACTH: CPT

## 2025-04-08 PROCEDURE — 36415 COLL VENOUS BLD VENIPUNCTURE: CPT

## 2025-04-09 ENCOUNTER — INFUSION (OUTPATIENT)
Dept: HEMATOLOGY/ONCOLOGY | Facility: CLINIC | Age: 65
End: 2025-04-09
Payer: COMMERCIAL

## 2025-04-09 ENCOUNTER — OFFICE VISIT (OUTPATIENT)
Dept: HEMATOLOGY/ONCOLOGY | Facility: CLINIC | Age: 65
End: 2025-04-09
Payer: COMMERCIAL

## 2025-04-09 VITALS
WEIGHT: 135.91 LBS | DIASTOLIC BLOOD PRESSURE: 82 MMHG | TEMPERATURE: 97.2 F | RESPIRATION RATE: 18 BRPM | SYSTOLIC BLOOD PRESSURE: 156 MMHG | HEART RATE: 58 BPM | OXYGEN SATURATION: 98 % | BODY MASS INDEX: 20.07 KG/M2

## 2025-04-09 DIAGNOSIS — C34.11 MALIGNANT NEOPLASM OF UPPER LOBE OF RIGHT LUNG (MULTI): Primary | ICD-10-CM

## 2025-04-09 DIAGNOSIS — Z51.12 ENCOUNTER FOR ANTINEOPLASTIC IMMUNOTHERAPY: ICD-10-CM

## 2025-04-09 DIAGNOSIS — C34.11 MALIGNANT NEOPLASM OF UPPER LOBE OF RIGHT LUNG (MULTI): ICD-10-CM

## 2025-04-09 DIAGNOSIS — G89.3 CANCER RELATED PAIN: ICD-10-CM

## 2025-04-09 PROCEDURE — 96413 CHEMO IV INFUSION 1 HR: CPT

## 2025-04-09 PROCEDURE — 1159F MED LIST DOCD IN RCRD: CPT | Performed by: NURSE PRACTITIONER

## 2025-04-09 PROCEDURE — 3079F DIAST BP 80-89 MM HG: CPT | Performed by: NURSE PRACTITIONER

## 2025-04-09 PROCEDURE — 1125F AMNT PAIN NOTED PAIN PRSNT: CPT | Performed by: NURSE PRACTITIONER

## 2025-04-09 PROCEDURE — 99214 OFFICE O/P EST MOD 30 MIN: CPT | Mod: 25 | Performed by: NURSE PRACTITIONER

## 2025-04-09 PROCEDURE — 2500000004 HC RX 250 GENERAL PHARMACY W/ HCPCS (ALT 636 FOR OP/ED): Performed by: NURSE PRACTITIONER

## 2025-04-09 PROCEDURE — 1160F RVW MEDS BY RX/DR IN RCRD: CPT | Performed by: NURSE PRACTITIONER

## 2025-04-09 PROCEDURE — 3077F SYST BP >= 140 MM HG: CPT | Performed by: NURSE PRACTITIONER

## 2025-04-09 PROCEDURE — 99214 OFFICE O/P EST MOD 30 MIN: CPT | Performed by: NURSE PRACTITIONER

## 2025-04-09 RX ORDER — HEPARIN 100 UNIT/ML
500 SYRINGE INTRAVENOUS AS NEEDED
Status: DISCONTINUED | OUTPATIENT
Start: 2025-04-09 | End: 2025-04-09 | Stop reason: HOSPADM

## 2025-04-09 RX ORDER — DIPHENHYDRAMINE HYDROCHLORIDE 50 MG/ML
50 INJECTION, SOLUTION INTRAMUSCULAR; INTRAVENOUS AS NEEDED
Status: CANCELLED | OUTPATIENT
Start: 2025-04-09

## 2025-04-09 RX ORDER — HEPARIN SODIUM,PORCINE/PF 10 UNIT/ML
50 SYRINGE (ML) INTRAVENOUS AS NEEDED
Status: DISCONTINUED | OUTPATIENT
Start: 2025-04-09 | End: 2025-04-09 | Stop reason: HOSPADM

## 2025-04-09 RX ORDER — FAMOTIDINE 10 MG/ML
20 INJECTION, SOLUTION INTRAVENOUS ONCE AS NEEDED
Status: DISCONTINUED | OUTPATIENT
Start: 2025-04-09 | End: 2025-04-09 | Stop reason: HOSPADM

## 2025-04-09 RX ORDER — EPINEPHRINE 0.3 MG/.3ML
0.3 INJECTION SUBCUTANEOUS EVERY 5 MIN PRN
Status: CANCELLED | OUTPATIENT
Start: 2025-04-09

## 2025-04-09 RX ORDER — ALBUTEROL SULFATE 0.83 MG/ML
3 SOLUTION RESPIRATORY (INHALATION) AS NEEDED
Status: DISCONTINUED | OUTPATIENT
Start: 2025-04-09 | End: 2025-04-09 | Stop reason: HOSPADM

## 2025-04-09 RX ORDER — PROCHLORPERAZINE MALEATE 10 MG
10 TABLET ORAL EVERY 6 HOURS PRN
Status: CANCELLED | OUTPATIENT
Start: 2025-04-09

## 2025-04-09 RX ORDER — HEPARIN SODIUM,PORCINE/PF 10 UNIT/ML
50 SYRINGE (ML) INTRAVENOUS AS NEEDED
OUTPATIENT
Start: 2025-04-09

## 2025-04-09 RX ORDER — DIPHENHYDRAMINE HYDROCHLORIDE 50 MG/ML
50 INJECTION, SOLUTION INTRAMUSCULAR; INTRAVENOUS AS NEEDED
Status: DISCONTINUED | OUTPATIENT
Start: 2025-04-09 | End: 2025-04-09 | Stop reason: HOSPADM

## 2025-04-09 RX ORDER — PROCHLORPERAZINE EDISYLATE 5 MG/ML
10 INJECTION INTRAMUSCULAR; INTRAVENOUS EVERY 6 HOURS PRN
Status: CANCELLED | OUTPATIENT
Start: 2025-04-09

## 2025-04-09 RX ORDER — HEPARIN 100 UNIT/ML
500 SYRINGE INTRAVENOUS AS NEEDED
OUTPATIENT
Start: 2025-04-09

## 2025-04-09 RX ORDER — FAMOTIDINE 10 MG/ML
20 INJECTION, SOLUTION INTRAVENOUS ONCE AS NEEDED
Status: CANCELLED | OUTPATIENT
Start: 2025-04-09

## 2025-04-09 RX ORDER — ALBUTEROL SULFATE 0.83 MG/ML
3 SOLUTION RESPIRATORY (INHALATION) AS NEEDED
Status: CANCELLED | OUTPATIENT
Start: 2025-04-09

## 2025-04-09 RX ORDER — PROCHLORPERAZINE MALEATE 10 MG
10 TABLET ORAL EVERY 6 HOURS PRN
Status: DISCONTINUED | OUTPATIENT
Start: 2025-04-09 | End: 2025-04-09 | Stop reason: HOSPADM

## 2025-04-09 RX ORDER — PROCHLORPERAZINE EDISYLATE 5 MG/ML
10 INJECTION INTRAMUSCULAR; INTRAVENOUS EVERY 6 HOURS PRN
Status: DISCONTINUED | OUTPATIENT
Start: 2025-04-09 | End: 2025-04-09 | Stop reason: HOSPADM

## 2025-04-09 RX ORDER — EPINEPHRINE 0.3 MG/.3ML
0.3 INJECTION SUBCUTANEOUS EVERY 5 MIN PRN
Status: DISCONTINUED | OUTPATIENT
Start: 2025-04-09 | End: 2025-04-09 | Stop reason: HOSPADM

## 2025-04-09 RX ADMIN — SODIUM CHLORIDE 200 MG: 9 INJECTION, SOLUTION INTRAVENOUS at 08:56

## 2025-04-09 ASSESSMENT — ENCOUNTER SYMPTOMS
DEPRESSION: 0
LOSS OF SENSATION IN FEET: 0
OCCASIONAL FEELINGS OF UNSTEADINESS: 0

## 2025-04-09 ASSESSMENT — PAIN SCALES - GENERAL: PAINLEVEL_OUTOF10: 7

## 2025-04-09 NOTE — PROGRESS NOTES
Patient here today for follow up and treatment. He has no new medical issues or concerns today.    Fatigue: denies any issues  PO intake: doing well  Weight: stable    Medications and pharmacy preference reviewed with patient.     Labs drawn yesterday.   Patient taken to day treatment for [place].

## 2025-04-09 NOTE — PROGRESS NOTES
Patient ID: Pillo Guerin is a 64 y.o. male    Primary Care Provider: Rocio Harris, APRN-CNP    DIAGNOSIS AND STAGING  Stage IVB (cT3 cN3 cM1c) poorly differentiated adenocarcinoma of RUL diagnosed on 08/03/23     SITES OF DISEASE  RUL  Supraclavicular nodes  Contralateral lung  Right gluteal muscle   Brain right occipital lobe      MOLECULAR GENOMICS  MICROSATELLITE STATUS: Microsatellite Stable (MAGDIEL)     DISEASE ASSOCIATED GENOMIC FINDINGS:   KRAS p.G13D ([id number] c.38G>A)   PIK3CA p.E542K ([id number] c.1624G>A)   TP53 p.E285K ([id number] c.853G>A)     DISEASE RELEVANT ALTERATIONS NOT DETECTED:   Negative for ALK fusion.   Negative for BRAF V600E.   Negative for EGFR sensitizing mutation.   Negative for KRAS G12C.   Negative for MET exon 14 skipping mutation.   Negative for NTRK fusion.   Negative for RET fusion.   Negative for ROS1 fusion.     PD-L1 TPS 80%     PRIOR THERAPIES  GKRS to right occipital lobe on  24 Gy in 1 fraction on 10/17/23      CURRENT THERAPY  Single agent pembrolizumab since 08/31/23    CURRENT ONCOLOGICAL PROBLEMS  Resolved GIII ICI colitis     HISTORY OF PRESENT ILLNESS  This is a pt with PMH of EtOH use disorder, CVA, CAD c/b NSTEMI s/p 2 stents and stage IVB ([id number]) poorly differentiated adenocarcinoma of RUL diagnosed on 08/03/23.      Patient was initially diagnosed 08/2022 after incidentally found RUL lesion was worked up during admission for hyponatremia presumed to be 2/2 beer potomania. He was then seen by his pulmonologist Dr. Callejas who ordered PET and MRI but this was not obtained  nor were multiple attempts to schedule for oncologic NPV.     I saw him in August 2023 and he endorsed haviing lost 15 lbs since stroke April 2022. His appetite is good, denies dysgeusia. He has slowed down a bit since he had his stroke. He can walk without becoming dyspneic but is limited by his stroke.    On 08/12/23 the patient underwent a bronchoscopy with EBUS:  Accession #: [id number]    Date of Procedure:8/3/2022   Date Reported: 8/9/2022   Date Received: 8/3/2022   Date of Birth / Sex 1960 (Age: 62) / M   Race: WHITE   Submitting Physician: DARYN YOUNG MD   Attending Physician: MD HEVER BROWNE MD   Procedures/Addenda Present     Other External #     FINAL CYTOLOGICAL INTERPRETATION     A. FINE NEEDLE ASPIRATION LYMPH NODE, L 4, CYTOLOGY AND CELL BLOCK:   NO MALIGNANT CELLS IDENTIFIED.   LYMPHOID SAMPLE IS PRESENT.     Note: Cell block shows presence of lymphoid cells along with bronchial cells contamination.     B. FINE NEEDLE ASPIRATION LYMPH NODE, STATION 7, CYTOLOGY AND CELL BLOCK:   NO MALIGNANT CELLS IDENTIFIED.   LYMPHOID SAMPLE IS PRESENT.     C. FINE NEEDLE ASPIRATION LYMPH NODE, R 11, CYTOLOGY AND CELL BLOCK:   POORLY DIFFERENTIATED CARCINOMA, CONSISTENT WITH ADENOCARCINOMA, SEE NOTE.     Note: A limited panel of immunohistochemical stains performed showed neoplastic   cells staining strongly and diffusely positive with TTF-1 and negative with   p40. Findings are consistent with above.     Staff pathologist: Reviewed by Dr. Ellyn Valles.     Molecular testing has been ordered and results will be issued in an addendum.     The cell block; C1 contains moderate tumor cellularity representing roughly 70 % of all nucleated cells.       D. BRONCHO-ALVEOLAR LAVAGE OF RIGHT UPPER LOBE:   RARE ATYPICAL CELLS ARE PRESENT.     08/16/23: PET scan demonstrates multiple RUL FDG avid nodules as well as contra-lateral lung nodules, right hilar, mediastinal and supraclavicular nodes consistent with neoplastic involvement. There is also a large FDG avid right gluteal mass extending into the right SIJ and iliac bone.     08/16/23: MRI brain shows a 8 mm right occipital lobe metastasis with mild associated edema.     08/31/23: Single agent pembrolizumab begins     10/17/23: GKRS to right occipital lobe on  24 Gy in 1 fraction     06/06/24: grade 3 immune-related diarrhea with  episodes of incontinence despite Imodium   Rx prednisone 40 mg/day provided today      PAST MEDICAL HISTORY  CAD s/p 2 stents, CVA, Dupuytren's contracture   BPH  Lumbar radiculopathy   Ir-diarrhea (pembrolizumab) treated with prednisone months of May and      SURGICAL HISTORY  None      SOCIAL HISTORY  Born in [place], lives in [place] with 2 roommates.   Brother Salas is NOK. Drinks 2 24 oz beers a day.   + Tobacco - 50 pack year smoking history, active.   Rare cannabis, acid use in 70s, no additional illicits.    Was working at [employer] in [place] before stroke.      FAMILY HISTORY  Mother  of melanoma in      CURRENT MEDS REVIEWED    Current Outpatient Medications:     amLODIPine (Norvasc) 5 mg tablet, Take 0.5 tablets (2.5 mg) by mouth once daily., Disp: , Rfl:     cholecalciferol (Vitamin D3) 25 mcg (1000 units) tablet, Take 1 tablet (1,000 Units) by mouth once daily., Disp: 30 tablet, Rfl: 11    gabapentin (Neurontin) 300 mg capsule, Take 2 capsules (600 mg) by mouth 2 times a day., Disp: 120 capsule, Rfl: 2    losartan (Cozaar) 50 mg tablet, Take 1 tablet (50 mg) by mouth., Disp: , Rfl:     omeprazole (PriLOSEC) 20 mg DR capsule, Do not crush or chew. Take it 30 minutes before breakfast., Disp: 30 capsule, Rfl: 11    oxyCODONE (Roxicodone) 10 mg immediate release tablet, Take 1 tablet (10 mg) by mouth every 6 hours if needed for severe pain (7 - 10)., Disp: 120 tablet, Rfl: 0    predniSONE (Deltasone) 5 mg tablet, Take 1 tablet (5 mg) by mouth once daily., Disp: 30 tablet, Rfl: 2    tadalafil (Cialis) 10 mg tablet, Take 1 tablet (10 mg) by mouth once daily as needed., Disp: , Rfl:     ticagrelor (Brilinta) 60 mg tablet, Take 1 tablet (60 mg) by mouth 2 times a day., Disp: 60 tablet, Rfl: 11    triamcinolone (Kenalog) 0.1 % ointment, Apply topically 2 times a day., Disp: 80 g, Rfl: 1    umeclidinium (Incruse Ellipta) 62.5 mcg/actuation inhalation, Inhale 1 puff (62.5 mcg) once  daily., Disp: 1 each, Rfl: 11    atorvastatin (Lipitor) 80 mg tablet, TAKE 1 TABLET BY MOUTH ONCE DAILY, Disp: 30 tablet, Rfl: 1    diclofenac sodium 1 % kit, Apply 2 g topically every 6 hours if needed., Disp: , Rfl:     docusate sodium (Colace) 100 mg capsule, Take 2 capsules (200 mg) by mouth as needed at bedtime for constipation. You only need to take this if you feel constipated. (Patient not taking: Reported on 4/9/2025), Disp: 120 capsule, Rfl: 0    loperamide (Imodium A-D) 2 mg tablet, Take it as instructed for diarrhea (Patient not taking: Reported on 4/9/2025), Disp: 60 tablet, Rfl: 3    metoprolol tartrate (Lopressor) 25 mg tablet, TAKE 1 TABLET BY MOUTH TWO TIMES A DAY, Disp: 60 tablet, Rfl: 1    naloxone (Narcan) 4 mg/0.1 mL nasal spray, Administer 1 spray (4 mg) into affected nostril(s) if needed for opioid reversal. May repeat every 2-3 minutes if needed, alternating nostrils, until medical assistance becomes available. (Patient not taking: Reported on 4/9/2025), Disp: 2 each, Rfl: 0    nitroglycerin (Nitrostat) 0.4 mg SL tablet, DISSOLVE 1 TABLET UNDER TONGUE EVERY 5 MINUTES FOR 3 DOSES AS NEEDED FOR CHEST PAIN. IF PAIN PERSISTS DIAL 911. (Patient not taking: Reported on 2/5/2025), Disp: 25 tablet, Rfl: 1    Current Facility-Administered Medications:     acetaminophen (Tylenol) tablet 650 mg, 650 mg, oral, q4h PRN, Angelica Munoz MD    HYDROmorphone (Dilaudid) injection 0.4 mg, 0.4 mg, intravenous, PRN, Angelica Munoz MD, 0.4 mg at 10/17/23 1230    HYDROmorphone (Dilaudid) injection 0.4 mg, 0.4 mg, intravenous, PRN, Angelica Munoz MD, 0.4 mg at 10/17/23 0830    ibuprofen tablet 400 mg, 400 mg, oral, PRN, Angelica Munoz MD    midazolam (Versed) injection 0.5 mg, 0.5 mg, intravenous, PRN, Angelica Munoz MD, 0.5 mg at 10/17/23 1230    midazolam (Versed) injection 0.5 mg, 0.5 mg, intravenous, PRN, Angelica Munoz MD, 0.5 mg at 10/17/23 0830    ondansetron (Zofran) injection 4 mg, 4 mg,  intravenous, q4h PRN, Angelica Munoz MD    oxyCODONE (Roxicodone) immediate release tablet 10 mg, 10 mg, oral, q4h PRN, Angelica Munoz MD      ALLERGIES REVIEWED     SUBJECTIVE:   Patient presents today for toxicity check and treatment readiness visit. He is doing well today. He is trying to be more active at home, walking [place]. Ongoing pain in mid back and leg using Oxycodone 5 mg PRN with good result. Follows with palliative care. He denies any fevers, chills or night sweats. No cough, chest pain or shortness of breath. No nausea or vomiting. No constipation or diarrhea. No urinary symptoms. No rash. No neuropathy.     OBJECTIVE:  /82   Pulse 58   Temp 36.2 °C (97.2 °F) (Core)   Resp 18   Wt 61.7 kg (135 lb 14.6 oz)   SpO2 98%   BMI 20.07 kg/m²     Wt Readings from Last 5 Encounters:   25 61.7 kg (135 lb 14.6 oz)   25 62.8 kg (138 lb 6.4 oz)   25 61.6 kg (135 lb 12.9 oz)   25 63 kg (139 lb)   25 61.2 kg (135 lb)       Physical Exam:  ECO  Pain: 7/10 mainly left leg  Constitutional: Well developed, awake/alert/oriented x3, no distress, alert and cooperative  Eyes: PER. sclera anicteric  ENMT: Oral mucosa moist, no lesions or thrush identified  Respiratory/Thorax: Breathing is non-labored. Lungs are clear to auscultation bilaterally. No adventitious breath sounds  Cardiovascular: S1-S2. Regular rate and rhythm. No murmurs, rubs, or gallops appreciated  Gastrointestinal: Abdomen soft nontender, nondistended, normal active bowel sounds.  Musculoskeletal: ROM intact, no joint swelling, normal strength  Extremities: normal extremities, no cyanosis, no edema, no clubbing  Lymphatics: no palpable lymphadenopathy  Skin: no rash  Neurologic: alert and oriented x3. Nonfocal exam. No myoclonus  Psychological: Pleasant, appropriate and easily engaged     Results  Lab Results   Component Value Date    WBC 9.3 2025    HGB 13.5 2025    HCT 41.5 2025     MCV 66 (L) 04/08/2025     04/08/2025      Lab Results   Component Value Date    NEUTROABS 5.34 04/08/2025      Lab Results   Component Value Date    GLUCOSE 98 04/08/2025    CALCIUM 9.4 04/08/2025     (L) 04/08/2025    K 4.6 04/08/2025    CO2 26 04/08/2025    CL 99 04/08/2025    BUN 14 04/08/2025    CREATININE 0.85 04/08/2025    MG 2.28 08/06/2024     Lab Results   Component Value Date    ALT 16 04/08/2025    AST 18 04/08/2025    ALKPHOS 54 04/08/2025    BILITOT 0.8 04/08/2025    BILIDIR 0.3 07/31/2022      Lab Results   Component Value Date    ACTH 10.5 02/24/2025    CORTISOL 16.5 04/08/2025    TSH 2.21 04/08/2025    FREET4 1.14 04/22/2024       Diagnostic Results   CT CAP 03/12/25  IMPRESSION:  Restaging of lung cancer compared with previous exam from 01/03/2025.      1. The dominant large right upper lobe spiculated mass has further increased in size. Additional bilateral lung nodules have also slightly increased in size. See above for details.  2. The known metastatic lesion with soft tissue component in the right iliac bone posteriorly appear stable. Compression deformity of the T6 vertebra with sclerotic changes is new since the prior exam and possibly represents pathologic compression fracture. Also loss of height with sclerotic superior endplate involving the T12 vertebra. Correlate clinically. No associated spinal canal stenosis.  3. Additional chronic findings as above.        MRI Brain 03/12/2025  IMPRESSION:  1. No mass or enhancement suggestive of metastatic disease is noted.      2. The parenchymal abnormalities are similar to the prior MR and may be secondary to chronic ischemic changes. No acute infarct or hemorrhage is noted.    Assessment/Plan     Lung cancer (Multi), Clinical: Stage IVB (cT3, cN3, cM1c)  Mr. Pillo Guerin is a 65 YO with Stage IVB ([id number]) poorly differentiated adenocarcinoma of RUL, TPS 80%, lack of actionable genomic alterations noted.    He was started on single  agent pembrolizumab since 08/31/23 - however had to be held in May 2024 due to grade III ICI colitis which has completed resolved.     He has now completed 20 cycles pembrolizumab. CT scans from 3/12 personally reviewed showing increase in dominant lesion by ~1 mm in one direction, other lung lesions generally stable. We discussed this slow progression and I showed him images. Clinically he is doing well and there are limited options for him in second line. We will continue with pembrolizumab and obtain scans at end of May.    #Stage IVB NSCLC with early, slow progression and no options in second line  -Continue pembrolizumab  -Scans end of May ordered for May 16    #Grade III ICI Diarrhea - resolved    #Pruritus, immune related  Continue moisturizing BUE with CeraVe and triamcinolone as needed     #Right occipital lobe 8 mm metastasis   S/p GKRS 24 Gy in 1 fraction on 10/17/23. Stable MRI 3/12/2025  Repeat MRI brain every 3-4 months per Rad Onc     #Pain on right gluteal region-  Due to large centrally necrotic and peripherally hypermetabolic mass in the right lower paramediastinal muscle extending into the adjacent subcutaneous tissue and superior right gluteal muscle with involvement of the superior aspect of the right iliac  bone, right florencio sacrum -   He has been taking gabapentin for pain (controlled) in LLE that is chronic   -Increased oxycodone to 10 mg every 6 PRN. Patient has establish with supportive oncology    #Advanced Care Planning  -Discussed slow nature of progression and challenges with treatment in second line given PS. He is close with brother and roommate/ex gf Radha     #Stroke in April 2022 and hx of CAD  -   Stable -   On ASA and ticagrelor      #HTN - on amlodipine      #HLD - atorvastatin    #Microcytic Anemia - Chronic in nature. Iron panel and ferritin found to be WNL.  Could send hemoglobin electrophoresis if anemia becomes a process.   Has never been told or heard about family history  of thalassemia or other blood disorders.       Damian Carpenter, APRN-CNP  [facility]  Thoracic + [facility] Medical Oncology     I spent a total of 30+ minutes on the date of the service which included preparing to see the patient, face-to-face patient care, completing clinical documentation, obtaining and / or reviewing separately obtained history, counseling and educating the patient/family/caregiver, ordering medications, tests, or procedures, communicating with other healthcare providers (not separately reported), independently interpreting results, not separately reported, and communicating results to the patient/family/caregiver, Name and date of birth verified.

## 2025-04-10 DIAGNOSIS — G89.3 CANCER ASSOCIATED PAIN: Primary | ICD-10-CM

## 2025-04-10 LAB — ACTH PLAS-MCNC: 35.1 PG/ML (ref 7.2–63.3)

## 2025-04-10 RX ORDER — GABAPENTIN 300 MG/1
600 CAPSULE ORAL 2 TIMES DAILY
Qty: 120 CAPSULE | Refills: 2 | Status: SHIPPED | OUTPATIENT
Start: 2025-04-10 | End: 2026-04-10

## 2025-04-10 NOTE — TELEPHONE ENCOUNTER
Refill request received for Gabapentin 300mg.  Preferred pharmacy is [facility] in [place].  Medication pended to team to refill if appropriate.

## 2025-04-11 RX ORDER — UMECLIDINIUM 62.5 UG/1
1 AEROSOL, POWDER ORAL DAILY
Qty: 30 EACH | Refills: 11 | Status: SHIPPED | OUTPATIENT
Start: 2025-04-11

## 2025-04-25 ENCOUNTER — OFFICE VISIT (OUTPATIENT)
Dept: PALLIATIVE MEDICINE | Facility: CLINIC | Age: 65
End: 2025-04-25
Payer: COMMERCIAL

## 2025-04-25 ENCOUNTER — PHARMACY VISIT (OUTPATIENT)
Dept: PHARMACY | Facility: CLINIC | Age: 65
End: 2025-04-25
Payer: COMMERCIAL

## 2025-04-25 VITALS
WEIGHT: 136.6 LBS | DIASTOLIC BLOOD PRESSURE: 72 MMHG | SYSTOLIC BLOOD PRESSURE: 127 MMHG | OXYGEN SATURATION: 97 % | BODY MASS INDEX: 20.17 KG/M2 | HEART RATE: 64 BPM | TEMPERATURE: 98.1 F | RESPIRATION RATE: 18 BRPM

## 2025-04-25 DIAGNOSIS — Z79.891 ENCOUNTER FOR LONG-TERM OPIATE ANALGESIC USE: Primary | ICD-10-CM

## 2025-04-25 DIAGNOSIS — R19.7 DIARRHEA, UNSPECIFIED TYPE: ICD-10-CM

## 2025-04-25 DIAGNOSIS — G89.3 CANCER RELATED PAIN: ICD-10-CM

## 2025-04-25 DIAGNOSIS — Z51.5 PALLIATIVE CARE ENCOUNTER: ICD-10-CM

## 2025-04-25 DIAGNOSIS — C34.11 MALIGNANT NEOPLASM OF UPPER LOBE OF RIGHT LUNG (MULTI): ICD-10-CM

## 2025-04-25 PROCEDURE — RXMED WILLOW AMBULATORY MEDICATION CHARGE

## 2025-04-25 PROCEDURE — 99214 OFFICE O/P EST MOD 30 MIN: CPT

## 2025-04-25 PROCEDURE — 1125F AMNT PAIN NOTED PAIN PRSNT: CPT

## 2025-04-25 PROCEDURE — 3078F DIAST BP <80 MM HG: CPT

## 2025-04-25 PROCEDURE — 3074F SYST BP LT 130 MM HG: CPT

## 2025-04-25 RX ORDER — OXYCODONE HYDROCHLORIDE 10 MG/1
10 TABLET ORAL EVERY 6 HOURS PRN
Qty: 120 TABLET | Refills: 0 | Status: SHIPPED | OUTPATIENT
Start: 2025-04-25 | End: 2025-05-25

## 2025-04-25 RX ORDER — MORPHINE SULFATE 15 MG/1
15 TABLET, FILM COATED, EXTENDED RELEASE ORAL 3 TIMES DAILY
Qty: 90 TABLET | Refills: 0 | Status: SHIPPED | OUTPATIENT
Start: 2025-04-25 | End: 2025-05-25

## 2025-04-25 ASSESSMENT — PAIN SCALES - GENERAL: PAINLEVEL_OUTOF10: 8

## 2025-04-25 NOTE — PROGRESS NOTES
SUPPORTIVE AND PALLIATIVE ONCOLOGY CONSULT - OUTPATIENT      SERVICE DATE: 4/25/2025    Medical Oncologist: MD Jorge Duque MD   Radiation Oncologist: No care team member to display  Primary Physician: Rocio Harris  [phone]    REASON FOR CONSULT/CHIEF CONSULT COMPLAINT: pain management and Introduction to Supportive and Palliative Oncology Services    Subjective   HISTORY OF PRESENT ILLNESS: Pillo Guerin is a 65 y.o. 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 Pt is s/p brain XRT 10/2023 and currently on Pembro. Pt follows with Dr. Gray.     Pain Assessment:  Pain Score:  6  Location:  L hip -> L leg (constant), RLE (intermittent)  Education:  changes to current regimen      Symptom Assessment:  Pain:very much - L hip -> L leg (constant), RLE (intermittent); some improvement with adjusting Oxycodone dose  Headache: none  Dizziness:none  Lack of energy: none  Difficulty sleeping: none  Worrying: none  Anxiety: none  Depression: none  Pain in mouth/swallowing: none  Dry mouth: none  Taste changes: none  Shortness of breath: a little- with exertion  Lack of appetite: none   Nausea: none  Vomiting: none  Constipation: none  Diarrhea: a little- finds that eating white rice helps to manage this  Sore muscles: a little  Numbness or tingling in hands/feet/other: none  Weight loss: none  Other: none      Information obtained from: chart review and interview of patient  ______________________________________________________________________     Oncology History   Lung cancer (Multi)   8/31/2023 - 10/30/2024 Chemotherapy    Pembrolizumab, 21 Day Cycles     9/22/2023 Initial Diagnosis    Lung cancer (CMS/HCC)     11/21/2023 Cancer Staged    Staging form: Lung, AJCC 8th Edition, Clinical: Stage IVB (cT3, cN3, cM1c) - Signed by Zuleyma Vines MD on 11/21/2023 11/20/2024 -   "Chemotherapy    Pembrolizumab, 21 Day Cycles         Past Medical History:   Diagnosis Date    Hyperlipidemia, unspecified 06/13/2022    Hyperlipidemia    Old myocardial infarction     History of myocardial infarction     Past Surgical History:   Procedure Laterality Date    MR HEAD ANGIO WO IV CONTRAST  4/19/2022    MR HEAD ANGIO WO IV CONTRAST 4/19/2022 [facility]    MR NECK ANGIO WO IV CONTRAST  4/19/2022    MR NECK ANGIO WO IV CONTRAST 4/19/2022 [facility] CLINICAL LEGAC[facility]    OTHER SURGICAL HISTORY  09/01/2015    Previous Stent Placement     Family History   Problem Relation Name Age of Onset    Cancer Mother      Heart attack Father      Heart attack Brother          SOCIAL HISTORY  Lives with ex/current friendRadha   Social History:  reports that he has been smoking cigarettes. He has been exposed to tobacco smoke. He uses smokeless tobacco. He reports current alcohol use of about 4.0 standard drinks of alcohol per week. He reports current drug use. Drug: \"Crack\" cocaine.  Retired, previously worked in [employer]    [employer]: unknown    REVIEW OF SYSTEMS  Review of systems negative unless noted in HPI.       Objective     Palliative Performance Scale % (PPS)       Current Outpatient Medications   Medication Instructions    amLODIPine (NORVASC) 2.5 mg, Daily RT    atorvastatin (Lipitor) 80 mg tablet TAKE 1 TABLET BY MOUTH ONCE DAILY    cholecalciferol (VITAMIN D3) 1,000 Units, oral, Daily    diclofenac sodium 2 g, Every 6 hours PRN    docusate sodium (COLACE) 200 mg, oral, Nightly PRN, You only need to take this if you feel constipated.    gabapentin (NEURONTIN) 600 mg, oral, 2 times daily    Incruse Ellipta 62.5 mcg, inhalation, Daily    loperamide (Imodium A-D) 2 mg tablet Take it as instructed for diarrhea    losartan (COZAAR) 50 mg    metoprolol tartrate (Lopressor) 25 mg tablet TAKE 1 TABLET BY MOUTH TWO TIMES A DAY    naloxone (NARCAN) 4 mg, nasal, As needed, May repeat " every 2-3 minutes if needed, alternating nostrils, until medical assistance becomes available.    nitroglycerin (Nitrostat) 0.4 mg SL tablet DISSOLVE 1 TABLET UNDER TONGUE EVERY 5 MINUTES FOR 3 DOSES AS NEEDED FOR CHEST PAIN. IF PAIN PERSISTS DIAL 911.    omeprazole (PriLOSEC) 20 mg DR capsule Do not crush or chew. Take it 30 minutes before breakfast.    oxyCODONE (ROXICODONE) 10 mg, oral, Every 6 hours PRN    predniSONE (DELTASONE) 5 mg, oral, Daily    tadalafil (CIALIS) 10 mg, Daily PRN    ticagrelor (BRILINTA) 60 mg, oral, 2 times daily    triamcinolone (Kenalog) 0.1 % ointment Topical, 2 times daily       Allergies: No Known Allergies    No results found for this or any previous visit (from the past 96 hours).             PHYSICAL EXAMINATION  Vital Signs:       2/5/2025     1:44 PM 2/26/2025    11:53 AM 3/12/2025    10:53 AM 3/19/2025    12:01 PM 3/28/2025     9:39 AM 4/9/2025     8:02 AM 4/25/2025     2:18 PM   Vitals   Systolic 124 122  117 134 156 127   Diastolic 72 58  65 68 82 72   BP Location Right arm Right arm        Heart Rate 66 64  58 55 58 64   Temp 36.1 °C (97 °F) 35.9 °C (96.6 °F)  36.3 °C (97.3 °F) 37 °C (98.6 °F) 36.2 °C (97.2 °F) 36.7 °C (98.1 °F)   Resp 18 18  18 18 18 18   Weight (lb) 135 139 138 135.8 138.4 135.91 136.6   BMI 19.94 kg/m2 20.53 kg/m2 20.38 kg/m2 20.05 kg/m2 20.44 kg/m2 20.07 kg/m2 20.17 kg/m2   BSA (m2) 1.73 m2 1.75 m2 1.75 m2 1.73 m2 1.75 m2 1.73 m2 1.74 m2   Visit Report Report Report  Report Report Report Report        Physical Exam  Constitutional:       Appearance: Normal appearance.   HENT:      Mouth/Throat:      Mouth: Mucous membranes are moist.   Eyes:      Extraocular Movements: Extraocular movements intact.      Pupils: Pupils are equal, round, and reactive to light.   Cardiovascular:      Rate and Rhythm: Normal rate.   Pulmonary:      Effort: Pulmonary effort is normal.   Abdominal:      Palpations: Abdomen is soft.   Musculoskeletal:      Comments: CYNTHIA HUNT  weakness, wears brace to LLE   Skin:     General: Skin is warm and dry.   Neurological:      Mental Status: He is alert and oriented to person, place, and time.   Psychiatric:         Mood and Affect: Mood normal.         Behavior: Behavior normal.         ASSESSMENT/PLAN    Pain  Pain is: cancer related pain and chronic (cancer, CVA)  Type: somatic and neuropathic; L hip -> L leg (constant), RLE (intermittent)  Pain control: sub-optimally controlled  Home regimen:   Oxycodone to 10mg q6h as needed  Start Morphine ER 15mg TID for continued cancer pain  Gabapentin 600mg BID  Tylenol 650mg q8h as needed (only takes occasionally)  Offered PT- pt prefers to get stronger with walking first (education provided, pt continues to decline)  Intolerances/previously tried: n/a  Personalized pain goal: feel comfortable enough to walk more    Opioid Use  Medication Management:   - OARRS report reviewed with no aberrant behavior; consistent with  prescriptions/records and patient history  - MED ~30.  Overdose Risk Score 210.   This has been discussed with patient.   - We will continue to closely monitor the patient for signs of prescription misuse including UDS, OARRS review and subjective reports at each visit.  - N/a concurrent benzodiazepine use   - I am a provider who either is or has consulted and collaborated with a provider certified in Hospice and Palliative Medicine and have conducted a face-face visit and examination for this patient.  - Routine Urine Drug Screen complete next visit- pt was unable to void 4/25/25  - Controlled Substance Agreement completed 4/25/25  - Specifically discussed that controlled substance prescriptions will only be provided by our group as outlined in the completed agreement  - Prescribed naloxone previously  - Red Flags: n/a     Nausea- denies    Constipation/Diarrhea   At risk for constipation related to opioids,   currently not constipated, diarrhea stable  Colace 100-200mg nightly as  "needed for signs of opiate-induced constipation- has not yet needed  Encouraged adequate fluid intake  Manages occasional diarrhea by eating white rice    Altered Mood- denies  Sleeping Difficulty- denies    Decreased appetite- denies  Discussed viewing food as \"fuel\", especially with any increase in activity  On Prednisone 5mg daily for other reasons- monitor appetite if ever weaned off Prednisone      Supportive Interventions: n/a- will continue to monitor needs    Introduction to Supportive and Palliative Oncology:  Spoke with patient   Introduced the role and philosophy of Supportive and Palliative oncology in the evaluation and management of symptoms during cancer treatment  Palliative care was introduced as a service for patients with serious illness to help with symptoms, assist with goals of care conversations, navigate complex decision making, improve quality of life for patients, and provide support both patients and families.  Patient seemed to appreciate the extra layer of support.    Medical Decision Making/Goals of Care/Advance Care Planning:  Patient's current clinical condition, including diagnosis, prognosis, and management plan, and goals of care were discussed.   Life limiting disease: metastatic malignancy  Family: Supportive ex/friend  Performance status: Major limitations due to  previous stroke (manages well)  Goals: symptom control and cancer directed therapy    Advance Directives  Existence of Advance Directives:No - has interest  Decision maker: HCPOA is unknown- confirm at next visit  Code Status: Full code    Next Follow-Up Visit:  Return to clinic in 1 month at  [facility]    Signature and billing  Thank you for allowing us to participate in the care of this patient. Recommendations will be communicated back to the consulting service by way of shared electronic medical record or face-to-face.    Medical complexity was moderate level due to due to complexity of problems, extensive data " review, and high risk of management/treatment.  Time was spent on the following: Prep Time, Time Directly with Patient/Family/Caregiver, Documentation Time. Total time spent: 35min      DATA   Diagnostic tests and information reviewed for today's visit:  Most recent labs and imaging results, Medications     4/25/25: changes to plan indicated in bold. Continue all other regimens as listed.    Some elements copied from Supportive Oncology note on 3/28/25, the elements have been updated and all reflect current decision making from today, 4/25/2025.      Plan of Care discussed with: Provider, RN, Patient      SIGNATURE: AMAN Garcia    Contact information:  Supportive and Palliative Oncology  Monday-Friday 8 AM-5 PM  Phone:  [phone], press option #5, then option #1.   Or Epic Secure Chat

## 2025-04-28 NOTE — PROGRESS NOTES
Patient ID: Pillo Guerin is a 64 y.o. male    Primary Care Provider: Rocio Harris, APRN-CNP    DIAGNOSIS AND STAGING  Stage IVB (cT3 cN3 cM1c) poorly differentiated adenocarcinoma of RUL diagnosed on 08/03/23     SITES OF DISEASE  RUL  Supraclavicular nodes  Contralateral lung  Right gluteal muscle   Brain right occipital lobe      MOLECULAR GENOMICS  MICROSATELLITE STATUS: Microsatellite Stable (MAGDIEL)     DISEASE ASSOCIATED GENOMIC FINDINGS:   KRAS p.G13D ([id number] c.38G>A)   PIK3CA p.E542K ([id number] c.1624G>A)   TP53 p.E285K ([id number] c.853G>A)     DISEASE RELEVANT ALTERATIONS NOT DETECTED:   Negative for ALK fusion.   Negative for BRAF V600E.   Negative for EGFR sensitizing mutation.   Negative for KRAS G12C.   Negative for MET exon 14 skipping mutation.   Negative for NTRK fusion.   Negative for RET fusion.   Negative for ROS1 fusion.     PD-L1 TPS 80%     PRIOR THERAPIES  GKRS to right occipital lobe on  24 Gy in 1 fraction on 10/17/23      CURRENT THERAPY  Single agent pembrolizumab since 08/31/23    CURRENT ONCOLOGICAL PROBLEMS  Resolved GIII ICI colitis     HISTORY OF PRESENT ILLNESS  This is a pt with PMH of EtOH use disorder, CVA, CAD c/b NSTEMI s/p 2 stents and stage IVB ([id number]) poorly differentiated adenocarcinoma of RUL diagnosed on 08/03/23.      Patient was initially diagnosed 08/2022 after incidentally found RUL lesion was worked up during admission for hyponatremia presumed to be 2/2 beer potomania. He was then seen by his pulmonologist Dr. Callejas who ordered PET and MRI but this was not obtained  nor were multiple attempts to schedule for oncologic NPV.     I saw him in August 2023 and he endorsed haviing lost 15 lbs since stroke April 2022. His appetite is good, denies dysgeusia. He has slowed down a bit since he had his stroke. He can walk without becoming dyspneic but is limited by his stroke.    On 08/12/23 the patient underwent a bronchoscopy with EBUS:  Accession #: [id number]    Date of Procedure:8/3/2022   Date Reported: 8/9/2022   Date Received: 8/3/2022   Date of Birth / Sex 1960 (Age: 62) / M   Race: WHITE   Submitting Physician: DARYN YOUNG MD   Attending Physician: MD HEVER BROWNE MD   Procedures/Addenda Present     Other External #     FINAL CYTOLOGICAL INTERPRETATION     A. FINE NEEDLE ASPIRATION LYMPH NODE, L 4, CYTOLOGY AND CELL BLOCK:   NO MALIGNANT CELLS IDENTIFIED.   LYMPHOID SAMPLE IS PRESENT.     Note: Cell block shows presence of lymphoid cells along with bronchial cells contamination.     B. FINE NEEDLE ASPIRATION LYMPH NODE, STATION 7, CYTOLOGY AND CELL BLOCK:   NO MALIGNANT CELLS IDENTIFIED.   LYMPHOID SAMPLE IS PRESENT.     C. FINE NEEDLE ASPIRATION LYMPH NODE, R 11, CYTOLOGY AND CELL BLOCK:   POORLY DIFFERENTIATED CARCINOMA, CONSISTENT WITH ADENOCARCINOMA, SEE NOTE.     Note: A limited panel of immunohistochemical stains performed showed neoplastic   cells staining strongly and diffusely positive with TTF-1 and negative with   p40. Findings are consistent with above.     Staff pathologist: Reviewed by Dr. Ellyn Valles.     Molecular testing has been ordered and results will be issued in an addendum.     The cell block; C1 contains moderate tumor cellularity representing roughly 70 % of all nucleated cells.       D. BRONCHO-ALVEOLAR LAVAGE OF RIGHT UPPER LOBE:   RARE ATYPICAL CELLS ARE PRESENT.     08/16/23: PET scan demonstrates multiple RUL FDG avid nodules as well as contra-lateral lung nodules, right hilar, mediastinal and supraclavicular nodes consistent with neoplastic involvement. There is also a large FDG avid right gluteal mass extending into the right SIJ and iliac bone.     08/16/23: MRI brain shows a 8 mm right occipital lobe metastasis with mild associated edema.     08/31/23: Single agent pembrolizumab begins     10/17/23: GKRS to right occipital lobe on  24 Gy in 1 fraction     06/06/24: grade 3 immune-related diarrhea with  episodes of incontinence despite Imodium   Rx prednisone 40 mg/day provided today      PAST MEDICAL HISTORY  CAD s/p 2 stents, CVA, Dupuytren's contracture   BPH  Lumbar radiculopathy   Ir-diarrhea (pembrolizumab) treated with prednisone months of May and      SURGICAL HISTORY  None      SOCIAL HISTORY  Born in [place], lives in [place] with 2 roommates.   Brother Salas is NOK. Drinks 2 24 oz beers a day.   + Tobacco - 50 pack year smoking history, active.   Rare cannabis, acid use in 70s, no additional illicits.    Was working at [email or web address] in [place] before stroke.      FAMILY HISTORY  Mother  of melanoma in      CURRENT MEDS REVIEWED    Current Outpatient Medications:     amLODIPine (Norvasc) 5 mg tablet, Take 0.5 tablets (2.5 mg) by mouth once daily., Disp: , Rfl:     atorvastatin (Lipitor) 80 mg tablet, TAKE 1 TABLET BY MOUTH ONCE DAILY, Disp: 30 tablet, Rfl: 1    cholecalciferol (Vitamin D3) 25 mcg (1000 units) tablet, Take 1 tablet (1,000 Units) by mouth once daily., Disp: 30 tablet, Rfl: 11    diclofenac sodium 1 % kit, Apply 2 g topically every 6 hours if needed., Disp: , Rfl:     docusate sodium (Colace) 100 mg capsule, Take 2 capsules (200 mg) by mouth as needed at bedtime for constipation. You only need to take this if you feel constipated. (Patient not taking: Reported on 2025), Disp: 120 capsule, Rfl: 0    gabapentin (Neurontin) 300 mg capsule, Take 2 capsules (600 mg) by mouth 2 times a day., Disp: 120 capsule, Rfl: 2    Incruse Ellipta 62.5 mcg/actuation inhalation, INHALE 1 PUFF (62.5 MCG) ONCE DAILY, Disp: 30 each, Rfl: 11    loperamide (Imodium A-D) 2 mg tablet, Take it as instructed for diarrhea (Patient not taking: Reported on 2025), Disp: 60 tablet, Rfl: 3    losartan (Cozaar) 50 mg tablet, Take 1 tablet (50 mg) by mouth., Disp: , Rfl:     metoprolol tartrate (Lopressor) 25 mg tablet, TAKE 1 TABLET BY MOUTH TWO TIMES A DAY, Disp: 60 tablet, Rfl: 1    morphine CR  (MS Contin) 15 mg 12 hr tablet, Take 1 tablet (15 mg) by mouth 3 times a day. Do not crush, chew, or split., Disp: 90 tablet, Rfl: 0    naloxone (Narcan) 4 mg/0.1 mL nasal spray, Administer 1 spray (4 mg) into affected nostril(s) if needed for opioid reversal. May repeat every 2-3 minutes if needed, alternating nostrils, until medical assistance becomes available. (Patient not taking: Reported on 4/9/2025), Disp: 2 each, Rfl: 0    nitroglycerin (Nitrostat) 0.4 mg SL tablet, DISSOLVE 1 TABLET UNDER TONGUE EVERY 5 MINUTES FOR 3 DOSES AS NEEDED FOR CHEST PAIN. IF PAIN PERSISTS DIAL 911. (Patient not taking: Reported on 2/5/2025), Disp: 25 tablet, Rfl: 1    omeprazole (PriLOSEC) 20 mg DR capsule, Do not crush or chew. Take it 30 minutes before breakfast., Disp: 30 capsule, Rfl: 11    oxyCODONE (Roxicodone) 10 mg immediate release tablet, Take 1 tablet (10 mg) by mouth every 6 hours if needed for severe pain (7 - 10)., Disp: 120 tablet, Rfl: 0    predniSONE (Deltasone) 5 mg tablet, Take 1 tablet (5 mg) by mouth once daily., Disp: 30 tablet, Rfl: 2    tadalafil (Cialis) 10 mg tablet, Take 1 tablet (10 mg) by mouth once daily as needed., Disp: , Rfl:     ticagrelor (Brilinta) 60 mg tablet, Take 1 tablet (60 mg) by mouth 2 times a day., Disp: 60 tablet, Rfl: 11    triamcinolone (Kenalog) 0.1 % ointment, Apply topically 2 times a day., Disp: 80 g, Rfl: 1    Current Facility-Administered Medications:     acetaminophen (Tylenol) tablet 650 mg, 650 mg, oral, q4h PRN, Angelica Munoz MD    HYDROmorphone (Dilaudid) injection 0.4 mg, 0.4 mg, intravenous, PRN, Angelica Munoz MD, 0.4 mg at 10/17/23 1230    HYDROmorphone (Dilaudid) injection 0.4 mg, 0.4 mg, intravenous, PRN, Angelica Munoz MD, 0.4 mg at 10/17/23 0830    ibuprofen tablet 400 mg, 400 mg, oral, PRN, Angelica Munoz MD    midazolam (Versed) injection 0.5 mg, 0.5 mg, intravenous, PRN, Angelica Munoz MD, 0.5 mg at 10/17/23 1230    midazolam (Versed) injection  0.5 mg, 0.5 mg, intravenous, PRN, Angelica Munoz MD, 0.5 mg at 10/17/23 0830    ondansetron (Zofran) injection 4 mg, 4 mg, intravenous, q4h PRN, Angelica Munoz MD    oxyCODONE (Roxicodone) immediate release tablet 10 mg, 10 mg, oral, q4h PRN, Angelica Munoz MD      ALLERGIES REVIEWED     SUBJECTIVE: Patient doing well today. He is walking outside and eating well.    He denies any fevers, chills or night sweats. No cough, chest pain or shortness of breath. No nausea or vomiting. No constipation or diarrhea. No urinary symptoms. No rash. No neuropathy.     OBJECTIVE:  /69 (BP Location: Left arm, Patient Position: Sitting, BP Cuff Size: Adult)   Pulse 55   Temp 36.2 °C (97.2 °F) (Core)   Resp 18   Wt 60.1 kg (132 lb 8 oz)   SpO2 97%   BMI 19.57 kg/m²     Wt Readings from Last 5 Encounters:   25 60.1 kg (132 lb 8 oz)   25 62 kg (136 lb 9.6 oz)   25 61.7 kg (135 lb 14.6 oz)   25 62.8 kg (138 lb 6.4 oz)   25 61.6 kg (135 lb 12.9 oz)       Physical Exam:  ECO  Pain: 7/10 mainly left leg  Constitutional: Well developed, awake/alert/oriented x3, no distress, alert and cooperative  Eyes: PER. sclera anicteric  ENMT: Oral mucosa moist, no lesions or thrush identified  Respiratory/Thorax: Breathing is non-labored. Lungs are clear to auscultation bilaterally. No adventitious breath sounds  Cardiovascular: S1-S2. Regular rate and rhythm. No murmurs, rubs, or gallops appreciated  Gastrointestinal: Abdomen soft nontender, nondistended, normal active bowel sounds.  Musculoskeletal: ROM intact, no joint swelling, normal strength  Extremities: normal extremities, no cyanosis, no edema, no clubbing  Lymphatics: no palpable lymphadenopathy  Skin: no rash  Neurologic: alert and oriented x3. Nonfocal exam. No myoclonus  Psychological: Pleasant, appropriate and easily engaged     Results  Lab Results   Component Value Date    WBC 9.2 2025    HGB 13.5 2025    HCT 40.0 (L)  04/29/2025    MCV 64 (L) 04/29/2025     04/29/2025      Lab Results   Component Value Date    NEUTROABS 4.80 04/29/2025      Lab Results   Component Value Date    GLUCOSE 101 (H) 04/29/2025    CALCIUM 9.5 04/29/2025     (L) 04/29/2025    K 4.8 04/29/2025    CO2 22 04/29/2025    CL 94 (L) 04/29/2025    BUN 12 04/29/2025    CREATININE 0.73 04/29/2025    MG 2.28 08/06/2024     Lab Results   Component Value Date    ALT 15 04/29/2025    AST 16 04/29/2025    ALKPHOS 54 04/29/2025    BILITOT 1.2 04/29/2025    BILIDIR 0.3 07/31/2022      Lab Results   Component Value Date    ACTH 35.1 04/08/2025    CORTISOL 16.5 04/08/2025    TSH 2.21 04/08/2025    FREET4 1.14 04/22/2024       Diagnostic Results   CT CAP 03/12/25  IMPRESSION:  Restaging of lung cancer compared with previous exam from 01/03/2025.      1. The dominant large right upper lobe spiculated mass has further increased in size. Additional bilateral lung nodules have also slightly increased in size. See above for details.  2. The known metastatic lesion with soft tissue component in the right iliac bone posteriorly appear stable. Compression deformity of the T6 vertebra with sclerotic changes is new since the prior exam and possibly represents pathologic compression fracture. Also loss of height with sclerotic superior endplate involving the T12 vertebra. Correlate clinically. No associated spinal canal stenosis.  3. Additional chronic findings as above.        MRI Brain 03/12/2025  IMPRESSION:  1. No mass or enhancement suggestive of metastatic disease is noted.      2. The parenchymal abnormalities are similar to the prior MR and may be secondary to chronic ischemic changes. No acute infarct or hemorrhage is noted.    Assessment/Plan     Lung cancer (Multi), Clinical: Stage IVB (cT3, cN3, cM1c)  Mr. Pillo Guerin is a 63 YO with Stage IVB (bA3hC5N9m) poorly differentiated adenocarcinoma of RUL, TPS 80%, lack of actionable genomic alterations noted.    He  was started on single agent pembrolizumab since 08/31/23 - however had to be held in May 2024 due to grade III ICI colitis which has completed resolved.     He has now completed 20 cycles pembrolizumab. CT scans from 3/12 personally reviewed showing increase in dominant lesion by ~1 mm in one direction, other lung lesions generally stable. We discussed this slow progression and I showed him images. Clinically he is doing well and there are limited options for him in second line. We will continue with pembrolizumab and obtain scans at end of May.    #Stage IVB NSCLC with early, slow progression and no options in second line  -Continue pembrolizumab  -Scans end of May ordered for May 16    #Grade III ICI Diarrhea - resolved    #Pruritus, immune related  Continue moisturizing BUE with CeraVe and triamcinolone as needed     #Right occipital lobe 8 mm metastasis   S/p GKRS 24 Gy in 1 fraction on 10/17/23. Stable MRI 3/12/2025  Repeat MRI brain every 3-4 months per Rad Onc     #Pain on right gluteal region-  Due to large centrally necrotic and peripherally hypermetabolic mass in the right lower paramediastinal muscle extending into the adjacent subcutaneous tissue and superior right gluteal muscle with involvement of the superior aspect of the right iliac  bone, right florencio sacrum -   He has been taking gabapentin for pain (controlled) in LLE that is chronic   -Increased oxycodone to 10 mg every 6 PRN. Patient has established with supportive oncology    #Advanced Care Planning  -Discussed slow nature of progression and challenges with treatment in second line given PS. He is close with brother and roommate/ex mingo Garcia     #Stroke in April 2022 and hx of CAD  -   Stable -   On ASA and ticagrelor      #HTN - on amlodipine      #HLD - atorvastatin    #Microcytic Anemia - Chronic in nature. Iron panel and ferritin found to be WNL.  Could send hemoglobin electrophoresis if anemia becomes a process.   Has never been told or  heard about family history of thalassemia or other blood disorders.     Jorge Gray MD  Thoracic Medical Oncology   [id number] [id number]  Phone: [phone]

## 2025-04-29 ENCOUNTER — LAB (OUTPATIENT)
Dept: LAB | Facility: CLINIC | Age: 65
End: 2025-04-29
Payer: COMMERCIAL

## 2025-04-29 ENCOUNTER — PHARMACY VISIT (OUTPATIENT)
Dept: PHARMACY | Facility: CLINIC | Age: 65
End: 2025-04-29

## 2025-04-29 DIAGNOSIS — C34.11 MALIGNANT NEOPLASM OF UPPER LOBE OF RIGHT LUNG (MULTI): ICD-10-CM

## 2025-04-29 LAB
ALBUMIN SERPL BCP-MCNC: 4.4 G/DL (ref 3.4–5)
ALP SERPL-CCNC: 54 U/L (ref 33–136)
ALT SERPL W P-5'-P-CCNC: 15 U/L (ref 10–52)
ANION GAP SERPL CALC-SCNC: 15 MMOL/L
AST SERPL W P-5'-P-CCNC: 16 U/L (ref 9–39)
BASOPHILS # BLD AUTO: 0.07 X10*3/UL (ref 0–0.1)
BASOPHILS NFR BLD AUTO: 0.8 %
BILIRUB SERPL-MCNC: 1.2 MG/DL (ref 0–1.2)
BUN SERPL-MCNC: 12 MG/DL (ref 6–23)
CALCIUM SERPL-MCNC: 9.5 MG/DL (ref 8.6–10.6)
CHLORIDE SERPL-SCNC: 94 MMOL/L (ref 98–107)
CO2 SERPL-SCNC: 22 MMOL/L (ref 21–32)
CREAT SERPL-MCNC: 0.73 MG/DL (ref 0.5–1.3)
EGFRCR SERPLBLD CKD-EPI 2021: >90 ML/MIN/1.73M*2
EOSINOPHIL # BLD AUTO: 0.68 X10*3/UL (ref 0–0.7)
EOSINOPHIL NFR BLD AUTO: 7.4 %
ERYTHROCYTE [DISTWIDTH] IN BLOOD BY AUTOMATED COUNT: 17.1 % (ref 11.5–14.5)
GLUCOSE SERPL-MCNC: 101 MG/DL (ref 74–99)
HCT VFR BLD AUTO: 40 % (ref 41–52)
HGB BLD-MCNC: 13.5 G/DL (ref 13.5–17.5)
IMM GRANULOCYTES # BLD AUTO: 0.05 X10*3/UL (ref 0–0.7)
IMM GRANULOCYTES NFR BLD AUTO: 0.5 % (ref 0–0.9)
LYMPHOCYTES # BLD AUTO: 2.55 X10*3/UL (ref 1.2–4.8)
LYMPHOCYTES NFR BLD AUTO: 27.6 %
MCH RBC QN AUTO: 21.6 PG (ref 26–34)
MCHC RBC AUTO-ENTMCNC: 33.8 G/DL (ref 32–36)
MCV RBC AUTO: 64 FL (ref 80–100)
MONOCYTES # BLD AUTO: 1.09 X10*3/UL (ref 0.1–1)
MONOCYTES NFR BLD AUTO: 11.8 %
NEUTROPHILS # BLD AUTO: 4.8 X10*3/UL (ref 1.2–7.7)
NEUTROPHILS NFR BLD AUTO: 51.9 %
NRBC BLD-RTO: ABNORMAL /100{WBCS}
PLATELET # BLD AUTO: 296 X10*3/UL (ref 150–450)
POTASSIUM SERPL-SCNC: 4.8 MMOL/L (ref 3.5–5.3)
PROT SERPL-MCNC: 6.3 G/DL (ref 6.4–8.2)
RBC # BLD AUTO: 6.26 X10*6/UL (ref 4.5–5.9)
SODIUM SERPL-SCNC: 126 MMOL/L (ref 136–145)
WBC # BLD AUTO: 9.2 X10*3/UL (ref 4.4–11.3)

## 2025-04-29 PROCEDURE — 85025 COMPLETE CBC W/AUTO DIFF WBC: CPT

## 2025-04-29 PROCEDURE — 80053 COMPREHEN METABOLIC PANEL: CPT

## 2025-04-29 PROCEDURE — 36415 COLL VENOUS BLD VENIPUNCTURE: CPT

## 2025-04-30 ENCOUNTER — OFFICE VISIT (OUTPATIENT)
Dept: HEMATOLOGY/ONCOLOGY | Facility: CLINIC | Age: 65
End: 2025-04-30
Payer: COMMERCIAL

## 2025-04-30 ENCOUNTER — SOCIAL WORK (OUTPATIENT)
Dept: CASE MANAGEMENT | Facility: HOSPITAL | Age: 65
End: 2025-04-30
Payer: COMMERCIAL

## 2025-04-30 ENCOUNTER — INFUSION (OUTPATIENT)
Dept: HEMATOLOGY/ONCOLOGY | Facility: CLINIC | Age: 65
End: 2025-04-30
Payer: COMMERCIAL

## 2025-04-30 VITALS
RESPIRATION RATE: 18 BRPM | WEIGHT: 132.5 LBS | DIASTOLIC BLOOD PRESSURE: 69 MMHG | BODY MASS INDEX: 19.57 KG/M2 | SYSTOLIC BLOOD PRESSURE: 118 MMHG | OXYGEN SATURATION: 97 % | HEART RATE: 55 BPM | TEMPERATURE: 97.2 F

## 2025-04-30 DIAGNOSIS — C34.11 MALIGNANT NEOPLASM OF UPPER LOBE OF RIGHT LUNG (MULTI): ICD-10-CM

## 2025-04-30 PROCEDURE — 1159F MED LIST DOCD IN RCRD: CPT | Performed by: STUDENT IN AN ORGANIZED HEALTH CARE EDUCATION/TRAINING PROGRAM

## 2025-04-30 PROCEDURE — 2500000004 HC RX 250 GENERAL PHARMACY W/ HCPCS (ALT 636 FOR OP/ED): Mod: JZ | Performed by: STUDENT IN AN ORGANIZED HEALTH CARE EDUCATION/TRAINING PROGRAM

## 2025-04-30 PROCEDURE — 96413 CHEMO IV INFUSION 1 HR: CPT

## 2025-04-30 PROCEDURE — 99215 OFFICE O/P EST HI 40 MIN: CPT | Performed by: STUDENT IN AN ORGANIZED HEALTH CARE EDUCATION/TRAINING PROGRAM

## 2025-04-30 PROCEDURE — 3074F SYST BP LT 130 MM HG: CPT | Performed by: STUDENT IN AN ORGANIZED HEALTH CARE EDUCATION/TRAINING PROGRAM

## 2025-04-30 PROCEDURE — G2211 COMPLEX E/M VISIT ADD ON: HCPCS | Performed by: STUDENT IN AN ORGANIZED HEALTH CARE EDUCATION/TRAINING PROGRAM

## 2025-04-30 PROCEDURE — 3078F DIAST BP <80 MM HG: CPT | Performed by: STUDENT IN AN ORGANIZED HEALTH CARE EDUCATION/TRAINING PROGRAM

## 2025-04-30 PROCEDURE — 1126F AMNT PAIN NOTED NONE PRSNT: CPT | Performed by: STUDENT IN AN ORGANIZED HEALTH CARE EDUCATION/TRAINING PROGRAM

## 2025-04-30 RX ORDER — ALBUTEROL SULFATE 0.83 MG/ML
3 SOLUTION RESPIRATORY (INHALATION) AS NEEDED
Status: DISCONTINUED | OUTPATIENT
Start: 2025-04-30 | End: 2025-04-30 | Stop reason: HOSPADM

## 2025-04-30 RX ORDER — PROCHLORPERAZINE EDISYLATE 5 MG/ML
10 INJECTION INTRAMUSCULAR; INTRAVENOUS EVERY 6 HOURS PRN
Status: CANCELLED | OUTPATIENT
Start: 2025-04-30

## 2025-04-30 RX ORDER — PROCHLORPERAZINE EDISYLATE 5 MG/ML
10 INJECTION INTRAMUSCULAR; INTRAVENOUS EVERY 6 HOURS PRN
Status: DISCONTINUED | OUTPATIENT
Start: 2025-04-30 | End: 2025-04-30 | Stop reason: HOSPADM

## 2025-04-30 RX ORDER — PROCHLORPERAZINE MALEATE 10 MG
10 TABLET ORAL EVERY 6 HOURS PRN
OUTPATIENT
Start: 2025-05-21

## 2025-04-30 RX ORDER — EPINEPHRINE 0.3 MG/.3ML
0.3 INJECTION SUBCUTANEOUS EVERY 5 MIN PRN
Status: CANCELLED | OUTPATIENT
Start: 2025-04-30

## 2025-04-30 RX ORDER — PROCHLORPERAZINE MALEATE 10 MG
10 TABLET ORAL EVERY 6 HOURS PRN
Status: CANCELLED | OUTPATIENT
Start: 2025-04-30

## 2025-04-30 RX ORDER — FAMOTIDINE 10 MG/ML
20 INJECTION, SOLUTION INTRAVENOUS ONCE AS NEEDED
Status: CANCELLED | OUTPATIENT
Start: 2025-04-30

## 2025-04-30 RX ORDER — DIPHENHYDRAMINE HYDROCHLORIDE 50 MG/ML
50 INJECTION, SOLUTION INTRAMUSCULAR; INTRAVENOUS AS NEEDED
Status: CANCELLED | OUTPATIENT
Start: 2025-04-30

## 2025-04-30 RX ORDER — PROCHLORPERAZINE MALEATE 10 MG
10 TABLET ORAL EVERY 6 HOURS PRN
Status: DISCONTINUED | OUTPATIENT
Start: 2025-04-30 | End: 2025-04-30 | Stop reason: HOSPADM

## 2025-04-30 RX ORDER — FAMOTIDINE 10 MG/ML
20 INJECTION, SOLUTION INTRAVENOUS ONCE AS NEEDED
Status: DISCONTINUED | OUTPATIENT
Start: 2025-04-30 | End: 2025-04-30 | Stop reason: HOSPADM

## 2025-04-30 RX ORDER — HEPARIN 100 UNIT/ML
500 SYRINGE INTRAVENOUS AS NEEDED
OUTPATIENT
Start: 2025-04-30

## 2025-04-30 RX ORDER — HEPARIN 100 UNIT/ML
500 SYRINGE INTRAVENOUS AS NEEDED
Status: DISCONTINUED | OUTPATIENT
Start: 2025-04-30 | End: 2025-04-30 | Stop reason: HOSPADM

## 2025-04-30 RX ORDER — PROCHLORPERAZINE EDISYLATE 5 MG/ML
10 INJECTION INTRAMUSCULAR; INTRAVENOUS EVERY 6 HOURS PRN
OUTPATIENT
Start: 2025-05-21

## 2025-04-30 RX ORDER — ALBUTEROL SULFATE 0.83 MG/ML
3 SOLUTION RESPIRATORY (INHALATION) AS NEEDED
Status: CANCELLED | OUTPATIENT
Start: 2025-04-30

## 2025-04-30 RX ORDER — ALBUTEROL SULFATE 0.83 MG/ML
3 SOLUTION RESPIRATORY (INHALATION) AS NEEDED
OUTPATIENT
Start: 2025-05-21

## 2025-04-30 RX ORDER — EPINEPHRINE 0.3 MG/.3ML
0.3 INJECTION SUBCUTANEOUS EVERY 5 MIN PRN
OUTPATIENT
Start: 2025-05-21

## 2025-04-30 RX ORDER — HEPARIN SODIUM,PORCINE/PF 10 UNIT/ML
50 SYRINGE (ML) INTRAVENOUS AS NEEDED
OUTPATIENT
Start: 2025-04-30

## 2025-04-30 RX ORDER — HEPARIN SODIUM,PORCINE/PF 10 UNIT/ML
50 SYRINGE (ML) INTRAVENOUS AS NEEDED
Status: CANCELLED | OUTPATIENT
Start: 2025-04-30

## 2025-04-30 RX ORDER — DIPHENHYDRAMINE HYDROCHLORIDE 50 MG/ML
50 INJECTION, SOLUTION INTRAMUSCULAR; INTRAVENOUS AS NEEDED
OUTPATIENT
Start: 2025-05-21

## 2025-04-30 RX ORDER — HEPARIN SODIUM,PORCINE/PF 10 UNIT/ML
50 SYRINGE (ML) INTRAVENOUS AS NEEDED
Status: DISCONTINUED | OUTPATIENT
Start: 2025-04-30 | End: 2025-04-30 | Stop reason: HOSPADM

## 2025-04-30 RX ORDER — DIPHENHYDRAMINE HYDROCHLORIDE 50 MG/ML
50 INJECTION, SOLUTION INTRAMUSCULAR; INTRAVENOUS AS NEEDED
Status: DISCONTINUED | OUTPATIENT
Start: 2025-04-30 | End: 2025-04-30 | Stop reason: HOSPADM

## 2025-04-30 RX ORDER — FAMOTIDINE 10 MG/ML
20 INJECTION, SOLUTION INTRAVENOUS ONCE AS NEEDED
OUTPATIENT
Start: 2025-05-21

## 2025-04-30 RX ORDER — HEPARIN 100 UNIT/ML
500 SYRINGE INTRAVENOUS AS NEEDED
Status: CANCELLED | OUTPATIENT
Start: 2025-04-30

## 2025-04-30 RX ORDER — EPINEPHRINE 0.3 MG/.3ML
0.3 INJECTION SUBCUTANEOUS EVERY 5 MIN PRN
Status: DISCONTINUED | OUTPATIENT
Start: 2025-04-30 | End: 2025-04-30 | Stop reason: HOSPADM

## 2025-04-30 RX ADMIN — SODIUM CHLORIDE 200 MG: 9 INJECTION, SOLUTION INTRAVENOUS at 10:13

## 2025-04-30 ASSESSMENT — PAIN SCALES - GENERAL: PAINLEVEL_OUTOF10: 0-NO PAIN

## 2025-04-30 NOTE — PROGRESS NOTES
Patient here today for follow up. He started morphine yesterday, hasn't noticed a difference yet, but is more relaxed.     Fatigue: doing well,   PO intake: doing well  Weight: -4lbs since 4/25  N/V/D/C: denies     Medications and pharmacy preference reviewed with patient.     Taken to day treatment for Pembro.  Scans scheduled in May with follow up.

## 2025-04-30 NOTE — SIGNIFICANT EVENT

## 2025-04-30 NOTE — PROGRESS NOTES
Social Work Note    Referral Source: SW  Meeting Location: In-Person  Person(s) Present: JAENA, JEANA student, and patient  Identified Needs: No needs at this time  Impression and Plan: JEANA and JEANA mcdonnell met with patient today in INF. Patient appears to be in good spirits and reports that he is doing well. He shared about his previous work experiences and the cities he has visited. Patient reported no needs at this time.   Interventions Provided: Support  Estimated Time Spent: 2 minutes    SW will continue to follow as needed.

## 2025-05-16 ENCOUNTER — HOSPITAL ENCOUNTER (OUTPATIENT)
Dept: RADIOLOGY | Facility: CLINIC | Age: 65
Discharge: HOME | End: 2025-05-16
Payer: COMMERCIAL

## 2025-05-16 DIAGNOSIS — C34.11 MALIGNANT NEOPLASM OF UPPER LOBE OF RIGHT LUNG (MULTI): ICD-10-CM

## 2025-05-16 PROCEDURE — 2550000001 HC RX 255 CONTRASTS: Performed by: NURSE PRACTITIONER

## 2025-05-16 PROCEDURE — 74177 CT ABD & PELVIS W/CONTRAST: CPT

## 2025-05-16 RX ADMIN — IOHEXOL 75 ML: 350 INJECTION, SOLUTION INTRAVENOUS at 09:48

## 2025-05-20 ENCOUNTER — LAB (OUTPATIENT)
Dept: LAB | Facility: CLINIC | Age: 65
End: 2025-05-20
Payer: COMMERCIAL

## 2025-05-20 DIAGNOSIS — C34.11 MALIGNANT NEOPLASM OF UPPER LOBE OF RIGHT LUNG (MULTI): ICD-10-CM

## 2025-05-20 LAB
BASOPHILS # BLD AUTO: 0.08 X10*3/UL (ref 0–0.1)
BASOPHILS NFR BLD AUTO: 0.9 %
EOSINOPHIL # BLD AUTO: 0.34 X10*3/UL (ref 0–0.7)
EOSINOPHIL NFR BLD AUTO: 3.8 %
ERYTHROCYTE [DISTWIDTH] IN BLOOD BY AUTOMATED COUNT: 17.1 % (ref 11.5–14.5)
HCT VFR BLD AUTO: 40.5 % (ref 41–52)
HGB BLD-MCNC: 13 G/DL (ref 13.5–17.5)
IMM GRANULOCYTES # BLD AUTO: 0.05 X10*3/UL (ref 0–0.7)
IMM GRANULOCYTES NFR BLD AUTO: 0.6 % (ref 0–0.9)
LYMPHOCYTES # BLD AUTO: 1.72 X10*3/UL (ref 1.2–4.8)
LYMPHOCYTES NFR BLD AUTO: 19.4 %
MCH RBC QN AUTO: 21 PG (ref 26–34)
MCHC RBC AUTO-ENTMCNC: 32.1 G/DL (ref 32–36)
MCV RBC AUTO: 66 FL (ref 80–100)
MONOCYTES # BLD AUTO: 0.51 X10*3/UL (ref 0.1–1)
MONOCYTES NFR BLD AUTO: 5.7 %
NEUTROPHILS # BLD AUTO: 6.18 X10*3/UL (ref 1.2–7.7)
NEUTROPHILS NFR BLD AUTO: 69.6 %
NRBC BLD-RTO: ABNORMAL /100{WBCS}
PLATELET # BLD AUTO: 346 X10*3/UL (ref 150–450)
RBC # BLD AUTO: 6.18 X10*6/UL (ref 4.5–5.9)
WBC # BLD AUTO: 8.9 X10*3/UL (ref 4.4–11.3)

## 2025-05-20 PROCEDURE — 84443 ASSAY THYROID STIM HORMONE: CPT

## 2025-05-20 PROCEDURE — 82024 ASSAY OF ACTH: CPT

## 2025-05-20 PROCEDURE — 85025 COMPLETE CBC W/AUTO DIFF WBC: CPT

## 2025-05-20 PROCEDURE — 36415 COLL VENOUS BLD VENIPUNCTURE: CPT

## 2025-05-20 PROCEDURE — 84439 ASSAY OF FREE THYROXINE: CPT

## 2025-05-20 PROCEDURE — 82533 TOTAL CORTISOL: CPT

## 2025-05-20 PROCEDURE — 80053 COMPREHEN METABOLIC PANEL: CPT

## 2025-05-21 ENCOUNTER — OFFICE VISIT (OUTPATIENT)
Dept: HEMATOLOGY/ONCOLOGY | Facility: CLINIC | Age: 65
End: 2025-05-21
Payer: COMMERCIAL

## 2025-05-21 ENCOUNTER — SOCIAL WORK (OUTPATIENT)
Dept: CASE MANAGEMENT | Facility: HOSPITAL | Age: 65
End: 2025-05-21

## 2025-05-21 ENCOUNTER — INFUSION (OUTPATIENT)
Dept: HEMATOLOGY/ONCOLOGY | Facility: CLINIC | Age: 65
End: 2025-05-21
Payer: COMMERCIAL

## 2025-05-21 ENCOUNTER — NUTRITION (OUTPATIENT)
Dept: HEMATOLOGY/ONCOLOGY | Facility: CLINIC | Age: 65
End: 2025-05-21
Payer: COMMERCIAL

## 2025-05-21 VITALS
OXYGEN SATURATION: 93 % | SYSTOLIC BLOOD PRESSURE: 133 MMHG | WEIGHT: 133 LBS | RESPIRATION RATE: 18 BRPM | HEART RATE: 57 BPM | DIASTOLIC BLOOD PRESSURE: 57 MMHG | TEMPERATURE: 97.3 F | BODY MASS INDEX: 19.64 KG/M2

## 2025-05-21 DIAGNOSIS — C34.11 MALIGNANT NEOPLASM OF UPPER LOBE OF RIGHT LUNG (MULTI): ICD-10-CM

## 2025-05-21 DIAGNOSIS — C34.11 MALIGNANT NEOPLASM OF UPPER LOBE OF RIGHT LUNG (MULTI): Primary | ICD-10-CM

## 2025-05-21 LAB
ALBUMIN SERPL BCP-MCNC: 4.3 G/DL (ref 3.4–5)
ALP SERPL-CCNC: 48 U/L (ref 33–136)
ALT SERPL W P-5'-P-CCNC: 18 U/L (ref 10–52)
ANION GAP SERPL CALC-SCNC: 13 MMOL/L (ref 10–20)
AST SERPL W P-5'-P-CCNC: 17 U/L (ref 9–39)
BILIRUB SERPL-MCNC: 0.9 MG/DL (ref 0–1.2)
BUN SERPL-MCNC: 12 MG/DL (ref 6–23)
CALCIUM SERPL-MCNC: 9.2 MG/DL (ref 8.6–10.6)
CHLORIDE SERPL-SCNC: 100 MMOL/L (ref 98–107)
CO2 SERPL-SCNC: 26 MMOL/L (ref 21–32)
CORTIS AM PEAK SERPL-MSCNC: 12.1 UG/DL (ref 5–20)
CREAT SERPL-MCNC: 0.71 MG/DL (ref 0.5–1.3)
EGFRCR SERPLBLD CKD-EPI 2021: >90 ML/MIN/1.73M*2
GLUCOSE SERPL-MCNC: 92 MG/DL (ref 74–99)
POTASSIUM SERPL-SCNC: 4.4 MMOL/L (ref 3.5–5.3)
PROT SERPL-MCNC: 6.4 G/DL (ref 6.4–8.2)
SODIUM SERPL-SCNC: 135 MMOL/L (ref 136–145)
T4 FREE SERPL-MCNC: 1.17 NG/DL (ref 0.78–1.48)
TSH SERPL-ACNC: 6 MIU/L (ref 0.44–3.98)

## 2025-05-21 PROCEDURE — 1159F MED LIST DOCD IN RCRD: CPT | Performed by: STUDENT IN AN ORGANIZED HEALTH CARE EDUCATION/TRAINING PROGRAM

## 2025-05-21 PROCEDURE — 99215 OFFICE O/P EST HI 40 MIN: CPT | Performed by: STUDENT IN AN ORGANIZED HEALTH CARE EDUCATION/TRAINING PROGRAM

## 2025-05-21 PROCEDURE — G2211 COMPLEX E/M VISIT ADD ON: HCPCS | Performed by: STUDENT IN AN ORGANIZED HEALTH CARE EDUCATION/TRAINING PROGRAM

## 2025-05-21 PROCEDURE — 3075F SYST BP GE 130 - 139MM HG: CPT | Performed by: STUDENT IN AN ORGANIZED HEALTH CARE EDUCATION/TRAINING PROGRAM

## 2025-05-21 PROCEDURE — 3078F DIAST BP <80 MM HG: CPT | Performed by: STUDENT IN AN ORGANIZED HEALTH CARE EDUCATION/TRAINING PROGRAM

## 2025-05-21 PROCEDURE — 96413 CHEMO IV INFUSION 1 HR: CPT

## 2025-05-21 PROCEDURE — 1125F AMNT PAIN NOTED PAIN PRSNT: CPT | Performed by: STUDENT IN AN ORGANIZED HEALTH CARE EDUCATION/TRAINING PROGRAM

## 2025-05-21 PROCEDURE — 2500000004 HC RX 250 GENERAL PHARMACY W/ HCPCS (ALT 636 FOR OP/ED): Mod: JZ,TB | Performed by: STUDENT IN AN ORGANIZED HEALTH CARE EDUCATION/TRAINING PROGRAM

## 2025-05-21 RX ORDER — ALBUTEROL SULFATE 0.83 MG/ML
3 SOLUTION RESPIRATORY (INHALATION) AS NEEDED
OUTPATIENT
Start: 2025-06-11

## 2025-05-21 RX ORDER — FAMOTIDINE 10 MG/ML
20 INJECTION, SOLUTION INTRAVENOUS ONCE AS NEEDED
OUTPATIENT
Start: 2025-06-11

## 2025-05-21 RX ORDER — PROCHLORPERAZINE EDISYLATE 5 MG/ML
10 INJECTION INTRAMUSCULAR; INTRAVENOUS EVERY 6 HOURS PRN
OUTPATIENT
Start: 2025-07-02

## 2025-05-21 RX ORDER — PROCHLORPERAZINE EDISYLATE 5 MG/ML
10 INJECTION INTRAMUSCULAR; INTRAVENOUS EVERY 6 HOURS PRN
OUTPATIENT
Start: 2025-06-11

## 2025-05-21 RX ORDER — PROCHLORPERAZINE MALEATE 10 MG
10 TABLET ORAL EVERY 6 HOURS PRN
Status: DISCONTINUED | OUTPATIENT
Start: 2025-05-21 | End: 2025-05-21 | Stop reason: HOSPADM

## 2025-05-21 RX ORDER — ALBUTEROL SULFATE 0.83 MG/ML
3 SOLUTION RESPIRATORY (INHALATION) AS NEEDED
Status: DISCONTINUED | OUTPATIENT
Start: 2025-05-21 | End: 2025-05-21 | Stop reason: HOSPADM

## 2025-05-21 RX ORDER — PROCHLORPERAZINE MALEATE 10 MG
10 TABLET ORAL EVERY 6 HOURS PRN
OUTPATIENT
Start: 2025-07-02

## 2025-05-21 RX ORDER — FAMOTIDINE 10 MG/ML
20 INJECTION, SOLUTION INTRAVENOUS ONCE AS NEEDED
Status: DISCONTINUED | OUTPATIENT
Start: 2025-05-21 | End: 2025-05-21 | Stop reason: HOSPADM

## 2025-05-21 RX ORDER — DIPHENHYDRAMINE HYDROCHLORIDE 50 MG/ML
50 INJECTION, SOLUTION INTRAMUSCULAR; INTRAVENOUS AS NEEDED
Status: DISCONTINUED | OUTPATIENT
Start: 2025-05-21 | End: 2025-05-21 | Stop reason: HOSPADM

## 2025-05-21 RX ORDER — EPINEPHRINE 0.3 MG/.3ML
0.3 INJECTION SUBCUTANEOUS EVERY 5 MIN PRN
Status: DISCONTINUED | OUTPATIENT
Start: 2025-05-21 | End: 2025-05-21 | Stop reason: HOSPADM

## 2025-05-21 RX ORDER — PROCHLORPERAZINE MALEATE 10 MG
10 TABLET ORAL EVERY 6 HOURS PRN
OUTPATIENT
Start: 2025-06-11

## 2025-05-21 RX ORDER — FAMOTIDINE 10 MG/ML
20 INJECTION, SOLUTION INTRAVENOUS ONCE AS NEEDED
OUTPATIENT
Start: 2025-07-02

## 2025-05-21 RX ORDER — PROCHLORPERAZINE EDISYLATE 5 MG/ML
10 INJECTION INTRAMUSCULAR; INTRAVENOUS EVERY 6 HOURS PRN
Status: DISCONTINUED | OUTPATIENT
Start: 2025-05-21 | End: 2025-05-21 | Stop reason: HOSPADM

## 2025-05-21 RX ORDER — EPINEPHRINE 0.3 MG/.3ML
0.3 INJECTION SUBCUTANEOUS EVERY 5 MIN PRN
OUTPATIENT
Start: 2025-06-11

## 2025-05-21 RX ORDER — DICLOFENAC SODIUM 10 MG/G
GEL TOPICAL
COMMUNITY
Start: 2025-04-11

## 2025-05-21 RX ORDER — ALBUTEROL SULFATE 0.83 MG/ML
3 SOLUTION RESPIRATORY (INHALATION) AS NEEDED
OUTPATIENT
Start: 2025-07-02

## 2025-05-21 RX ORDER — EPINEPHRINE 0.3 MG/.3ML
0.3 INJECTION SUBCUTANEOUS EVERY 5 MIN PRN
OUTPATIENT
Start: 2025-07-02

## 2025-05-21 RX ORDER — DIPHENHYDRAMINE HYDROCHLORIDE 50 MG/ML
50 INJECTION, SOLUTION INTRAMUSCULAR; INTRAVENOUS AS NEEDED
OUTPATIENT
Start: 2025-06-11

## 2025-05-21 RX ORDER — FERROUS SULFATE 325(65) MG
TABLET ORAL
COMMUNITY
Start: 2025-05-13

## 2025-05-21 RX ORDER — KETOCONAZOLE 20 MG/G
CREAM TOPICAL
COMMUNITY
Start: 2025-05-11

## 2025-05-21 RX ORDER — DIPHENHYDRAMINE HYDROCHLORIDE 50 MG/ML
50 INJECTION, SOLUTION INTRAMUSCULAR; INTRAVENOUS AS NEEDED
OUTPATIENT
Start: 2025-07-02

## 2025-05-21 RX ADMIN — SODIUM CHLORIDE 200 MG: 9 INJECTION, SOLUTION INTRAVENOUS at 11:29

## 2025-05-21 ASSESSMENT — PAIN SCALES - GENERAL: PAINLEVEL_OUTOF10: 7

## 2025-05-21 NOTE — SIGNIFICANT EVENT

## 2025-05-21 NOTE — PROGRESS NOTES
Patient here today for follow up and to review recent imaging. SOB at baseline.     Fatigue: denies   PO intake: adequate; he's considering starting Ensure to increase caloric intake   Weight: stable   N/V/D/C: denies     Pharmacy preference reviewed with patient. Medications reviewed by MA.    Labs done 5/20.

## 2025-05-21 NOTE — PROGRESS NOTES
SW met with patient today at [facility] with Dr. Gray. Patient is managing ok. He shared his granddaughter received her beautician license. He is so happy. SW provided praise. No needs at this time.

## 2025-05-21 NOTE — PROGRESS NOTES
NUTRITION Assessment NOTE    Nutrition Assessment     Reason for Visit:  Pillo Guerin is a 65 y.o. 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     PMH of EtOH use disorder, CVA, CAD c/b NSTEMI s/p 2 stents, hyponatremia presumed to be 2/2 beer potomania.     Met with patient for nutrition follow up visit today.  He is here in infusion for treatment .    Lab Results   Component Value Date/Time    GLUCOSE 92 05/20/2025 1145     (L) 05/20/2025 1145    K 4.4 05/20/2025 1145     05/20/2025 1145    CO2 26 05/20/2025 1145    ANIONGAP 13 05/20/2025 1145    BUN 12 05/20/2025 1145    CREATININE 0.71 05/20/2025 1145    EGFR >90 05/20/2025 1145    CALCIUM 9.2 05/20/2025 1145    ALBUMIN 4.3 05/20/2025 1145    ALKPHOS 48 05/20/2025 1145    PROT 6.4 05/20/2025 1145    AST 17 05/20/2025 1145    BILITOT 0.9 05/20/2025 1145    ALT 18 05/20/2025 1145    MG 2.28 08/06/2024 1032    PHOS 4.1 08/05/2022 0606     Lab Results   Component Value Date/Time    VITD25 50 03/18/2025 1212        Lab Results   Component Value Date    WBC 8.9 05/20/2025    HGB 13.0 (L) 05/20/2025    HCT 40.5 (L) 05/20/2025    MCV 66 (L) 05/20/2025     05/20/2025       Anthropometrics:     Per patient, weight fluctuates 60-62 kg .  Weight was up Dec/January (unsure if this was true weight gain or fluid related) Remains at low end of UBW range.   Wt Readings from Last 20 Encounters:   05/21/25 60.3 kg (133 lb)   04/30/25 60.1 kg (132 lb 8 oz)   04/25/25 62 kg (136 lb 9.6 oz)   04/09/25 61.7 kg (135 lb 14.6 oz)   03/28/25 62.8 kg (138 lb  6.4 oz)   03/19/25 61.6 kg (135 lb 12.9 oz)   02/26/25 63 kg (139 lb)   02/05/25 61.2 kg (135 lb)   01/15/25 64 kg (141 lb)   03/12/25 62.6 kg (138 lb)   12/11/24 63.9 kg (140 lb 14 oz)   11/20/24 63 kg (139 lb)   10/30/24 62.8 kg (138 lb 5.4 oz)   10/10/24 62.4 kg (137 lb 9.1 oz)   09/18/24 61.8 kg (136 lb 3.9 oz)   12/04/24 62.1 kg (137 lb)   08/28/24 59.6 kg (131 lb 6.3 oz)   08/26/24 61.9 kg (136 lb 7.4 oz)   08/07/24 59.3 kg (130 lb 11.7 oz)   07/19/24 60.3 kg (133 lb)              Food And Nutrient Intake:  Food and Nutrient History  Food and Nutrient History: Patient reports good appetite and oral intake.    He is still receiving both [employer] meals, as well as Meals on Wheels and eats 3 meals/day.  He has recently stopped drinking Ensure as he ran out of coupons, and it was becoming expensive.  He is inquiring about more coupons today.  His weight has trended back down some .  Tonight he is eating a benitez burger that he likes to prepare.   He is still taking maintenance vit D and tries also to sit in the sun for a bit. Denies n/v/d.  Drinks 1-2 cups coffee/day.  Energy Intake: Fair 50-75 %         Food Intake  Meal 1: Waffles, benitez, syrup, coffee  Meal 2: Hot meal from meals on wheels  Meal 3: Benitez hamburger  Snacks: Sometimes peanut butter and crackers                Nutrition Focused Physical Exam Findings:      Subcutaneous Fat Loss  Orbital Fat Pads: Mild-Moderate (slight dark circles and slight hollowing)  Buccal Fat Pads: Mild-Moderate (flat cheeks, minimal bounce)  Triceps: Mild-Moderate (less than ample fat tissue)    Muscle Wasting  Temporalis: Mild-Moderate (slight depression)  Deltoid/Trapezius: Defer  Interosseous: Defer  Trapezius/Infraspinatus/Supraspinatus (Scapular Region): Defer      Energy Needs  Estimated Energy Needs  Total Energy Estimated Needs in 24 hours (kCal):  (4584-4489 cals (32-35 cals/kg))  Energy Estimated Needs per kg Body Weight in 24 hours (kCal/kg): 35 kCal/kg  Method for  Estimating Needs: Increased for slow repletion  Estimated Fluid Needs  Total Fluid Estimated Needs in 24 Hours (mL):  (1900 mls)  Total Fluid Estimated Needs in 24 hours (mL/kg): 30 mL/kg  Estimated Protein Needs  Total Protein Estimated Needs in 24 Hours (g):  ( g)  Protein Estimated Needs per kg Body Weight in 24 Hours (g/kg): 1.4 g/kg  Method for Estimating 24 Hour Protein Needs: Increased for slow repletion        Nutrition Diagnosis   Malnutrition Diagnosis  Patient has Malnutrition Diagnosis: Yes  Diagnosis Status: Active  Malnutrition Diagnosis: Mild malnutrition related to chronic disease or condition  Related to: h/o chronic alcoholism and cancer  As Evidenced by: previously with significant weight loss (now improving ) and remains with mild-moderate muscle/fat loss (Patient has been making progress and eating well, thus has transitioned from Moderate to Mildly Malnourished.)       Nutrition Interventions/Recommendations   Nutrition Prescription  Individualized Nutrition Prescription Provided for : Regular TONO (Note pt with limited resources and provided Global meals vary but often provide frozen meals)Regular diet   1000 Ius vitamin D3 daily for maintenance   One-a -day MVI   Encouraged Ensure Plus shake once daily (as able to afford) for ongoing weight stablization /gain   Ensure Plus 1 x/day provides: 350 kCal, 16 g PRO, 11 g fat, 47 g CHO, 0 g fiber, and 0 mL free water    Ensure coupons provided to assist with cost savings     Food and Nutrition Delivery  Food and Nutrition Delivery  Medical Food Supplement:  (Encouraged retrial for tolerance.  WIll mail Ensure coupons to pt)  Goals:  (Continue DMV with iron)  Bioactive Substance Management: Alcohol management  Goals: Patient has reduced his intake form 8-10 servings down to 6 servings . Not likely he will decrease intake further    Nutrition Education  Nutrition Education  Nutrition Education Content: Content related nutrition  education    - Continue with MVI daily  -Discussed how vitamin d level now at 50 (from 8 ) and is in normal limits  CNP encouraged 1000 international units  vitamin D3 once daily for maintenance.   Coordination of Care  Coordination of Nutrition Care by a Nutrition Professional  Collaboration and referral of nutrition care: Collaboration and Referral of Nutrition Care    There are no Patient Instructions on file for this visit.    Nutrition Monitoring and Evaluation   Food and Nutrient Intake  Fluid Intake: Estimated fluid intake  Criteria: Meet daily hydration goals  Amount of Food: Estimated amout of food  Criteria: Meet energy and protein needs  Meal/Snack Pattern: Estimated meal and snack pattern  Criteria: as above  Anthropometric measurements  Monitoring and Evaluation Plan: Weight  Criteria: Maintenance or slow gain  Biochemical Data, Medical Tests and Procedures  Criteria: WNL  Vitamin Profile: Vitamin D, 25 hydroxy  Criteria: > 30          Time Spent  Prep time on day of patient encounter: 10 minutes  Time spent directly with patient, family or caregiver: 10 minutes  Additional Time Spent on Patient Care Activities: 5 minutes  Documentation Time: 15 minutes  Other Time Spent: 0 minutes  Total: 40 minutes

## 2025-05-22 LAB — ACTH PLAS-MCNC: 9.7 PG/ML (ref 7.2–63.3)

## 2025-05-30 ENCOUNTER — OFFICE VISIT (OUTPATIENT)
Dept: PALLIATIVE MEDICINE | Facility: CLINIC | Age: 65
End: 2025-05-30
Payer: COMMERCIAL

## 2025-05-30 ENCOUNTER — PHARMACY VISIT (OUTPATIENT)
Dept: PHARMACY | Facility: CLINIC | Age: 65
End: 2025-05-30
Payer: COMMERCIAL

## 2025-05-30 VITALS
HEART RATE: 63 BPM | WEIGHT: 134.4 LBS | TEMPERATURE: 97.9 F | SYSTOLIC BLOOD PRESSURE: 151 MMHG | BODY MASS INDEX: 19.85 KG/M2 | RESPIRATION RATE: 16 BRPM | OXYGEN SATURATION: 98 % | DIASTOLIC BLOOD PRESSURE: 73 MMHG

## 2025-05-30 DIAGNOSIS — G89.3 CANCER RELATED PAIN: ICD-10-CM

## 2025-05-30 DIAGNOSIS — Z51.5 PALLIATIVE CARE ENCOUNTER: ICD-10-CM

## 2025-05-30 DIAGNOSIS — R53.81 PHYSICAL DECONDITIONING: ICD-10-CM

## 2025-05-30 DIAGNOSIS — Z79.891 ENCOUNTER FOR LONG-TERM USE OF OPIATE ANALGESIC: ICD-10-CM

## 2025-05-30 DIAGNOSIS — C34.11 MALIGNANT NEOPLASM OF UPPER LOBE OF RIGHT LUNG (MULTI): ICD-10-CM

## 2025-05-30 DIAGNOSIS — Z02.9 ENCOUNTER FOR OPIATE ANALGESIC USE AGREEMENT: Primary | ICD-10-CM

## 2025-05-30 LAB
AMPHETAMINES UR QL SCN: ABNORMAL
BARBITURATES UR QL SCN: ABNORMAL
BENZODIAZ UR QL SCN: ABNORMAL
BZE UR QL SCN: ABNORMAL
CANNABINOIDS UR QL SCN: ABNORMAL
FENTANYL+NORFENTANYL UR QL SCN: ABNORMAL
METHADONE UR QL SCN: ABNORMAL
OPIATES UR QL SCN: ABNORMAL
OXYCODONE+OXYMORPHONE UR QL SCN: ABNORMAL
PCP UR QL SCN: ABNORMAL

## 2025-05-30 PROCEDURE — 99214 OFFICE O/P EST MOD 30 MIN: CPT

## 2025-05-30 PROCEDURE — 1159F MED LIST DOCD IN RCRD: CPT

## 2025-05-30 PROCEDURE — 3077F SYST BP >= 140 MM HG: CPT

## 2025-05-30 PROCEDURE — 80307 DRUG TEST PRSMV CHEM ANLYZR: CPT

## 2025-05-30 PROCEDURE — 3078F DIAST BP <80 MM HG: CPT

## 2025-05-30 PROCEDURE — RXMED WILLOW AMBULATORY MEDICATION CHARGE

## 2025-05-30 PROCEDURE — 1125F AMNT PAIN NOTED PAIN PRSNT: CPT

## 2025-05-30 PROCEDURE — 80349 CANNABINOIDS NATURAL: CPT

## 2025-05-30 RX ORDER — OXYCODONE HYDROCHLORIDE 10 MG/1
10 TABLET ORAL EVERY 6 HOURS PRN
Qty: 120 TABLET | Refills: 0 | Status: SHIPPED | OUTPATIENT
Start: 2025-05-30 | End: 2025-06-29

## 2025-05-30 RX ORDER — MORPHINE SULFATE 30 MG/1
30 TABLET, FILM COATED, EXTENDED RELEASE ORAL 2 TIMES DAILY
Qty: 60 TABLET | Refills: 0 | Status: SHIPPED | OUTPATIENT
Start: 2025-05-30 | End: 2025-06-29

## 2025-05-30 RX ORDER — MORPHINE SULFATE 30 MG/1
30 TABLET, FILM COATED, EXTENDED RELEASE ORAL 2 TIMES DAILY
Qty: 60 TABLET | Refills: 0 | Status: SHIPPED | OUTPATIENT
Start: 2025-05-30 | End: 2025-05-30 | Stop reason: SDUPTHER

## 2025-05-30 ASSESSMENT — PAIN SCALES - GENERAL: PAINLEVEL_OUTOF10: 7

## 2025-05-30 ASSESSMENT — ENCOUNTER SYMPTOMS
LOSS OF SENSATION IN FEET: 0
OCCASIONAL FEELINGS OF UNSTEADINESS: 0
DEPRESSION: 0

## 2025-05-30 NOTE — PROGRESS NOTES
SUPPORTIVE AND PALLIATIVE ONCOLOGY CONSULT - OUTPATIENT      SERVICE DATE: 5/30/2025    Medical Oncologist: MD Jorge Duque MD   Radiation Oncologist: No care team member to display  Primary Physician: Rocio Lyle Ellenparth  [phone]    REASON FOR CONSULT/CHIEF CONSULT COMPLAINT: pain management and Introduction to Supportive and Palliative Oncology Services    Subjective   HISTORY OF PRESENT ILLNESS: Pillo Guerin is a 65 y.o. 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 Pt is s/p brain XRT 10/2023 and currently on Pembro. Pt follows with Dr. Gray.     Pain Assessment:  Pain Score:  5  Location:  L hip -> L leg (constant), RLE (intermittent)  Education:  changes to current regimen      Symptom Assessment:  Pain:somewhat- L hip -> L leg (constant), RLE (intermittent); some improvement with Morphine ER, moving around more  Headache: none  Dizziness:none  Lack of energy: none  Difficulty sleeping: none  Worrying: none  Anxiety: none  Depression: none  Pain in mouth/swallowing: none  Dry mouth: none  Taste changes: none  Shortness of breath: a little- with exertion  Lack of appetite: none   Nausea: none  Vomiting: none  Constipation: none  Diarrhea: none- finds that eating white rice helps to manage this  Sore muscles: a little  Numbness or tingling in hands/feet/other: none  Weight loss: none  Other: none      Information obtained from: chart review and interview of patient  ______________________________________________________________________     Oncology History   Lung cancer (Multi)   8/31/2023 - 10/30/2024 Chemotherapy    Pembrolizumab, 21 Day Cycles     9/22/2023 Initial Diagnosis    Lung cancer (CMS/HCC)     11/21/2023 Cancer Staged    Staging form: Lung, AJCC 8th Edition, Clinical: Stage IVB (cT3, cN3, cM1c) - Signed by Zuleyma Vines MD on 11/21/2023 11/20/2024 -   "Chemotherapy    Pembrolizumab, 21 Day Cycles         Past Medical History:   Diagnosis Date    Hyperlipidemia, unspecified 06/13/2022    Hyperlipidemia    Old myocardial infarction     History of myocardial infarction     Past Surgical History:   Procedure Laterality Date    MR HEAD ANGIO WO IV CONTRAST  4/19/2022    MR HEAD ANGIO WO IV CONTRAST 4/19/2022 [facility] CLINICAL LEGACY    MR NECK ANGIO WO IV CONTRAST  4/19/2022    MR NECK ANGIO WO IV CONTRAST 4/19/2022 [facility] CLINICAL LEGACY    OTHER SURGICAL HISTORY  09/01/2015    Previous Stent Placement     Family History   Problem Relation Name Age of Onset    Cancer Mother      Heart attack Father      Heart attack Brother          SOCIAL HISTORY  Lives with ex/current friendRadha   Social History:  reports that he has been smoking cigarettes. He has been exposed to tobacco smoke. He uses smokeless tobacco. He reports current alcohol use of about 4.0 standard drinks of alcohol per week. He reports current drug use. Drug: \"Crack\" cocaine.  Retired, previously worked in [employer]    Rastafarian and Importance of Rastafarian: unknown    REVIEW OF SYSTEMS  Review of systems negative unless noted in HPI.       Objective     Palliative Performance Scale % (PPS)       Current Outpatient Medications   Medication Instructions    amLODIPine (NORVASC) 2.5 mg, Daily RT    atorvastatin (Lipitor) 80 mg tablet TAKE 1 TABLET BY MOUTH ONCE DAILY    cholecalciferol (VITAMIN D3) 1,000 Units, oral, Daily    diclofenac sodium (Voltaren) 1 % gel     docusate sodium (COLACE) 200 mg, oral, Nightly PRN, You only need to take this if you feel constipated.    FeroSuL 325 mg (65 mg iron) tablet     gabapentin (NEURONTIN) 600 mg, oral, 2 times daily    Incruse Ellipta 62.5 mcg, inhalation, Daily    ketoconazole (NIZOral) 2 % cream     loperamide (Imodium A-D) 2 mg tablet Take it as instructed for diarrhea    losartan (COZAAR) 50 mg    metoprolol tartrate (Lopressor) 25 mg tablet TAKE 1 TABLET BY MOUTH " TWO TIMES A DAY    morphine CR (MS CONTIN) 15 mg, oral, 3 times daily, Do not crush, chew, or split.    naloxone (NARCAN) 4 mg, nasal, As needed, May repeat every 2-3 minutes if needed, alternating nostrils, until medical assistance becomes available.    nitroglycerin (Nitrostat) 0.4 mg SL tablet DISSOLVE 1 TABLET UNDER TONGUE EVERY 5 MINUTES FOR 3 DOSES AS NEEDED FOR CHEST PAIN. IF PAIN PERSISTS DIAL 911.    omeprazole (PriLOSEC) 20 mg DR capsule Do not crush or chew. Take it 30 minutes before breakfast.    oxyCODONE (ROXICODONE) 10 mg, oral, Every 6 hours PRN    predniSONE (DELTASONE) 5 mg, oral, Daily    tadalafil (CIALIS) 10 mg, Daily PRN    ticagrelor (BRILINTA) 60 mg, oral, 2 times daily    triamcinolone (Kenalog) 0.1 % ointment Topical, 2 times daily       Allergies: No Known Allergies    No results found for this or any previous visit (from the past 96 hours).             PHYSICAL EXAMINATION  Vital Signs:       3/19/2025    12:01 PM 3/28/2025     9:39 AM 4/9/2025     8:02 AM 4/25/2025     2:18 PM 4/30/2025     9:10 AM 5/21/2025    10:33 AM 5/30/2025     2:04 PM   Vitals   Systolic 117 134 156 127 118 133 151   Diastolic 65 68 82 72 69 57 73   BP Location     Left arm Right arm    Heart Rate 58 55 58 64 55 57 63   Temp 36.3 °C (97.3 °F) 37 °C (98.6 °F) 36.2 °C (97.2 °F) 36.7 °C (98.1 °F) 36.2 °C (97.2 °F) 36.3 °C (97.3 °F) 36.6 °C (97.9 °F)   Resp 18 18 18 18 18 18 16   Weight (lb) 135.8 138.4 135.91 136.6 132.5 133 134.4   BMI 20.05 kg/m2 20.44 kg/m2 20.07 kg/m2 20.17 kg/m2 19.57 kg/m2 19.64 kg/m2 19.85 kg/m2   BSA (m2) 1.73 m2 1.75 m2 1.73 m2 1.74 m2 1.71 m2 1.71 m2 1.72 m2   Visit Report Report Report Report Report Report Report Report          Physical Exam  Constitutional:       Appearance: Normal appearance.   HENT:      Mouth/Throat:      Mouth: Mucous membranes are moist.   Eyes:      Extraocular Movements: Extraocular movements intact.      Pupils: Pupils are equal, round, and reactive to light.    Cardiovascular:      Rate and Rhythm: Normal rate.   Pulmonary:      Effort: Pulmonary effort is normal.   Abdominal:      Palpations: Abdomen is soft.   Musculoskeletal:      Comments: LUE, LLE weakness, wears brace to LLE   Skin:     General: Skin is warm and dry.   Neurological:      Mental Status: He is alert and oriented to person, place, and time.   Psychiatric:         Mood and Affect: Mood normal.         Behavior: Behavior normal.         ASSESSMENT/PLAN    Pain  Pain is: cancer related pain and chronic (cancer, CVA)  Type: somatic and neuropathic; L hip -> L leg (constant), RLE (intermittent)  Pain control: sub-optimally controlled  Home regimen:   Oxycodone to 10mg q6h as needed (takes ~4X/day to good relief)  Up Morphine ER to 30mg BID for continued cancer pain  Gabapentin 600mg BID  Tylenol 650mg q8h as needed (only takes occasionally)  Offered PT- pt prefers to get stronger with walking first (education provided, pt continues to decline)  Intolerances/previously tried: n/a  Personalized pain goal: feel comfortable enough to walk more    Opioid Use  Medication Management:   - OARRS report reviewed with no aberrant behavior; consistent with  prescriptions/records and patient history  - MED ~30.  Overdose Risk Score 290.   This has been discussed with patient.   - We will continue to closely monitor the patient for signs of prescription misuse including UDS, OARRS review and subjective reports at each visit.  - N/a concurrent benzodiazepine use   - I am a provider who either is or has consulted and collaborated with a provider certified in Hospice and Palliative Medicine and have conducted a face-face visit and examination for this patient.  - Routine Urine Drug Screen pending 5/30/25  - Controlled Substance Agreement completed 4/25/25  - Specifically discussed that controlled substance prescriptions will only be provided by our group as outlined in the completed agreement  - Prescribed naloxone  "previously  - Red Flags: n/a     Nausea- denies    Constipation/Diarrhea   At risk for constipation related to opioids,   currently not constipated, diarrhea stable  Colace 100-200mg nightly as needed for signs of opiate-induced constipation- has not yet needed  Encouraged adequate fluid intake  Manages occasional diarrhea by eating white rice    Altered Mood- denies  Sleeping Difficulty- denies    Decreased appetite- denies  Discussed viewing food as \"fuel\", especially with any increase in activity  On Prednisone 5mg daily for other reasons- monitor appetite if ever weaned off Prednisone  Plans to start drinking chocolate Ensure this week      Supportive Interventions: n/a- will continue to monitor needs    Introduction to Supportive and Palliative Oncology:  Spoke with patient   Introduced the role and philosophy of Supportive and Palliative oncology in the evaluation and management of symptoms during cancer treatment  Palliative care was introduced as a service for patients with serious illness to help with symptoms, assist with goals of care conversations, navigate complex decision making, improve quality of life for patients, and provide support both patients and families.  Patient seemed to appreciate the extra layer of support.    Medical Decision Making/Goals of Care/Advance Care Planning:  Patient's current clinical condition, including diagnosis, prognosis, and management plan, and goals of care were discussed.   Life limiting disease: metastatic malignancy  Family: Supportive ex/friend  Performance status: Major limitations due to previous stroke (manages well)  Goals: symptom control and cancer directed therapy    Advance Directives  Existence of Advance Directives:No - has interest  Decision maker: HCPOA is unknown- confirm at next visit  Code Status: Full code    Next Follow-Up Visit:  Return to clinic in 1 month at  [facility]    Signature and billing  Thank you for allowing us to participate in the " care of this patient. Recommendations will be communicated back to the consulting service by way of shared electronic medical record or face-to-face.    Medical complexity was moderate level due to due to complexity of problems, extensive data review, and high risk of management/treatment.  Time was spent on the following: Prep Time, Time Directly with Patient/Family/Caregiver, Documentation Time. Total time spent: 35min      DATA   Diagnostic tests and information reviewed for today's visit:  Most recent labs and imaging results, Medications     5/30/25: changes to plan indicated in bold. Continue all other regimens as listed.    Some elements copied from Supportive Oncology note on 4/25/25, the elements have been updated and all reflect current decision making from today, 5/30/2025.      Plan of Care discussed with: Provider, RN, Patient      SIGNATURE: AMAN Garcia    Contact information:  Supportive and Palliative Oncology  Monday-Friday 8 AM-5 PM  Phone:  [phone], press option #5, then option #1.   Or Epic Secure Chat

## 2025-06-02 ENCOUNTER — TELEPHONE (OUTPATIENT)
Dept: ADMISSION | Facility: HOSPITAL | Age: 65
End: 2025-06-02
Payer: COMMERCIAL

## 2025-06-02 DIAGNOSIS — G89.3 CANCER RELATED PAIN: ICD-10-CM

## 2025-06-02 LAB
6MAM UR CFM-MCNC: <25 NG/ML
CODEINE UR CFM-MCNC: <50 NG/ML
HYDROCODONE CTO UR CFM-MCNC: <25 NG/ML
HYDROMORPHONE UR CFM-MCNC: 86 NG/ML
MORPHINE UR CFM-MCNC: >2500 NG/ML
NORHYDROCODONE UR CFM-MCNC: <25 NG/ML
NOROXYCODONE UR CFM-MCNC: >1000 NG/ML
OXYCODONE UR CFM-MCNC: 2273 NG/ML
OXYMORPHONE UR CFM-MCNC: 708 NG/ML

## 2025-06-02 RX ORDER — MORPHINE SULFATE 30 MG/1
30 TABLET, FILM COATED, EXTENDED RELEASE ORAL 2 TIMES DAILY
Qty: 60 TABLET | Refills: 0 | Status: SHIPPED | OUTPATIENT
Start: 2025-06-02 | End: 2025-07-06

## 2025-06-02 NOTE — TELEPHONE ENCOUNTER
[facility] called back.  Unable to order MS Contin 15 or 30 mg tablets for patient.  [facility] pharmacy called.  Pharmacist stated the pharmacy had the total MS Contin 30 mg script in stock currently.  Script resent in by Preethi Robledo.  Patient called back by this RN and left a voicemail with update.

## 2025-06-02 NOTE — TELEPHONE ENCOUNTER
Nevin from [facility] called stating, MS Contin 30mg is out of stock and unable to order.  Questioning if an alternative med can be sent or send RX to  pharmacy (where patient has been previously receiving this). Script sent on 5/30.      Call back [phone] for [facility] pharmacy.

## 2025-06-05 LAB — CARBOXYTHC UR-MCNC: 35 NG/ML

## 2025-06-09 ENCOUNTER — TELEPHONE (OUTPATIENT)
Dept: CARDIOLOGY | Facility: CLINIC | Age: 65
End: 2025-06-09

## 2025-06-09 NOTE — TELEPHONE ENCOUNTER
Kimberly called from [facility], Dr. Robledo sent a referral over for Dr. Valles and wanted to know if we received it. You can call her if there are any questions [phone]

## 2025-06-10 ENCOUNTER — LAB (OUTPATIENT)
Dept: LAB | Facility: CLINIC | Age: 65
End: 2025-06-10
Payer: COMMERCIAL

## 2025-06-10 DIAGNOSIS — C34.11 MALIGNANT NEOPLASM OF UPPER LOBE OF RIGHT LUNG (MULTI): ICD-10-CM

## 2025-06-10 LAB
ALBUMIN SERPL BCP-MCNC: 4 G/DL (ref 3.4–5)
ALP SERPL-CCNC: 48 U/L (ref 33–136)
ALT SERPL W P-5'-P-CCNC: 14 U/L (ref 10–52)
ANION GAP SERPL CALC-SCNC: 10 MMOL/L (ref 10–20)
AST SERPL W P-5'-P-CCNC: 17 U/L (ref 9–39)
BASOPHILS # BLD AUTO: 0.07 X10*3/UL (ref 0–0.1)
BASOPHILS NFR BLD AUTO: 1 %
BILIRUB SERPL-MCNC: 0.9 MG/DL (ref 0–1.2)
BUN SERPL-MCNC: 9 MG/DL (ref 6–23)
CALCIUM SERPL-MCNC: 8.9 MG/DL (ref 8.6–10.6)
CHLORIDE SERPL-SCNC: 98 MMOL/L (ref 98–107)
CO2 SERPL-SCNC: 28 MMOL/L (ref 21–32)
CREAT SERPL-MCNC: 0.67 MG/DL (ref 0.5–1.3)
EGFRCR SERPLBLD CKD-EPI 2021: >90 ML/MIN/1.73M*2
EOSINOPHIL # BLD AUTO: 0.66 X10*3/UL (ref 0–0.7)
EOSINOPHIL NFR BLD AUTO: 9.4 %
ERYTHROCYTE [DISTWIDTH] IN BLOOD BY AUTOMATED COUNT: 16.7 % (ref 11.5–14.5)
GLUCOSE SERPL-MCNC: 97 MG/DL (ref 74–99)
HCT VFR BLD AUTO: 37.9 % (ref 41–52)
HGB BLD-MCNC: 12.5 G/DL (ref 13.5–17.5)
IMM GRANULOCYTES # BLD AUTO: 0.02 X10*3/UL (ref 0–0.7)
IMM GRANULOCYTES NFR BLD AUTO: 0.3 % (ref 0–0.9)
LYMPHOCYTES # BLD AUTO: 1.82 X10*3/UL (ref 1.2–4.8)
LYMPHOCYTES NFR BLD AUTO: 25.9 %
MCH RBC QN AUTO: 21.4 PG (ref 26–34)
MCHC RBC AUTO-ENTMCNC: 33 G/DL (ref 32–36)
MCV RBC AUTO: 65 FL (ref 80–100)
MONOCYTES # BLD AUTO: 0.72 X10*3/UL (ref 0.1–1)
MONOCYTES NFR BLD AUTO: 10.2 %
NEUTROPHILS # BLD AUTO: 3.75 X10*3/UL (ref 1.2–7.7)
NEUTROPHILS NFR BLD AUTO: 53.2 %
NRBC BLD-RTO: ABNORMAL /100{WBCS}
PLATELET # BLD AUTO: 285 X10*3/UL (ref 150–450)
POTASSIUM SERPL-SCNC: 4.3 MMOL/L (ref 3.5–5.3)
PROT SERPL-MCNC: 5.9 G/DL (ref 6.4–8.2)
RBC # BLD AUTO: 5.84 X10*6/UL (ref 4.5–5.9)
SODIUM SERPL-SCNC: 132 MMOL/L (ref 136–145)
WBC # BLD AUTO: 7 X10*3/UL (ref 4.4–11.3)

## 2025-06-10 PROCEDURE — 85025 COMPLETE CBC W/AUTO DIFF WBC: CPT

## 2025-06-10 PROCEDURE — 36415 COLL VENOUS BLD VENIPUNCTURE: CPT

## 2025-06-10 PROCEDURE — 80053 COMPREHEN METABOLIC PANEL: CPT

## 2025-06-11 ENCOUNTER — NUTRITION (OUTPATIENT)
Dept: HEMATOLOGY/ONCOLOGY | Facility: CLINIC | Age: 65
End: 2025-06-11

## 2025-06-11 ENCOUNTER — OFFICE VISIT (OUTPATIENT)
Dept: HEMATOLOGY/ONCOLOGY | Facility: CLINIC | Age: 65
End: 2025-06-11
Payer: COMMERCIAL

## 2025-06-11 ENCOUNTER — INFUSION (OUTPATIENT)
Dept: HEMATOLOGY/ONCOLOGY | Facility: CLINIC | Age: 65
End: 2025-06-11
Payer: COMMERCIAL

## 2025-06-11 ENCOUNTER — TELEPHONE (OUTPATIENT)
Dept: PALLIATIVE MEDICINE | Facility: HOSPITAL | Age: 65
End: 2025-06-11

## 2025-06-11 VITALS
SYSTOLIC BLOOD PRESSURE: 148 MMHG | DIASTOLIC BLOOD PRESSURE: 78 MMHG | TEMPERATURE: 98.4 F | OXYGEN SATURATION: 97 % | WEIGHT: 130.2 LBS | BODY MASS INDEX: 19.23 KG/M2 | HEART RATE: 56 BPM | RESPIRATION RATE: 18 BRPM

## 2025-06-11 DIAGNOSIS — G89.3 CANCER RELATED PAIN: ICD-10-CM

## 2025-06-11 DIAGNOSIS — C34.11 MALIGNANT NEOPLASM OF UPPER LOBE OF RIGHT LUNG (MULTI): Primary | ICD-10-CM

## 2025-06-11 DIAGNOSIS — C34.11 MALIGNANT NEOPLASM OF UPPER LOBE OF RIGHT LUNG (MULTI): ICD-10-CM

## 2025-06-11 PROCEDURE — 99213 OFFICE O/P EST LOW 20 MIN: CPT | Performed by: NURSE PRACTITIONER

## 2025-06-11 PROCEDURE — 3078F DIAST BP <80 MM HG: CPT | Performed by: NURSE PRACTITIONER

## 2025-06-11 PROCEDURE — 3077F SYST BP >= 140 MM HG: CPT | Performed by: NURSE PRACTITIONER

## 2025-06-11 PROCEDURE — 2500000004 HC RX 250 GENERAL PHARMACY W/ HCPCS (ALT 636 FOR OP/ED): Performed by: STUDENT IN AN ORGANIZED HEALTH CARE EDUCATION/TRAINING PROGRAM

## 2025-06-11 PROCEDURE — 1159F MED LIST DOCD IN RCRD: CPT | Performed by: NURSE PRACTITIONER

## 2025-06-11 PROCEDURE — 1125F AMNT PAIN NOTED PAIN PRSNT: CPT | Performed by: NURSE PRACTITIONER

## 2025-06-11 PROCEDURE — 96413 CHEMO IV INFUSION 1 HR: CPT

## 2025-06-11 RX ORDER — HEPARIN 100 UNIT/ML
500 SYRINGE INTRAVENOUS AS NEEDED
Status: DISCONTINUED | OUTPATIENT
Start: 2025-06-11 | End: 2025-06-11 | Stop reason: HOSPADM

## 2025-06-11 RX ORDER — FAMOTIDINE 10 MG/ML
20 INJECTION, SOLUTION INTRAVENOUS ONCE AS NEEDED
Status: DISCONTINUED | OUTPATIENT
Start: 2025-06-11 | End: 2025-06-11 | Stop reason: HOSPADM

## 2025-06-11 RX ORDER — HEPARIN SODIUM,PORCINE/PF 10 UNIT/ML
50 SYRINGE (ML) INTRAVENOUS AS NEEDED
OUTPATIENT
Start: 2025-06-11

## 2025-06-11 RX ORDER — EPINEPHRINE 0.3 MG/.3ML
0.3 INJECTION SUBCUTANEOUS EVERY 5 MIN PRN
Status: DISCONTINUED | OUTPATIENT
Start: 2025-06-11 | End: 2025-06-11 | Stop reason: HOSPADM

## 2025-06-11 RX ORDER — HEPARIN 100 UNIT/ML
500 SYRINGE INTRAVENOUS AS NEEDED
OUTPATIENT
Start: 2025-06-11

## 2025-06-11 RX ORDER — ALBUTEROL SULFATE 0.83 MG/ML
3 SOLUTION RESPIRATORY (INHALATION) AS NEEDED
Status: DISCONTINUED | OUTPATIENT
Start: 2025-06-11 | End: 2025-06-11 | Stop reason: HOSPADM

## 2025-06-11 RX ORDER — MORPHINE SULFATE 30 MG/1
30 TABLET, FILM COATED, EXTENDED RELEASE ORAL 2 TIMES DAILY
Qty: 60 TABLET | Refills: 0 | Status: SHIPPED | OUTPATIENT
Start: 2025-06-11 | End: 2025-07-11

## 2025-06-11 RX ORDER — PROCHLORPERAZINE EDISYLATE 5 MG/ML
10 INJECTION INTRAMUSCULAR; INTRAVENOUS EVERY 6 HOURS PRN
Status: DISCONTINUED | OUTPATIENT
Start: 2025-06-11 | End: 2025-06-11 | Stop reason: HOSPADM

## 2025-06-11 RX ORDER — PROCHLORPERAZINE MALEATE 10 MG
10 TABLET ORAL EVERY 6 HOURS PRN
Status: DISCONTINUED | OUTPATIENT
Start: 2025-06-11 | End: 2025-06-11 | Stop reason: HOSPADM

## 2025-06-11 RX ORDER — DIPHENHYDRAMINE HYDROCHLORIDE 50 MG/ML
50 INJECTION, SOLUTION INTRAMUSCULAR; INTRAVENOUS AS NEEDED
Status: DISCONTINUED | OUTPATIENT
Start: 2025-06-11 | End: 2025-06-11 | Stop reason: HOSPADM

## 2025-06-11 RX ORDER — HEPARIN SODIUM,PORCINE/PF 10 UNIT/ML
50 SYRINGE (ML) INTRAVENOUS AS NEEDED
Status: DISCONTINUED | OUTPATIENT
Start: 2025-06-11 | End: 2025-06-11 | Stop reason: HOSPADM

## 2025-06-11 RX ADMIN — SODIUM CHLORIDE 200 MG: 9 INJECTION, SOLUTION INTRAVENOUS at 11:44

## 2025-06-11 ASSESSMENT — PAIN SCALES - GENERAL: PAINLEVEL_OUTOF10: 8

## 2025-06-11 NOTE — PROGRESS NOTES
Patient here today for follow up and treatment.    Pain management recently increased his Morphine to 30, but there is no stock at the pharmacy, so he's only been taking his oxy.    Fatigue: a little worse with worsening pain  PO intake: adequate  Weight: stable  N/V/D/C: denies n/v, having frequent stools but not diarrhea     Medications and pharmacy preference reviewed with patient.

## 2025-06-11 NOTE — TELEPHONE ENCOUNTER
This RN called  [facility], [facility], and [facility] to fill MS Contin 30 mg tabs.  Pharmacies did not have the 15 or 30 mg tabs in stock and were unable to order.  Message left for [facility] pharmacy off [address] in [place] [phone] to call supportive oncology office back to see if 15 or 30 mg tabs were in stock or could be ordered.  Awaiting call back.

## 2025-06-11 NOTE — SIGNIFICANT EVENT

## 2025-06-11 NOTE — TELEPHONE ENCOUNTER
[facility] pharmacy in [facility] [phone] had entire 30 mg MS Contin 60 tab script in stock.  Script pended to provider to send into pharmacy to be filled today.  Patient updated and aware.

## 2025-06-11 NOTE — PROGRESS NOTES
Patient ID: Pillo Guerin is a 65 y.o. male.    Primary Care Provider: Rocio Harris, SIMONE-CNP    DIAGNOSIS AND STAGING  Stage IVB (cT3 cN3 cM1c) poorly differentiated adenocarcinoma of RUL diagnosed on 08/03/23     SITES OF DISEASE  RUL  Supraclavicular nodes  Contralateral lung  Right gluteal muscle   Brain right occipital lobe      MOLECULAR GENOMICS  MICROSATELLITE STATUS: Microsatellite Stable (MAGDIEL)     DISEASE ASSOCIATED GENOMIC FINDINGS:   KRAS p.G13D ([id number] c.38G>A)   PIK3CA p.E542K ([id number] c.1624G>A)   TP53 p.E285K ([id number] c.853G>A)     DISEASE RELEVANT ALTERATIONS NOT DETECTED:   Negative for ALK fusion.   Negative for BRAF V600E.   Negative for EGFR sensitizing mutation.   Negative for KRAS G12C.   Negative for MET exon 14 skipping mutation.   Negative for NTRK fusion.   Negative for RET fusion.   Negative for ROS1 fusion.     PD-L1 TPS 80%     PRIOR THERAPIES  GKRS to right occipital lobe on  24 Gy in 1 fraction on 10/17/23      CURRENT THERAPY  Single agent pembrolizumab since 08/31/23    CURRENT ONCOLOGICAL PROBLEMS  Resolved GIII ICI colitis     HISTORY OF PRESENT ILLNESS  This is a pt with PMH of EtOH use disorder, CVA, CAD c/b NSTEMI s/p 2 stents and stage IVB ([id number]) poorly differentiated adenocarcinoma of RUL diagnosed on 08/03/23.      Patient was initially diagnosed 08/2022 after incidentally found RUL lesion was worked up during admission for hyponatremia presumed to be 2/2 beer potomania. He was then seen by his pulmonologist Dr. Callejas who ordered PET and MRI but this was not obtained  nor were multiple attempts to schedule for oncologic NPV.     I saw him in August 2023 and he endorsed haviing lost 15 lbs since stroke April 2022. His appetite is good, denies dysgeusia. He has slowed down a bit since he had his stroke. He can walk without becoming dyspneic but is limited by his stroke.    On 08/12/23 the patient underwent a bronchoscopy with EBUS:  Accession #:  [id number]   Date of Procedure:8/3/2022   Date Reported: 8/9/2022   Date Received: 8/3/2022   Date of Birth / Sex 1960 (Age: 62) / M   Race: WHITE   Submitting Physician: DARYN YOUNG MD   Attending Physician: MD HEVER BROWNE MD   Procedures/Addenda Present     Other External #     FINAL CYTOLOGICAL INTERPRETATION     A. FINE NEEDLE ASPIRATION LYMPH NODE, L 4, CYTOLOGY AND CELL BLOCK:   NO MALIGNANT CELLS IDENTIFIED.   LYMPHOID SAMPLE IS PRESENT.     Note: Cell block shows presence of lymphoid cells along with bronchial cells contamination.     B. FINE NEEDLE ASPIRATION LYMPH NODE, STATION 7, CYTOLOGY AND CELL BLOCK:   NO MALIGNANT CELLS IDENTIFIED.   LYMPHOID SAMPLE IS PRESENT.     C. FINE NEEDLE ASPIRATION LYMPH NODE, R 11, CYTOLOGY AND CELL BLOCK:   POORLY DIFFERENTIATED CARCINOMA, CONSISTENT WITH ADENOCARCINOMA, SEE NOTE.     Note: A limited panel of immunohistochemical stains performed showed neoplastic   cells staining strongly and diffusely positive with TTF-1 and negative with   p40. Findings are consistent with above.     Staff pathologist: Reviewed by Dr. Ellyn Valles.     Molecular testing has been ordered and results will be issued in an addendum.     The cell block; C1 contains moderate tumor cellularity representing roughly 70 % of all nucleated cells.       D. BRONCHO-ALVEOLAR LAVAGE OF RIGHT UPPER LOBE:   RARE ATYPICAL CELLS ARE PRESENT.     08/16/23: PET scan demonstrates multiple RUL FDG avid nodules as well as contra-lateral lung nodules, right hilar, mediastinal and supraclavicular nodes consistent with neoplastic involvement. There is also a large FDG avid right gluteal mass extending into the right SIJ and iliac bone.     08/16/23: MRI brain shows a 8 mm right occipital lobe metastasis with mild associated edema.     08/31/23: Single agent pembrolizumab begins     10/17/23: GKRS to right occipital lobe on  24 Gy in 1 fraction     06/06/24: grade 3 immune-related  diarrhea with episodes of incontinence despite Imodium   Rx prednisone 40 mg/day provided today      PAST MEDICAL HISTORY  CAD s/p 2 stents, CVA, Dupuytren's contracture   BPH  Lumbar radiculopathy   Ir-diarrhea (pembrolizumab) treated with prednisone months of May and      SURGICAL HISTORY  None      SOCIAL HISTORY  Born in [place], lives in [place] with 2 roommates.   Brother Salas is NOK. Drinks 2 24 oz beers a day.   + Tobacco - 50 pack year smoking history, active.   Rare cannabis, acid use in 70s, no additional illicits.    Was working at [employer] in [place] before stroke.      FAMILY HISTORY  Mother  of melanoma in      CURRENT MEDS REVIEWED      ALLERGIES REVIEWED     SUBJECTIVE:   Patient doing well today. He has had decreased activity due to pain as he has been unable to fill the morphine Rx. He had been walking daily outside. Palliative care has been notified. He is eating well. Some soft stool since stopping morphine, no diarrhea. He denies any fevers, chills or night sweats. No cough, chest pain or shortness of breath. No nausea or vomiting. No urinary symptoms. No rash. No neuropathy.     Review of Systems:  A review of systems has been completed and are negative for complaints except what is stated in the HPI and/or past medical history      OBJECTIVE:  /78   Pulse 56   Temp 36.9 °C (98.4 °F)   Resp 18   Wt 59.1 kg (130 lb 3.2 oz)   SpO2 97%   BMI 19.23 kg/m²     Wt Readings from Last 5 Encounters:   25 59.1 kg (130 lb 3.2 oz)   25 61 kg (134 lb 6.4 oz)   25 60.3 kg (133 lb)   25 60.1 kg (132 lb 8 oz)   25 62 kg (136 lb 9.6 oz)       Physical Exam:  ECO  Pain: 7/10 mainly left leg  Constitutional: Well developed, awake/alert/oriented x3, no distress, alert and cooperative  Eyes: PER. sclera anicteric  ENMT: Oral mucosa moist, no lesions or thrush identified  Respiratory/Thorax: Breathing is non-labored. Lungs are clear to  auscultation bilaterally. No adventitious breath sounds  Cardiovascular: S1-S2. Regular rate and rhythm. No murmurs, rubs, or gallops appreciated  Gastrointestinal: Abdomen soft nontender, nondistended, normal active bowel sounds.  Musculoskeletal: ROM intact, no joint swelling, normal strength  Extremities: normal extremities, no cyanosis, no edema, no clubbing  Lymphatics: no palpable lymphadenopathy  Skin: no rash  Neurologic:  Nonfocal exam. No myoclonus  Psychological: Pleasant, appropriate and easily engaged     Diagnostic Results     Lab Results   Component Value Date    WBC 7.0 06/10/2025    HGB 12.5 (L) 06/10/2025    HCT 37.9 (L) 06/10/2025    MCV 65 (L) 06/10/2025     06/10/2025      Lab Results   Component Value Date    NEUTROABS 3.75 06/10/2025      Lab Results   Component Value Date    GLUCOSE 97 06/10/2025    CALCIUM 8.9 06/10/2025     (L) 06/10/2025    K 4.3 06/10/2025    CO2 28 06/10/2025    CL 98 06/10/2025    BUN 9 06/10/2025    CREATININE 0.67 06/10/2025    MG 2.28 08/06/2024     Lab Results   Component Value Date    ALT 14 06/10/2025    AST 17 06/10/2025    ALKPHOS 48 06/10/2025    BILITOT 0.9 06/10/2025    BILIDIR 0.3 07/31/2022      Lab Results   Component Value Date    ACTH 9.7 05/20/2025    CORTISOL 12.1 05/20/2025    TSH 6.00 (H) 05/20/2025    FREET4 1.17 05/20/2025       Imaging  CT CAP 05/16/25  IMPRESSION:  Lung cancer restaging scan. When compared to the prior CT dated  03/12/2025, there is stable size of the dominant right upper lobe  mass with increasing internal cavitary component which may be  posttreatment related and attention on follow-up imaging is  recommended. Stable osseous metastatic disease. No definite new sites  of metastatic disease. Additional stable chronic and incidental  findings described above.     MRI Brain 03/12/2025  IMPRESSION:  1. No mass or enhancement suggestive of metastatic disease is noted.      2. The parenchymal abnormalities are similar to  the prior MR and may be secondary to chronic ischemic changes. No acute infarct or hemorrhage is noted.    Assessment/Plan   Lung cancer (Multi), Clinical: Stage IVB (cT3, cN3, cM1c)  Mr. Pillo Guerin is a 63 YO with Stage IVB ([id number]) poorly differentiated adenocarcinoma of RUL, TPS 80%, lack of actionable genomic alterations noted.    He was started on single agent pembrolizumab since 08/31/23 - however had to be held in May 2024 due to grade III ICI colitis which has completed resolved.     He has now completed 20 cycles pembrolizumab. CT scans from 05/25 personally reviewed showing stable primary lesion with new cavitation suggestive of response. We will continue with pembrolizumab and obtain scans in August.    #Stage IVB NSCLC with early, slow progression and no options in second line  -Continue pembrolizumab  -Scans August    #Grade III ICI Diarrhea - resolved  6/11/2025 - soft stool as currently off Morphine     #Pruritus, immune related  Continue moisturizing BUE with CeraVe and triamcinolone as needed     #Right occipital lobe 8 mm metastasis   S/p GKRS 24 Gy in 1 fraction on 10/17/23. Stable MRI 3/12/2025  Repeat MRI brain every 3-4 months per Rad Onc     #Pain on right gluteal region-  Due to large centrally necrotic and peripherally hypermetabolic mass in the right lower paramediastinal muscle extending into the adjacent subcutaneous tissue and superior right gluteal muscle with involvement of the superior aspect of the right iliac  bone, right florencio sacrum -   He has been taking gabapentin for pain (controlled) in LLE that is chronic   -Increased oxycodone to 10 mg every 6 PRN. Patient has established with supportive oncology    #Advanced Care Planning  -Discussed slow nature of progression and challenges with treatment in second line given PS. He is close with brother and roommate/ex gf Radha     #Stroke in April 2022 and hx of CAD  -   Stable -   On ASA and ticagrelor      #HTN - on amlodipine       #HLD - atorvastatin    #Microcytic Anemia - Chronic in nature. Iron panel and ferritin found to be WNL.  Could send hemoglobin electrophoresis if anemia becomes a process.   Has never been told or heard about family history of thalassemia or other blood disorders.     #Dispo  -continue every 21 day visits while on Pembrolizumab     Damian Carpenter, APRN-CNP  [facility]  Thoracic + H&N Medical Oncology     I spent a total of 30+ minutes on the date of the service which included preparing to see the patient, face-to-face patient care, completing clinical documentation, obtaining and / or reviewing separately obtained history, counseling and educating the patient/family/caregiver, ordering medications, tests, or procedures, communicating with other healthcare providers (not separately reported), independently interpreting results, not separately reported, and communicating results to the patient/family/caregiver, Name and date of birth verified.

## 2025-06-11 NOTE — PROGRESS NOTES
NUTRITION Assessment NOTE    Nutrition Assessment     Reason for Visit:  Pillo Guerin is a 65 y.o. 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     PMH of EtOH use disorder, CVA, CAD c/b NSTEMI s/p 2 stents, hyponatremia presumed to be 2/2 beer potomania.     Met with patient for nutrition follow up visit today.  He is here in infusion for treatment .    Lab Results   Component Value Date/Time    GLUCOSE 97 06/10/2025 1138     (L) 06/10/2025 1138    K 4.3 06/10/2025 1138    CL 98 06/10/2025 1138    CO2 28 06/10/2025 1138    ANIONGAP 10 06/10/2025 1138    BUN 9 06/10/2025 1138    CREATININE 0.67 06/10/2025 1138    EGFR >90 06/10/2025 1138    CALCIUM 8.9 06/10/2025 1138    ALBUMIN 4.0 06/10/2025 1138    ALKPHOS 48 06/10/2025 1138    PROT 5.9 (L) 06/10/2025 1138    AST 17 06/10/2025 1138    BILITOT 0.9 06/10/2025 1138    ALT 14 06/10/2025 1138    MG 2.28 08/06/2024 1032    PHOS 4.1 08/05/2022 0606     Lab Results   Component Value Date/Time    VITD25 50 03/18/2025 1212        Lab Results   Component Value Date    WBC 7.0 06/10/2025    HGB 12.5 (L) 06/10/2025    HCT 37.9 (L) 06/10/2025    MCV 65 (L) 06/10/2025     06/10/2025       Anthropometrics:     Per patient, weight fluctuates 60-62 kg .  Weight was up Dec/January (unsure if this was true weight gain or fluid shift.  Today weight is down 2-3 lbs from usual weight range.   Wt Readings from Last 20 Encounters:   06/11/25 59.1 kg (130 lb 3.2 oz)   05/30/25 61 kg (134 lb 6.4 oz)   05/21/25 60.3 kg (133 lb)   04/30/25 60.1 kg (132 lb 8 oz)    04/25/25 62 kg (136 lb 9.6 oz)   04/09/25 61.7 kg (135 lb 14.6 oz)   03/28/25 62.8 kg (138 lb 6.4 oz)   03/19/25 61.6 kg (135 lb 12.9 oz)   02/26/25 63 kg (139 lb)   02/05/25 61.2 kg (135 lb)   01/15/25 64 kg (141 lb)   03/12/25 62.6 kg (138 lb)   12/11/24 63.9 kg (140 lb 14 oz)   11/20/24 63 kg (139 lb)   10/30/24 62.8 kg (138 lb 5.4 oz)   10/10/24 62.4 kg (137 lb 9.1 oz)   09/18/24 61.8 kg (136 lb 3.9 oz)   12/04/24 62.1 kg (137 lb)   08/28/24 59.6 kg (131 lb 6.3 oz)   08/26/24 61.9 kg (136 lb 7.4 oz)              Food And Nutrient Intake:  Food and Nutrient History  Food and Nutrient History: Patient reports fair intake the past week as he has been unable to get morphine filled, and he is experiencing increased pain, thus more difficulty with eating. In addition, he's had some difficulty sleeping.  He was up today at 3:15 am.  Weight down 4 lbs from UBW range.  He is still getting meals on wheels and global meals.  He hasn't been taking Ensure as he needs to purchase more.  Did provide him with Ensure coupons today to assist with cost savings. He has been trying to stay active by walking outside. Denies n/v/d. Stool is looser since not having pain medication, but is not diarrhea.  CNP in today noting that [facility] in [place] has morphine that he can  today.  He is confident oral intake will improve once he is able to get his morphine rx filled and pain is better controlled.             Food Preparation  Grocery Shopping: Patient           Nutrition Focused Physical Exam Findings:      Subcutaneous Fat Loss  Defer Subcutaneous Fat Loss Assessment: Defer all  Defer All Reason: See 5/21           Energy Needs  Estimated Energy Needs  Total Energy Estimated Needs in 24 hours (kCal):  (6022-6710 cals (32-35 cals/kg))  Energy Estimated Needs per kg Body Weight in 24 hours (kCal/kg): 35 kCal/kg  Method for Estimating Needs: Increased for slow repletion  Estimated Fluid Needs  Total Fluid Estimated Needs in 24  Hours (mL):  (1900 mls)  Total Fluid Estimated Needs in 24 hours (mL/kg): 30 mL/kg  Estimated Protein Needs  Total Protein Estimated Needs in 24 Hours (g):  ( g)  Protein Estimated Needs per kg Body Weight in 24 Hours (g/kg): 1.4 g/kg  Method for Estimating 24 Hour Protein Needs: Increased for slow repletion        Nutrition Diagnosis   Malnutrition Diagnosis  Patient has Malnutrition Diagnosis: Yes  Diagnosis Status: Active  Malnutrition Diagnosis: Mild malnutrition related to chronic disease or condition  Related to: h/o chronic alcoholism and cancer  As Evidenced by: previously with significant weight loss (now improving ) and remains with mild-moderate muscle/fat loss (Patient has been making progress and eating well, thus has transitioned from Moderate to Mildly Malnourished.)       Nutrition Interventions/Recommendations   Nutrition Prescription  Individualized Nutrition Prescription Provided for : Regular TONO (Note pt with limited resources and provided Global meals vary but often provide frozen meals)Regular diet   Continue with 1000 Ius vitamin D3 daily for maintenance   One-a -day MVI   Encouraged Ensure Plus shake once daily (as able to afford) for ongoing weight stablization /gain   Ensure Plus 1 x/day provides: 350 kCal, 16 g PRO, 11 g fat, 47 g CHO, 0 g fiber, and 0 mL free water    Ensure coupons provided to assist with cost savings     Food and Nutrition Delivery  Food and Nutrition Delivery  Medical Food Supplement:  (Encouraged retrial for tolerance.  WIll mail Ensure coupons to pt)  Goals:  (Continue DMV with iron)    Nutrition Education  Nutrition Education  Nutrition Education Content: Content related nutrition education    -Coordination of Care  Coordination of Nutrition Care by a Nutrition Professional  Collaboration and referral of nutrition care: Collaboration and Referral of Nutrition Care    There are no Patient Instructions on file for this visit.    Nutrition Monitoring and  Evaluation   Food and Nutrient Intake  Fluid Intake: Estimated fluid intake  Criteria: Meet daily hydration goals  Amount of Food: Estimated amout of food  Criteria: Meet energy and protein needs  Meal/Snack Pattern: Estimated meal and snack pattern  Criteria: as above  Anthropometric measurements  Monitoring and Evaluation Plan: Weight  Criteria: Maintenance or slow gain  Biochemical Data, Medical Tests and Procedures  Criteria: WNL  Vitamin Profile: Vitamin D, 25 hydroxy  Criteria: > 30

## 2025-06-12 ENCOUNTER — APPOINTMENT (OUTPATIENT)
Dept: CARDIOLOGY | Facility: CLINIC | Age: 65
End: 2025-06-12
Payer: COMMERCIAL

## 2025-06-25 PROBLEM — N40.0 BENIGN PROSTATIC HYPERPLASIA: Status: ACTIVE | Noted: 2022-08-05

## 2025-06-25 PROBLEM — E55.9 VITAMIN D DEFICIENCY: Status: ACTIVE | Noted: 2025-06-25

## 2025-06-25 PROBLEM — I67.2 CEREBRAL ATHEROSCLEROSIS: Status: RESOLVED | Noted: 2023-09-22 | Resolved: 2025-06-25

## 2025-06-25 PROBLEM — I21.3 ST ELEVATION MYOCARDIAL INFARCTION (STEMI) (MULTI): Status: ACTIVE | Noted: 2023-09-13

## 2025-06-25 PROBLEM — S72.009A CLOSED FRACTURE OF HIP (MULTI): Status: ACTIVE | Noted: 2023-01-04

## 2025-06-25 PROBLEM — R59.0 MEDIASTINAL LYMPHADENOPATHY: Status: ACTIVE | Noted: 2025-06-25

## 2025-06-25 PROBLEM — S72.009A CLOSED FRACTURE OF HIP (MULTI): Status: RESOLVED | Noted: 2023-01-04 | Resolved: 2025-06-25

## 2025-06-25 PROBLEM — G81.94 LEFT HEMIPARESIS (MULTI): Status: ACTIVE | Noted: 2025-06-25

## 2025-06-25 PROBLEM — F10.20 ALCOHOLISM (MULTI): Status: ACTIVE | Noted: 2023-06-28

## 2025-06-25 PROBLEM — R91.8 LUNG MASS: Status: ACTIVE | Noted: 2025-06-25

## 2025-06-25 PROBLEM — S31.829A OPEN WOUND OF LEFT BUTTOCK: Status: ACTIVE | Noted: 2024-06-25

## 2025-06-25 PROBLEM — I69.30 HISTORY OF CEREBROVASCULAR ACCIDENT (CVA) WITH RESIDUAL DEFICIT: Status: ACTIVE | Noted: 2023-09-13

## 2025-06-26 ENCOUNTER — HOSPITAL ENCOUNTER (OUTPATIENT)
Dept: RADIOLOGY | Facility: CLINIC | Age: 65
Discharge: HOME | End: 2025-06-26
Payer: COMMERCIAL

## 2025-06-26 ENCOUNTER — TELEPHONE (OUTPATIENT)
Dept: RADIATION ONCOLOGY | Facility: HOSPITAL | Age: 65
End: 2025-06-26
Payer: COMMERCIAL

## 2025-06-26 DIAGNOSIS — C34.90 LUNG CANCER METASTATIC TO BRAIN (MULTI): ICD-10-CM

## 2025-06-26 DIAGNOSIS — C34.90 MALIGNANT NEOPLASM OF LUNG, UNSPECIFIED LATERALITY, UNSPECIFIED PART OF LUNG (MULTI): ICD-10-CM

## 2025-06-26 DIAGNOSIS — C79.31 LUNG CANCER METASTATIC TO BRAIN (MULTI): ICD-10-CM

## 2025-06-26 PROCEDURE — A9575 INJ GADOTERATE MEGLUMI 0.1ML: HCPCS | Mod: JW

## 2025-06-26 PROCEDURE — 2550000001 HC RX 255 CONTRASTS: Mod: JW

## 2025-06-26 PROCEDURE — 70553 MRI BRAIN STEM W/O & W/DYE: CPT

## 2025-06-26 RX ORDER — GADOTERATE MEGLUMINE 376.9 MG/ML
12 INJECTION INTRAVENOUS
Status: COMPLETED | OUTPATIENT
Start: 2025-06-26 | End: 2025-06-26

## 2025-06-26 RX ADMIN — GADOTERATE MEGLUMINE 12 ML: 376.9 INJECTION INTRAVENOUS at 10:57

## 2025-06-27 ENCOUNTER — OFFICE VISIT (OUTPATIENT)
Dept: PALLIATIVE MEDICINE | Facility: CLINIC | Age: 65
End: 2025-06-27
Payer: COMMERCIAL

## 2025-06-27 ENCOUNTER — PHARMACY VISIT (OUTPATIENT)
Dept: PHARMACY | Facility: CLINIC | Age: 65
End: 2025-06-27
Payer: COMMERCIAL

## 2025-06-27 VITALS
HEART RATE: 63 BPM | SYSTOLIC BLOOD PRESSURE: 118 MMHG | OXYGEN SATURATION: 97 % | WEIGHT: 131.8 LBS | DIASTOLIC BLOOD PRESSURE: 70 MMHG | TEMPERATURE: 97.9 F | RESPIRATION RATE: 16 BRPM | BODY MASS INDEX: 19.46 KG/M2

## 2025-06-27 DIAGNOSIS — C34.11 MALIGNANT NEOPLASM OF UPPER LOBE OF RIGHT LUNG (MULTI): ICD-10-CM

## 2025-06-27 DIAGNOSIS — Z51.5 PALLIATIVE CARE ENCOUNTER: ICD-10-CM

## 2025-06-27 DIAGNOSIS — R63.4 WEIGHT LOSS: ICD-10-CM

## 2025-06-27 DIAGNOSIS — G89.3 CANCER RELATED PAIN: Primary | ICD-10-CM

## 2025-06-27 PROCEDURE — 99214 OFFICE O/P EST MOD 30 MIN: CPT

## 2025-06-27 PROCEDURE — 3074F SYST BP LT 130 MM HG: CPT

## 2025-06-27 PROCEDURE — 1159F MED LIST DOCD IN RCRD: CPT

## 2025-06-27 PROCEDURE — 3078F DIAST BP <80 MM HG: CPT

## 2025-06-27 PROCEDURE — RXMED WILLOW AMBULATORY MEDICATION CHARGE

## 2025-06-27 PROCEDURE — 1126F AMNT PAIN NOTED NONE PRSNT: CPT

## 2025-06-27 RX ORDER — ACETAMINOPHEN 325 MG/1
650 TABLET ORAL EVERY 6 HOURS PRN
Qty: 120 TABLET | Refills: 2 | Status: SHIPPED | OUTPATIENT
Start: 2025-06-27 | End: 2025-09-25

## 2025-06-27 RX ORDER — OXYCODONE HYDROCHLORIDE 10 MG/1
10 TABLET ORAL EVERY 6 HOURS PRN
Qty: 120 TABLET | Refills: 0 | Status: SHIPPED | OUTPATIENT
Start: 2025-06-27 | End: 2025-07-27

## 2025-06-27 ASSESSMENT — ENCOUNTER SYMPTOMS
OCCASIONAL FEELINGS OF UNSTEADINESS: 0
DEPRESSION: 0
LOSS OF SENSATION IN FEET: 0

## 2025-06-27 ASSESSMENT — PATIENT HEALTH QUESTIONNAIRE - PHQ9
SUM OF ALL RESPONSES TO PHQ9 QUESTIONS 1 & 2: 0
1. LITTLE INTEREST OR PLEASURE IN DOING THINGS: NOT AT ALL
2. FEELING DOWN, DEPRESSED OR HOPELESS: NOT AT ALL

## 2025-06-27 ASSESSMENT — PAIN SCALES - GENERAL: PAINLEVEL_OUTOF10: 0-NO PAIN

## 2025-06-27 NOTE — PROGRESS NOTES
SUPPORTIVE AND PALLIATIVE ONCOLOGY CONSULT - OUTPATIENT      SERVICE DATE: 6/27/2025    Medical Oncologist: MD Jorge Duque MD   Radiation Oncologist: No care team member to display  Primary Physician: Rocio Harris  [phone]    REASON FOR CONSULT/CHIEF CONSULT COMPLAINT: pain management and Introduction to Supportive and Palliative Oncology Services    Subjective   HISTORY OF PRESENT ILLNESS: Pillo Guerin is a 65 y.o. 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 Pt is s/p brain XRT 10/2023 and currently on Pembro. Pt follows with Dr. Gray.     Pain Assessment:  Pain Score:  5  Location:  L hip -> L leg (constant), RLE (intermittent)  Education:  changes to current regimen      Symptom Assessment:  Pain:somewhat- L hip -> L leg (constant), RLE (intermittent);  Headache: none  Dizziness:none  Lack of energy: none  Difficulty sleeping: none  Worrying: none  Anxiety: none  Depression: none  Pain in mouth/swallowing: none  Dry mouth: none  Taste changes: none  Shortness of breath: a little- with exertion  Lack of appetite: none   Nausea: none  Vomiting: none  Constipation: none  Diarrhea: none- finds that eating white rice helps to manage this  Sore muscles: a little  Numbness or tingling in hands/feet/other: none  Weight loss: a little- aware, going to restart Ensure  Other: none      Information obtained from: chart review and interview of patient  ______________________________________________________________________     Oncology History   Lung cancer (Multi)   8/31/2023 - 10/30/2024 Chemotherapy    Pembrolizumab, 21 Day Cycles     9/22/2023 Initial Diagnosis    Lung cancer (CMS/HCC)     11/21/2023 Cancer Staged    Staging form: Lung, AJCC 8th Edition, Clinical: Stage IVB (cT3, cN3, cM1c) - Signed by Zuleyma Vines MD on 11/21/2023 11/20/2024 -  Chemotherapy     "Pembrolizumab, 21 Day Cycles         Past Medical History:   Diagnosis Date    Hyperlipidemia, unspecified 06/13/2022    Hyperlipidemia    Old myocardial infarction     History of myocardial infarction     Past Surgical History:   Procedure Laterality Date    MR HEAD ANGIO WO IV CONTRAST  4/19/2022    MR HEAD ANGIO WO IV CONTRAST 4/19/2022 [facility]ICAL LEGAC[facility]    MR NECK ANGIO WO IV CONTRAST  4/19/2022    MR NECK ANGIO WO IV CONTRAST 4/19/2022 [facility] CLINICAL LEGACY    OTHER SURGICAL HISTORY  09/01/2015    Previous Stent Placement     Family History   Problem Relation Name Age of Onset    Cancer Mother      Heart attack Father      Heart attack Brother          SOCIAL HISTORY  Lives with ex/current friendRadha   Social History:  reports that he has been smoking cigarettes. He has been exposed to tobacco smoke. He uses smokeless tobacco. He reports current alcohol use of about 4.0 standard drinks of alcohol per week. He reports current drug use. Drug: \"Crack\" cocaine.  Retired, previously worked in [employer]    [employer] and Importance of [employer]: unknown    REVIEW OF SYSTEMS  Review of systems negative unless noted in HPI.       Objective     Palliative Performance Scale % (PPS)       Current Outpatient Medications   Medication Instructions    amLODIPine (NORVASC) 2.5 mg, Daily RT    atorvastatin (Lipitor) 80 mg tablet TAKE 1 TABLET BY MOUTH ONCE DAILY    cholecalciferol (VITAMIN D3) 1,000 Units, oral, Daily    diclofenac sodium (Voltaren) 1 % gel     docusate sodium (COLACE) 200 mg, oral, Nightly PRN, You only need to take this if you feel constipated.    FeroSuL 325 mg (65 mg iron) tablet     gabapentin (NEURONTIN) 600 mg, oral, 2 times daily    Incruse Ellipta 62.5 mcg, inhalation, Daily    ketoconazole (NIZOral) 2 % cream     loperamide (Imodium A-D) 2 mg tablet Take it as instructed for diarrhea    losartan (COZAAR) 50 mg    metoprolol tartrate (Lopressor) 25 mg tablet TAKE 1 TABLET BY MOUTH TWO TIMES A DAY    " "morphine CR (MS CONTIN) 30 mg, oral, 2 times daily, Do not crush, chew, or split.    naloxone (NARCAN) 4 mg, nasal, As needed, May repeat every 2-3 minutes if needed, alternating nostrils, until medical assistance becomes available.    nitroglycerin (Nitrostat) 0.4 mg SL tablet DISSOLVE 1 TABLET UNDER TONGUE EVERY 5 MINUTES FOR 3 DOSES AS NEEDED FOR CHEST PAIN. IF PAIN PERSISTS DIAL 911.    omeprazole (PriLOSEC) 20 mg DR capsule Do not crush or chew. Take it 30 minutes before breakfast.    oxyCODONE (ROXICODONE) 10 mg, oral, Every 6 hours PRN    predniSONE (DELTASONE) 5 mg, oral, Daily    tadalafil (CIALIS) 10 mg, Daily PRN    ticagrelor (BRILINTA) 60 mg, oral, 2 times daily    triamcinolone (Kenalog) 0.1 % ointment Topical, 2 times daily       Allergies: No Known Allergies    No results found for this or any previous visit (from the past 96 hours).             PHYSICAL EXAMINATION  Vital Signs:       4/25/2025     2:18 PM 4/30/2025     9:10 AM 5/21/2025    10:33 AM 5/30/2025     2:04 PM 6/11/2025    10:02 AM 6/26/2025     9:49 AM 6/27/2025     2:16 PM   Vitals   Systolic 127 118 133 151 148  118   Diastolic 72 69 57 73 78  70   BP Location  Left arm Right arm       Heart Rate 64 55 57 63 56  63   Temp 36.7 °C (98.1 °F) 36.2 °C (97.2 °F) 36.3 °C (97.3 °F) 36.6 °C (97.9 °F) 36.9 °C (98.4 °F)  36.6 °C (97.9 °F)   Resp 18 18 18 16 18  16   Height      1.753 m (5' 9\")    Weight (lb) 136.6 132.5 133 134.4 130.2 130 131.8   BMI 20.17 kg/m2 19.57 kg/m2 19.64 kg/m2 19.85 kg/m2 19.23 kg/m2 19.2 kg/m2 19.46 kg/m2   BSA (m2) 1.74 m2 1.71 m2 1.71 m2 1.72 m2 1.7 m2 1.69 m2 1.71 m2   Visit Report Report Report Report Report Report  Report        Physical Exam  Constitutional:       Appearance: Normal appearance.   HENT:      Mouth/Throat:      Mouth: Mucous membranes are moist.   Eyes:      Extraocular Movements: Extraocular movements intact.      Pupils: Pupils are equal, round, and reactive to light.   Cardiovascular:      Rate " and Rhythm: Normal rate.   Pulmonary:      Effort: Pulmonary effort is normal.   Abdominal:      Palpations: Abdomen is soft.   Musculoskeletal:      Comments: LUE, LLE weakness, wears brace to LLE   Skin:     General: Skin is warm and dry.   Neurological:      Mental Status: He is alert and oriented to person, place, and time.   Psychiatric:         Mood and Affect: Mood normal.         Behavior: Behavior normal.         ASSESSMENT/PLAN    Pain  Pain is: cancer related pain and chronic (cancer, CVA)  Type: somatic and neuropathic; L hip -> L leg (constant), RLE (intermittent)  Pain control: sub-optimally controlled  Home regimen:   Oxycodone to 10mg q6h as needed (takes ~4X/day to good relief)  Morphine ER 30mg BID for continued cancer pain  Gabapentin 600mg BID  Tylenol 650mg q6h as needed- encouraged to take consistently with Oxycodone for improved pain relief  Offered PT- pt prefers to get stronger with walking first (education provided, pt continues to decline)  Intolerances/previously tried: n/a  Personalized pain goal: feel comfortable enough to walk more    Opioid Use  Medication Management:   - OARRS report reviewed with no aberrant behavior; consistent with  prescriptions/records and patient history  - MED ~.  Overdose Risk Score 320.   This has been discussed with patient.   - We will continue to closely monitor the patient for signs of prescription misuse including UDS, OARRS review and subjective reports at each visit.  - N/a concurrent benzodiazepine use   - I am a provider who either is or has consulted and collaborated with a provider certified in Hospice and Palliative Medicine and have conducted a face-face visit and examination for this patient.  - Routine Urine Drug Screen completed 5/30/25  - Controlled Substance Agreement completed 4/25/25  - Specifically discussed that controlled substance prescriptions will only be provided by our group as outlined in the completed agreement  -  "Prescribed naloxone previously  - Red Flags: n/a     Nausea- denies    Constipation/Diarrhea   At risk for constipation related to opioids,   currently not constipated, diarrhea stable  Colace 100-200mg nightly as needed for signs of opiate-induced constipation- has not yet needed  Encouraged adequate fluid intake  Manages occasional diarrhea by eating white rice    Altered Mood- denies  Sleeping Difficulty- denies    Decreased appetite- denies  Discussed viewing food as \"fuel\", especially with any increase in activity  Encouraged to start drinking chocolate Ensure (has at home) this week      Supportive Interventions: n/a- will continue to monitor needs    Introduction to Supportive and Palliative Oncology:  Spoke with patient   Introduced the role and philosophy of Supportive and Palliative oncology in the evaluation and management of symptoms during cancer treatment  Palliative care was introduced as a service for patients with serious illness to help with symptoms, assist with goals of care conversations, navigate complex decision making, improve quality of life for patients, and provide support both patients and families.  Patient seemed to appreciate the extra layer of support.    Medical Decision Making/Goals of Care/Advance Care Planning:  Patient's current clinical condition, including diagnosis, prognosis, and management plan, and goals of care were discussed.   Life limiting disease: metastatic malignancy  Family: Supportive ex/friend  Performance status: Major limitations due to previous stroke (manages well)  Goals: symptom control and cancer directed therapy    Advance Directives  Existence of Advance Directives:No - has interest  Decision maker: HCPOA is unknown- confirm at next visit  Code Status: Full code    Next Follow-Up Visit:  Return to clinic in 1 month at  [facility]    Signature and billing  Thank you for allowing us to participate in the care of this patient. Recommendations will be " communicated back to the consulting service by way of shared electronic medical record or face-to-face.    Medical complexity was moderate level due to due to complexity of problems, extensive data review, and high risk of management/treatment.  Time was spent on the following: Prep Time, Time Directly with Patient/Family/Caregiver, Documentation Time. Total time spent: 35min      DATA   Diagnostic tests and information reviewed for today's visit:  Most recent labs and imaging results, Medications     6/27/25: changes to plan indicated in bold. Continue all other regimens as listed.    Some elements copied from Supportive Oncology note on 5/30/25, the elements have been updated and all reflect current decision making from today, 6/27/2025.      Plan of Care discussed with: Provider, RN, Patient      SIGNATURE: AMAN Garcia    Contact information:  Supportive and Palliative Oncology  Monday-Friday 8 AM-5 PM  Phone:  [phone], press option #5, then option #1.   Or Epic Secure Chat

## 2025-06-30 ENCOUNTER — HOSPITAL ENCOUNTER (OUTPATIENT)
Dept: RADIATION ONCOLOGY | Facility: HOSPITAL | Age: 65
Setting detail: RADIATION/ONCOLOGY SERIES
Discharge: HOME | End: 2025-06-30
Payer: COMMERCIAL

## 2025-06-30 DIAGNOSIS — C34.90 LUNG CANCER METASTATIC TO BRAIN (MULTI): ICD-10-CM

## 2025-06-30 DIAGNOSIS — C79.31 LUNG CANCER METASTATIC TO BRAIN (MULTI): ICD-10-CM

## 2025-06-30 DIAGNOSIS — C34.90 MALIGNANT NEOPLASM OF LUNG, UNSPECIFIED LATERALITY, UNSPECIFIED PART OF LUNG (MULTI): ICD-10-CM

## 2025-06-30 PROCEDURE — 99214 OFFICE O/P EST MOD 30 MIN: CPT | Mod: 95

## 2025-06-30 PROCEDURE — 99214 OFFICE O/P EST MOD 30 MIN: CPT

## 2025-06-30 ASSESSMENT — ENCOUNTER SYMPTOMS
DEPRESSION: 0
EXTREMITY WEAKNESS: 1
SPEECH DIFFICULTY: 0
ABDOMINAL DISTENTION: 0
SEIZURES: 0
CONFUSION: 0
ARTHRALGIAS: 0
BACK PAIN: 0
BLOOD IN STOOL: 0
LIGHT-HEADEDNESS: 0
DIFFICULTY URINATING: 0
VOMITING: 0
EYE PROBLEMS: 0
NECK STIFFNESS: 0
CHILLS: 0
NERVOUS/ANXIOUS: 0
FEVER: 0
NECK PAIN: 0
HEMOPTYSIS: 0
SHORTNESS OF BREATH: 0
FATIGUE: 1
NAUSEA: 0
HEMATURIA: 0
ABDOMINAL PAIN: 0
WHEEZING: 0
DIZZINESS: 0
CONSTIPATION: 0
COUGH: 0
DIARRHEA: 0
ADENOPATHY: 0
NUMBNESS: 0
HEADACHES: 0
TROUBLE SWALLOWING: 0

## 2025-06-30 NOTE — PROGRESS NOTES
Radiation Oncology Follow-Up    Patient Name:  Pillo Guerin  MRN:  [id number]  :  1960    Referring Provider: Amos Mcguire, *  Primary Care Provider: AMAN Roland  Care Team: Patient Care Team:  AMAN Roland as PCP - General (Family Medicine)  Zuleyma Vines MD as Consulting Physician (Hematology and Oncology)  Arslan Min RN as Nurse Navigator (Hematology and Oncology)  FABIEN Garrett as  ()  Jorge Gray MD as Consulting Physician (Hematology and Oncology)  AMAN Barton as Nurse Practitioner (Radiation Oncology)  AMAN Garcia as Nurse Practitioner (Palliative Medicine)  Shyanne Rboledo DPM as Surgeon (Podiatry)    Date of Service: 2025    Diagnosis: Poorly differentiated adenocarcinoma of RUL diagnosed on 23     Staging: Stage IVB (cT3 cN3 cM1c)     Sites of disease:   RUL  Supraclavicular nodes  Contralateral lung  Right gluteal muscle   Brain right occipital lobe     Radiotherapy: GKRS to right occipital lobe on 24 Gy in 1 fraction on 10/17/23   Other Treatments    Treatment Period Technique Fraction Dose Fractions Total Dose   Course 1 10/17/2023-10/17/2023  (days elapsed: 0)         A: 10/17/2023-10/17/2023 Gamma-Knife 2400 / 2,400 cGy  2400 / 2,400 cGy     Current Therapies: Single agent pembrolizumab since 23     SUBJECTIVE  History of Present Illness:  Telephone Consent  An interactive audio and video telecommunication system which permits real time communications between the patient (at the originating site) and provider (at the distant site) was utilized to provide this telehealth service.  Verbal consent was requested and obtained from Pillo Guerin on this date, 25 for a telehealth visit and the patient's location was confirmed at the time of the visit.     Pillo Guerin is a 65 y.o. male who was previously seen at the  [facility] Department of Radiation Oncology for Poorly differentiated adenocarcinoma of RUL (cT3 cN3 cM1c) metastatic to the brain.  Treatment consisted of GK (24 Gy in 1 fx) to a right occipital lobe lesion ending on 10/17/23.  Currently the patient is being treated systemically with single agent Pembrolizumab.  Unfortunately restaging imaging has shown slow progression and there are few options in second line.  Today the patient is in clinic for a routine Radiation Oncology FU visit and review of an MRI brain completed on 3/12/25.  Per a Radiology read, the imaging shows stable SARITA.  Overall, The patient is doing well.  He continues to smoke about 15 cigarettes/day which is down from 1.5 packs. He denies a cough, SOB, SANCHEZ or chest pain.  He does endorse some fatigue however.  Neurologically, he continues to c/o a weak left leg related to a prior stroke he had in 4/2022.  He also endorses some pain on his left side that he attributes to his stroke.  He's currently managing with oxycodone. He denies seizures, headaches, vision changes, dizziness, speech changes, memory changes or new focal sensory/motor deficits. Denies chills, fever, hemoptysis or bony pain or recent illnesses.  Denies abdominal pain, nausea, vomiting, diarrhea, or constipation.     6/30/25 Interval FU visit:     Virtual or Telephone Consent    An interactive audio and video telecommunication system which permits real time communications between the patient (at the originating site) and provider (at the distant site) was utilized to provide this telehealth service.  Verbal consent was requested and obtained from Pillo Guerin on this date, 06/30/25 for a telehealth visit and the patient's location was confirmed at the time of the visit.     Today I'm connecting with the patient virtually for a routine Radiation Oncology FU visit and review of an MRI brain completed on 6/26/25.  Per a Radiology review, there is no  evidence of acute infarct, intracranial mass effect or midline shift and no abnormal parenchymal enhancement. Unchanged 0.5 cm focus of enhancement within the left frontal calvarium compared to the prior exam 08/16/2023.  This was discussed with the patient. The patient is doing well.  He denies a cough, SOB, SANCHEZ or chest pain.  He does endorse some fatigue however.  Neurologically, he continues to c/o a weak left leg related to a prior stroke he had in 4/2022.  He also endorses some pain on his left side that he attributes to his stroke.   He denies seizures, headaches, vision changes, dizziness, speech changes, memory changes or new focal sensory/motor deficits. Denies chills, fever, hemoptysis or bony pain or recent illnesses.  Denies abdominal pain, nausea, vomiting, diarrhea, or constipation.     Review of Systems:   Review of Systems   Constitutional:  Positive for fatigue. Negative for chills and fever.   HENT:   Negative for hearing loss and trouble swallowing.    Eyes:  Negative for eye problems.   Respiratory:  Negative for cough, hemoptysis, shortness of breath and wheezing.    Cardiovascular:  Negative for chest pain.   Gastrointestinal:  Negative for abdominal distention, abdominal pain, blood in stool, constipation, diarrhea, nausea and vomiting.   Genitourinary:  Negative for difficulty urinating and hematuria.    Musculoskeletal:  Negative for arthralgias, back pain, gait problem, neck pain and neck stiffness.   Skin:  Positive for itching and rash.   Neurological:  Positive for extremity weakness (Left leg from prior stroke.). Negative for dizziness, gait problem, headaches, light-headedness, numbness, seizures and speech difficulty.   Hematological:  Negative for adenopathy.   Psychiatric/Behavioral:  Negative for confusion and depression. The patient is not nervous/anxious.      Performance Status:   The Karnofsky performance scale today is 90, Able to carry on normal activity; minor signs or  "symptoms of disease (ECOG equivalent 0).    Pain: Denies pain today.     OBJECTIVE  Vital Signs:      4/25/2025     2:18 PM 4/30/2025     9:10 AM 5/21/2025    10:33 AM 5/30/2025     2:04 PM 6/11/2025    10:02 AM 6/26/2025     9:49 AM 6/27/2025     2:16 PM   Vitals   Systolic 127 118 133 151 148  118   Diastolic 72 69 57 73 78  70   BP Location  Left arm Right arm       Heart Rate 64 55 57 63 56  63   Temp 36.7 °C (98.1 °F) 36.2 °C (97.2 °F) 36.3 °C (97.3 °F) 36.6 °C (97.9 °F) 36.9 °C (98.4 °F)  36.6 °C (97.9 °F)   Resp 18 18 18 16 18  16   Height      1.753 m (5' 9\")    Weight (lb) 136.6 132.5 133 134.4 130.2 130 131.8   BMI 20.17 kg/m2 19.57 kg/m2 19.64 kg/m2 19.85 kg/m2 19.23 kg/m2 19.2 kg/m2 19.46 kg/m2   BSA (m2) 1.74 m2 1.71 m2 1.71 m2 1.72 m2 1.7 m2 1.69 m2 1.71 m2   Visit Report Report Report Report Report Report  Report      Physical Exam     ASSESSMENT:   64 y.o. male who was previously seen at the [facility] Department of Radiation Oncology for Poorly differentiated adenocarcinoma of RUL (cT3 cN3 cM1c) metastatic to the brain.  Treatment consisted of GK (24 Gy in 1 fx) to a right occipital lobe lesion ending on 10/17/23.  An MRI of the brain completed on 6/26/25 show stable disease.  This was discussed with the patient.      PLAN:    --MRI brain in 4 months.   --FU with Phil Mcguire CNP in 4 months.  --Continue to follow with Dr. Gray and Damian Carpenter CNP for Medical Oncology management.     Please reach out with any questions or concerns.       NCCN Guidelines were applicable to guide this patients treatment plan.  SIMONE Barton-CNP     " No

## 2025-07-01 ENCOUNTER — LAB (OUTPATIENT)
Dept: LAB | Facility: CLINIC | Age: 65
End: 2025-07-01
Payer: COMMERCIAL

## 2025-07-01 DIAGNOSIS — C34.11 MALIGNANT NEOPLASM OF UPPER LOBE OF RIGHT LUNG (MULTI): ICD-10-CM

## 2025-07-01 LAB
ALBUMIN SERPL BCP-MCNC: 4.3 G/DL (ref 3.4–5)
ALP SERPL-CCNC: 50 U/L (ref 33–136)
ALT SERPL W P-5'-P-CCNC: 13 U/L (ref 10–52)
ANION GAP SERPL CALC-SCNC: 11 MMOL/L (ref 10–20)
AST SERPL W P-5'-P-CCNC: 15 U/L (ref 9–39)
BASOPHILS # BLD AUTO: 0.07 X10*3/UL (ref 0–0.1)
BASOPHILS NFR BLD AUTO: 0.7 %
BILIRUB SERPL-MCNC: 0.8 MG/DL (ref 0–1.2)
BUN SERPL-MCNC: 10 MG/DL (ref 6–23)
CALCIUM SERPL-MCNC: 9 MG/DL (ref 8.6–10.6)
CHLORIDE SERPL-SCNC: 98 MMOL/L (ref 98–107)
CO2 SERPL-SCNC: 28 MMOL/L (ref 21–32)
CORTIS AM PEAK SERPL-MSCNC: 4.5 UG/DL (ref 5–20)
CREAT SERPL-MCNC: 0.7 MG/DL (ref 0.5–1.3)
EGFRCR SERPLBLD CKD-EPI 2021: >90 ML/MIN/1.73M*2
EOSINOPHIL # BLD AUTO: 0.9 X10*3/UL (ref 0–0.7)
EOSINOPHIL NFR BLD AUTO: 9.6 %
ERYTHROCYTE [DISTWIDTH] IN BLOOD BY AUTOMATED COUNT: 17 % (ref 11.5–14.5)
GLUCOSE SERPL-MCNC: 99 MG/DL (ref 74–99)
HCT VFR BLD AUTO: 40.8 % (ref 41–52)
HGB BLD-MCNC: 13 G/DL (ref 13.5–17.5)
IMM GRANULOCYTES # BLD AUTO: 0.03 X10*3/UL (ref 0–0.7)
IMM GRANULOCYTES NFR BLD AUTO: 0.3 % (ref 0–0.9)
LYMPHOCYTES # BLD AUTO: 3.15 X10*3/UL (ref 1.2–4.8)
LYMPHOCYTES NFR BLD AUTO: 33.7 %
MCH RBC QN AUTO: 20.8 PG (ref 26–34)
MCHC RBC AUTO-ENTMCNC: 31.9 G/DL (ref 32–36)
MCV RBC AUTO: 65 FL (ref 80–100)
MONOCYTES # BLD AUTO: 1.08 X10*3/UL (ref 0.1–1)
MONOCYTES NFR BLD AUTO: 11.6 %
NEUTROPHILS # BLD AUTO: 4.12 X10*3/UL (ref 1.2–7.7)
NEUTROPHILS NFR BLD AUTO: 44.1 %
NRBC BLD-RTO: ABNORMAL /100{WBCS}
PLATELET # BLD AUTO: 304 X10*3/UL (ref 150–450)
POTASSIUM SERPL-SCNC: 4.2 MMOL/L (ref 3.5–5.3)
PROT SERPL-MCNC: 6.3 G/DL (ref 6.4–8.2)
RBC # BLD AUTO: 6.24 X10*6/UL (ref 4.5–5.9)
SODIUM SERPL-SCNC: 133 MMOL/L (ref 136–145)
T4 FREE SERPL-MCNC: 1.37 NG/DL (ref 0.78–1.48)
TSH SERPL-ACNC: 5.68 MIU/L (ref 0.44–3.98)
WBC # BLD AUTO: 9.4 X10*3/UL (ref 4.4–11.3)

## 2025-07-01 PROCEDURE — 84439 ASSAY OF FREE THYROXINE: CPT

## 2025-07-01 PROCEDURE — 36415 COLL VENOUS BLD VENIPUNCTURE: CPT

## 2025-07-01 PROCEDURE — 82024 ASSAY OF ACTH: CPT

## 2025-07-01 PROCEDURE — 82533 TOTAL CORTISOL: CPT

## 2025-07-01 PROCEDURE — 84075 ASSAY ALKALINE PHOSPHATASE: CPT

## 2025-07-01 PROCEDURE — 85025 COMPLETE CBC W/AUTO DIFF WBC: CPT

## 2025-07-01 PROCEDURE — 84443 ASSAY THYROID STIM HORMONE: CPT

## 2025-07-02 ENCOUNTER — APPOINTMENT (OUTPATIENT)
Dept: HEMATOLOGY/ONCOLOGY | Facility: CLINIC | Age: 65
End: 2025-07-02
Payer: COMMERCIAL

## 2025-07-02 ENCOUNTER — APPOINTMENT (OUTPATIENT)
Dept: HEMATOLOGY/ONCOLOGY | Facility: HOSPITAL | Age: 65
End: 2025-07-02
Payer: COMMERCIAL

## 2025-07-03 LAB — ACTH PLAS-MCNC: 32 PG/ML (ref 7.2–63.3)

## 2025-07-07 ENCOUNTER — TELEPHONE (OUTPATIENT)
Dept: HEMATOLOGY/ONCOLOGY | Facility: CLINIC | Age: 65
End: 2025-07-07
Payer: COMMERCIAL

## 2025-07-07 NOTE — TELEPHONE ENCOUNTER
Called patient to confirm he was able to arrange a ride for his follow up and treatment on Wednesday, 7/9.     No answer, left voicemail. Asked him to call back if there was a transportation issue. Provided [facility] phone number.

## 2025-07-09 ENCOUNTER — OFFICE VISIT (OUTPATIENT)
Dept: HEMATOLOGY/ONCOLOGY | Facility: CLINIC | Age: 65
End: 2025-07-09
Payer: COMMERCIAL

## 2025-07-09 ENCOUNTER — SOCIAL WORK (OUTPATIENT)
Dept: CASE MANAGEMENT | Facility: HOSPITAL | Age: 65
End: 2025-07-09

## 2025-07-09 ENCOUNTER — INFUSION (OUTPATIENT)
Dept: HEMATOLOGY/ONCOLOGY | Facility: CLINIC | Age: 65
End: 2025-07-09
Payer: COMMERCIAL

## 2025-07-09 VITALS
OXYGEN SATURATION: 95 % | BODY MASS INDEX: 18.16 KG/M2 | TEMPERATURE: 97.5 F | HEART RATE: 67 BPM | SYSTOLIC BLOOD PRESSURE: 114 MMHG | RESPIRATION RATE: 18 BRPM | DIASTOLIC BLOOD PRESSURE: 72 MMHG | WEIGHT: 123 LBS

## 2025-07-09 DIAGNOSIS — C34.11 MALIGNANT NEOPLASM OF UPPER LOBE OF RIGHT LUNG (MULTI): Primary | ICD-10-CM

## 2025-07-09 DIAGNOSIS — C34.11 MALIGNANT NEOPLASM OF UPPER LOBE OF RIGHT LUNG (MULTI): ICD-10-CM

## 2025-07-09 PROCEDURE — 2500000004 HC RX 250 GENERAL PHARMACY W/ HCPCS (ALT 636 FOR OP/ED): Performed by: STUDENT IN AN ORGANIZED HEALTH CARE EDUCATION/TRAINING PROGRAM

## 2025-07-09 PROCEDURE — 99215 OFFICE O/P EST HI 40 MIN: CPT | Performed by: STUDENT IN AN ORGANIZED HEALTH CARE EDUCATION/TRAINING PROGRAM

## 2025-07-09 PROCEDURE — 3078F DIAST BP <80 MM HG: CPT | Performed by: STUDENT IN AN ORGANIZED HEALTH CARE EDUCATION/TRAINING PROGRAM

## 2025-07-09 PROCEDURE — G2211 COMPLEX E/M VISIT ADD ON: HCPCS | Performed by: STUDENT IN AN ORGANIZED HEALTH CARE EDUCATION/TRAINING PROGRAM

## 2025-07-09 PROCEDURE — 96413 CHEMO IV INFUSION 1 HR: CPT

## 2025-07-09 PROCEDURE — 1159F MED LIST DOCD IN RCRD: CPT | Performed by: STUDENT IN AN ORGANIZED HEALTH CARE EDUCATION/TRAINING PROGRAM

## 2025-07-09 PROCEDURE — 3074F SYST BP LT 130 MM HG: CPT | Performed by: STUDENT IN AN ORGANIZED HEALTH CARE EDUCATION/TRAINING PROGRAM

## 2025-07-09 PROCEDURE — 1125F AMNT PAIN NOTED PAIN PRSNT: CPT | Performed by: STUDENT IN AN ORGANIZED HEALTH CARE EDUCATION/TRAINING PROGRAM

## 2025-07-09 RX ORDER — DIPHENHYDRAMINE HYDROCHLORIDE 50 MG/ML
50 INJECTION, SOLUTION INTRAMUSCULAR; INTRAVENOUS AS NEEDED
Status: DISCONTINUED | OUTPATIENT
Start: 2025-07-09 | End: 2025-07-09 | Stop reason: HOSPADM

## 2025-07-09 RX ORDER — PROCHLORPERAZINE EDISYLATE 5 MG/ML
10 INJECTION INTRAMUSCULAR; INTRAVENOUS EVERY 6 HOURS PRN
OUTPATIENT
Start: 2025-07-30

## 2025-07-09 RX ORDER — PROCHLORPERAZINE EDISYLATE 5 MG/ML
10 INJECTION INTRAMUSCULAR; INTRAVENOUS EVERY 6 HOURS PRN
Status: DISCONTINUED | OUTPATIENT
Start: 2025-07-09 | End: 2025-07-09 | Stop reason: HOSPADM

## 2025-07-09 RX ORDER — PROCHLORPERAZINE MALEATE 10 MG
10 TABLET ORAL EVERY 6 HOURS PRN
OUTPATIENT
Start: 2025-07-30

## 2025-07-09 RX ORDER — HEPARIN SODIUM,PORCINE/PF 10 UNIT/ML
50 SYRINGE (ML) INTRAVENOUS AS NEEDED
OUTPATIENT
Start: 2025-07-09

## 2025-07-09 RX ORDER — FAMOTIDINE 10 MG/ML
20 INJECTION, SOLUTION INTRAVENOUS ONCE AS NEEDED
Status: DISCONTINUED | OUTPATIENT
Start: 2025-07-09 | End: 2025-07-09 | Stop reason: HOSPADM

## 2025-07-09 RX ORDER — HEPARIN 100 UNIT/ML
500 SYRINGE INTRAVENOUS AS NEEDED
OUTPATIENT
Start: 2025-07-09

## 2025-07-09 RX ORDER — HEPARIN 100 UNIT/ML
500 SYRINGE INTRAVENOUS AS NEEDED
Status: DISCONTINUED | OUTPATIENT
Start: 2025-07-09 | End: 2025-07-09 | Stop reason: HOSPADM

## 2025-07-09 RX ORDER — PROCHLORPERAZINE MALEATE 10 MG
10 TABLET ORAL EVERY 6 HOURS PRN
Status: DISCONTINUED | OUTPATIENT
Start: 2025-07-09 | End: 2025-07-09 | Stop reason: HOSPADM

## 2025-07-09 RX ORDER — ALBUTEROL SULFATE 0.83 MG/ML
3 SOLUTION RESPIRATORY (INHALATION) AS NEEDED
Status: DISCONTINUED | OUTPATIENT
Start: 2025-07-09 | End: 2025-07-09 | Stop reason: HOSPADM

## 2025-07-09 RX ORDER — EPINEPHRINE 0.3 MG/.3ML
0.3 INJECTION SUBCUTANEOUS EVERY 5 MIN PRN
Status: DISCONTINUED | OUTPATIENT
Start: 2025-07-09 | End: 2025-07-09 | Stop reason: HOSPADM

## 2025-07-09 RX ORDER — FAMOTIDINE 10 MG/ML
20 INJECTION, SOLUTION INTRAVENOUS ONCE AS NEEDED
OUTPATIENT
Start: 2025-07-30

## 2025-07-09 RX ORDER — HEPARIN SODIUM,PORCINE/PF 10 UNIT/ML
50 SYRINGE (ML) INTRAVENOUS AS NEEDED
Status: DISCONTINUED | OUTPATIENT
Start: 2025-07-09 | End: 2025-07-09 | Stop reason: HOSPADM

## 2025-07-09 RX ORDER — EPINEPHRINE 0.3 MG/.3ML
0.3 INJECTION SUBCUTANEOUS EVERY 5 MIN PRN
OUTPATIENT
Start: 2025-07-30

## 2025-07-09 RX ORDER — ALBUTEROL SULFATE 0.83 MG/ML
3 SOLUTION RESPIRATORY (INHALATION) AS NEEDED
OUTPATIENT
Start: 2025-07-30

## 2025-07-09 RX ORDER — DIPHENHYDRAMINE HYDROCHLORIDE 50 MG/ML
50 INJECTION, SOLUTION INTRAMUSCULAR; INTRAVENOUS AS NEEDED
OUTPATIENT
Start: 2025-07-30

## 2025-07-09 RX ADMIN — SODIUM CHLORIDE 200 MG: 9 INJECTION, SOLUTION INTRAVENOUS at 10:05

## 2025-07-09 ASSESSMENT — PAIN SCALES - GENERAL: PAINLEVEL_OUTOF10: 7

## 2025-07-09 NOTE — PROGRESS NOTES
Patient here today for follow up and treatment. He had to cancel last week because he wasn't feeling well - overall general fatigue, no other symptoms.     Fatigue: better than last week  PO intake: adequate, eats less when it's hot out  Weight: -8lbs since June, admittedly because he eats less with hot weather  N/V/D/C: denies     Medications and pharmacy preference reviewed with patient.     Labs 7/1 because of planned treatment last week. Per Dr. Gray ok to use for today's treatment.      yes

## 2025-07-09 NOTE — PROGRESS NOTES
Patient ID: Pillo Guerin is a 65 y.o. male.    Primary Care Provider: Rocio Harris, SIMONE-CNP    DIAGNOSIS AND STAGING  Stage IVB (cT3 cN3 cM1c) poorly differentiated adenocarcinoma of RUL diagnosed on 08/03/23     SITES OF DISEASE  RUL  Supraclavicular nodes  Contralateral lung  Right gluteal muscle   Brain right occipital lobe      MOLECULAR GENOMICS  MICROSATELLITE STATUS: Microsatellite Stable (MAGDIEL)     DISEASE ASSOCIATED GENOMIC FINDINGS:   KRAS p.G13D ([id number] c.38G>A)   PIK3CA p.E542K ([id number] c.1624G>A)   TP53 p.E285K ([id number] c.853G>A)     DISEASE RELEVANT ALTERATIONS NOT DETECTED:   Negative for ALK fusion.   Negative for BRAF V600E.   Negative for EGFR sensitizing mutation.   Negative for KRAS G12C.   Negative for MET exon 14 skipping mutation.   Negative for NTRK fusion.   Negative for RET fusion.   Negative for ROS1 fusion.     PD-L1 TPS 80%     PRIOR THERAPIES  GKRS to right occipital lobe on  24 Gy in 1 fraction on 10/17/23      CURRENT THERAPY  Single agent pembrolizumab since 08/31/23    CURRENT ONCOLOGICAL PROBLEMS  Resolved GIII ICI colitis     HISTORY OF PRESENT ILLNESS  This is a pt with PMH of EtOH use disorder, CVA, CAD c/b NSTEMI s/p 2 stents and stage IVB ([id number]) poorly differentiated adenocarcinoma of RUL diagnosed on 08/03/23.      Patient was initially diagnosed 08/2022 after incidentally found RUL lesion was worked up during admission for hyponatremia presumed to be 2/2 beer potomania. He was then seen by his pulmonologist Dr. Callejas who ordered PET and MRI but this was not obtained  nor were multiple attempts to schedule for oncologic NPV.     I saw him in August 2023 and he endorsed haviing lost 15 lbs since stroke April 2022. His appetite is good, denies dysgeusia. He has slowed down a bit since he had his stroke. He can walk without becoming dyspneic but is limited by his stroke.    On 08/12/23 the patient underwent a bronchoscopy with EBUS:  Accession #:  [id number]   Date of Procedure:8/3/2022   Date Reported: 8/9/2022   Date Received: 8/3/2022   Date of Birth / Sex 1960 (Age: 62) / M   Race: WHITE   Submitting Physician: DARYN YOUNG MD   Attending Physician: MD HEVER BROWNE MD   Procedures/Addenda Present     Other External #     FINAL CYTOLOGICAL INTERPRETATION     A. FINE NEEDLE ASPIRATION LYMPH NODE, L 4, CYTOLOGY AND CELL BLOCK:   NO MALIGNANT CELLS IDENTIFIED.   LYMPHOID SAMPLE IS PRESENT.     Note: Cell block shows presence of lymphoid cells along with bronchial cells contamination.     B. FINE NEEDLE ASPIRATION LYMPH NODE, STATION 7, CYTOLOGY AND CELL BLOCK:   NO MALIGNANT CELLS IDENTIFIED.   LYMPHOID SAMPLE IS PRESENT.     C. FINE NEEDLE ASPIRATION LYMPH NODE, R 11, CYTOLOGY AND CELL BLOCK:   POORLY DIFFERENTIATED CARCINOMA, CONSISTENT WITH ADENOCARCINOMA, SEE NOTE.     Note: A limited panel of immunohistochemical stains performed showed neoplastic   cells staining strongly and diffusely positive with TTF-1 and negative with   p40. Findings are consistent with above.     Staff pathologist: Reviewed by Dr. Ellyn Valles.     Molecular testing has been ordered and results will be issued in an addendum.     The cell block; C1 contains moderate tumor cellularity representing roughly 70 % of all nucleated cells.       D. BRONCHO-ALVEOLAR LAVAGE OF RIGHT UPPER LOBE:   RARE ATYPICAL CELLS ARE PRESENT.     08/16/23: PET scan demonstrates multiple RUL FDG avid nodules as well as contra-lateral lung nodules, right hilar, mediastinal and supraclavicular nodes consistent with neoplastic involvement. There is also a large FDG avid right gluteal mass extending into the right SIJ and iliac bone.     08/16/23: MRI brain shows a 8 mm right occipital lobe metastasis with mild associated edema.     08/31/23: Single agent pembrolizumab begins     10/17/23: GKRS to right occipital lobe on  24 Gy in 1 fraction     06/06/24: grade 3 immune-related  diarrhea with episodes of incontinence despite Imodium   Rx prednisone 40 mg/day provided today      PAST MEDICAL HISTORY  CAD s/p 2 stents, CVA, Dupuytren's contracture   BPH  Lumbar radiculopathy   Ir-diarrhea (pembrolizumab) treated with prednisone months of May and      SURGICAL HISTORY  None      SOCIAL HISTORY  Born in [place], lives in [place] with 2 roommates.   Brother Salas is NOK. Drinks 2 24 oz beers a day.   + Tobacco - 50 pack year smoking history, active.   Rare cannabis, acid use in 70s, no additional illicits.    Was working at [employer] in [place] before stroke.      FAMILY HISTORY  Mother  of melanoma in      CURRENT MEDS REVIEWED      ALLERGIES REVIEWED     SUBJECTIVE:   Patient doing well today. He has lost weight due to a recent infection, but is otherwise at baseline. He was on morphine but it was too hard on him so he dropped it from 30 to 15 BID.     Review of Systems:  A review of systems has been completed and are negative for complaints except what is stated in the HPI and/or past medical history      OBJECTIVE:  /72 (BP Location: Right arm, Patient Position: Sitting, BP Cuff Size: Adult)   Pulse 67   Temp 36.4 °C (97.5 °F) (Core)   Resp 18   Wt 55.8 kg (123 lb)   SpO2 95%   BMI 18.16 kg/m²     Wt Readings from Last 5 Encounters:   25 55.8 kg (123 lb)   25 59.8 kg (131 lb 12.8 oz)   25 59.1 kg (130 lb 3.2 oz)   25 61 kg (134 lb 6.4 oz)   25 60.3 kg (133 lb)       Physical Exam:  ECO  Pain: 7/10 mainly left leg  Constitutional: Well developed, awake/alert/oriented x3, no distress, alert and cooperative  Eyes: PER. sclera anicteric  ENMT: Oral mucosa moist, no lesions or thrush identified  Respiratory/Thorax: Breathing is non-labored. Lungs are clear to auscultation bilaterally. No adventitious breath sounds  Cardiovascular: S1-S2. Regular rate and rhythm. No murmurs, rubs, or gallops appreciated  Gastrointestinal:  Abdomen soft nontender, nondistended, normal active bowel sounds.  Musculoskeletal: ROM intact, no joint swelling, normal strength  Extremities: normal extremities, no cyanosis, no edema, no clubbing  Lymphatics: no palpable lymphadenopathy  Skin: no rash  Neurologic:  Nonfocal exam. No myoclonus  Psychological: Pleasant, appropriate and easily engaged     Diagnostic Results     Lab Results   Component Value Date    WBC 9.4 07/01/2025    HGB 13.0 (L) 07/01/2025    HCT 40.8 (L) 07/01/2025    MCV 65 (L) 07/01/2025     07/01/2025      Lab Results   Component Value Date    NEUTROABS 4.12 07/01/2025      Lab Results   Component Value Date    GLUCOSE 99 07/01/2025    CALCIUM 9.0 07/01/2025     (L) 07/01/2025    K 4.2 07/01/2025    CO2 28 07/01/2025    CL 98 07/01/2025    BUN 10 07/01/2025    CREATININE 0.70 07/01/2025    MG 2.28 08/06/2024     Lab Results   Component Value Date    ALT 13 07/01/2025    AST 15 07/01/2025    ALKPHOS 50 07/01/2025    BILITOT 0.8 07/01/2025    BILIDIR 0.3 07/31/2022      Lab Results   Component Value Date    ACTH 32.0 07/01/2025    CORTISOL 4.5 (L) 07/01/2025    TSH 5.68 (H) 07/01/2025    FREET4 1.37 07/01/2025       Imaging  CT CAP 05/16/25  IMPRESSION:  Lung cancer restaging scan. When compared to the prior CT dated  03/12/2025, there is stable size of the dominant right upper lobe  mass with increasing internal cavitary component which may be  posttreatment related and attention on follow-up imaging is  recommended. Stable osseous metastatic disease. No definite new sites  of metastatic disease. Additional stable chronic and incidental  findings described above.     MRI Brain 03/12/2025  IMPRESSION:  1. No mass or enhancement suggestive of metastatic disease is noted.      2. The parenchymal abnormalities are similar to the prior MR and may be secondary to chronic ischemic changes. No acute infarct or hemorrhage is noted.    Assessment/Plan   Lung cancer (Multi), Clinical:  Stage IVB (cT3, cN3, cM1c)  Mr. Pillo Guerin is a 63 YO with Stage IVB ([id number]) poorly differentiated adenocarcinoma of RUL, TPS 80%, lack of actionable genomic alterations noted.    He was started on single agent pembrolizumab since 08/31/23 - however had to be held in May 2024 due to grade III ICI colitis which has completed resolved.     He has now completed 20 cycles pembrolizumab. CT scans from 05/25 personally reviewed showing stable primary lesion with new cavitation suggestive of response. We will continue with pembrolizumab and obtain scans in August.    #Stage IVB NSCLC with early, slow progression and no options in second line  -Continue pembrolizumab  -Scans August    #Grade III ICI Diarrhea - resolved  6/11/2025 - soft stool as currently off Morphine     #Pruritus, immune related  Continue moisturizing BUE with CeraVe and triamcinolone as needed     #Right occipital lobe 8 mm metastasis   S/p GKRS 24 Gy in 1 fraction on 10/17/23. Stable MRI 3/12/2025  Repeat MRI brain every 3-4 months per Rad Onc     #Pain on right gluteal region-  Due to large centrally necrotic and peripherally hypermetabolic mass in the right lower paramediastinal muscle extending into the adjacent subcutaneous tissue and superior right gluteal muscle with involvement of the superior aspect of the right iliac  bone, right florencio sacrum -   He has been taking gabapentin for pain (controlled) in LLE that is chronic   -Increased oxycodone to 10 mg every 6 PRN. Patient has established with supportive oncology    #Advanced Care Planning  -Discussed slow nature of progression and challenges with treatment in second line given PS. He is close with brother and roommate/ex gf Radha     #Stroke in April 2022 and hx of CAD  -   Stable -   On ASA and ticagrelor      #HTN - on amlodipine      #HLD - atorvastatin    #Microcytic Anemia - Chronic in nature. Iron panel and ferritin found to be WNL.  Could send hemoglobin electrophoresis if anemia  becomes a process.   Has never been told or heard about family history of thalassemia or other blood disorders.     #Dispo  -continue every 21 day visits while on Pembrolizumab

## 2025-07-09 NOTE — PROGRESS NOTES
JEANA received referral from RICCI Sy RN [facility] today. Patient wanted to speak to JEANA. Sw was at [facility] today and called patient via phone. Patient reported he has his transportation set up through his insurance and did not see a will call return ride. SW called [facility] [phone] to check. Patient does have Will Call ride and SW set it for 10:30am . JEANA asked RICCI Sy RN to keep SW informed if there are changes. JEANA updated patient and is worried that he wont be ready in time. SW offered to change if needed. JEANA provided support and reassurance that SW will assist with patient getting home.    Update: JEANA was informed patientwill be ready at 11am today. JEANA called [facility] to set up return ride for  at 11am. The ride was cancelled and SW rescheduled with  at 11am trip #[id number]. JEANA updated RICCI Sy RN and patient today.    Update: JEANA was informed by RICCI Sy RN that patient missed his ride. JEANA called [facility] and rescheduled ride for 11:30am  trip #[id number]. JEANA called patient to update and also updated RICCI Sy RN.

## 2025-07-09 NOTE — SIGNIFICANT EVENT
07/09/25 0841   Prechemo Checklist   Has the patient been in the hospital, ED, or urgent care since last date of service No   Chemo/Immuno Consent Completed and Signed Yes   Protocol/Indications Verified Yes   Confirmed to previous date/time of medication No  (patient cancelled, delayed one week)   Compared to previous dose Yes   All medications are dated accurately Yes   Pregnancy Test Negative Not applicable   Parameters Met (!) No   Reasons Parameters Not Met Other (See Comments)  (ok to use labs from 7/1/25)   Provider Notified Yes   Provider Name Dr. Gray   Is Patient Proceeding With Treatment? Yes   BSA/Weight-Height Verified Yes   Dose Calculations Verified (current, total, cumulative) Yes

## 2025-07-10 ENCOUNTER — APPOINTMENT (OUTPATIENT)
Dept: CARDIOLOGY | Facility: CLINIC | Age: 65
End: 2025-07-10
Payer: COMMERCIAL

## 2025-07-14 ENCOUNTER — TELEPHONE (OUTPATIENT)
Dept: PALLIATIVE MEDICINE | Facility: CLINIC | Age: 65
End: 2025-07-14
Payer: COMMERCIAL

## 2025-07-14 DIAGNOSIS — G89.3 CANCER RELATED PAIN: ICD-10-CM

## 2025-07-14 RX ORDER — MORPHINE SULFATE 15 MG/1
15 TABLET, FILM COATED, EXTENDED RELEASE ORAL 3 TIMES DAILY
Qty: 90 TABLET | Refills: 0 | Status: SHIPPED | OUTPATIENT
Start: 2025-07-14 | End: 2025-08-13

## 2025-07-14 NOTE — TELEPHONE ENCOUNTER
I spoke with Pillo. He reports the MSER 30mg BID is too much for him. They make him too tired. He would like to go down to 15mg TID. Ok per SIMONE Robledo to make this dose decrease. Prescription pended to provider for approval.

## 2025-07-17 DIAGNOSIS — G89.3 CANCER ASSOCIATED PAIN: ICD-10-CM

## 2025-07-17 RX ORDER — GABAPENTIN 300 MG/1
600 CAPSULE ORAL 2 TIMES DAILY
Qty: 120 CAPSULE | Refills: 2 | Status: SHIPPED | OUTPATIENT
Start: 2025-07-17 | End: 2026-07-17

## 2025-07-23 ENCOUNTER — APPOINTMENT (OUTPATIENT)
Dept: HEMATOLOGY/ONCOLOGY | Facility: CLINIC | Age: 65
End: 2025-07-23
Payer: COMMERCIAL

## 2025-07-25 ENCOUNTER — PHARMACY VISIT (OUTPATIENT)
Dept: PHARMACY | Facility: CLINIC | Age: 65
End: 2025-07-25
Payer: COMMERCIAL

## 2025-07-25 ENCOUNTER — OFFICE VISIT (OUTPATIENT)
Dept: PALLIATIVE MEDICINE | Facility: CLINIC | Age: 65
End: 2025-07-25
Payer: COMMERCIAL

## 2025-07-25 VITALS
OXYGEN SATURATION: 98 % | DIASTOLIC BLOOD PRESSURE: 72 MMHG | HEART RATE: 69 BPM | WEIGHT: 126 LBS | RESPIRATION RATE: 18 BRPM | BODY MASS INDEX: 18.61 KG/M2 | SYSTOLIC BLOOD PRESSURE: 132 MMHG | TEMPERATURE: 98.1 F

## 2025-07-25 DIAGNOSIS — Z51.5 PALLIATIVE CARE ENCOUNTER: ICD-10-CM

## 2025-07-25 DIAGNOSIS — R63.4 WEIGHT LOSS: Primary | ICD-10-CM

## 2025-07-25 DIAGNOSIS — C34.11 MALIGNANT NEOPLASM OF UPPER LOBE OF RIGHT LUNG (MULTI): ICD-10-CM

## 2025-07-25 DIAGNOSIS — G89.3 CANCER RELATED PAIN: ICD-10-CM

## 2025-07-25 PROCEDURE — 99214 OFFICE O/P EST MOD 30 MIN: CPT

## 2025-07-25 PROCEDURE — RXMED WILLOW AMBULATORY MEDICATION CHARGE

## 2025-07-25 PROCEDURE — 3075F SYST BP GE 130 - 139MM HG: CPT

## 2025-07-25 PROCEDURE — 3078F DIAST BP <80 MM HG: CPT

## 2025-07-25 PROCEDURE — 1159F MED LIST DOCD IN RCRD: CPT

## 2025-07-25 PROCEDURE — 1125F AMNT PAIN NOTED PAIN PRSNT: CPT

## 2025-07-25 RX ORDER — OLANZAPINE 2.5 MG/1
2.5 TABLET, FILM COATED ORAL
Qty: 30 TABLET | Refills: 2 | Status: SHIPPED | OUTPATIENT
Start: 2025-07-25 | End: 2025-10-23

## 2025-07-25 RX ORDER — OXYCODONE HYDROCHLORIDE 10 MG/1
10 TABLET ORAL EVERY 6 HOURS PRN
Qty: 120 TABLET | Refills: 0 | Status: SHIPPED | OUTPATIENT
Start: 2025-07-25 | End: 2025-08-24

## 2025-07-25 ASSESSMENT — PAIN SCALES - GENERAL: PAINLEVEL_OUTOF10: 7

## 2025-07-25 NOTE — PROGRESS NOTES
SUPPORTIVE AND PALLIATIVE ONCOLOGY CONSULT - OUTPATIENT      SERVICE DATE: 7/25/2025    Medical Oncologist: Jorge Gray MD   Radiation Oncologist: No care team member to display  Primary Physician: Rocio Harris  [phone]    REASON FOR CONSULT/CHIEF CONSULT COMPLAINT: pain management and Introduction to Supportive and Palliative Oncology Services    Subjective   HISTORY OF PRESENT ILLNESS: Pillo Guerin is a 65 y.o. 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 Pt is s/p brain XRT 10/2023 and currently on Pembro. Pt follows with Dr. Gray.     Pain Assessment:  Pain Score:   7 (hurts more after taking the stairs)  Location: LegL hip -> L leg (constant), RLE (intermittent)  Education:  review ofcurrent regimen      Symptom Assessment:  Pain:somewhat- L hip -> L leg (constant), RLE (intermittent);  Headache: none  Dizziness:none  Lack of energy: none  Difficulty sleeping: none  Worrying: none  Anxiety: none  Depression: none  Pain in mouth/swallowing: none  Dry mouth: none  Taste changes: none  Shortness of breath: a little- with exertion  Lack of appetite: none- aware of continued weight loss   Nausea: none  Vomiting: none  Constipation: none  Diarrhea: none- finds that eating white rice helps to manage this  Sore muscles: a little  Numbness or tingling in hands/feet/other: none  Weight loss: very much- aware  Other: none      Information obtained from: chart review and interview of patient  ______________________________________________________________________     Oncology History   Lung cancer (Multi)   8/31/2023 - 10/30/2024 Chemotherapy    Pembrolizumab, 21 Day Cycles     9/22/2023 Initial Diagnosis    Lung cancer (CMS/HCC)     11/21/2023 Cancer Staged    Staging form: Lung, AJCC 8th Edition, Clinical: Stage IVB (cT3, cN3, cM1c) - Signed by Zuleyma Vines MD on 11/21/2023    "  11/20/2024 -  Chemotherapy    Pembrolizumab, 21 Day Cycles         Past Medical History:   Diagnosis Date    Hyperlipidemia, unspecified 06/13/2022    Hyperlipidemia    Old myocardial infarction     History of myocardial infarction     Past Surgical History:   Procedure Laterality Date    MR HEAD ANGIO WO IV CONTRAST  4/19/2022    MR HEAD ANGIO WO IV CONTRAST 4/19/2022 [facility] CLINICAL LEGACY    MR NECK ANGIO WO IV CONTRAST  4/19/2022    MR NECK ANGIO WO IV CONTRAST 4/19/2022 [facility] CLINICAL LEGACY    OTHER SURGICAL HISTORY  09/01/2015    Previous Stent Placement     Family History   Problem Relation Name Age of Onset    Cancer Mother      Heart attack Father      Heart attack Brother          SOCIAL HISTORY  Lives with ex/current friend, Radha   Social History:  reports that he has been smoking cigarettes. He has been exposed to tobacco smoke. He uses smokeless tobacco. He reports current alcohol use of about 4.0 standard drinks of alcohol per week. He reports current drug use. Drug: \"Crack\" cocaine.  Retired, previously worked in [employer]    Adventism and Importance of Adventism: unknown    REVIEW OF SYSTEMS  Review of systems negative unless noted in HPI.       Objective     Palliative Performance Scale % (PPS)       Current Outpatient Medications   Medication Instructions    acetaminophen (TYLENOL) 650 mg, oral, Every 6 hours PRN, It is okay to take this with Oxycodone.    amLODIPine (NORVASC) 2.5 mg, Daily RT    atorvastatin (Lipitor) 80 mg tablet TAKE 1 TABLET BY MOUTH ONCE DAILY    cholecalciferol (VITAMIN D3) 1,000 Units, oral, Daily    diclofenac sodium (Voltaren) 1 % gel     docusate sodium (COLACE) 200 mg, oral, Nightly PRN, You only need to take this if you feel constipated.    FeroSuL 325 mg (65 mg iron) tablet     gabapentin (NEURONTIN) 600 mg, oral, 2 times daily    Incruse Ellipta 62.5 mcg, inhalation, Daily    ketoconazole (NIZOral) 2 % cream     loperamide (Imodium A-D) 2 mg tablet Take it as " "instructed for diarrhea    losartan (COZAAR) 50 mg    metoprolol tartrate (Lopressor) 25 mg tablet TAKE 1 TABLET BY MOUTH TWO TIMES A DAY    morphine CR (MS CONTIN) 15 mg, oral, 3 times daily, Do not crush, chew, or split.    naloxone (NARCAN) 4 mg, nasal, As needed, May repeat every 2-3 minutes if needed, alternating nostrils, until medical assistance becomes available.    nitroglycerin (Nitrostat) 0.4 mg SL tablet DISSOLVE 1 TABLET UNDER TONGUE EVERY 5 MINUTES FOR 3 DOSES AS NEEDED FOR CHEST PAIN. IF PAIN PERSISTS DIAL 911.    omeprazole (PriLOSEC) 20 mg DR capsule Do not crush or chew. Take it 30 minutes before breakfast.    oxyCODONE (ROXICODONE) 10 mg, oral, Every 6 hours PRN    predniSONE (DELTASONE) 5 mg, oral, Daily    tadalafil (CIALIS) 10 mg, Daily PRN    triamcinolone (Kenalog) 0.1 % ointment Topical, 2 times daily       Allergies: No Known Allergies    No results found. However, due to the size of the patient record, not all encounters were searched. Please check Results Review for a complete set of results.             PHYSICAL EXAMINATION  Vital Signs:       5/21/2025    10:33 AM 5/30/2025     2:04 PM 6/11/2025    10:02 AM 6/26/2025     9:49 AM 6/27/2025     2:16 PM 7/9/2025     8:05 AM 7/25/2025     2:06 PM   Vitals   Systolic 133 151 148  118 114 132   Diastolic 57 73 78  70 72 72   BP Location Right arm     Right arm    Heart Rate 57 63 56  63 67 69   Temp 36.3 °C (97.3 °F) 36.6 °C (97.9 °F) 36.9 °C (98.4 °F)  36.6 °C (97.9 °F) 36.4 °C (97.5 °F) 36.7 °C (98.1 °F)   Resp 18 16 18  16 18 18   Height    1.753 m (5' 9\")      Weight (lb) 133 134.4 130.2 130 131.8 123 126   BMI 19.64 kg/m2 19.85 kg/m2 19.23 kg/m2 19.2 kg/m2 19.46 kg/m2 18.16 kg/m2 18.61 kg/m2   BSA (m2) 1.71 m2 1.72 m2 1.7 m2 1.69 m2 1.71 m2 1.65 m2 1.67 m2   Visit Report Report Report Report  Report Report Report        Physical Exam  Constitutional:       Appearance: Normal appearance.   HENT:      Mouth/Throat:      Mouth: Mucous " membranes are moist.     Eyes:      Extraocular Movements: Extraocular movements intact.      Pupils: Pupils are equal, round, and reactive to light.       Cardiovascular:      Rate and Rhythm: Normal rate.   Pulmonary:      Effort: Pulmonary effort is normal.   Abdominal:      Palpations: Abdomen is soft.     Musculoskeletal:      Comments: LUE, LLE weakness, wears brace to LLE     Skin:     General: Skin is warm and dry.     Neurological:      Mental Status: He is alert and oriented to person, place, and time.     Psychiatric:         Mood and Affect: Mood normal.         Behavior: Behavior normal.         ASSESSMENT/PLAN    Pain  Pain is: cancer related pain and chronic (cancer, CVA)  Type: somatic and neuropathic; L hip -> L leg (constant), RLE (intermittent)  Pain control: sub-optimally controlled  Home regimen:   Oxycodone to 10mg q6h as needed (takes ~4X/day to good relief)  Morphine ER 15mg TID for continued cancer pain (did not tolerate Morphine ER 30mg)  Gabapentin 600mg BID  Tylenol 650mg q6h as needed- encouraged to take consistently with Oxycodone for improved pain relief  Offered PT- pt prefers to get stronger with walking first (education provided, pt continues to decline)  Intolerances/previously tried: n/a  Personalized pain goal: feel comfortable enough to walk more    Opioid Use  Medication Management:   - OARRS report reviewed with no aberrant behavior; consistent with  prescriptions/records and patient history  - MED ~.  Overdose Risk Score 320.   This has been discussed with patient.   - We will continue to closely monitor the patient for signs of prescription misuse including UDS, OARRS review and subjective reports at each visit.  - N/a concurrent benzodiazepine use   - I am a provider who either is or has consulted and collaborated with a provider certified in Hospice and Palliative Medicine and have conducted a face-face visit and examination for this patient.  - Routine Urine Drug  "Screen completed 5/30/25  - Controlled Substance Agreement completed 4/25/25  - Specifically discussed that controlled substance prescriptions will only be provided by our group as outlined in the completed agreement  - Prescribed naloxone previously  - Red Flags: n/a     Nausea- denies    Constipation/Diarrhea   At risk for constipation related to opioids,   currently not constipated, diarrhea stable  Colace 100-200mg nightly as needed for signs of opiate-induced constipation-  Encouraged adequate fluid intake  Manages occasional diarrhea by eating white rice    Altered Mood- denies  Sleeping Difficulty- denies    Decreased appetite- denies  Start Olanzapine 2.5mg daily in the evening with food- the goal is to help increase nutritional intake  Pt continues to decline RD referral  Discussed viewing food as \"fuel\", especially with any increase in activity  Encouraged to start drinking chocolate Ensure (has at home) this week      Supportive Interventions: n/a- will continue to monitor needs    Introduction to Supportive and Palliative Oncology:  Spoke with patient   Introduced the role and philosophy of Supportive and Palliative oncology in the evaluation and management of symptoms during cancer treatment  Palliative care was introduced as a service for patients with serious illness to help with symptoms, assist with goals of care conversations, navigate complex decision making, improve quality of life for patients, and provide support both patients and families.  Patient seemed to appreciate the extra layer of support.    Medical Decision Making/Goals of Care/Advance Care Planning:  Patient's current clinical condition, including diagnosis, prognosis, and management plan, and goals of care were discussed.   Life limiting disease: metastatic malignancy  Family: Supportive ex/friend  Performance status: Major limitations due to previous stroke (manages well)  Goals: symptom control and cancer directed " therapy    Advance Directives  Existence of Advance Directives:No - has interest  Decision maker: HCPOA is unknown- confirm at next visit  Code Status: Full code    Next Follow-Up Visit:  Return to clinic in 4-6 weeks at  [facility]    Signature and billing  Thank you for allowing us to participate in the care of this patient. Recommendations will be communicated back to the consulting service by way of shared electronic medical record or face-to-face.    Medical complexity was moderate level due to due to complexity of problems, extensive data review, and high risk of management/treatment.  Time was spent on the following: Prep Time, Time Directly with Patient/Family/Caregiver, Documentation Time. Total time spent: 35min      DATA   Diagnostic tests and information reviewed for today's visit:  Most recent labs and imaging results, Medications     7/25/25: changes to plan indicated in bold. Continue all other regimens as listed.    Some elements copied from Supportive Oncology note on 6/27/25, the elements have been updated and all reflect current decision making from today, 7/25/2025.      Plan of Care discussed with: Provider, RN, Patient      SIGNATURE: AMAN Garcia    Contact information:  Supportive and Palliative Oncology  Monday-Friday 8 AM-5 PM  Phone:  [phone], press option #5, then option #1.   Or Epic Secure Chat

## 2025-07-28 ENCOUNTER — SOCIAL WORK (OUTPATIENT)
Dept: HEMATOLOGY/ONCOLOGY | Facility: CLINIC | Age: 65
End: 2025-07-28
Payer: COMMERCIAL

## 2025-07-28 NOTE — PROGRESS NOTES
JEANA received referral from malka Babb desk at [facility] on 7/25/25.stating patient needed help with his transportation home on 7/25/25. SW called [facility] [phone] and some how patient's ride was cancelled. SW set up new ride to pick patient up at 3:45pm with Trip # [id number]. SW called patient to update him.

## 2025-07-29 ENCOUNTER — LAB (OUTPATIENT)
Dept: LAB | Facility: CLINIC | Age: 65
End: 2025-07-29
Payer: COMMERCIAL

## 2025-07-29 DIAGNOSIS — C34.11 MALIGNANT NEOPLASM OF UPPER LOBE OF RIGHT LUNG (MULTI): ICD-10-CM

## 2025-07-29 LAB
ALBUMIN SERPL BCP-MCNC: 3.7 G/DL (ref 3.4–5)
ALP SERPL-CCNC: 76 U/L (ref 33–136)
ALT SERPL W P-5'-P-CCNC: 16 U/L (ref 10–52)
ANION GAP SERPL CALC-SCNC: 14 MMOL/L (ref 10–20)
AST SERPL W P-5'-P-CCNC: 17 U/L (ref 9–39)
BASOPHILS # BLD AUTO: 0.09 X10*3/UL (ref 0–0.1)
BASOPHILS NFR BLD AUTO: 0.9 %
BILIRUB SERPL-MCNC: 0.9 MG/DL (ref 0–1.2)
BUN SERPL-MCNC: 10 MG/DL (ref 6–23)
CALCIUM SERPL-MCNC: 8.6 MG/DL (ref 8.6–10.6)
CHLORIDE SERPL-SCNC: 98 MMOL/L (ref 98–107)
CO2 SERPL-SCNC: 26 MMOL/L (ref 21–32)
CREAT SERPL-MCNC: 0.6 MG/DL (ref 0.5–1.3)
EGFRCR SERPLBLD CKD-EPI 2021: >90 ML/MIN/1.73M*2
EOSINOPHIL # BLD AUTO: 0.66 X10*3/UL (ref 0–0.7)
EOSINOPHIL NFR BLD AUTO: 6.5 %
ERYTHROCYTE [DISTWIDTH] IN BLOOD BY AUTOMATED COUNT: 15.9 % (ref 11.5–14.5)
GLUCOSE SERPL-MCNC: 97 MG/DL (ref 74–99)
HCT VFR BLD AUTO: 35.6 % (ref 41–52)
HGB BLD-MCNC: 11.6 G/DL (ref 13.5–17.5)
IMM GRANULOCYTES # BLD AUTO: 0.13 X10*3/UL (ref 0–0.7)
IMM GRANULOCYTES NFR BLD AUTO: 1.3 % (ref 0–0.9)
LYMPHOCYTES # BLD AUTO: 3.46 X10*3/UL (ref 1.2–4.8)
LYMPHOCYTES NFR BLD AUTO: 33.8 %
MCH RBC QN AUTO: 20.8 PG (ref 26–34)
MCHC RBC AUTO-ENTMCNC: 32.6 G/DL (ref 32–36)
MCV RBC AUTO: 64 FL (ref 80–100)
MONOCYTES # BLD AUTO: 1.05 X10*3/UL (ref 0.1–1)
MONOCYTES NFR BLD AUTO: 10.3 %
NEUTROPHILS # BLD AUTO: 4.84 X10*3/UL (ref 1.2–7.7)
NEUTROPHILS NFR BLD AUTO: 47.2 %
NRBC BLD-RTO: ABNORMAL /100{WBCS}
PLATELET # BLD AUTO: 520 X10*3/UL (ref 150–450)
POTASSIUM SERPL-SCNC: 4.7 MMOL/L (ref 3.5–5.3)
PROT SERPL-MCNC: 6 G/DL (ref 6.4–8.2)
RBC # BLD AUTO: 5.59 X10*6/UL (ref 4.5–5.9)
SODIUM SERPL-SCNC: 133 MMOL/L (ref 136–145)
WBC # BLD AUTO: 10.2 X10*3/UL (ref 4.4–11.3)

## 2025-07-29 PROCEDURE — 85025 COMPLETE CBC W/AUTO DIFF WBC: CPT

## 2025-07-29 PROCEDURE — 36415 COLL VENOUS BLD VENIPUNCTURE: CPT

## 2025-07-29 PROCEDURE — 80053 COMPREHEN METABOLIC PANEL: CPT

## 2025-07-30 ENCOUNTER — NUTRITION (OUTPATIENT)
Dept: HEMATOLOGY/ONCOLOGY | Facility: CLINIC | Age: 65
End: 2025-07-30

## 2025-07-30 ENCOUNTER — APPOINTMENT (OUTPATIENT)
Dept: HEMATOLOGY/ONCOLOGY | Facility: CLINIC | Age: 65
End: 2025-07-30
Payer: COMMERCIAL

## 2025-07-30 ENCOUNTER — PHARMACY VISIT (OUTPATIENT)
Dept: PHARMACY | Facility: CLINIC | Age: 65
End: 2025-07-30

## 2025-07-30 ENCOUNTER — SOCIAL WORK (OUTPATIENT)
Dept: CASE MANAGEMENT | Facility: HOSPITAL | Age: 65
End: 2025-07-30
Payer: COMMERCIAL

## 2025-07-30 ENCOUNTER — INFUSION (OUTPATIENT)
Dept: HEMATOLOGY/ONCOLOGY | Facility: CLINIC | Age: 65
End: 2025-07-30
Payer: COMMERCIAL

## 2025-07-30 ENCOUNTER — OFFICE VISIT (OUTPATIENT)
Dept: HEMATOLOGY/ONCOLOGY | Facility: CLINIC | Age: 65
End: 2025-07-30
Payer: COMMERCIAL

## 2025-07-30 VITALS
DIASTOLIC BLOOD PRESSURE: 70 MMHG | WEIGHT: 126 LBS | SYSTOLIC BLOOD PRESSURE: 119 MMHG | TEMPERATURE: 97.7 F | BODY MASS INDEX: 18.61 KG/M2 | OXYGEN SATURATION: 95 % | HEART RATE: 61 BPM | RESPIRATION RATE: 18 BRPM

## 2025-07-30 DIAGNOSIS — C34.11 MALIGNANT NEOPLASM OF UPPER LOBE OF RIGHT LUNG (MULTI): ICD-10-CM

## 2025-07-30 DIAGNOSIS — R19.7 DIARRHEA, UNSPECIFIED TYPE: Primary | ICD-10-CM

## 2025-07-30 PROCEDURE — 1160F RVW MEDS BY RX/DR IN RCRD: CPT | Performed by: NURSE PRACTITIONER

## 2025-07-30 PROCEDURE — 99213 OFFICE O/P EST LOW 20 MIN: CPT | Performed by: NURSE PRACTITIONER

## 2025-07-30 PROCEDURE — 1126F AMNT PAIN NOTED NONE PRSNT: CPT | Performed by: NURSE PRACTITIONER

## 2025-07-30 PROCEDURE — 3074F SYST BP LT 130 MM HG: CPT | Performed by: NURSE PRACTITIONER

## 2025-07-30 PROCEDURE — 3078F DIAST BP <80 MM HG: CPT | Performed by: NURSE PRACTITIONER

## 2025-07-30 PROCEDURE — 2500000004 HC RX 250 GENERAL PHARMACY W/ HCPCS (ALT 636 FOR OP/ED): Performed by: STUDENT IN AN ORGANIZED HEALTH CARE EDUCATION/TRAINING PROGRAM

## 2025-07-30 PROCEDURE — 1159F MED LIST DOCD IN RCRD: CPT | Performed by: NURSE PRACTITIONER

## 2025-07-30 PROCEDURE — 96413 CHEMO IV INFUSION 1 HR: CPT

## 2025-07-30 RX ORDER — EPINEPHRINE 0.3 MG/.3ML
0.3 INJECTION SUBCUTANEOUS EVERY 5 MIN PRN
Status: DISCONTINUED | OUTPATIENT
Start: 2025-07-30 | End: 2025-07-30 | Stop reason: HOSPADM

## 2025-07-30 RX ORDER — HEPARIN SODIUM,PORCINE/PF 10 UNIT/ML
50 SYRINGE (ML) INTRAVENOUS AS NEEDED
Status: DISCONTINUED | OUTPATIENT
Start: 2025-07-30 | End: 2025-07-30 | Stop reason: HOSPADM

## 2025-07-30 RX ORDER — FAMOTIDINE 10 MG/ML
20 INJECTION, SOLUTION INTRAVENOUS ONCE AS NEEDED
Status: DISCONTINUED | OUTPATIENT
Start: 2025-07-30 | End: 2025-07-30 | Stop reason: HOSPADM

## 2025-07-30 RX ORDER — DIPHENHYDRAMINE HYDROCHLORIDE 50 MG/ML
50 INJECTION, SOLUTION INTRAMUSCULAR; INTRAVENOUS AS NEEDED
Status: DISCONTINUED | OUTPATIENT
Start: 2025-07-30 | End: 2025-07-30 | Stop reason: HOSPADM

## 2025-07-30 RX ORDER — HEPARIN 100 UNIT/ML
500 SYRINGE INTRAVENOUS AS NEEDED
OUTPATIENT
Start: 2025-07-30

## 2025-07-30 RX ORDER — HEPARIN 100 UNIT/ML
500 SYRINGE INTRAVENOUS AS NEEDED
Status: DISCONTINUED | OUTPATIENT
Start: 2025-07-30 | End: 2025-07-30 | Stop reason: HOSPADM

## 2025-07-30 RX ORDER — PROCHLORPERAZINE EDISYLATE 5 MG/ML
10 INJECTION INTRAMUSCULAR; INTRAVENOUS EVERY 6 HOURS PRN
Status: DISCONTINUED | OUTPATIENT
Start: 2025-07-30 | End: 2025-07-30 | Stop reason: HOSPADM

## 2025-07-30 RX ORDER — ALBUTEROL SULFATE 0.83 MG/ML
3 SOLUTION RESPIRATORY (INHALATION) AS NEEDED
Status: DISCONTINUED | OUTPATIENT
Start: 2025-07-30 | End: 2025-07-30 | Stop reason: HOSPADM

## 2025-07-30 RX ORDER — LOPERAMIDE HCL 2 MG
2 TABLET ORAL 4 TIMES DAILY PRN
Qty: 30 TABLET | Refills: 0 | Status: SHIPPED | OUTPATIENT
Start: 2025-07-30 | End: 2025-08-09

## 2025-07-30 RX ORDER — PROCHLORPERAZINE MALEATE 10 MG
10 TABLET ORAL EVERY 6 HOURS PRN
Status: DISCONTINUED | OUTPATIENT
Start: 2025-07-30 | End: 2025-07-30 | Stop reason: HOSPADM

## 2025-07-30 RX ORDER — HEPARIN SODIUM,PORCINE/PF 10 UNIT/ML
50 SYRINGE (ML) INTRAVENOUS AS NEEDED
OUTPATIENT
Start: 2025-07-30

## 2025-07-30 RX ADMIN — SODIUM CHLORIDE 200 MG: 9 INJECTION, SOLUTION INTRAVENOUS at 11:20

## 2025-07-30 ASSESSMENT — PAIN SCALES - GENERAL: PAINLEVEL_OUTOF10: 0-NO PAIN

## 2025-07-30 NOTE — PROGRESS NOTES
Patient ID: Pillo Guerin is a 65 y.o. male.    Primary Care Provider: Rocio Harris, SIMONE-CNP    DIAGNOSIS AND STAGING  Stage IVB (cT3 cN3 cM1c) poorly differentiated adenocarcinoma of RUL diagnosed on 08/03/23     SITES OF DISEASE  RUL  Supraclavicular nodes  Contralateral lung  Right gluteal muscle   Brain right occipital lobe      MOLECULAR GENOMICS  MICROSATELLITE STATUS: Microsatellite Stable (MAGDIEL)     DISEASE ASSOCIATED GENOMIC FINDINGS:   KRAS p.G13D ([id number] c.38G>A)   PIK3CA p.E542K ([id number] c.1624G>A)   TP53 p.E285K ([id number] c.853G>A)     DISEASE RELEVANT ALTERATIONS NOT DETECTED:   Negative for ALK fusion.   Negative for BRAF V600E.   Negative for EGFR sensitizing mutation.   Negative for KRAS G12C.   Negative for MET exon 14 skipping mutation.   Negative for NTRK fusion.   Negative for RET fusion.   Negative for ROS1 fusion.     PD-L1 TPS 80%     PRIOR THERAPIES  GKRS to right occipital lobe on  24 Gy in 1 fraction on 10/17/23      CURRENT THERAPY  Single agent pembrolizumab since 08/31/23    CURRENT ONCOLOGICAL PROBLEMS  Resolved GIII ICI colitis     HISTORY OF PRESENT ILLNESS  This is a pt with PMH of EtOH use disorder, CVA, CAD c/b NSTEMI s/p 2 stents and stage IVB ([id number]) poorly differentiated adenocarcinoma of RUL diagnosed on 08/03/23.      Patient was initially diagnosed 08/2022 after incidentally found RUL lesion was worked up during admission for hyponatremia presumed to be 2/2 beer potomania. He was then seen by his pulmonologist Dr. Callejas who ordered PET and MRI but this was not obtained  nor were multiple attempts to schedule for oncologic NPV.     I saw him in August 2023 and he endorsed haviing lost 15 lbs since stroke April 2022. His appetite is good, denies dysgeusia. He has slowed down a bit since he had his stroke. He can walk without becoming dyspneic but is limited by his stroke.    On 08/12/23 the patient underwent a bronchoscopy with EBUS:  Accession #:  [id number]   Date of Procedure:8/3/2022   Date Reported: 8/9/2022   Date Received: 8/3/2022   Date of Birth / Sex 1960 (Age: 62) / M   Race: WHITE   Submitting Physician: DARYN YOUNG MD   Attending Physician: MD HEVER BROWNE MD   Procedures/Addenda Present     Other External #     FINAL CYTOLOGICAL INTERPRETATION     A. FINE NEEDLE ASPIRATION LYMPH NODE, L 4, CYTOLOGY AND CELL BLOCK:   NO MALIGNANT CELLS IDENTIFIED.   LYMPHOID SAMPLE IS PRESENT.     Note: Cell block shows presence of lymphoid cells along with bronchial cells contamination.     B. FINE NEEDLE ASPIRATION LYMPH NODE, STATION 7, CYTOLOGY AND CELL BLOCK:   NO MALIGNANT CELLS IDENTIFIED.   LYMPHOID SAMPLE IS PRESENT.     C. FINE NEEDLE ASPIRATION LYMPH NODE, R 11, CYTOLOGY AND CELL BLOCK:   POORLY DIFFERENTIATED CARCINOMA, CONSISTENT WITH ADENOCARCINOMA, SEE NOTE.     Note: A limited panel of immunohistochemical stains performed showed neoplastic   cells staining strongly and diffusely positive with TTF-1 and negative with   p40. Findings are consistent with above.     Staff pathologist: Reviewed by Dr. Ellyn Valles.     Molecular testing has been ordered and results will be issued in an addendum.     The cell block; C1 contains moderate tumor cellularity representing roughly 70 % of all nucleated cells.       D. BRONCHO-ALVEOLAR LAVAGE OF RIGHT UPPER LOBE:   RARE ATYPICAL CELLS ARE PRESENT.     08/16/23: PET scan demonstrates multiple RUL FDG avid nodules as well as contra-lateral lung nodules, right hilar, mediastinal and supraclavicular nodes consistent with neoplastic involvement. There is also a large FDG avid right gluteal mass extending into the right SIJ and iliac bone.     08/16/23: MRI brain shows a 8 mm right occipital lobe metastasis with mild associated edema.     08/31/23: Single agent pembrolizumab begins     10/17/23: GKRS to right occipital lobe on  24 Gy in 1 fraction     06/06/24: grade 3 immune-related  diarrhea with episodes of incontinence despite Imodium   Rx prednisone 40 mg/day provided today      PAST MEDICAL HISTORY  CAD s/p 2 stents, CVA, Dupuytren's contracture   BPH  Lumbar radiculopathy   Ir-diarrhea (pembrolizumab) treated with prednisone months of May and      SURGICAL HISTORY  None      SOCIAL HISTORY  Born in [place], lives in [place] with 2 roommates.   Brother Salas is NOK. Drinks 2 24 oz beers a day.   + Tobacco - 50 pack year smoking history, active.   Rare cannabis, acid use in 70s, no additional illicits.    Was working at [employer] in [place] before stroke.      FAMILY HISTORY  Mother  of melanoma in      CURRENT MEDS REVIEWED      ALLERGIES REVIEWED     SUBJECTIVE:   Patient presents for toxicity check and treatment readiness visit. He is doing well overall.  He denies any fevers, chills or night sweats. No cough, chest pain or shortness of breath. No nausea or vomiting. No constipation or diarrhea. No urinary symptoms. No rash. No neuropathy. Oxycodone for pain with good result.       Review of Systems:  A review of systems has been completed and are negative for complaints except what is stated in the HPI and/or past medical history      OBJECTIVE:  /70 (BP Location: Right arm, Patient Position: Sitting, BP Cuff Size: Adult)   Pulse 61   Temp 36.5 °C (97.7 °F) (Core)   Resp 18   Wt 57.2 kg (126 lb)   SpO2 95%   BMI 18.61 kg/m²     Wt Readings from Last 5 Encounters:   25 57.2 kg (126 lb)   25 57.2 kg (126 lb)   25 55.8 kg (123 lb)   25 59.8 kg (131 lb 12.8 oz)   25 59.1 kg (130 lb 3.2 oz)       Physical Exam:  ECO  Pain: 7/10 mainly left leg  Constitutional: Well developed, awake/alert/oriented x3, no distress, alert and cooperative  Eyes: PER. sclera anicteric  ENMT: Oral mucosa moist, no lesions or thrush identified  Respiratory/Thorax: Breathing is non-labored. Lungs are clear to auscultation bilaterally. No  adventitious breath sounds  Cardiovascular: S1-S2. Regular rate and rhythm. No murmurs, rubs, or gallops appreciated  Gastrointestinal: Abdomen soft nontender, nondistended, normal active bowel sounds.  Musculoskeletal: ROM intact, no joint swelling, normal strength  Extremities: normal extremities, no cyanosis, no edema, no clubbing  Lymphatics: no palpable lymphadenopathy  Skin: no rash  Neurologic:  Nonfocal exam. No myoclonus  Psychological: Pleasant, appropriate and easily engaged     Diagnostic Results   Labs  Lab Results   Component Value Date    WBC 10.2 07/29/2025    HGB 11.6 (L) 07/29/2025    HCT 35.6 (L) 07/29/2025    MCV 64 (L) 07/29/2025     (H) 07/29/2025      Lab Results   Component Value Date    NEUTROABS 4.84 07/29/2025      Lab Results   Component Value Date    GLUCOSE 97 07/29/2025    CALCIUM 8.6 07/29/2025     (L) 07/29/2025    K 4.7 07/29/2025    CO2 26 07/29/2025    CL 98 07/29/2025    BUN 10 07/29/2025    CREATININE 0.60 07/29/2025    MG 2.28 08/06/2024     Lab Results   Component Value Date    ALT 16 07/29/2025    AST 17 07/29/2025    ALKPHOS 76 07/29/2025    BILITOT 0.9 07/29/2025    BILIDIR 0.3 07/31/2022      Lab Results   Component Value Date    ACTH 32.0 07/01/2025    CORTISOL 4.5 (L) 07/01/2025    TSH 5.68 (H) 07/01/2025    FREET4 1.37 07/01/2025             Imaging  CT CAP 05/16/25  IMPRESSION:  Lung cancer restaging scan. When compared to the prior CT dated  03/12/2025, there is stable size of the dominant right upper lobe  mass with increasing internal cavitary component which may be  posttreatment related and attention on follow-up imaging is  recommended. Stable osseous metastatic disease. No definite new sites  of metastatic disease. Additional stable chronic and incidental  findings described above.     MRI Brain 03/12/2025  IMPRESSION:  1. No mass or enhancement suggestive of metastatic disease is noted.      2. The parenchymal abnormalities are similar to the prior  MR and may be secondary to chronic ischemic changes. No acute infarct or hemorrhage is noted.    Assessment/Plan   Lung cancer (Multi), Clinical: Stage IVB (cT3, cN3, cM1c)  Mr. Pillo Guerin is a 63 YO with Stage IVB ([id number]) poorly differentiated adenocarcinoma of RUL, TPS 80%, lack of actionable genomic alterations noted.    He was started on single agent pembrolizumab since 08/31/23 - however had to be held in May 2024 due to grade III ICI colitis which has completed resolved.     He has now completed 20 cycles pembrolizumab. CT scans from 05/25 personally reviewed showing stable primary lesion with new cavitation suggestive of response. We will continue with pembrolizumab and obtain scans in August.    #Stage IVB NSCLC with early, slow progression and no options in second line  -Continue pembrolizumab  -Scans August    #Grade III ICI Diarrhea - resolved  6/11/2025 - soft stool as currently off Morphine     #Pruritus, immune related  Continue moisturizing BUE with CeraVe and triamcinolone as needed     #Right occipital lobe 8 mm metastasis   S/p GKRS 24 Gy in 1 fraction on 10/17/23. Stable MRI 3/12/2025  Repeat MRI brain every 3-4 months per Rad Onc     #Pain on right gluteal region-  Due to large centrally necrotic and peripherally hypermetabolic mass in the right lower paramediastinal muscle extending into the adjacent subcutaneous tissue and superior right gluteal muscle with involvement of the superior aspect of the right iliac  bone, right florencio sacrum -   He has been taking gabapentin for pain (controlled) in LLE that is chronic   -Increased oxycodone to 10 mg every 6 PRN. Patient has established with supportive oncology    #Advanced Care Planning  -Discussed slow nature of progression and challenges with treatment in second line given PS. He is close with brother and roommate/ex gf Radha     #Stroke in April 2022 and hx of CAD  -   Stable -   On ASA and ticagrelor      #HTN - on amlodipine      #HLD -  atorvastatin    #Microcytic Anemia - Chronic in nature. Iron panel and ferritin found to be WNL.  Could send hemoglobin electrophoresis if anemia becomes a process.   Has never been told or heard about family history of thalassemia or other blood disorders.     #Dispo  -continue every 21 day visits while on Pembrolizumab

## 2025-07-30 NOTE — SIGNIFICANT EVENT
07/30/25 1053   Prechemo Checklist   Has the patient been in the hospital, ED, or urgent care since last date of service No   Chemo/Immuno Consent Completed and Signed Yes   Protocol/Indications Verified Yes   Confirmed to previous date/time of medication Yes   Compared to previous dose Yes   All medications are dated accurately Yes   Pregnancy Test Negative Not applicable   Parameters Met Yes   Provider Notified Yes   Is Patient Proceeding With Treatment? Yes   BSA/Weight-Height Verified Yes   Dose Calculations Verified (current, total, cumulative) Yes

## 2025-07-30 NOTE — PROGRESS NOTES
NUTRITION Assessment NOTE    Nutrition Assessment     Reason for Visit:  Pillo Guerin is a 65 y.o. male with Stage IVB (cT3 cN3 cM1c) poorly differentiated adenocarcinoma of RUL diagnosed on 08/03/23     SITES OF DISEASE  RUL  Supraclavicular nodes  Contralateral lung  Right gluteal muscle   Brain right occipital lobe     PRIOR THERAPIES  GKRS to right occipital lobe on  24 Gy in 1 fraction on 10/17/23      CURRENT THERAPY  Single agent pembrolizumab since 08/31/23     CURRENT ONCOLOGICAL PROBLEMS  Resolved GIII ICI colitis-     PMH of EtOH use disorder, CVA, CAD c/b NSTEMI s/p 2 stents, hyponatremia presumed to be 2/2 beer potomania.     Met with patient for nutrition follow up visit today.  He is here in infusion for treatment .    Lab Results   Component Value Date/Time    GLUCOSE 97 07/29/2025 1126     (L) 07/29/2025 1126    K 4.7 07/29/2025 1126    CL 98 07/29/2025 1126    CO2 26 07/29/2025 1126    ANIONGAP 14 07/29/2025 1126    BUN 10 07/29/2025 1126    CREATININE 0.60 07/29/2025 1126    EGFR >90 07/29/2025 1126    CALCIUM 8.6 07/29/2025 1126    ALBUMIN 3.7 07/29/2025 1126    ALKPHOS 76 07/29/2025 1126    PROT 6.0 (L) 07/29/2025 1126    AST 17 07/29/2025 1126    BILITOT 0.9 07/29/2025 1126    ALT 16 07/29/2025 1126    MG 2.28 08/06/2024 1032    PHOS 4.1 08/05/2022 0606     Lab Results   Component Value Date/Time    VITD25 50 03/18/2025 1212        Lab Results   Component Value Date    WBC 10.2 07/29/2025    HGB 11.6 (L) 07/29/2025    HCT 35.6 (L) 07/29/2025    MCV 64 (L) 07/29/2025     (H) 07/29/2025       Anthropometrics:     Per patient, weight fluctuates 60-62 kg .  Weight was up Dec/January (unsure if this was true weight gain or fluid shift.  Today weight is down 5%  from usual weight range.   Wt Readings from Last 20 Encounters:   07/30/25 57.2 kg (126 lb)   07/25/25 57.2 kg (126 lb)   07/09/25 55.8 kg (123 lb)   06/27/25 59.8 kg (131 lb 12.8 oz)   06/11/25 59.1 kg (130 lb 3.2 oz)   05/30/25  61 kg (134 lb 6.4 oz)   05/21/25 60.3 kg (133 lb)   04/30/25 60.1 kg (132 lb 8 oz)   04/25/25 62 kg (136 lb 9.6 oz)   04/09/25 61.7 kg (135 lb 14.6 oz)   03/28/25 62.8 kg (138 lb 6.4 oz)   03/19/25 61.6 kg (135 lb 12.9 oz)   06/26/25 59 kg (130 lb)   02/26/25 63 kg (139 lb)   02/05/25 61.2 kg (135 lb)   01/15/25 64 kg (141 lb)   03/12/25 62.6 kg (138 lb)   12/11/24 63.9 kg (140 lb 14 oz)   11/20/24 63 kg (139 lb)   10/30/24 62.8 kg (138 lb 5.4 oz)              Food And Nutrient Intake:  Food and Nutrient History  Food and Nutrient History: Patient reports that appetite and intake are slightly decreased, due to the humidity and hot temperatures.   He does not have air conditioning in his home and reports that he is not sleeping well at night and it's impacting his overall eating patterns.  He does have a fan that is helpful but reports it can still be miserable at times.  He states that he had a pizza the other night for dinner and he still is eating 3 meals/day, as he has Meals on Wheels,but quantitiy is not as much.  He also ran out of Ensure/Boost shakes and has not been drinking.  Did offer patient coupons today to assist with cost savings and to optmize calorie intake, especially during the warmer days.  Takes vitamin D daily . Denies n/v/d.  Energy Intake: Fair 50-75 %  He is confident oral intake will improve once he is able to get his morphine rx filled and pain is better controlled.                         Nutrition Focused Physical Exam Findings:      Subcutaneous Fat Loss  Orbital Fat Pads: Mild-Moderate (slight dark circles and slight hollowing)  Buccal Fat Pads: Mild-Moderate (flat cheeks, minimal bounce)    Muscle Wasting  Temporalis: Severe (hollowed scooping depression)  Deltoid/Trapezius: Defer  Interosseous: Defer  Trapezius/Infraspinatus/Supraspinatus (Scapular Region): Defer  Gastrocnemius: Mild-Moderate (not well developed muscle)      Energy Needs  Estimated Energy Needs  Total Energy Estimated  Needs in 24 hours (kCal):  (3368-2607 cals (32-35 cals/kg))  Estimated Fluid Needs  Total Fluid Estimated Needs in 24 Hours (mL):  (1900 mls)  Total Fluid Estimated Needs in 24 hours (mL/kg): 30 mL/kg  Estimated Protein Needs  Total Protein Estimated Needs in 24 Hours (g):  ( g)        Nutrition Diagnosis   Malnutrition Diagnosis  Patient has Malnutrition Diagnosis: Yes  Diagnosis Status: New  Malnutrition Diagnosis: Moderate malnutrition related to chronic disease or condition  Related to: h/o chronic alcoholism and cancer  As Evidenced by: moderate muscle/fat loss, 5% weight loss from UBW range, and reports <75% oral intake >1 month       Nutrition Interventions/Recommendations   Nutrition Prescription  Individualized Nutrition Prescription Provided for : Regular TONO; Not skipping meals, nightly snack, 1 oral nutrition supplement daily (i.e Boost/Ensure Plus) (Note pt with limited resources and provided Global meals vary but often provide frozen meals)Regular diet   Continue with 1000 Ius vitamin D3 daily for maintenance   One-a -day MVI   Encouraged Ensure or Boost Plus shake once daily (as able to afford) for ongoing weight stablization /gain   Ensure Plus 1 x/day provides: 350 kCal, 16 g PRO, 11 g fat, 47 g CHO, 0 g fiber, and 0 mL free water      Boost Plus 1 x/day provides: 360 kCal, 14 g PRO, 14 g fat, 45 g CHO, 1 g fiber, and 182 mL free water      Boost  coupons provided today to assist with cost savings       Food and Nutrition Delivery  Food and Nutrition Delivery  Meals & Snacks: Energy-modified diet, Modification of schedule of oral intake  Goals: 3 regular meals ; 1-2 snacks; 1 oral nutrition supplement shake daily  Medical Food Supplement:  (Encouraged retrial for tolerance.  WIll mail Ensure coupons to pt)  Goals:  (Continue DMV with iron and vitamin D)    Nutrition Education  Nutrition Education  Nutrition Education Content: Content related nutrition education    -Coordination of  Care  Coordination of Nutrition Care by a Nutrition Professional  Collaboration and referral of nutrition care: Collaboration and Referral of Nutrition Care    There are no Patient Instructions on file for this visit.    Nutrition Monitoring and Evaluation   Food and Nutrient Intake  Fluid Intake: Estimated fluid intake  Criteria: Meet daily hydration goals  Amount of Food: Estimated amout of food  Criteria: Meet energy and protein needs  Meal/Snack Pattern: Estimated meal and snack pattern  Criteria: as above  Anthropometric measurements  Monitoring and Evaluation Plan: Weight  Criteria: Maintenance or slow gain  Biochemical Data, Medical Tests and Procedures  Criteria: WNL  Vitamin Profile: Vitamin D, 25 hydroxy  Criteria: > 30

## 2025-07-30 NOTE — PROGRESS NOTES
JEANA met with patient today at [facility]. He reported he is not sure if he has a ride home today with his insurance transportation. He is going through the automated prompts with [employer] and it isnt working well. JEANA encouraged him to let JEANA know and JEANA can assist with ride home today. RICCI Sy RN aware as well.    Update: JEANA called [facility] today [phone] to activate patient's ride home. JEANA called patient today to make him aware and he reported the ride was on the way and he received a text

## 2025-07-31 RX ORDER — TICAGRELOR 60 MG/1
60 TABLET ORAL
Qty: 60 TABLET | Refills: 11 | OUTPATIENT
Start: 2025-07-31

## 2025-08-13 ENCOUNTER — APPOINTMENT (OUTPATIENT)
Dept: HEMATOLOGY/ONCOLOGY | Facility: CLINIC | Age: 65
End: 2025-08-13
Payer: COMMERCIAL

## 2025-08-15 ENCOUNTER — HOSPITAL ENCOUNTER (OUTPATIENT)
Dept: RADIOLOGY | Facility: CLINIC | Age: 65
Discharge: HOME | End: 2025-08-15
Payer: COMMERCIAL

## 2025-08-15 DIAGNOSIS — C34.11 MALIGNANT NEOPLASM OF UPPER LOBE OF RIGHT LUNG (MULTI): ICD-10-CM

## 2025-08-15 PROCEDURE — 2550000001 HC RX 255 CONTRASTS: Performed by: NURSE PRACTITIONER

## 2025-08-15 PROCEDURE — 74177 CT ABD & PELVIS W/CONTRAST: CPT

## 2025-08-15 RX ADMIN — IOHEXOL 75 ML: 350 INJECTION, SOLUTION INTRAVENOUS at 10:36

## 2025-08-18 ENCOUNTER — TELEPHONE (OUTPATIENT)
Dept: PALLIATIVE MEDICINE | Facility: CLINIC | Age: 65
End: 2025-08-18
Payer: COMMERCIAL

## 2025-08-18 DIAGNOSIS — G89.3 CANCER RELATED PAIN: ICD-10-CM

## 2025-08-18 PROCEDURE — RXMED WILLOW AMBULATORY MEDICATION CHARGE

## 2025-08-18 RX ORDER — MORPHINE SULFATE 15 MG/1
15 TABLET, FILM COATED, EXTENDED RELEASE ORAL 3 TIMES DAILY
Qty: 90 TABLET | Refills: 0 | Status: SHIPPED | OUTPATIENT
Start: 2025-08-18 | End: 2025-09-18

## 2025-08-19 ENCOUNTER — PHARMACY VISIT (OUTPATIENT)
Dept: PHARMACY | Facility: CLINIC | Age: 65
End: 2025-08-19
Payer: COMMERCIAL

## 2025-08-19 ENCOUNTER — LAB (OUTPATIENT)
Dept: LAB | Facility: CLINIC | Age: 65
End: 2025-08-19
Payer: COMMERCIAL

## 2025-08-19 DIAGNOSIS — C34.11 MALIGNANT NEOPLASM OF UPPER LOBE OF RIGHT LUNG (MULTI): ICD-10-CM

## 2025-08-19 LAB
ALBUMIN SERPL BCP-MCNC: 4.1 G/DL (ref 3.4–5)
ALP SERPL-CCNC: 53 U/L (ref 33–136)
ALT SERPL W P-5'-P-CCNC: 10 U/L (ref 10–52)
ANION GAP SERPL CALC-SCNC: 12 MMOL/L (ref 10–20)
AST SERPL W P-5'-P-CCNC: 19 U/L (ref 9–39)
BASOPHILS # BLD AUTO: 0.08 X10*3/UL (ref 0–0.1)
BASOPHILS NFR BLD AUTO: 0.8 %
BILIRUB SERPL-MCNC: 0.7 MG/DL (ref 0–1.2)
BUN SERPL-MCNC: 12 MG/DL (ref 6–23)
CALCIUM SERPL-MCNC: 8.5 MG/DL (ref 8.6–10.6)
CHLORIDE SERPL-SCNC: 101 MMOL/L (ref 98–107)
CO2 SERPL-SCNC: 26 MMOL/L (ref 21–32)
CORTIS AM PEAK SERPL-MCNC: 9.8 UG/DL (ref 5–20)
CREAT SERPL-MCNC: 0.74 MG/DL (ref 0.5–1.3)
EGFRCR SERPLBLD CKD-EPI 2021: >90 ML/MIN/1.73M*2
EOSINOPHIL # BLD AUTO: 1.23 X10*3/UL (ref 0–0.7)
EOSINOPHIL NFR BLD AUTO: 12.4 %
ERYTHROCYTE [DISTWIDTH] IN BLOOD BY AUTOMATED COUNT: 17.4 % (ref 11.5–14.5)
GLUCOSE SERPL-MCNC: 94 MG/DL (ref 74–99)
HCT VFR BLD AUTO: 36.8 % (ref 41–52)
HGB BLD-MCNC: 11.7 G/DL (ref 13.5–17.5)
IMM GRANULOCYTES # BLD AUTO: 0.04 X10*3/UL (ref 0–0.7)
IMM GRANULOCYTES NFR BLD AUTO: 0.4 % (ref 0–0.9)
LYMPHOCYTES # BLD AUTO: 3.9 X10*3/UL (ref 1.2–4.8)
LYMPHOCYTES NFR BLD AUTO: 39.4 %
MCH RBC QN AUTO: 20.5 PG (ref 26–34)
MCHC RBC AUTO-ENTMCNC: 31.8 G/DL (ref 32–36)
MCV RBC AUTO: 64 FL (ref 80–100)
MONOCYTES # BLD AUTO: 0.9 X10*3/UL (ref 0.1–1)
MONOCYTES NFR BLD AUTO: 9.1 %
NEUTROPHILS # BLD AUTO: 3.74 X10*3/UL (ref 1.2–7.7)
NEUTROPHILS NFR BLD AUTO: 37.9 %
NRBC BLD-RTO: ABNORMAL /100{WBCS}
PLATELET # BLD AUTO: 305 X10*3/UL (ref 150–450)
POTASSIUM SERPL-SCNC: 4.5 MMOL/L (ref 3.5–5.3)
PROT SERPL-MCNC: 5.9 G/DL (ref 6.4–8.2)
RBC # BLD AUTO: 5.71 X10*6/UL (ref 4.5–5.9)
SODIUM SERPL-SCNC: 134 MMOL/L (ref 136–145)
T4 FREE SERPL-MCNC: 1.15 NG/DL (ref 0.78–1.48)
TSH SERPL-ACNC: 8.66 MIU/L (ref 0.44–3.98)
WBC # BLD AUTO: 9.9 X10*3/UL (ref 4.4–11.3)

## 2025-08-19 PROCEDURE — 84439 ASSAY OF FREE THYROXINE: CPT

## 2025-08-19 PROCEDURE — 84443 ASSAY THYROID STIM HORMONE: CPT

## 2025-08-19 PROCEDURE — 36415 COLL VENOUS BLD VENIPUNCTURE: CPT

## 2025-08-19 PROCEDURE — 84075 ASSAY ALKALINE PHOSPHATASE: CPT

## 2025-08-19 PROCEDURE — 82533 TOTAL CORTISOL: CPT

## 2025-08-19 PROCEDURE — 82024 ASSAY OF ACTH: CPT

## 2025-08-19 PROCEDURE — 85025 COMPLETE CBC W/AUTO DIFF WBC: CPT

## 2025-08-20 ENCOUNTER — PHARMACY VISIT (OUTPATIENT)
Dept: PHARMACY | Facility: CLINIC | Age: 65
End: 2025-08-20
Payer: COMMERCIAL

## 2025-08-20 ENCOUNTER — OFFICE VISIT (OUTPATIENT)
Dept: HEMATOLOGY/ONCOLOGY | Facility: CLINIC | Age: 65
End: 2025-08-20
Payer: COMMERCIAL

## 2025-08-20 ENCOUNTER — INFUSION (OUTPATIENT)
Dept: HEMATOLOGY/ONCOLOGY | Facility: CLINIC | Age: 65
End: 2025-08-20
Payer: COMMERCIAL

## 2025-08-20 ENCOUNTER — SOCIAL WORK (OUTPATIENT)
Dept: CASE MANAGEMENT | Facility: HOSPITAL | Age: 65
End: 2025-08-20

## 2025-08-20 VITALS
SYSTOLIC BLOOD PRESSURE: 129 MMHG | DIASTOLIC BLOOD PRESSURE: 69 MMHG | BODY MASS INDEX: 18.9 KG/M2 | OXYGEN SATURATION: 95 % | WEIGHT: 128 LBS | TEMPERATURE: 96.8 F | HEART RATE: 64 BPM | RESPIRATION RATE: 18 BRPM

## 2025-08-20 VITALS — BODY MASS INDEX: 18.94 KG/M2 | HEIGHT: 69 IN

## 2025-08-20 DIAGNOSIS — R19.7 DIARRHEA, UNSPECIFIED TYPE: ICD-10-CM

## 2025-08-20 DIAGNOSIS — C34.11 MALIGNANT NEOPLASM OF UPPER LOBE OF RIGHT LUNG (MULTI): Primary | ICD-10-CM

## 2025-08-20 DIAGNOSIS — C34.11 MALIGNANT NEOPLASM OF UPPER LOBE OF RIGHT LUNG (MULTI): ICD-10-CM

## 2025-08-20 PROCEDURE — 99215 OFFICE O/P EST HI 40 MIN: CPT | Performed by: STUDENT IN AN ORGANIZED HEALTH CARE EDUCATION/TRAINING PROGRAM

## 2025-08-20 PROCEDURE — 3074F SYST BP LT 130 MM HG: CPT | Performed by: STUDENT IN AN ORGANIZED HEALTH CARE EDUCATION/TRAINING PROGRAM

## 2025-08-20 PROCEDURE — G2211 COMPLEX E/M VISIT ADD ON: HCPCS | Performed by: STUDENT IN AN ORGANIZED HEALTH CARE EDUCATION/TRAINING PROGRAM

## 2025-08-20 PROCEDURE — 1126F AMNT PAIN NOTED NONE PRSNT: CPT | Performed by: STUDENT IN AN ORGANIZED HEALTH CARE EDUCATION/TRAINING PROGRAM

## 2025-08-20 PROCEDURE — 3078F DIAST BP <80 MM HG: CPT | Performed by: STUDENT IN AN ORGANIZED HEALTH CARE EDUCATION/TRAINING PROGRAM

## 2025-08-20 PROCEDURE — RXMED WILLOW AMBULATORY MEDICATION CHARGE

## 2025-08-20 PROCEDURE — 2500000004 HC RX 250 GENERAL PHARMACY W/ HCPCS (ALT 636 FOR OP/ED): Performed by: STUDENT IN AN ORGANIZED HEALTH CARE EDUCATION/TRAINING PROGRAM

## 2025-08-20 PROCEDURE — 96413 CHEMO IV INFUSION 1 HR: CPT

## 2025-08-20 PROCEDURE — 1159F MED LIST DOCD IN RCRD: CPT | Performed by: STUDENT IN AN ORGANIZED HEALTH CARE EDUCATION/TRAINING PROGRAM

## 2025-08-20 RX ORDER — PROCHLORPERAZINE EDISYLATE 5 MG/ML
10 INJECTION INTRAMUSCULAR; INTRAVENOUS EVERY 6 HOURS PRN
OUTPATIENT
Start: 2025-10-01

## 2025-08-20 RX ORDER — DIPHENHYDRAMINE HYDROCHLORIDE 50 MG/ML
50 INJECTION, SOLUTION INTRAMUSCULAR; INTRAVENOUS AS NEEDED
Status: DISCONTINUED | OUTPATIENT
Start: 2025-08-20 | End: 2025-08-20 | Stop reason: HOSPADM

## 2025-08-20 RX ORDER — FAMOTIDINE 10 MG/ML
20 INJECTION, SOLUTION INTRAVENOUS ONCE AS NEEDED
OUTPATIENT
Start: 2025-10-01

## 2025-08-20 RX ORDER — HEPARIN 100 UNIT/ML
500 SYRINGE INTRAVENOUS AS NEEDED
OUTPATIENT
Start: 2025-08-20

## 2025-08-20 RX ORDER — ALBUTEROL SULFATE 0.83 MG/ML
3 SOLUTION RESPIRATORY (INHALATION) AS NEEDED
Status: DISCONTINUED | OUTPATIENT
Start: 2025-08-20 | End: 2025-08-20 | Stop reason: HOSPADM

## 2025-08-20 RX ORDER — EPINEPHRINE 0.3 MG/.3ML
0.3 INJECTION SUBCUTANEOUS EVERY 5 MIN PRN
Status: CANCELLED | OUTPATIENT
Start: 2025-08-20

## 2025-08-20 RX ORDER — PROCHLORPERAZINE EDISYLATE 5 MG/ML
10 INJECTION INTRAMUSCULAR; INTRAVENOUS EVERY 6 HOURS PRN
Status: DISCONTINUED | OUTPATIENT
Start: 2025-08-20 | End: 2025-08-20 | Stop reason: HOSPADM

## 2025-08-20 RX ORDER — DIPHENHYDRAMINE HYDROCHLORIDE 50 MG/ML
50 INJECTION, SOLUTION INTRAMUSCULAR; INTRAVENOUS AS NEEDED
Status: CANCELLED | OUTPATIENT
Start: 2025-08-20

## 2025-08-20 RX ORDER — DIPHENHYDRAMINE HYDROCHLORIDE 50 MG/ML
50 INJECTION, SOLUTION INTRAMUSCULAR; INTRAVENOUS AS NEEDED
OUTPATIENT
Start: 2025-09-10

## 2025-08-20 RX ORDER — EPINEPHRINE 0.3 MG/.3ML
0.3 INJECTION SUBCUTANEOUS EVERY 5 MIN PRN
OUTPATIENT
Start: 2025-10-22

## 2025-08-20 RX ORDER — LOPERAMIDE HCL 2 MG
2 TABLET ORAL 4 TIMES DAILY PRN
Qty: 30 TABLET | Refills: 0 | Status: SHIPPED | OUTPATIENT
Start: 2025-08-20 | End: 2025-08-30

## 2025-08-20 RX ORDER — FAMOTIDINE 10 MG/ML
20 INJECTION, SOLUTION INTRAVENOUS ONCE AS NEEDED
OUTPATIENT
Start: 2025-10-22

## 2025-08-20 RX ORDER — DIPHENHYDRAMINE HYDROCHLORIDE 50 MG/ML
50 INJECTION, SOLUTION INTRAMUSCULAR; INTRAVENOUS AS NEEDED
OUTPATIENT
Start: 2025-10-01

## 2025-08-20 RX ORDER — PROCHLORPERAZINE EDISYLATE 5 MG/ML
10 INJECTION INTRAMUSCULAR; INTRAVENOUS EVERY 6 HOURS PRN
OUTPATIENT
Start: 2025-09-10

## 2025-08-20 RX ORDER — HEPARIN 100 UNIT/ML
500 SYRINGE INTRAVENOUS AS NEEDED
Status: DISCONTINUED | OUTPATIENT
Start: 2025-08-20 | End: 2025-08-20 | Stop reason: HOSPADM

## 2025-08-20 RX ORDER — PROCHLORPERAZINE MALEATE 10 MG
10 TABLET ORAL EVERY 6 HOURS PRN
Status: CANCELLED | OUTPATIENT
Start: 2025-08-20

## 2025-08-20 RX ORDER — FAMOTIDINE 10 MG/ML
20 INJECTION, SOLUTION INTRAVENOUS ONCE AS NEEDED
Status: CANCELLED | OUTPATIENT
Start: 2025-08-20

## 2025-08-20 RX ORDER — ALBUTEROL SULFATE 0.83 MG/ML
3 SOLUTION RESPIRATORY (INHALATION) AS NEEDED
OUTPATIENT
Start: 2025-09-10

## 2025-08-20 RX ORDER — DIPHENHYDRAMINE HYDROCHLORIDE 50 MG/ML
50 INJECTION, SOLUTION INTRAMUSCULAR; INTRAVENOUS AS NEEDED
OUTPATIENT
Start: 2025-10-22

## 2025-08-20 RX ORDER — PROCHLORPERAZINE EDISYLATE 5 MG/ML
10 INJECTION INTRAMUSCULAR; INTRAVENOUS EVERY 6 HOURS PRN
OUTPATIENT
Start: 2025-10-22

## 2025-08-20 RX ORDER — HEPARIN SODIUM,PORCINE/PF 10 UNIT/ML
50 SYRINGE (ML) INTRAVENOUS AS NEEDED
OUTPATIENT
Start: 2025-08-20

## 2025-08-20 RX ORDER — HEPARIN SODIUM,PORCINE/PF 10 UNIT/ML
50 SYRINGE (ML) INTRAVENOUS AS NEEDED
Status: DISCONTINUED | OUTPATIENT
Start: 2025-08-20 | End: 2025-08-20 | Stop reason: HOSPADM

## 2025-08-20 RX ORDER — ALBUTEROL SULFATE 0.83 MG/ML
3 SOLUTION RESPIRATORY (INHALATION) AS NEEDED
Status: CANCELLED | OUTPATIENT
Start: 2025-08-20

## 2025-08-20 RX ORDER — PROCHLORPERAZINE MALEATE 10 MG
10 TABLET ORAL EVERY 6 HOURS PRN
Status: DISCONTINUED | OUTPATIENT
Start: 2025-08-20 | End: 2025-08-20 | Stop reason: HOSPADM

## 2025-08-20 RX ORDER — ALBUTEROL SULFATE 0.83 MG/ML
3 SOLUTION RESPIRATORY (INHALATION) AS NEEDED
OUTPATIENT
Start: 2025-10-22

## 2025-08-20 RX ORDER — PROCHLORPERAZINE EDISYLATE 5 MG/ML
10 INJECTION INTRAMUSCULAR; INTRAVENOUS EVERY 6 HOURS PRN
Status: CANCELLED | OUTPATIENT
Start: 2025-08-20

## 2025-08-20 RX ORDER — FAMOTIDINE 10 MG/ML
20 INJECTION, SOLUTION INTRAVENOUS ONCE AS NEEDED
OUTPATIENT
Start: 2025-09-10

## 2025-08-20 RX ORDER — EPINEPHRINE 0.3 MG/.3ML
0.3 INJECTION SUBCUTANEOUS EVERY 5 MIN PRN
OUTPATIENT
Start: 2025-10-01

## 2025-08-20 RX ORDER — EPINEPHRINE 0.3 MG/.3ML
0.3 INJECTION SUBCUTANEOUS EVERY 5 MIN PRN
OUTPATIENT
Start: 2025-09-10

## 2025-08-20 RX ORDER — PROCHLORPERAZINE MALEATE 10 MG
10 TABLET ORAL EVERY 6 HOURS PRN
OUTPATIENT
Start: 2025-10-01

## 2025-08-20 RX ORDER — PROCHLORPERAZINE MALEATE 10 MG
10 TABLET ORAL EVERY 6 HOURS PRN
OUTPATIENT
Start: 2025-10-22

## 2025-08-20 RX ORDER — ALBUTEROL SULFATE 0.83 MG/ML
3 SOLUTION RESPIRATORY (INHALATION) AS NEEDED
OUTPATIENT
Start: 2025-10-01

## 2025-08-20 RX ORDER — EPINEPHRINE 0.3 MG/.3ML
0.3 INJECTION SUBCUTANEOUS EVERY 5 MIN PRN
Status: DISCONTINUED | OUTPATIENT
Start: 2025-08-20 | End: 2025-08-20 | Stop reason: HOSPADM

## 2025-08-20 RX ORDER — FAMOTIDINE 10 MG/ML
20 INJECTION, SOLUTION INTRAVENOUS ONCE AS NEEDED
Status: DISCONTINUED | OUTPATIENT
Start: 2025-08-20 | End: 2025-08-20 | Stop reason: HOSPADM

## 2025-08-20 RX ORDER — PROCHLORPERAZINE MALEATE 10 MG
10 TABLET ORAL EVERY 6 HOURS PRN
OUTPATIENT
Start: 2025-09-10

## 2025-08-20 RX ADMIN — SODIUM CHLORIDE 200 MG: 9 INJECTION, SOLUTION INTRAVENOUS at 12:53

## 2025-08-20 ASSESSMENT — PAIN SCALES - GENERAL: PAINLEVEL_OUTOF10: 0-NO PAIN

## 2025-08-21 LAB — ACTH PLAS-MCNC: 42.4 PG/ML (ref 7.2–63.3)

## 2025-08-22 ENCOUNTER — TELEPHONE (OUTPATIENT)
Dept: PALLIATIVE MEDICINE | Facility: CLINIC | Age: 65
End: 2025-08-22
Payer: COMMERCIAL

## 2025-08-22 DIAGNOSIS — C34.11 MALIGNANT NEOPLASM OF UPPER LOBE OF RIGHT LUNG (MULTI): ICD-10-CM

## 2025-08-22 PROCEDURE — RXMED WILLOW AMBULATORY MEDICATION CHARGE

## 2025-08-22 RX ORDER — OXYCODONE HYDROCHLORIDE 10 MG/1
10 TABLET ORAL EVERY 6 HOURS PRN
Qty: 120 TABLET | Refills: 0 | Status: SHIPPED | OUTPATIENT
Start: 2025-08-22 | End: 2025-09-22

## 2025-08-23 ENCOUNTER — PHARMACY VISIT (OUTPATIENT)
Dept: PHARMACY | Facility: CLINIC | Age: 65
End: 2025-08-23
Payer: COMMERCIAL

## 2025-08-29 ENCOUNTER — OFFICE VISIT (OUTPATIENT)
Dept: PALLIATIVE MEDICINE | Facility: CLINIC | Age: 65
End: 2025-08-29
Payer: COMMERCIAL

## 2025-08-29 VITALS
TEMPERATURE: 97.7 F | OXYGEN SATURATION: 97 % | RESPIRATION RATE: 18 BRPM | WEIGHT: 125.2 LBS | HEART RATE: 57 BPM | BODY MASS INDEX: 18.52 KG/M2

## 2025-08-29 DIAGNOSIS — G89.3 CANCER RELATED PAIN: ICD-10-CM

## 2025-08-29 DIAGNOSIS — R53.81 PHYSICAL DECONDITIONING: ICD-10-CM

## 2025-08-29 DIAGNOSIS — C34.90 MALIGNANT NEOPLASM OF LUNG, UNSPECIFIED LATERALITY, UNSPECIFIED PART OF LUNG (MULTI): ICD-10-CM

## 2025-08-29 DIAGNOSIS — R63.4 WEIGHT LOSS: ICD-10-CM

## 2025-08-29 DIAGNOSIS — Z51.5 PALLIATIVE CARE ENCOUNTER: Primary | ICD-10-CM

## 2025-08-29 PROCEDURE — 1125F AMNT PAIN NOTED PAIN PRSNT: CPT

## 2025-08-29 PROCEDURE — 99214 OFFICE O/P EST MOD 30 MIN: CPT

## 2025-08-29 PROCEDURE — 1159F MED LIST DOCD IN RCRD: CPT

## 2025-08-29 RX ORDER — OLANZAPINE 5 MG/1
5 TABLET, FILM COATED ORAL
Qty: 30 TABLET | Refills: 2 | Status: SHIPPED | OUTPATIENT
Start: 2025-08-29 | End: 2025-11-27

## 2025-08-29 ASSESSMENT — PAIN SCALES - GENERAL: PAINLEVEL_OUTOF10: 6

## 2025-09-03 ENCOUNTER — APPOINTMENT (OUTPATIENT)
Dept: HEMATOLOGY/ONCOLOGY | Facility: CLINIC | Age: 65
End: 2025-09-03
Payer: COMMERCIAL

## 2025-09-25 ENCOUNTER — APPOINTMENT (OUTPATIENT)
Dept: CARDIOLOGY | Facility: CLINIC | Age: 65
End: 2025-09-25
Payer: COMMERCIAL